# Patient Record
Sex: MALE | Race: WHITE | NOT HISPANIC OR LATINO | Employment: OTHER | URBAN - METROPOLITAN AREA
[De-identification: names, ages, dates, MRNs, and addresses within clinical notes are randomized per-mention and may not be internally consistent; named-entity substitution may affect disease eponyms.]

---

## 2017-01-20 ENCOUNTER — ALLSCRIPTS OFFICE VISIT (OUTPATIENT)
Dept: OTHER | Facility: OTHER | Age: 67
End: 2017-01-20

## 2017-01-30 ENCOUNTER — ALLSCRIPTS OFFICE VISIT (OUTPATIENT)
Dept: OTHER | Facility: OTHER | Age: 67
End: 2017-01-30

## 2017-03-06 ENCOUNTER — ALLSCRIPTS OFFICE VISIT (OUTPATIENT)
Dept: OTHER | Facility: OTHER | Age: 67
End: 2017-03-06

## 2017-03-06 DIAGNOSIS — Z00.00 ENCOUNTER FOR GENERAL ADULT MEDICAL EXAMINATION WITHOUT ABNORMAL FINDINGS: ICD-10-CM

## 2017-03-16 ENCOUNTER — ALLSCRIPTS OFFICE VISIT (OUTPATIENT)
Dept: OTHER | Facility: OTHER | Age: 67
End: 2017-03-16

## 2017-06-05 ENCOUNTER — ALLSCRIPTS OFFICE VISIT (OUTPATIENT)
Dept: OTHER | Facility: OTHER | Age: 67
End: 2017-06-05

## 2017-06-05 DIAGNOSIS — S39.012A STRAIN OF MUSCLE, FASCIA AND TENDON OF LOWER BACK, INITIAL ENCOUNTER: ICD-10-CM

## 2017-06-05 DIAGNOSIS — Z00.00 ENCOUNTER FOR GENERAL ADULT MEDICAL EXAMINATION WITHOUT ABNORMAL FINDINGS: ICD-10-CM

## 2017-06-07 ENCOUNTER — GENERIC CONVERSION - ENCOUNTER (OUTPATIENT)
Dept: OTHER | Facility: OTHER | Age: 67
End: 2017-06-07

## 2017-06-07 ENCOUNTER — APPOINTMENT (OUTPATIENT)
Dept: PHYSICAL THERAPY | Facility: CLINIC | Age: 67
End: 2017-06-07
Payer: MEDICARE

## 2017-06-07 PROCEDURE — G8991 OTHER PT/OT GOAL STATUS: HCPCS

## 2017-06-07 PROCEDURE — 97162 PT EVAL MOD COMPLEX 30 MIN: CPT

## 2017-06-07 PROCEDURE — G8990 OTHER PT/OT CURRENT STATUS: HCPCS

## 2017-06-07 PROCEDURE — 97140 MANUAL THERAPY 1/> REGIONS: CPT

## 2017-06-09 ENCOUNTER — APPOINTMENT (OUTPATIENT)
Dept: PHYSICAL THERAPY | Facility: CLINIC | Age: 67
End: 2017-06-09
Payer: MEDICARE

## 2017-06-09 PROCEDURE — 97014 ELECTRIC STIMULATION THERAPY: CPT

## 2017-06-09 PROCEDURE — 97140 MANUAL THERAPY 1/> REGIONS: CPT

## 2017-06-14 ENCOUNTER — APPOINTMENT (OUTPATIENT)
Dept: PHYSICAL THERAPY | Facility: CLINIC | Age: 67
End: 2017-06-14
Payer: MEDICARE

## 2017-06-14 PROCEDURE — 97014 ELECTRIC STIMULATION THERAPY: CPT

## 2017-06-14 PROCEDURE — 97140 MANUAL THERAPY 1/> REGIONS: CPT

## 2017-06-14 PROCEDURE — 97110 THERAPEUTIC EXERCISES: CPT

## 2017-06-16 ENCOUNTER — APPOINTMENT (OUTPATIENT)
Dept: PHYSICAL THERAPY | Facility: CLINIC | Age: 67
End: 2017-06-16
Payer: MEDICARE

## 2017-06-16 PROCEDURE — 97140 MANUAL THERAPY 1/> REGIONS: CPT

## 2017-06-16 PROCEDURE — 97014 ELECTRIC STIMULATION THERAPY: CPT

## 2017-06-16 PROCEDURE — 97110 THERAPEUTIC EXERCISES: CPT

## 2017-06-21 ENCOUNTER — APPOINTMENT (OUTPATIENT)
Dept: PHYSICAL THERAPY | Facility: CLINIC | Age: 67
End: 2017-06-21
Payer: MEDICARE

## 2017-06-21 PROCEDURE — 97140 MANUAL THERAPY 1/> REGIONS: CPT

## 2017-06-21 PROCEDURE — 97014 ELECTRIC STIMULATION THERAPY: CPT

## 2017-06-21 PROCEDURE — 97110 THERAPEUTIC EXERCISES: CPT

## 2017-06-23 ENCOUNTER — APPOINTMENT (OUTPATIENT)
Dept: PHYSICAL THERAPY | Facility: CLINIC | Age: 67
End: 2017-06-23
Payer: MEDICARE

## 2017-06-23 PROCEDURE — 97014 ELECTRIC STIMULATION THERAPY: CPT

## 2017-06-23 PROCEDURE — 97140 MANUAL THERAPY 1/> REGIONS: CPT

## 2017-06-23 PROCEDURE — 97110 THERAPEUTIC EXERCISES: CPT

## 2017-06-28 ENCOUNTER — APPOINTMENT (OUTPATIENT)
Dept: PHYSICAL THERAPY | Facility: CLINIC | Age: 67
End: 2017-06-28
Payer: MEDICARE

## 2017-06-28 PROCEDURE — 97110 THERAPEUTIC EXERCISES: CPT

## 2017-06-28 PROCEDURE — 97140 MANUAL THERAPY 1/> REGIONS: CPT

## 2017-06-28 PROCEDURE — 97014 ELECTRIC STIMULATION THERAPY: CPT

## 2017-06-30 ENCOUNTER — APPOINTMENT (OUTPATIENT)
Dept: PHYSICAL THERAPY | Facility: CLINIC | Age: 67
End: 2017-06-30
Payer: MEDICARE

## 2017-06-30 PROCEDURE — 97140 MANUAL THERAPY 1/> REGIONS: CPT

## 2017-06-30 PROCEDURE — 97014 ELECTRIC STIMULATION THERAPY: CPT

## 2017-06-30 PROCEDURE — 97110 THERAPEUTIC EXERCISES: CPT

## 2017-07-05 ENCOUNTER — APPOINTMENT (OUTPATIENT)
Dept: PHYSICAL THERAPY | Facility: CLINIC | Age: 67
End: 2017-07-05
Payer: MEDICARE

## 2017-07-05 PROCEDURE — 97014 ELECTRIC STIMULATION THERAPY: CPT

## 2017-07-05 PROCEDURE — 97140 MANUAL THERAPY 1/> REGIONS: CPT

## 2017-07-05 PROCEDURE — 97110 THERAPEUTIC EXERCISES: CPT

## 2017-07-07 ENCOUNTER — APPOINTMENT (OUTPATIENT)
Dept: PHYSICAL THERAPY | Facility: CLINIC | Age: 67
End: 2017-07-07
Payer: MEDICARE

## 2017-07-12 ENCOUNTER — APPOINTMENT (OUTPATIENT)
Dept: PHYSICAL THERAPY | Facility: CLINIC | Age: 67
End: 2017-07-12
Payer: MEDICARE

## 2017-07-12 PROCEDURE — 97110 THERAPEUTIC EXERCISES: CPT

## 2017-07-12 PROCEDURE — 97014 ELECTRIC STIMULATION THERAPY: CPT

## 2017-07-12 PROCEDURE — 97140 MANUAL THERAPY 1/> REGIONS: CPT

## 2017-07-14 ENCOUNTER — APPOINTMENT (OUTPATIENT)
Dept: PHYSICAL THERAPY | Facility: CLINIC | Age: 67
End: 2017-07-14
Payer: MEDICARE

## 2017-07-19 ENCOUNTER — APPOINTMENT (OUTPATIENT)
Dept: PHYSICAL THERAPY | Facility: CLINIC | Age: 67
End: 2017-07-19
Payer: MEDICARE

## 2017-07-21 ENCOUNTER — APPOINTMENT (OUTPATIENT)
Dept: PHYSICAL THERAPY | Facility: CLINIC | Age: 67
End: 2017-07-21
Payer: MEDICARE

## 2017-08-08 ENCOUNTER — ALLSCRIPTS OFFICE VISIT (OUTPATIENT)
Dept: OTHER | Facility: OTHER | Age: 67
End: 2017-08-08

## 2017-08-11 ENCOUNTER — GENERIC CONVERSION - ENCOUNTER (OUTPATIENT)
Dept: OTHER | Facility: OTHER | Age: 67
End: 2017-08-11

## 2017-11-06 ENCOUNTER — GENERIC CONVERSION - ENCOUNTER (OUTPATIENT)
Dept: OTHER | Facility: OTHER | Age: 67
End: 2017-11-06

## 2017-11-06 ENCOUNTER — ALLSCRIPTS OFFICE VISIT (OUTPATIENT)
Dept: OTHER | Facility: OTHER | Age: 67
End: 2017-11-06

## 2017-11-06 DIAGNOSIS — I10 ESSENTIAL (PRIMARY) HYPERTENSION: ICD-10-CM

## 2017-11-06 DIAGNOSIS — E78.5 HYPERLIPIDEMIA: ICD-10-CM

## 2017-11-06 DIAGNOSIS — R73.09 OTHER ABNORMAL GLUCOSE: ICD-10-CM

## 2017-11-06 DIAGNOSIS — Z12.5 ENCOUNTER FOR SCREENING FOR MALIGNANT NEOPLASM OF PROSTATE: ICD-10-CM

## 2017-11-06 DIAGNOSIS — K21.9 GASTRO-ESOPHAGEAL REFLUX DISEASE WITHOUT ESOPHAGITIS: ICD-10-CM

## 2018-01-12 VITALS
BODY MASS INDEX: 36.96 KG/M2 | DIASTOLIC BLOOD PRESSURE: 70 MMHG | OXYGEN SATURATION: 94 % | HEART RATE: 85 BPM | HEIGHT: 71 IN | SYSTOLIC BLOOD PRESSURE: 120 MMHG | WEIGHT: 264 LBS

## 2018-01-12 VITALS
SYSTOLIC BLOOD PRESSURE: 136 MMHG | BODY MASS INDEX: 36.4 KG/M2 | HEIGHT: 71 IN | DIASTOLIC BLOOD PRESSURE: 62 MMHG | OXYGEN SATURATION: 89 % | HEART RATE: 72 BPM | WEIGHT: 260 LBS

## 2018-01-12 VITALS
BODY MASS INDEX: 35.84 KG/M2 | HEART RATE: 59 BPM | OXYGEN SATURATION: 97 % | SYSTOLIC BLOOD PRESSURE: 120 MMHG | DIASTOLIC BLOOD PRESSURE: 72 MMHG | WEIGHT: 256 LBS | HEIGHT: 71 IN

## 2018-01-12 VITALS
WEIGHT: 258 LBS | OXYGEN SATURATION: 98 % | BODY MASS INDEX: 36.12 KG/M2 | HEART RATE: 62 BPM | HEIGHT: 71 IN | TEMPERATURE: 97.1 F | SYSTOLIC BLOOD PRESSURE: 136 MMHG | DIASTOLIC BLOOD PRESSURE: 82 MMHG

## 2018-01-14 VITALS
WEIGHT: 271 LBS | HEART RATE: 76 BPM | HEIGHT: 71 IN | OXYGEN SATURATION: 98 % | SYSTOLIC BLOOD PRESSURE: 142 MMHG | DIASTOLIC BLOOD PRESSURE: 80 MMHG | BODY MASS INDEX: 37.94 KG/M2

## 2018-01-14 VITALS
DIASTOLIC BLOOD PRESSURE: 84 MMHG | OXYGEN SATURATION: 95 % | WEIGHT: 275 LBS | HEIGHT: 71 IN | SYSTOLIC BLOOD PRESSURE: 128 MMHG | HEART RATE: 70 BPM | BODY MASS INDEX: 38.5 KG/M2

## 2018-01-22 VITALS
OXYGEN SATURATION: 96 % | HEART RATE: 80 BPM | SYSTOLIC BLOOD PRESSURE: 100 MMHG | DIASTOLIC BLOOD PRESSURE: 74 MMHG | WEIGHT: 233 LBS | BODY MASS INDEX: 32.62 KG/M2 | HEIGHT: 71 IN

## 2018-01-23 NOTE — PROGRESS NOTES
Assessment   1  Encounter for preventive health examination (V70 0) (Z00 00)  2  Hypertension (401 9) (I10)  3  Acute asthmatic bronchitis (493 90) (J40 902)    Plan  Acute asthmatic bronchitis    · Start: DrRx Zithromax Z-Mesfin 250 MG #6 pill pack; 2 TABLETS PO TODAY AND ONE  TABLET PO DAILY THEREAFTER FOR 4 DAYS   · Start: Ventolin  (90 Base) MCG/ACT Inhalation Aerosol Solution; INHALE 2  PUFFS EVERY 4 HOURS AS NEEDED  Elevated glucose    · (1) HEMOGLOBIN A1C; Status:Active; Requested EEN:[id number];   GERD without esophagitis    · (1) CBC/PLT/DIFF; Status:Active; Requested UUW:[id number];   Hyperlipidemia    · (1) LIPID PANEL FASTING W DIRECT LDL REFLEX; Status:Active; Requested  OXI:[id number];   Hypertension    · Stop: Amlodipine Besylate-Valsartan 5-160 MG Oral Tablet   · Start: Valsartan 160 MG Oral Tablet; TAKE 1 TABLET ONCE DAILY   · (1) COMPREHENSIVE METABOLIC PANEL; Status:Active; Requested UYD:[id number];    · (1) URINALYSIS (will reflex a microscopy if leukocytes, occult blood, protein or nitrites  are not within normal limits); Status:Active; Requested MUQ:[id number];   PMH: Encounter for prostate cancer screening, Hyperlipidemia    · (1) PSA (SCREEN) (Dx V76 44 Screen for Prostate Cancer); Status:Active; Requested  SKA:[id number];     Discussion/Summary    Prevention: Breonna Echavarria was reminded that he is due for a 5 year colonoscopy last performed on July 23, 2012 and I wrote him reminders for him to contact his Gastroenterology group  I also wrote him a reminder that he is due for an annual dermatology screening visit and he will make his own appointment  He had a flu shot today, reports previous Pneumovax and Zostavax, and I recommended Prevnar booster but he declines but may consider it next visit  He is up-to-date on eye care and dental care without active problems      He continues to lose substantial amounts of weight slowly and steadily on a reduced calorie diet and a vigorous exercise program with excellent exercise tolerance, no chest pain or shortness of breath and his goal is to achieve weight below 200 lb  He was encouraged to continue his significant lifestyle improvement  Hypertension with orthostatic hypotension: Home blood pressure readings are time are showing systolic blood pressures around 100 to 106 associated with orthostatic lightheadedness  Plan is to discontinue amlodipine 5 mg daily while continuing valsartan 160 mg once daily and to come in in 1 week for a blood pressure check performed by nursing and I will review results and further advise  Asthmatic bronchitis: Zithromax Z-Mesfin and Ventolin meter dose inhaler were prescribed and I asked him to start these medicines right away, expect prompt improvement, call not significantly better in 2 days or for other problems  Chief Complaint  pt is here for physical      History of Present Illness  HPI: Myke Danielson is here for his annual preventive examination and has lost an additional 17 lb since his last visit here on August 8th, through continuing his healthy exercise program, which is mainly aerobic in nature, and markedly improved diet  Muscular cramps after exercise, that occurred after increasing protein intake have resolved by increasing fluid intake after he call about this problem  His goal to achieve weight of 200 lb or below ultimately  He is also noticing orthostatic lightheadedness and home blood pressure readings are x1 363 to 861 systolic with blood pressures running low  He has not adjusted his blood pressure medicines on his own  Two days ago he developed cough and wheezing, brown sputum production without fever, chills, any does have mild dyspnea at rest and with exertion  He denies sore throat or sinus congestion or any ear congestion or pain  We reviewed preventive care and he is due for flu shot which was given and Prevnar which she declines but may consider next visit   His last colonoscopy was in  and he is on a 5 year recall list and is due and was referred back to his Gastroenterology group  He does follow-up with Urology and takes tamsulosin does have erectile dysfunction, and takes Viagra as needed  He is due for annual lab work which was ordered at this time  Review of systems: As above, all others negative  Welcome to [employer] and Wellness Visits: The patient is being seen for the subsequent annual wellness visit  Medicare Screening and Risk Factors   Hospitalizations: he has been previously hospitalizied and 1  Medicare Screening Tests Risk Questions   Abdominal aortic aneurysm risk assessment: over 72years of age  Osteoporosis risk assessment: none indicated  HIV risk assessment: none indicated  Drug and Alcohol Use: The patient is a former cigarette smoker  The patient reports frequent alcohol use  He has never used illicit drugs  Diet and Physical Activity: Current diet includes well balanced meals, low fat food choices, low carbohydrate food choices, low salt food choices, 1 servings of fruit per day, 1 servings of vegetables per day, 1 servings of whole grains per day, 1 servings of simple carbohydrates per day and 2 cups of coffee per day  He exercises daily  Exercise: walking, strength training 35 minutes per day  Mood Disorder and Cognitive Impairment Screenin  He denies feeling down, depressed, or hopeless over the past two weeks1   He denies feeling little interest or pleasure in doing things over the past two weeks1   Cognitive impairment screenin  denies difficulty learning/retaining new information1 , denies difficulty handling complex tasks1 , denies difficulty with reasoning1 , denies difficulty with spatial ability and orientation1 , denies difficulty with language1  and denies difficulty with behavior1   Functional Ability/Level of Safety:1  Hearing is1  normal bilaterally1   He denies hearing difficulties1  1  He does not use a hearing aid  1   The patient is currently1  able to do activities of daily living without limitations1 , able to do instrumental activities of daily living without limitations1 , able to participate in social activities without limitations1  and able to drive without limitations1   Activities of daily living details:1  does not need help using the phone1 , no transportation help needed1 , does not need help shopping1 , no meal preparation help needed1 , does not need help doing housework1 , does not need help doing laundry1 , does not need help managing medications1  and does not need help managing money1   Fall risk factors: 1  The patient fell 0 times in the past 12 months  1   Home safety risk factors: 1  no unfamiliar surroundings1 , no loose rugs1 , no poor household lighting1 , no uneven floors1 , no household clutter1 , grab bars in the bathroom1  and handrails on the stairs1   Advance Directives:1  Advance directives: 1  living will1  and durable power of  for health care directives1   Co-Managers and Medical Equipment/Suppliers: See Patient Care Team   Preventive Quality Program 65 and Older: Falls Risk:1  The patient fell 0 times in the past 12 months  1   The patient is currently asymptomatic1  Symptoms Include: 1    The patient currently has no urinary incontinence symptoms1   Date of last glaucoma screen was1  [address] [address] Amended By: Tabitha Espinoza; Nov 06 2017 4:31 PM EST    Active Problems   1  Anxiety (300 00) (F41 9)  2  BPH with obstruction/lower urinary tract symptoms (600 01,599 69) (N40 1,N13 8)  3  Elevated glucose (790 29) (R73 09)  4  Erectile dysfunction of non-organic origin (302 72) (F52 21)  5  GERD without esophagitis (530 81) (K21 9)  6  Hyperlipidemia (272 4) (E78 5)  7  Hypertension (401 9) (I10)  8  Mechanical low back pain (724 2) (M54 5)  9  Morbid obesity (278 01) (E66 01)  10  Obstructive sleep apnea (327 23) (G47 33)  11  Osteoarthritis (715 90) (M19 90)  12   Psoriasis (696  1) (L40 9)  13  Right bundle branch block (426 4) (I45 10)    Past Medical History    · History of Closed fracture of distal end of fibula with routine healing, unspecified fracture  morphology, unspecified laterality, subsequent encounter (V54 16) (E61 651Q)   · Encounter for screening colonoscopy (V76 51) (Z12 11)    The active problems and past medical history were reviewed and updated today  Surgical History    · History of Arthroscopy Shoulder Left    The surgical history was reviewed and updated today  Family History  Mother    · Family history of arthritis (V17 7) (Z82 61)   · Family history of hypertension (V17 49) (Z82 49)   · Family history of irritable bowel syndrome (V18 59) (Z83 79)   · Family history of osteoporosis (V17 81) (Z82 62)  Maternal Half Brother    · Family history of arthritis (V17 7) (Z82 61)    The family history was reviewed and updated today  Social History    · Being A Social Drinker   · Never A Smoker   · Denied: History of Tobacco Use  The social history was reviewed and updated today  Current Meds  1  ALPRAZolam 0 5 MG Oral Tablet; take 1 tablet by mouth every 8 hours if needed for   anxiety; Therapy: 07OUI9469 to (Evaluate:14Mar2017)  Requested for: 45Bny5436; Last   Rx:75Zda7439 Ordered  2  Amlodipine Besylate-Valsartan 5-160 MG Oral Tablet; take 1 tablet by mouth once daily; Therapy: 04CMS7867 to (Evaluate:11Nov2017)  Requested for: 65YZX1520; Last   Rx:06Dom7023 Ordered  3  Amoxicillin 500 MG Oral Capsule; TAKE 4 CAPSULES 1 HOUR PRIOR TO   PROCEDURE; Therapy: 57FXN4251 to (Evaluate:22Tab5796)  Requested for: 92WDJ8454; Last   Rx:05Jun2017 Ordered  4  Cialis 20 MG Oral Tablet; take 1 tablet by mouth daily; Therapy: 72LPO7752 to (Evaluate:13Nov2017)  Requested for: 85TGG7866; Last   Rx:38Fbn0470 Ordered  5  Cyclobenzaprine HCl - 10 MG Oral Tablet; TAKE 1 TABLET AT BEDTIME;    Therapy: 20Uhw0414 to (Evaluate:10Oct2017)  Requested for: 14QCM2694; Last   Rx:52Naa9081 Ordered  6  Flomax 0 4 MG Oral Capsule; TAKE 2 CAPSULES PO QHS; Therapy: 18Gem9202 to (Evaluate:04Mar2018)  Requested for: 28DOD5708; Last   Rx:09Mar2017 Ordered  7  Fluocinonide 0 05 % External Solution; APPLY SPARINGLY TO AFFECTED AREA(S)   TWICE DAILY; Therapy: 93BQI3790 to (Last Rx:63Luv3080)  Requested for: 14WKE2820 Ordered  8  Meloxicam 7 5 MG Oral Tablet; take 1 tablet twice a day as needed; Therapy: 81KRY1343 to (Evaluate:49Evd0134)  Requested for: 29CYG7914; Last   Rx:67Urb5782 Ordered  9  Omeprazole 20 MG Oral Capsule Delayed Release; take 1 capsule by mouth once daily; Therapy: 99LLK9684 to (Evaluate:75Gqu3564)  Requested for: 70Rbw0368; Last   Rx:11Aug2017 Ordered  10  RaNITidine HCl - 150 MG Oral Tablet; TAKE 1 TABLET Every twelve hours; Therapy: 52XDZ6274 to (Evaluate:17Djq7127)  Requested for: 72NZV5650; Last    Rx:66Agv9428 Ordered  11  Tamsulosin HCl - 0 4 MG Oral Capsule; take 2 capsules by mouth at bedtime; Therapy: 23ULM4418 to (Evaluate:11Nov2017)  Requested for: 14VEL5245; Last    Rx:05Ttt0214 Ordered  12  Viagra 100 MG Oral Tablet; TAKE 1 TABLET DAILY 1 HOUR BEFORE NEEDED; Therapy: 79APK1282 to (Evaluate:19Nov2017)  Requested for: 0499 52 06 34; Last    Rx:45Amq7725 Ordered    The medication list was reviewed and updated today  Allergies   1  Shellfish-derived Products    Immunizations   1 2 3    Influenza  09-Axel-2014 05-Nov-2015 24-Oct-2016    Tdap  14-May-2015      Zoster  24-Jan-2014       Vitals  Signs    Heart Rate: 80  Systolic: 331  Diastolic: 74  Height: 5 ft 11 in  Weight: 233 lb   BMI Calculated: 32 5  BSA Calculated: 2 25  O2 Saturation: 96    Physical Exam    Constitutional   General appearance: No acute distress, well appearing and well nourished  Head and Face   Head and face: Normal     Palpation of the face and sinuses: No sinus tenderness  Eyes   Conjunctiva and lids: No erythema, swelling or discharge      Pupils and irises: Equal, round, reactive to light  Ears, Nose, Mouth, and Throat   External inspection of ears and nose: Normal     Otoscopic examination: Tympanic membranes translucent with normal light reflex  Canals patent without erythema  Hearing: Normal     Nasal mucosa, septum, and turbinates: Normal without edema or erythema  Lips, teeth, and gums: Normal, good dentition  Oropharynx: Normal with no erythema, edema, exudate or lesions  Neck   Neck: Supple, symmetric, trachea midline, no masses  Thyroid: Normal, no thyromegaly  Pulmonary   Respiratory effort: No increased work of breathing or signs of respiratory distress  Percussion of chest: Normal   Diffuse inspiratory and expiratory wheezing without stridor, with slight prolongation of expiratory phase of respiration  There are no rales or focal decreased breath sounds  Cardiovascular   Auscultation of heart: Normal rate and rhythm, normal S1 and S2, no murmurs  Carotid pulses: 2+ bilaterally  Abdominal aorta: Normal     Pedal pulses: 2+ bilaterally  Peripheral vascular exam: Normal     Examination of extremities for edema and/or varicosities: Normal     Abdomen   Abdomen: Non-tender, no masses  Liver and spleen: No hepatomegaly or splenomegaly  Examination for hernias: No hernias appreciated  Lymphatic   Palpation of lymph nodes in neck: No lymphadenopathy  Palpation of lymph nodes in other areas: No lymphadenopathy  Musculoskeletal   Gait and station: Normal     Inspection/palpation of digits and nails: Normal without clubbing or cyanosis  Inspection/palpation of joints, bones, and muscles: Normal     Range of motion: Normal     Stability: Normal     Muscle strength/tone: Normal     Skin Some some related skin damage including hyper trick [place] and some premature aging of the skin, no rash or skin malignancy is noted  Neurologic   Cranial nerves: Cranial nerves 2-12 intact  Cortical function: Normal mental status  Sensation: No sensory loss  Coordination: Normal finger to nose and heel to shin  Psychiatric   Judgment and insight: Normal     Orientation to person, place and time: Normal     Recent and remote memory: Intact      Mood and affect: Normal        Signatures   Electronically signed by : LING Florence ; Nov 6 2017  4:47PM EST                       (Author)

## 2018-03-12 ENCOUNTER — OFFICE VISIT (OUTPATIENT)
Dept: INTERNAL MEDICINE CLINIC | Facility: CLINIC | Age: 68
End: 2018-03-12
Payer: MEDICARE

## 2018-03-12 VITALS
BODY MASS INDEX: 32.62 KG/M2 | HEART RATE: 73 BPM | TEMPERATURE: 98.1 F | HEIGHT: 71 IN | OXYGEN SATURATION: 95 % | SYSTOLIC BLOOD PRESSURE: 122 MMHG | WEIGHT: 233 LBS | DIASTOLIC BLOOD PRESSURE: 60 MMHG

## 2018-03-12 DIAGNOSIS — E66.9 OBESITY (BMI 30.0-34.9): ICD-10-CM

## 2018-03-12 DIAGNOSIS — I10 ESSENTIAL HYPERTENSION: Primary | ICD-10-CM

## 2018-03-12 DIAGNOSIS — M19.90 OSTEOARTHRITIS: ICD-10-CM

## 2018-03-12 DIAGNOSIS — R73.09 ELEVATED GLUCOSE: ICD-10-CM

## 2018-03-12 PROBLEM — E66.01 MORBID OBESITY (HCC): Status: ACTIVE | Noted: 2017-08-08

## 2018-03-12 PROBLEM — E66.811 OBESITY (BMI 30.0-34.9): Status: ACTIVE | Noted: 2017-08-08

## 2018-03-12 PROCEDURE — 99214 OFFICE O/P EST MOD 30 MIN: CPT | Performed by: INTERNAL MEDICINE

## 2018-03-12 RX ORDER — TAMSULOSIN HYDROCHLORIDE 0.4 MG/1
2 CAPSULE ORAL DAILY
COMMUNITY
Start: 2011-04-16 | End: 2018-08-02 | Stop reason: SDUPTHER

## 2018-03-12 RX ORDER — SILDENAFIL 100 MG/1
1 TABLET, FILM COATED ORAL DAILY PRN
COMMUNITY
Start: 2017-10-26 | End: 2018-05-21 | Stop reason: ALTCHOICE

## 2018-03-12 RX ORDER — FLUOCINONIDE TOPICAL SOLUTION USP, 0.05% 0.5 MG/ML
1 SOLUTION TOPICAL 2 TIMES DAILY
COMMUNITY
Start: 2015-09-28 | End: 2018-03-12 | Stop reason: ALTCHOICE

## 2018-03-12 RX ORDER — FEXOFENADINE HCL AND PSEUDOEPHEDRINE HCI 180; 240 MG/1; MG/1
1 TABLET, EXTENDED RELEASE ORAL
COMMUNITY
End: 2019-06-18

## 2018-03-12 RX ORDER — MELOXICAM 7.5 MG/1
1 TABLET ORAL 2 TIMES DAILY PRN
COMMUNITY
Start: 2017-06-05 | End: 2018-03-12 | Stop reason: ALTCHOICE

## 2018-03-12 RX ORDER — ALPRAZOLAM 0.5 MG/1
1 TABLET ORAL EVERY 8 HOURS PRN
COMMUNITY
Start: 2017-01-20 | End: 2018-04-04 | Stop reason: ALTCHOICE

## 2018-03-12 RX ORDER — CYCLOBENZAPRINE HCL 10 MG
1 TABLET ORAL DAILY
COMMUNITY
Start: 2011-08-25 | End: 2018-11-19 | Stop reason: ALTCHOICE

## 2018-03-12 RX ORDER — VALSARTAN 80 MG/1
40 TABLET ORAL DAILY
COMMUNITY
Start: 2017-11-06 | End: 2018-05-21 | Stop reason: SDUPTHER

## 2018-03-12 RX ORDER — VALSARTAN 80 MG/1
80 TABLET ORAL
COMMUNITY
End: 2018-03-12 | Stop reason: SDUPTHER

## 2018-03-12 RX ORDER — RANITIDINE 150 MG/1
1 TABLET ORAL EVERY 12 HOURS
COMMUNITY
Start: 2016-07-15 | End: 2018-03-12 | Stop reason: ALTCHOICE

## 2018-03-12 RX ORDER — OMEPRAZOLE 20 MG/1
1 CAPSULE, DELAYED RELEASE ORAL DAILY
COMMUNITY
Start: 2012-08-14 | End: 2018-12-25 | Stop reason: SDUPTHER

## 2018-03-12 RX ORDER — TAMSULOSIN HYDROCHLORIDE 0.4 MG/1
0.4 CAPSULE ORAL
COMMUNITY
End: 2018-03-12 | Stop reason: ALTCHOICE

## 2018-03-12 RX ORDER — ALBUTEROL SULFATE 90 UG/1
2 AEROSOL, METERED RESPIRATORY (INHALATION) EVERY 4 HOURS PRN
COMMUNITY
Start: 2017-11-06 | End: 2018-03-12 | Stop reason: ALTCHOICE

## 2018-03-12 RX ORDER — OMEPRAZOLE 10 MG/1
10 CAPSULE, DELAYED RELEASE ORAL
COMMUNITY
End: 2019-06-18

## 2018-03-12 NOTE — PROGRESS NOTES
HPI:  Carol Bahena returns today for a 4 month checkup  He continues to follow a very healthy diet and exercises 6 days a week, combination aerobic activity and weight each day  He continues to have excellent exercise tolerance  He has a history of morbid obesity and has lost more than 80 lb  His weight is the same as last visit and his weight was 9 lb less than today in early February, but his diet was not sustainable and not healthy on review  He has since did adopted a very healthy and sustainable diet  His goal is still to reduce his weight to 200 lb  He reports that he did have his lab work performed after his last visit as part of his annual exam, and we did not received results and they will be requested  He reports that he did receive a report from the lab and that labs were very good  Hypertension is well controlled on low-dose valsartan 80 mg daily with no orthostatic lightheadedness and we reviewed his home readings which are averaging in the mid 873Y systolic and 97U diastolic  He has no symptoms of elevated glucose with previous history of pre diabetes, much improved in the last year with weight reduction  He has occasional musculoskeletal complaints but has not had any problems that have caused him to stop exercising  Physical exam:  Vital signs stable with regular pulse  Weight is unchanged from last time and BMI is now 32  No JVD  Lungs clear cardiac exam is normal   There is no peripheral edema  Assessment and plan:    Essential hypertension:  Continue home monitoring of blood pressure on valsartan 80 mg daily with plan to call if systolic blood pressure start averaging consistently below 120  His goal was to further reduce his weight, through exercise and diet, to treat hypertension without medication  Reassess in 3 months      Obesity:  He is now Re classified out of the morbid obesity range, with 2 readings with BMI of 32, goal to continue healthy lifestyle for long run and continue to lose weight  Reassess in 3 months  Glucose intolerance:  Check lab work from November, and if next hemoglobin A1c is in the normal range, Re classify patient has no longer having glucose intolerance  Osteoarthritis:  Symptoms are slowly decreasing, especially in the low back region with weight reduction  He is avoiding analgesics

## 2018-03-12 NOTE — PATIENT INSTRUCTIONS
Continue to monitor blood pressure and if your systolic blood pressures start averaging systole below 120, please call the office and we will consider stopping valsartan at that time  Please continue your long-term lifestyle improvements, and your next visit will be in 3 months

## 2018-03-29 ENCOUNTER — TELEPHONE (OUTPATIENT)
Dept: INTERNAL MEDICINE CLINIC | Facility: CLINIC | Age: 68
End: 2018-03-29

## 2018-03-31 NOTE — TELEPHONE ENCOUNTER
Kristine Philippe will need to come in for a quick visit to discuss, then we'll send a note to [facility]

## 2018-04-02 NOTE — TELEPHONE ENCOUNTER
Patient scheduled to see you on Monday  He will try to get the results of his study from pulmonary associate

## 2018-04-04 ENCOUNTER — OFFICE VISIT (OUTPATIENT)
Dept: INTERNAL MEDICINE CLINIC | Facility: CLINIC | Age: 68
End: 2018-04-04
Payer: MEDICARE

## 2018-04-04 VITALS
TEMPERATURE: 97.5 F | WEIGHT: 228 LBS | HEIGHT: 71 IN | OXYGEN SATURATION: 97 % | SYSTOLIC BLOOD PRESSURE: 120 MMHG | RESPIRATION RATE: 18 BRPM | HEART RATE: 73 BPM | DIASTOLIC BLOOD PRESSURE: 78 MMHG | BODY MASS INDEX: 31.92 KG/M2

## 2018-04-04 DIAGNOSIS — J01.90 ACUTE BACTERIAL SINUSITIS: ICD-10-CM

## 2018-04-04 DIAGNOSIS — B96.89 ACUTE BACTERIAL SINUSITIS: ICD-10-CM

## 2018-04-04 DIAGNOSIS — G47.33 OBSTRUCTIVE SLEEP APNEA: Primary | ICD-10-CM

## 2018-04-04 PROCEDURE — 99213 OFFICE O/P EST LOW 20 MIN: CPT | Performed by: INTERNAL MEDICINE

## 2018-04-04 RX ORDER — AMOXICILLIN 875 MG/1
875 TABLET, COATED ORAL 2 TIMES DAILY
Qty: 14 TABLET | Refills: 0 | Status: SHIPPED | OUTPATIENT
Start: 2018-04-04 | End: 2018-04-11

## 2018-04-04 NOTE — PROGRESS NOTES
Assessment/Plan:       Diagnoses and all orders for this visit:    Obstructive sleep apnea    Acute bacterial sinusitis  -     amoxicillin (AMOXIL) 875 mg tablet; Take 1 tablet (875 mg total) by mouth 2 (two) times a day for 7 days        BARB: Diagnosed in 2007  He is in need of a new machine  Settings for the CPAP are   12 cm of pressure  He will continue therapy as directed  In terms of his current symptoms, his right ear does reveal a middle ear effusion which may be viral in nature  However, he has already started therapy with Amoxicillin and will complete this  We discussed that he should also use Flonase for symptom relief  Otherwise no other changes were made  Subjective:      Patient ID: Mallory Clifford is a 78 y o  male  [place] is here today for 2 things  First, he reports right sided ear pain with some associated sinus symptoms and increased congestion  He reports that it has been present for 3-4 days  Note ROS below  He does report having amoxicillin to use prior to dental visits and did start this  Denies recent swimming or travel  He also needs a new script for a CPAP machine  See discussion  Earache    There is pain in the right ear  This is a new problem  The current episode started in the past 7 days  The problem has been gradually worsening  There has been no fever  The pain is moderate  Associated symptoms include coughing  Pertinent negatives include no ear discharge, headaches, hearing loss, rhinorrhea or sore throat  He has tried nothing for the symptoms  The treatment provided no relief  The following portions of the patient's history were reviewed and updated as appropriate: current medications, past family history, past medical history, past social history, past surgical history and problem list     Review of Systems   HENT: Positive for ear pain  Negative for ear discharge, hearing loss, rhinorrhea and sore throat  Respiratory: Positive for cough      Neurological: Negative for headaches  Objective:      /78 (BP Location: Left arm, Patient Position: Sitting, Cuff Size: Large)   Pulse 73   Temp 97 5 °F (36 4 °C) (Tympanic)   Resp 18   Ht 5' 11" (1 803 m)   Wt 103 kg (228 lb)   SpO2 97%   BMI 31 80 kg/m²          Physical Exam   Constitutional: He is oriented to person, place, and time  He appears well-developed and well-nourished  HENT:   Head: Normocephalic and atraumatic  Right Ear: A middle ear effusion is present  Eyes: Conjunctivae are normal    Cardiovascular: Normal rate, regular rhythm and normal heart sounds  Pulmonary/Chest: Effort normal and breath sounds normal  No respiratory distress  Neurological: He is alert and oriented to person, place, and time  Skin: Skin is dry  Psychiatric: He has a normal mood and affect   His behavior is normal  Judgment and thought content normal

## 2018-05-15 ENCOUNTER — TELEPHONE (OUTPATIENT)
Dept: INTERNAL MEDICINE CLINIC | Facility: CLINIC | Age: 68
End: 2018-05-15

## 2018-05-16 NOTE — TELEPHONE ENCOUNTER
Patient will be in Monday evening, in the meantime I did encourage he see the urologist ( one of the reasons he gets up at night is nocturia) so he will be in to see Dr Milena Camacho this Friday

## 2018-05-17 ENCOUNTER — OFFICE VISIT (OUTPATIENT)
Dept: UROLOGY | Facility: MEDICAL CENTER | Age: 68
End: 2018-05-17
Payer: MEDICARE

## 2018-05-17 VITALS
HEIGHT: 71 IN | BODY MASS INDEX: 31.08 KG/M2 | WEIGHT: 222 LBS | SYSTOLIC BLOOD PRESSURE: 120 MMHG | DIASTOLIC BLOOD PRESSURE: 73 MMHG

## 2018-05-17 DIAGNOSIS — N40.1 BPH WITH URINARY OBSTRUCTION: ICD-10-CM

## 2018-05-17 DIAGNOSIS — N13.8 BPH WITH URINARY OBSTRUCTION: ICD-10-CM

## 2018-05-17 DIAGNOSIS — N20.0 CALCULUS OF KIDNEY: Primary | ICD-10-CM

## 2018-05-17 DIAGNOSIS — R35.1 NOCTURIA: ICD-10-CM

## 2018-05-17 LAB
POST-VOID RESIDUAL VOLUME, ML POC: 25 ML
SL AMB  POCT GLUCOSE, UA: ABNORMAL
SL AMB LEUKOCYTE ESTERASE,UA: ABNORMAL
SL AMB POCT BILIRUBIN,UA: ABNORMAL
SL AMB POCT BLOOD,UA: ABNORMAL
SL AMB POCT CLARITY,UA: CLEAR
SL AMB POCT COLOR,UA: YELLOW
SL AMB POCT KETONES,UA: ABNORMAL
SL AMB POCT NITRITE,UA: ABNORMAL
SL AMB POCT PH,UA: 7
SL AMB POCT SPECIFIC GRAVITY,UA: 1.01
SL AMB POCT URINE PROTEIN: ABNORMAL
SL AMB POCT UROBILINOGEN: 0.2

## 2018-05-17 PROCEDURE — 99204 OFFICE O/P NEW MOD 45 MIN: CPT | Performed by: UROLOGY

## 2018-05-17 PROCEDURE — 51798 US URINE CAPACITY MEASURE: CPT | Performed by: UROLOGY

## 2018-05-17 PROCEDURE — 81003 URINALYSIS AUTO W/O SCOPE: CPT | Performed by: UROLOGY

## 2018-05-17 RX ORDER — VALSARTAN 80 MG/1
40 TABLET ORAL
COMMUNITY
End: 2018-07-18 | Stop reason: SDUPTHER

## 2018-05-17 RX ORDER — TADALAFIL 20 MG
20 TABLET ORAL DAILY
Refills: 0 | COMMUNITY
Start: 2018-05-10 | End: 2018-05-21 | Stop reason: ALTCHOICE

## 2018-05-17 RX ORDER — BETAMETHASONE VALERATE 1.2 MG/G
AEROSOL, FOAM TOPICAL
Refills: 0 | COMMUNITY
Start: 2018-04-05 | End: 2018-11-19 | Stop reason: ALTCHOICE

## 2018-05-17 RX ORDER — OXYBUTYNIN CHLORIDE 5 MG/1
TABLET ORAL
Qty: 60 TABLET | Refills: 3 | Status: SHIPPED | OUTPATIENT
Start: 2018-05-17 | End: 2018-08-30 | Stop reason: SDUPTHER

## 2018-05-17 NOTE — PROGRESS NOTES
Assessment/Plan:  1  Unclear cause of much more nighttime symptoms then daytime  He thinks Flomax helps but asked about other medicines  2   He should have cystoscopy, but is very reluctant without sedation  3   Trial of oxybutynin 5-10 mg Q evening, warned about side effects  4   Follow-up in 6 or 8 weeks, he will bring a  with him in case we want to do cysto with light IV sedation  No problem-specific Assessment & Plan notes found for this encounter  Diagnoses and all orders for this visit:    Calculus of kidney  -     POCT urine dip auto non-scope  -     POCT Measure PVR    Nocturia  -     POCT Measure PVR  -     oxybutynin (DITROPAN) 5 mg tablet; 1 j- 2 tablets before bed    BPH with urinary obstruction    Other orders  -     Acetaminophen 325 MG CAPS; Take 650 mg by mouth every 6 (six) hours  -     betamethasone valerate (LUXIQ) 0 12 % foam; apply topically to scalp once daily for 2 weeks if needed for FLARES  -     CIALIS 20 MG tablet; Take 20 mg by mouth daily  -     valsartan (DIOVAN) 80 mg tablet; Take 40 mg by mouth          Subjective:      Patient ID: Cherie Cooper is a 78 y o  male  1   Many years of voiding dysfunction  We saw him in 2011, started Flomax for nocturia, urgency, somewhat slower flow  Since that time he has been on varying dosages, currently taking 2 Flomax daily  He thinks helps overall, but still has nocturia x6  Tired from getting not much sleep  He has been on CPAP machine for  many years, he does not think that  sleep apnea is any part of his nocturia problem  Minimal daytime symptoms, decent flow and emptying  Drink lots of fluids during the daytime, nothing after 8:00 p m   No burning, incontinence, blood or infections      2   Small renal calculus on CT scan 2016, no symptoms        The following portions of the patient's history were reviewed and updated as appropriate: allergies, current medications, past family history, past medical history, past social history, past surgical history and problem list     Review of Systems   All other systems reviewed and are negative  Objective:      /73 (BP Location: Left arm, Patient Position: Sitting)   Ht 5' 11" (1 803 m)   Wt 101 kg (222 lb)   BMI 30 96 kg/m²          Physical Exam   Constitutional: He is oriented to person, place, and time  He appears well-developed and well-nourished  No distress  HENT:   Head: Normocephalic and atraumatic  Eyes: Conjunctivae are normal    Cardiovascular: Normal rate and regular rhythm  Pulmonary/Chest: Effort normal and breath sounds normal  No respiratory distress  He has no wheezes  Abdominal: Soft  Bowel sounds are normal  He exhibits no distension and no mass  There is no tenderness  Genitourinary: Testes normal and penis normal    Genitourinary Comments: Prostate mildly enlarged no nodules    PSA 0 3 in November 2017   Neurological: He is alert and oriented to person, place, and time  Skin: Skin is warm and dry  He is not diaphoretic

## 2018-05-21 ENCOUNTER — OFFICE VISIT (OUTPATIENT)
Dept: INTERNAL MEDICINE CLINIC | Facility: CLINIC | Age: 68
End: 2018-05-21
Payer: MEDICARE

## 2018-05-21 VITALS
BODY MASS INDEX: 31.08 KG/M2 | OXYGEN SATURATION: 97 % | WEIGHT: 222 LBS | HEIGHT: 71 IN | HEART RATE: 64 BPM | SYSTOLIC BLOOD PRESSURE: 126 MMHG | DIASTOLIC BLOOD PRESSURE: 62 MMHG

## 2018-05-21 DIAGNOSIS — R73.09 ELEVATED GLUCOSE: ICD-10-CM

## 2018-05-21 DIAGNOSIS — F52.21 ERECTILE DYSFUNCTION OF NON-ORGANIC ORIGIN: ICD-10-CM

## 2018-05-21 DIAGNOSIS — F51.01 PRIMARY INSOMNIA: ICD-10-CM

## 2018-05-21 DIAGNOSIS — E78.01 FAMILIAL HYPERCHOLESTEROLEMIA: Primary | ICD-10-CM

## 2018-05-21 DIAGNOSIS — I10 ESSENTIAL HYPERTENSION: ICD-10-CM

## 2018-05-21 DIAGNOSIS — IMO0002 ANTIBIOTIC PROPHYLAXIS FOR DENTAL PROCEDURE INDICATED DUE TO PRIOR JOINT REPLACEMENT: Primary | ICD-10-CM

## 2018-05-21 PROBLEM — B96.89 ACUTE BACTERIAL SINUSITIS: Status: RESOLVED | Noted: 2018-04-04 | Resolved: 2018-05-21

## 2018-05-21 PROBLEM — R35.1 NOCTURIA: Status: RESOLVED | Noted: 2018-05-17 | Resolved: 2018-05-21

## 2018-05-21 PROBLEM — J01.90 ACUTE BACTERIAL SINUSITIS: Status: RESOLVED | Noted: 2018-04-04 | Resolved: 2018-05-21

## 2018-05-21 PROCEDURE — 99214 OFFICE O/P EST MOD 30 MIN: CPT | Performed by: INTERNAL MEDICINE

## 2018-05-21 RX ORDER — AMOXICILLIN 500 MG/1
500 CAPSULE ORAL EVERY 8 HOURS SCHEDULED
Qty: 4 CAPSULE | Refills: 0 | Status: SHIPPED | OUTPATIENT
Start: 2018-05-21 | End: 2018-07-11 | Stop reason: SDUPTHER

## 2018-05-21 RX ORDER — ZOLPIDEM TARTRATE 5 MG/1
5 TABLET ORAL
Qty: 30 TABLET | Refills: 2 | Status: SHIPPED | OUTPATIENT
Start: 2018-05-21 | End: 2018-05-29 | Stop reason: SDUPTHER

## 2018-05-21 RX ORDER — SILDENAFIL CITRATE 20 MG/1
TABLET ORAL
Qty: 20 TABLET | Refills: 5 | Status: SHIPPED | OUTPATIENT
Start: 2018-05-21 | End: 2018-11-19 | Stop reason: ALTCHOICE

## 2018-05-21 NOTE — PATIENT INSTRUCTIONS
Please continue your improve lifestyle  Please follow-up as planned with your urologist and bring her lab work with you to this visit, May 21st   Your next visit will be in 6 months and please call sooner for any problems

## 2018-05-21 NOTE — PROGRESS NOTES
Assessment/Plan   Problem List Items Addressed This Visit     Elevated glucose    Erectile dysfunction of non-organic origin    Relevant Medications    sildenafil (REVATIO) 20 mg tablet    zolpidem (AMBIEN) 5 mg tablet    Hyperlipidemia - Primary    Primary insomnia    Relevant Medications    zolpidem (AMBIEN) 5 mg tablet      Elevated glucose:  Sustained improvement in lifestyle has normalized his hemoglobin A1c, currently of 5 7 with fasting glucose of 95 on May 21st of this year  His goals to continue lose weight, and continue work with his nutritionist and   Follow-up will be in 6 months and hemoglobin A1c and fasting glucose be repeated  Erectile dysfunction:  Plan is trial of low-dose sildenafil 40 mg as needed  Cialis was discontinued because of high cost     Hyperlipidemia:  May 21st lipids are optimal as discussed with patient today, with total cholesterol 204, HDL 65, cholesterol HDL ratio 3 14 and  with triglycerides 55 with substantial improvement in lifestyle which he was encouraged to continue  Primary insomnia:  He is having significant difficulty sleeping, not withstanding problems with prostatic symptoms, and did respond well to Ambien in the past   He is unable sleep without medication as he has classic primary insomnia  This is despite optimizing sleep hygiene, and lifestyle  Currently there is no associated anxiety or depression as well  Ambien will be restarted as needed  Hypertension:  Continue valsartan 80 mg daily and blood pressure is optimal with optimal lifestyle  Reassess in 6 months  Prostate cancer screening:  PSA is optimal at 0 38 active follow-up continues with Urology, including for symptomatic prostate muscular dysfunction with trial of Myrbetriq, with poor response to oxybutynin in combination with tamsulosin with active follow-up with Dr Wandy Rhodes his urologist   Odilon Higuera has opted against any cystoscopy      Subjective   Patient ID: Emma Tipton Gabbie King is a 78 y o  male  Vitals:    05/21/18 1801   BP: 126/62   Pulse: 64   SpO2: 97%     HPI   Classyayo Rivera is here for regular checkup  He exercises vigorously, going to the gym twice a day as optimize his diet  He is seeing a   Weight is declining slowly and steadily and BMI is now 31  We discussed these just outside the range of overweight category set of obesity  His goals have a BMI below 26  He feels great when he exercises including no chest pain or shortness of breath or declining exercise tolerance  Hypertension is well controlled with lifestyle medication, no orthostatic lightheadedness or edema  Hyperlipidemia is also controlled with lifestyle improvement  Labs of May 21st reviewed including lipid panel  We also reviewed normal CBC, optimal CMP including glucose of 95, hemoglobin A1c of 5 6, below the cut off for glucose intolerance for the first time in years, and PSA of 0 38, and negative urinalysis  He is encouraged by his labs and overall health improvements  He still struggling with symptoms from his prostate enlargement, unresponsive to oxybutynin for overactive bladder or to tamsulosin for prostatic enlargement  He is now being tried on Myrbetriq by Urology with follow-up planned  He has primary insomnia and tried to avoid prescription sleep medicines, including optimal sleep hygiene, trial of melatonin, optimizing lifestyle, and also he has through his  Anxiety after his break-up with his girlfriend  There is no depression or anxiety  He will need to restart Ambien is he is not sleeping  The following portions of the patient's history were reviewed and updated as appropriate: allergies, current medications, past family history, past medical history, past social history, past surgical history and problem list     Review of Systems    Objective   Physical Exam   Vital signs stable, regular pulse in no distress    He appears much more physically fit and healthy  Mood is very good  No JVD  Lungs clear cardiac exam is normal   Skin without pallor or icterus  There is no peripheral edema and peripheral circulation is intact

## 2018-05-22 ENCOUNTER — TELEPHONE (OUTPATIENT)
Dept: INTERNAL MEDICINE CLINIC | Facility: CLINIC | Age: 68
End: 2018-05-22

## 2018-05-22 NOTE — TELEPHONE ENCOUNTER
Please explain to Maira Ferro that he will not qualify for prior authorization approval, so his only options are to pay out-of-pocket or go back to Cialis or Brand name Viagra and pay out of pocket

## 2018-05-22 NOTE — TELEPHONE ENCOUNTER
Neither: 2, 20 mg tablets at the same time for a total dose of 40 mg per day (this is for the Sildenafil 20 mg rx I sent in)  For Viagra, the dose would be 50 mg once a day

## 2018-05-25 ENCOUNTER — TELEPHONE (OUTPATIENT)
Dept: INTERNAL MEDICINE CLINIC | Facility: CLINIC | Age: 68
End: 2018-05-25

## 2018-05-29 DIAGNOSIS — F51.01 PRIMARY INSOMNIA: ICD-10-CM

## 2018-05-30 RX ORDER — ZOLPIDEM TARTRATE 10 MG/1
10 TABLET ORAL
Qty: 30 TABLET | Refills: 5 | Status: SHIPPED | OUTPATIENT
Start: 2018-05-30 | End: 2018-06-04 | Stop reason: SDUPTHER

## 2018-06-04 ENCOUNTER — OFFICE VISIT (OUTPATIENT)
Dept: INTERNAL MEDICINE CLINIC | Facility: CLINIC | Age: 68
End: 2018-06-04
Payer: MEDICARE

## 2018-06-04 VITALS
HEART RATE: 68 BPM | BODY MASS INDEX: 32.06 KG/M2 | SYSTOLIC BLOOD PRESSURE: 122 MMHG | OXYGEN SATURATION: 98 % | DIASTOLIC BLOOD PRESSURE: 70 MMHG | HEIGHT: 71 IN | WEIGHT: 229 LBS

## 2018-06-04 DIAGNOSIS — G47.33 OBSTRUCTIVE SLEEP APNEA: Primary | ICD-10-CM

## 2018-06-04 DIAGNOSIS — F51.01 PRIMARY INSOMNIA: ICD-10-CM

## 2018-06-04 PROCEDURE — 99213 OFFICE O/P EST LOW 20 MIN: CPT | Performed by: INTERNAL MEDICINE

## 2018-06-04 RX ORDER — ZOLPIDEM TARTRATE 10 MG/1
10 TABLET ORAL
Qty: 30 TABLET | Refills: 2 | Status: SHIPPED | OUTPATIENT
Start: 2018-06-04 | End: 2018-12-05 | Stop reason: SDUPTHER

## 2018-06-04 NOTE — PROGRESS NOTES
Assessment/Plan   Problem List Items Addressed This Visit     Obstructive sleep apnea - Primary    Primary insomnia    Relevant Medications    zolpidem (AMBIEN) 10 mg tablet      Albaro Alvares is doing very well on long-term CPAP therapy at 12 cm water, for obstructive sleep apnea  His compliance seems excellent  Plans continue current treatment indefinitely  Subjective   Patient ID: Becca Mo is a 78 y o  male  This is a visit regarding assessment of treatment for sleep apnea  Vitals:    06/04/18 1737   BP: 122/70   Pulse: 68   SpO2: 98%     HPI   Sp feels well  He is here for evaluation of long-term therapy for obstructive sleep apnea with CPAP  His CPAP setting is at 12 cm of water a reports feeling well rested when he wakes up, and uses CPAP faithfully  He reports no decline in efficacy over time  Primary insomnia is responding well to Ambien at the 10 mg dose but not so well at the 5 mg dose  There is no effect of Ambien therapy on how he feels on CPAP therapy in regards to treating sleep apnea  The following portions of the patient's history were reviewed and updated as appropriate: allergies, current medications, past family history, past medical history, past social history, past surgical history and problem list   Patient Active Problem List   Diagnosis    Anxiety    BPH with urinary obstruction    Elevated glucose    Erectile dysfunction of non-organic origin    GERD without esophagitis    Hyperlipidemia    Hypertension    Obesity (BMI 30 0-34  9)    Obstructive sleep apnea    Osteoarthritis    Psoriasis    RBBB    Calculus of kidney    Primary insomnia     Current Outpatient Prescriptions:     Acetaminophen 325 MG CAPS, Take 650 mg by mouth every 6 (six) hours, Disp: , Rfl:     betamethasone valerate (LUXIQ) 0 12 % foam, apply topically to scalp once daily for 2 weeks if needed for FLARES, Disp: , Rfl: 0    cyclobenzaprine (FLEXERIL) 10 mg tablet, Take 1 tablet by mouth daily, Disp: , Rfl:     fexofenadine-pseudoephedrine (ALLEGRA-D 24) 180-240 MG per 24 hr tablet, Take 1 tablet by mouth, Disp: , Rfl:     omeprazole (PriLOSEC) 10 mg delayed release capsule, Take 10 mg by mouth, Disp: , Rfl:     omeprazole (PriLOSEC) 20 mg delayed release capsule, Take 1 capsule by mouth daily, Disp: , Rfl:     oxybutynin (DITROPAN) 5 mg tablet, 1 j- 2 tablets before bed, Disp: 60 tablet, Rfl: 3    sildenafil (REVATIO) 20 mg tablet, Two tablets orally as needed once daily, Disp: 20 tablet, Rfl: 5    tamsulosin (FLOMAX) 0 4 mg, Take 2 capsules by mouth daily, Disp: , Rfl:     valsartan (DIOVAN) 80 mg tablet, Take 40 mg by mouth, Disp: , Rfl:     zolpidem (AMBIEN) 10 mg tablet, Take 1 tablet (10 mg total) by mouth daily at bedtime as needed for sleep, Disp: 30 tablet, Rfl: 2    Review of Systems no daytime somnolence, no fatigue  Objective   Physical Exam   Vital signs stable  He appears well and in no distress  Nasal passages, oral cavity and throat are normal   There is no JVD   Lungs are clear and the cardiac exam is normal

## 2018-06-27 RX ORDER — TRAMADOL HYDROCHLORIDE 50 MG/1
50 TABLET ORAL EVERY 12 HOURS
COMMUNITY
Start: 2018-06-25 | End: 2018-07-25

## 2018-06-28 ENCOUNTER — TELEPHONE (OUTPATIENT)
Dept: UROLOGY | Facility: AMBULATORY SURGERY CENTER | Age: 68
End: 2018-06-28

## 2018-06-28 RX ORDER — ALPRAZOLAM 0.5 MG/1
TABLET, EXTENDED RELEASE ORAL
Qty: 1 TABLET | Refills: 0 | Status: SHIPPED | OUTPATIENT
Start: 2018-06-28 | End: 2018-11-19 | Stop reason: ALTCHOICE

## 2018-06-28 NOTE — TELEPHONE ENCOUNTER
Patient called said since change in medication has not been having any issues  Patient does not want to have cysto done but not sure if the doctor would still like to see him   [phone]

## 2018-06-28 NOTE — TELEPHONE ENCOUNTER
Returned call and pt stated that he doesn't want or need cystoscopy but will keep appt to talk to Dr Ion More

## 2018-07-02 ENCOUNTER — PROCEDURE VISIT (OUTPATIENT)
Dept: UROLOGY | Facility: MEDICAL CENTER | Age: 68
End: 2018-07-02
Payer: MEDICARE

## 2018-07-02 VITALS
DIASTOLIC BLOOD PRESSURE: 82 MMHG | BODY MASS INDEX: 32.14 KG/M2 | SYSTOLIC BLOOD PRESSURE: 132 MMHG | WEIGHT: 217 LBS | HEIGHT: 69 IN

## 2018-07-02 DIAGNOSIS — N13.8 BPH WITH OBSTRUCTION/LOWER URINARY TRACT SYMPTOMS: Primary | ICD-10-CM

## 2018-07-02 DIAGNOSIS — N40.1 BPH WITH OBSTRUCTION/LOWER URINARY TRACT SYMPTOMS: Primary | ICD-10-CM

## 2018-07-02 LAB
SL AMB  POCT GLUCOSE, UA: ABNORMAL
SL AMB LEUKOCYTE ESTERASE,UA: ABNORMAL
SL AMB POCT BILIRUBIN,UA: ABNORMAL
SL AMB POCT BLOOD,UA: ABNORMAL
SL AMB POCT CLARITY,UA: CLEAR
SL AMB POCT COLOR,UA: YELLOW
SL AMB POCT KETONES,UA: ABNORMAL
SL AMB POCT NITRITE,UA: ABNORMAL
SL AMB POCT PH,UA: 5.5
SL AMB POCT SPECIFIC GRAVITY,UA: 1.03
SL AMB POCT URINE PROTEIN: ABNORMAL
SL AMB POCT UROBILINOGEN: 0.2

## 2018-07-02 PROCEDURE — 81003 URINALYSIS AUTO W/O SCOPE: CPT | Performed by: UROLOGY

## 2018-07-02 PROCEDURE — 99213 OFFICE O/P EST LOW 20 MIN: CPT | Performed by: UROLOGY

## 2018-07-02 NOTE — PROGRESS NOTES
Assessment/Plan:   Much improved, no need for cysto at present    PSA quite low last fall, 0 37    Follow-up one year     Diagnoses and all orders for this visit:    BPH with obstruction/lower urinary tract symptoms  -     POCT urine dip auto non-scope    Other orders  -     traMADol (ULTRAM) 50 mg tablet; Take 50 mg by mouth every 12 (twelve) hours  -     TIZANIDINE HCL PO; Take by mouth          Subjective:     Patient ID: Cherie Cooper is a 78 y o  male  Nocturia much improved on oxybutynin 10 mg each night  Dry mouth, but he can tolerate that  Only getting up twice at night now, compared to six I am before  Review of Systems   All other systems reviewed and are negative  Objective:     Physical Exam   Constitutional: He appears well-nourished

## 2018-07-11 DIAGNOSIS — IMO0002 ANTIBIOTIC PROPHYLAXIS FOR DENTAL PROCEDURE INDICATED DUE TO PRIOR JOINT REPLACEMENT: ICD-10-CM

## 2018-07-11 RX ORDER — AMOXICILLIN 500 MG/1
CAPSULE ORAL
Qty: 4 CAPSULE | Refills: 0 | Status: SHIPPED | OUTPATIENT
Start: 2018-07-11 | End: 2018-07-12

## 2018-07-18 DIAGNOSIS — I10 HYPERTENSION, UNSPECIFIED TYPE: Primary | ICD-10-CM

## 2018-07-18 RX ORDER — VALSARTAN 80 MG/1
40 TABLET ORAL DAILY
Qty: 30 TABLET | Refills: 2 | Status: SHIPPED | OUTPATIENT
Start: 2018-07-18 | End: 2018-07-27 | Stop reason: ALTCHOICE

## 2018-07-18 NOTE — TELEPHONE ENCOUNTER
Mary Clarke is calling with concerns about one of his medications  He states that he has seen an article about certain manufacturers of Valsartin having carcinogenics in their pills  His current pharmacy confirmed that the  they use is one of them that may contain such  He is asking to have a script printed that he can take to other pharmacies until he can find one that does not  He would like a call back to let him know when he can pick the scrip up    Thanks

## 2018-07-26 DIAGNOSIS — I10 HYPERTENSION, UNSPECIFIED TYPE: Primary | ICD-10-CM

## 2018-07-27 RX ORDER — LOSARTAN POTASSIUM 100 MG/1
50 TABLET ORAL DAILY
Qty: 30 TABLET | Refills: 1 | Status: SHIPPED | OUTPATIENT
Start: 2018-07-27 | End: 2018-08-30 | Stop reason: SDUPTHER

## 2018-07-27 NOTE — TELEPHONE ENCOUNTER
Pt aware, I will send in a thirty day supply, he was advised to come in sometime next week for a bp check

## 2018-08-02 DIAGNOSIS — N40.1 BENIGN PROSTATIC HYPERPLASIA WITH LOWER URINARY TRACT SYMPTOMS, SYMPTOM DETAILS UNSPECIFIED: Primary | ICD-10-CM

## 2018-08-02 RX ORDER — TAMSULOSIN HYDROCHLORIDE 0.4 MG/1
CAPSULE ORAL
Qty: 540 CAPSULE | Refills: 0 | Status: SHIPPED | OUTPATIENT
Start: 2018-08-02 | End: 2019-04-25 | Stop reason: SDUPTHER

## 2018-08-30 DIAGNOSIS — I10 HYPERTENSION, UNSPECIFIED TYPE: ICD-10-CM

## 2018-08-30 DIAGNOSIS — R35.1 NOCTURIA: ICD-10-CM

## 2018-08-30 RX ORDER — OXYBUTYNIN CHLORIDE 5 MG/1
TABLET ORAL
Qty: 60 TABLET | Refills: 3 | Status: SHIPPED | OUTPATIENT
Start: 2018-08-30 | End: 2018-11-19 | Stop reason: ALTCHOICE

## 2018-08-30 RX ORDER — LOSARTAN POTASSIUM 50 MG/1
50 TABLET ORAL DAILY
Qty: 30 TABLET | Refills: 5 | Status: SHIPPED | OUTPATIENT
Start: 2018-08-30 | End: 2018-11-19 | Stop reason: ALTCHOICE

## 2018-08-30 NOTE — TELEPHONE ENCOUNTER
Judy Contreras recently notice that he was taking his Losartan incorrectly  He never split the pill in half  So first, he is out of meds and second he said that pharmacist said that these can be ordered for the dose he needs and wont have to split in half  Can you please call him back to let him know the status    Thanks

## 2018-09-12 ENCOUNTER — TELEPHONE (OUTPATIENT)
Dept: INTERNAL MEDICINE CLINIC | Facility: CLINIC | Age: 68
End: 2018-09-12

## 2018-09-12 DIAGNOSIS — G89.29 CHRONIC BACK PAIN, UNSPECIFIED BACK LOCATION, UNSPECIFIED BACK PAIN LATERALITY: Primary | ICD-10-CM

## 2018-09-12 DIAGNOSIS — M54.9 CHRONIC BACK PAIN, UNSPECIFIED BACK LOCATION, UNSPECIFIED BACK PAIN LATERALITY: Primary | ICD-10-CM

## 2018-09-14 ENCOUNTER — EVALUATION (OUTPATIENT)
Dept: PHYSICAL THERAPY | Facility: MEDICAL CENTER | Age: 68
End: 2018-09-14
Payer: MEDICARE

## 2018-09-14 DIAGNOSIS — M54.9 CHRONIC BACK PAIN, UNSPECIFIED BACK LOCATION, UNSPECIFIED BACK PAIN LATERALITY: ICD-10-CM

## 2018-09-14 DIAGNOSIS — G89.29 CHRONIC BACK PAIN, UNSPECIFIED BACK LOCATION, UNSPECIFIED BACK PAIN LATERALITY: ICD-10-CM

## 2018-09-14 PROCEDURE — 97161 PT EVAL LOW COMPLEX 20 MIN: CPT | Performed by: PHYSICAL THERAPIST

## 2018-09-14 PROCEDURE — G8978 MOBILITY CURRENT STATUS: HCPCS | Performed by: PHYSICAL THERAPIST

## 2018-09-14 PROCEDURE — G8979 MOBILITY GOAL STATUS: HCPCS | Performed by: PHYSICAL THERAPIST

## 2018-09-14 NOTE — PROGRESS NOTES
PT Evaluation     Today's date: 2018  Patient name: Josephine Shah  : 1950  MRN: [id number]  Referring provider: Allison Guillermo MD  Dx:   Encounter Diagnosis     ICD-10-CM    1  Chronic back pain, unspecified back location, unspecified back pain laterality M54 9 Ambulatory referral to Physical Therapy    G89 29                   Assessment  Impairments: abnormal muscle firing, abnormal muscle tone, abnormal or restricted ROM, activity intolerance, impaired physical strength, lacks appropriate home exercise program, pain with function and poor body mechanics    Assessment details: Josephine Shah is a pleasant 78 y o  male presents with complaints of low back pain and L scapular pain   No further referral appears necessary at this time  Patient presents with paraspinal muscular spasm secondary to poor lumbopelvic mobility and mobility deficits in L hip  Transverse and multifidus activation is poor  L sided cervical mobility deficits present which likely contribute to recurrent levator scap spasm  Skilled PT services appropriate to facilitate return to prior level of function with transition to home exercise program for independent management when appropriate  Understanding of Dx/Px/POC: good   Prognosis: good    Goals  Patient will successfully transition to home exercise program   Patient will be able to manage symptoms independently      Patient will improve sleep quality to 6-8 hours nightly  Exercise routine will be altered to avoid over-exertion   Patient will report decrease need for injection frequency   FOTO score to improve above MDIC by time of DC     Plan  Patient would benefit from: skilled PT  Referral necessary: No  Planned modality interventions: thermotherapy: hydrocollator packs  Planned therapy interventions: home exercise program, manual therapy, neuromuscular re-education, patient education, functional ROM exercises, strengthening, stretching, joint mobilization, graded activity, graded exercise, therapeutic exercise, body mechanics training, motor coordination training and activity modification  Frequency: 2x week  Duration in weeks: 12  Treatment plan discussed with: patient        Subjective Evaluation    History of Present Illness  Mechanism of injury: Cara Iglesias is a 78 y o  male presenting to therapy with complaints of low back pain  He reports long history of low back pain which has been marginally responsive to various treatments including PT, Chiropractic, massage  He has been receiving injections into paraspinals for the last year and a half which has provided the most benefit although he needs to return every 3 months as the effect diminishes  He is very active in gym 3 hours per day X 6 days per week  Morning is the worst until he is able to move around and loosen up    Notes he is one week away from next injection and has high pain levels in low back as well as trigger point in L scapular region   Pain  Current pain ratin  At best pain rating: 3  At worst pain ratin  Quality: dull ache, sharp and tight    Treatments  Previous treatment: chiropractic, injection treatment, medication, physical therapy and massage  Patient Goals  Patient goals for therapy: decreased pain  Patient goal: Decrease reliance on injections, improved sleep quality, less pain in morning         Objective   Lumbar hinge at L5/S1 with hypomobility in surrounding areas  L glute min spasm with restricted hip mobility   Lumbar paraspinal muscular spasm  Poor L illum rotation   Inability to activate TA and multifidus     Cervical L sided facet hypomobility   Levator scap muscular spasm     No red flags present  Relfex/dermatomal/myotomal testing WNL       Flowsheet Rows      Most Recent Value   PT/OT G-Codes   FOTO information reviewed  Yes   Assessment Type  Evaluation   G code set  Mobility: Walking & Moving Around   Mobility: Walking and Moving Around Current Status () CK   Mobility: Walking and Moving Around Goal Status ()  CJ          Precautions: HTN (controlled)     Daily Treatment Diary     Manual  9/14            Prone thoracic/thoraco-lumbar PA's AF            L glute min release in prone AF            Uplgides C2-C5 AF            LS release AF                             Exercise Diary                                                                                                                                                                                                                                                                                      Modalities              MHP 10' pre

## 2018-09-17 ENCOUNTER — OFFICE VISIT (OUTPATIENT)
Dept: PHYSICAL THERAPY | Facility: MEDICAL CENTER | Age: 68
End: 2018-09-17
Payer: MEDICARE

## 2018-09-17 DIAGNOSIS — G89.29 CHRONIC BACK PAIN, UNSPECIFIED BACK LOCATION, UNSPECIFIED BACK PAIN LATERALITY: Primary | ICD-10-CM

## 2018-09-17 DIAGNOSIS — M54.9 CHRONIC BACK PAIN, UNSPECIFIED BACK LOCATION, UNSPECIFIED BACK PAIN LATERALITY: Primary | ICD-10-CM

## 2018-09-17 PROCEDURE — 97110 THERAPEUTIC EXERCISES: CPT | Performed by: PHYSICAL THERAPIST

## 2018-09-17 PROCEDURE — 97140 MANUAL THERAPY 1/> REGIONS: CPT | Performed by: PHYSICAL THERAPIST

## 2018-09-18 NOTE — PROGRESS NOTES
Daily Note     Today's date: 2018  Patient name: Elly Persaud  : 1950  MRN: [id number]  Referring provider: Vilma Ham MD  Dx:   Encounter Diagnosis     ICD-10-CM    1  Chronic back pain, unspecified back location, unspecified back pain laterality M54 9     G89 29                   Subjective: Jv An reports he felt great after evaluation and then decided to go to gym  Feels he may have overdone it with elliptical and heavy weights and is having low back pain and neck pain again today       Objective: See treatment diary below  Precautions: HTN (controlled)     Daily Treatment Diary     Manual   9 17           Prone thoracic/thoraco-lumbar PA's AF AF           L glute min release in prone AF AF           Uplgides C2-C5 AF AF           LS release AF AF           L postero-lateral hip glide   AF               Exercise Diary              Foam roll on wall 5'                                                                                                                                                                                                                                                                       Modalities              MHP 10' pre  10                                         Assessment: Tolerated treatment well  Patient with bilateral paraspinal muscle spasm which responded well to manual techniques  L hip mobility present and was addressed with mobilizations today  Plan: Continue per plan of care

## 2018-09-19 ENCOUNTER — OFFICE VISIT (OUTPATIENT)
Dept: PHYSICAL THERAPY | Facility: MEDICAL CENTER | Age: 68
End: 2018-09-19
Payer: MEDICARE

## 2018-09-19 DIAGNOSIS — G89.29 CHRONIC BACK PAIN, UNSPECIFIED BACK LOCATION, UNSPECIFIED BACK PAIN LATERALITY: Primary | ICD-10-CM

## 2018-09-19 DIAGNOSIS — M54.9 CHRONIC BACK PAIN, UNSPECIFIED BACK LOCATION, UNSPECIFIED BACK PAIN LATERALITY: Primary | ICD-10-CM

## 2018-09-19 PROCEDURE — 97110 THERAPEUTIC EXERCISES: CPT | Performed by: PHYSICAL THERAPIST

## 2018-09-20 NOTE — PROGRESS NOTES
Daily Note     Today's date: 2018  Patient name: Kiesha Souza  : 1950  MRN: [id number]  Referring provider: Kasey Miles MD  Dx:   Encounter Diagnosis     ICD-10-CM    1  Chronic back pain, unspecified back location, unspecified back pain laterality M54 9     G89 29                   Subjective: Cheryl Cooper reports that he is doing well overall and notes improvements  Continues to have flare ups of pain and is concerned that workout routine may be contributing  Objective: See treatment diary below  Precautions: HTN (controlled)     Daily Treatment Diary     Manual   9           Prone thoracic/thoraco-lumbar PA's AF AF AF          L glute min release in prone AF AF AF          Uplgides C2-C5 AF AF AF          LS release AF AF AF          L postero-lateral hip glide   AF AF              Exercise Diary              Foam roll on wall 5'  5'                                                                                                                                                                                                                                                                     Modalities              MHP 10' pre  10 10'                                        Assessment: Tolerated treatment well  Patient responds well to spinal mobility and soft tissue release  Progress as able       Plan: Continue per plan of care

## 2018-09-21 ENCOUNTER — OFFICE VISIT (OUTPATIENT)
Dept: PHYSICAL THERAPY | Facility: MEDICAL CENTER | Age: 68
End: 2018-09-21
Payer: MEDICARE

## 2018-09-21 DIAGNOSIS — M54.9 CHRONIC BACK PAIN, UNSPECIFIED BACK LOCATION, UNSPECIFIED BACK PAIN LATERALITY: Primary | ICD-10-CM

## 2018-09-21 DIAGNOSIS — G89.29 CHRONIC BACK PAIN, UNSPECIFIED BACK LOCATION, UNSPECIFIED BACK PAIN LATERALITY: Primary | ICD-10-CM

## 2018-09-21 PROCEDURE — 97140 MANUAL THERAPY 1/> REGIONS: CPT | Performed by: PHYSICAL THERAPIST

## 2018-09-24 ENCOUNTER — OFFICE VISIT (OUTPATIENT)
Dept: PHYSICAL THERAPY | Facility: MEDICAL CENTER | Age: 68
End: 2018-09-24
Payer: MEDICARE

## 2018-09-24 DIAGNOSIS — G89.29 CHRONIC BACK PAIN, UNSPECIFIED BACK LOCATION, UNSPECIFIED BACK PAIN LATERALITY: Primary | ICD-10-CM

## 2018-09-24 DIAGNOSIS — M54.9 CHRONIC BACK PAIN, UNSPECIFIED BACK LOCATION, UNSPECIFIED BACK PAIN LATERALITY: Primary | ICD-10-CM

## 2018-09-24 PROCEDURE — 97110 THERAPEUTIC EXERCISES: CPT | Performed by: PHYSICAL THERAPIST

## 2018-09-24 PROCEDURE — 97140 MANUAL THERAPY 1/> REGIONS: CPT | Performed by: PHYSICAL THERAPIST

## 2018-09-24 NOTE — PROGRESS NOTES
Daily Note     Today's date: 2018  Patient name: Cara Iglesias  : 1950  MRN: [id number]  Referring provider: Omero Oneal MD  Dx:   Encounter Diagnosis     ICD-10-CM    1  Chronic back pain, unspecified back location, unspecified back pain laterality M54 9     G89 29                   Subjective: [facility] reports he worked out this morning and is sore in lumbar spine       Objective: See treatment diary below  Precautions: HTN (controlled)     Daily Treatment Diary     Manual          Prone thoracic/thoraco-lumbar PA's AF AF AF AF AF        L glute min release in prone AF AF AF AF AF        Uplgides C2-C5 AF AF AF AF aF        LS release AF AF AF AF aF        L postero-lateral hip glide   AF AF AF AF            Exercise Diary              Foam roll on wall 5'  5'          Mod plank on table     10 sec  X5                                                                                                                                                                                                                                                       Modalities              MHP 10' pre  10 10' 10' 10'                                      Assessment: Tolerated treatment well  Patient remains  with significant muscle spasm of paraspinals  Began additional core strengthening with good tolerance       Plan: Continue per plan of care

## 2018-09-24 NOTE — PROGRESS NOTES
Daily Note     Today's date: 2018  Patient name: Cherie Cooper  : 1950  MRN: [id number]  Referring provider: Ambar Pichardo MD  Dx:   Encounter Diagnosis     ICD-10-CM    1  Chronic back pain, unspecified back location, unspecified back pain laterality M54 9     G89 29                   Subjective: Homero Summers reports that he is doing well  Just had injections in paraspinals yesterday and since notes less pain       Objective: See treatment diary below  Precautions: HTN (controlled)     Daily Treatment Diary     Manual           Prone thoracic/thoraco-lumbar PA's AF AF AF AF         L glute min release in prone AF AF AF AF         Uplgides C2-C5 AF AF AF AF         LS release AF AF AF AF         L postero-lateral hip glide   AF AF AF             Exercise Diary              Foam roll on wall 5'  5'                                                                                                                                                                                                                                                                     Modalities              MHP 10' pre  10 10' 10'                                       Assessment: Tolerated treatment well  Patient responds well to spinal mobility and soft tissue release  Progress as able       Plan: Continue per plan of care

## 2018-09-26 ENCOUNTER — APPOINTMENT (OUTPATIENT)
Dept: PHYSICAL THERAPY | Facility: MEDICAL CENTER | Age: 68
End: 2018-09-26
Payer: MEDICARE

## 2018-09-28 ENCOUNTER — OFFICE VISIT (OUTPATIENT)
Dept: PHYSICAL THERAPY | Facility: MEDICAL CENTER | Age: 68
End: 2018-09-28
Payer: MEDICARE

## 2018-09-28 DIAGNOSIS — G89.29 CHRONIC BACK PAIN, UNSPECIFIED BACK LOCATION, UNSPECIFIED BACK PAIN LATERALITY: Primary | ICD-10-CM

## 2018-09-28 DIAGNOSIS — M54.9 CHRONIC BACK PAIN, UNSPECIFIED BACK LOCATION, UNSPECIFIED BACK PAIN LATERALITY: Primary | ICD-10-CM

## 2018-09-28 PROCEDURE — 97140 MANUAL THERAPY 1/> REGIONS: CPT

## 2018-09-28 NOTE — PROGRESS NOTES
Daily Note     Today's date: 2018  Patient name: Araceli Pena  : 1950  MRN: [id number]  Referring provider: Faith Sterling MD  Dx:   Encounter Diagnosis     ICD-10-CM    1  Chronic back pain, unspecified back location, unspecified back pain laterality M54 9     G89 29                   Subjective: Gael Robb reports that his low back and hips are still sore and he is noticing that he has increased pain after doing the eliptical for 40 minutes  Objective: See treatment diary below  Precautions: HTN (controlled)     Daily Treatment Diary     Manual         Prone thoracic/thoraco-lumbar PA's AF AF AF AF AF        L glute min release in prone AF AF AF AF AF        Uplgides C2-C5 AF AF AF AF aF        LS release AF AF AF AF aF CC STM       L postero-lateral hip glide   AF AF AF AF            Exercise Diary              Foam roll on wall 5'  5'          Mod plank on table     10 sec  X5                                                                                                                                                                                                                                                       Modalities              MHP 10' pre  10 10' 10' 10' 10'                                     Assessment: Tolerated treatment well  Patient continues with muscle spasm and tightness in his L upper trap,lev scap, lumbar paraspinals and gluteus medius B  Pt responded well to soft tissue release and noted improved mobility post treatment  Discussed altering his eliptical program to 30-35 minutes before the onset of pain and competing his cardio workout with biking        Plan: Continue per plan of care

## 2018-10-01 ENCOUNTER — OFFICE VISIT (OUTPATIENT)
Dept: PHYSICAL THERAPY | Facility: MEDICAL CENTER | Age: 68
End: 2018-10-01
Payer: MEDICARE

## 2018-10-01 DIAGNOSIS — G89.29 CHRONIC BACK PAIN, UNSPECIFIED BACK LOCATION, UNSPECIFIED BACK PAIN LATERALITY: Primary | ICD-10-CM

## 2018-10-01 DIAGNOSIS — M54.9 CHRONIC BACK PAIN, UNSPECIFIED BACK LOCATION, UNSPECIFIED BACK PAIN LATERALITY: Primary | ICD-10-CM

## 2018-10-01 PROCEDURE — 97140 MANUAL THERAPY 1/> REGIONS: CPT

## 2018-10-01 NOTE — PROGRESS NOTES
Daily Note     Today's date: 10/1/2018  Patient name: Vashti Ortega  : 1950  MRN: [id number]  Referring provider: Adolph Romo MD  Dx:   Encounter Diagnosis     ICD-10-CM    1  Chronic back pain, unspecified back location, unspecified back pain laterality M54 9     G89 29                   Subjective: Debi Higuera reports that he felt great after his last visit but he did a lot of sitting to watch games over the weekend and he got really sore from the prolonged positioning  Objective: See treatment diary below  Precautions: HTN (controlled)     Daily Treatment Diary     Manual  9/14 9 17 9/19 9/23 9/24 9/28 10/1      Prone thoracic/thoraco-lumbar PA's AF AF AF AF AF        L glute min release in prone AF AF AF AF AF        Uplgides C2-C5 AF AF AF AF aF        LS release AF AF AF AF aF CC STM CC STM      L postero-lateral hip glide   AF AF AF AF            Exercise Diary              Foam roll on wall 5'  5'          Mod plank on table     10 sec  X5                                                                                                                                                                                                                                                       Modalities              MHP 10' pre  10 10' 10' 10' 10' 10'                                    Assessment: Tolerated treatment well  Patient continues with muscle spasm and tightness in his L upper trap,lev scap, lumbar paraspinals and gluteus medius B  Pt responded well to soft tissue release and noted improved mobility post treatment  Discussed posturing and use of a lumbar support when sitting in deep chairs to promote good posture        Plan: Continue per plan of care

## 2018-10-03 ENCOUNTER — OFFICE VISIT (OUTPATIENT)
Dept: PHYSICAL THERAPY | Facility: MEDICAL CENTER | Age: 68
End: 2018-10-03
Payer: MEDICARE

## 2018-10-03 DIAGNOSIS — G89.29 CHRONIC BACK PAIN, UNSPECIFIED BACK LOCATION, UNSPECIFIED BACK PAIN LATERALITY: Primary | ICD-10-CM

## 2018-10-03 DIAGNOSIS — M54.9 CHRONIC BACK PAIN, UNSPECIFIED BACK LOCATION, UNSPECIFIED BACK PAIN LATERALITY: Primary | ICD-10-CM

## 2018-10-03 PROCEDURE — 97140 MANUAL THERAPY 1/> REGIONS: CPT

## 2018-10-03 NOTE — PROGRESS NOTES
Daily Note     Today's date: 10/3/2018  Patient name: Kary Max  : 1950  MRN: [id number]  Referring provider: Andrea Martinez MD  Dx:   Encounter Diagnosis     ICD-10-CM    1  Chronic back pain, unspecified back location, unspecified back pain laterality M54 9     G89 29                   Subjective: Ganga Nam reports that his L upper trap has been feeling great  He reports that he continues to have low back and hip soreness L>R  Objective: See treatment diary below  Precautions: HTN (controlled)     Daily Treatment Diary     Manual  9/14 9 17 9/19 9/23 9/24 9/28 10/1 10/3     Prone thoracic/thoraco-lumbar PA's AF AF AF AF AF        L glute min release in prone AF AF AF AF AF        Uplgides C2-C5 AF AF AF AF aF        LS release AF AF AF AF aF CC STM CC STM CC STM     L postero-lateral hip glide   AF AF AF AF            Exercise Diary              Foam roll on wall 5'  5'          Mod plank on table     10 sec  X5                                                                                                                                                                                                                                                       Modalities              MHP 10' pre  10 10' 10' 10' 10' 10' 10'                                     Assessment: Tolerated treatment well  Patient continues with  lumbar paraspinals and gluteus medius B (L>R)  Pt presents with decreased subjective reports of L upper trap pain today  Improved upper trap/ev scap soft tissue mobility noted  Pt continues to respond positively to treatment and would benefit form continued PT  [facility] [place] Plan: Continue per plan of care

## 2018-10-05 ENCOUNTER — APPOINTMENT (OUTPATIENT)
Dept: PHYSICAL THERAPY | Facility: MEDICAL CENTER | Age: 68
End: 2018-10-05
Payer: MEDICARE

## 2018-10-08 ENCOUNTER — OFFICE VISIT (OUTPATIENT)
Dept: PHYSICAL THERAPY | Facility: MEDICAL CENTER | Age: 68
End: 2018-10-08
Payer: MEDICARE

## 2018-10-08 DIAGNOSIS — G89.29 CHRONIC BACK PAIN, UNSPECIFIED BACK LOCATION, UNSPECIFIED BACK PAIN LATERALITY: Primary | ICD-10-CM

## 2018-10-08 DIAGNOSIS — M54.9 CHRONIC BACK PAIN, UNSPECIFIED BACK LOCATION, UNSPECIFIED BACK PAIN LATERALITY: Primary | ICD-10-CM

## 2018-10-08 PROCEDURE — 97140 MANUAL THERAPY 1/> REGIONS: CPT | Performed by: PHYSICAL THERAPIST

## 2018-10-08 NOTE — PROGRESS NOTES
Daily Note     Today's date: 10/8/2018  Patient name: Paulina Keene  : 1950  MRN: [id number]  Referring provider: Tyler Garcia MD  Dx:   Encounter Diagnosis     ICD-10-CM    1  Chronic back pain, unspecified back location, unspecified back pain laterality M54 9     G89 29                   Subjective: Maliha Otero reports that he definitely notices an improvement in rotational ability  Most pain occurs when waking up       Objective: See treatment diary below  Precautions: HTN (controlled)     Daily Treatment Diary     Manual  9/14 9 17 9/19 9/23 9/24 9/28 10/1 10/3 10/8    Prone thoracic/thoraco-lumbar PA's AF AF AF AF AF    AF    L glute min release in prone AF AF AF AF AF    AF    Uplgides C2-C5 AF AF AF AF aF    AF    LS release AF AF AF AF aF CC STM CC STM CC STM AF    L postero-lateral hip glide   AF AF AF AF    AF        Exercise Diary              Foam roll on wall 5'  5'      rev    Mod plank on table     10 sec  X5     rev                                                                                                                                                                                                                                                  Modalities              MHP 10' pre  10 10' 10' 10' 10' 10' 10'   10'                                  Assessment: Tolerated treatment well  Patient continues to show improvements in lumbar and hip mobility resulting in reduced pain during exercise and daily tasks  Progress as able      Plan: Continue per plan of care

## 2018-10-10 ENCOUNTER — OFFICE VISIT (OUTPATIENT)
Dept: PHYSICAL THERAPY | Facility: MEDICAL CENTER | Age: 68
End: 2018-10-10
Payer: MEDICARE

## 2018-10-10 DIAGNOSIS — M54.9 CHRONIC BACK PAIN, UNSPECIFIED BACK LOCATION, UNSPECIFIED BACK PAIN LATERALITY: Primary | ICD-10-CM

## 2018-10-10 DIAGNOSIS — G89.29 CHRONIC BACK PAIN, UNSPECIFIED BACK LOCATION, UNSPECIFIED BACK PAIN LATERALITY: Primary | ICD-10-CM

## 2018-10-10 PROCEDURE — 97140 MANUAL THERAPY 1/> REGIONS: CPT | Performed by: PHYSICAL THERAPIST

## 2018-10-10 NOTE — PROGRESS NOTES
PT Re-evaluation    Today's date: 2018  Patient name: Alex Salinas  : 1950  MRN: [id number]  Referring provider: Zelalem Bridges MD  Dx:   Encounter Diagnosis     ICD-10-CM    1  Chronic back pain, unspecified back location, unspecified back pain laterality M54 9 Ambulatory referral to Physical Therapy    G89 29                   Assessment  Impairments: abnormal muscle firing, abnormal muscle tone, abnormal or restricted ROM, activity intolerance, impaired physical strength, lacks appropriate home exercise program, pain with function and poor body mechanics    Assessment details: Alex Salinas is a pleasant 78 y o  male presents with complaints of low back pain and L scapular pain   No further referral appears necessary at this time  Patient presents with paraspinal muscular spasm secondary to poor lumbopelvic mobility and mobility deficits in L hip  Transverse and multifidus activation is poor  L sided cervical mobility deficits present which likely contribute to recurrent levator scap spasm  Skilled PT services appropriate to facilitate return to prior level of function with transition to home exercise program for independent management when appropriate  Understanding of Dx/Px/POC: good   Prognosis: good    Goals  Patient will successfully transition to home exercise program  - progressing   Patient will be able to manage symptoms independently    - progressing  Patient will improve sleep quality to 6-8 hours nightly - met   Exercise routine will be altered to avoid over-exertion - in progress  Patient will report decrease need for injection frequency - in progress  FOTO score to improve above MDIC by time of DC - in progress    Plan  Patient would benefit from: skilled PT  Referral necessary: No  Planned modality interventions: thermotherapy: hydrocollator packs  Planned therapy interventions: home exercise program, manual therapy, neuromuscular re-education, patient education, functional ROM exercises, strengthening, stretching, joint mobilization, graded activity, graded exercise, therapeutic exercise, body mechanics training, motor coordination training and activity modification  Frequency: 2x week  Duration in weeks: 6  Treatment plan discussed with: patient        Subjective Evaluation    History of Present Illness  Mechanism of injury: Hollie Sanders is a 78 y o  male presenting to therapy with complaints of low back pain  He reports long history of low back pain which has been marginally responsive to various treatments including PT, Chiropractic, massage  He has been receiving injections into paraspinals for the last year and a half which has provided the most benefit although he needs to return every 3 months as the effect diminishes  He is very active in gym 3 hours per day X 6 days per week  Morning is the worst until he is able to move around and loosen up  Notes he is one week away from next injection and has high pain levels in low back as well as trigger point in L scapular region     Update 10/10-  Trisha Maikelar has been compliant with PT including soft tissue and joint mobilization to lumbar and thoracic spine as well as lumbopelvic re-education  He is able to better tolerate his normal fitness routine and has modified his routine to avoid recurrent symptom irritation  He remains with recurrent lumbar spine pain due to muscular spasm and would benefit from further intervention to provide additional pain relief      Pain  Current pain rating: 3  At best pain ratin  At worst pain ratin  Quality: dull ache, sharp and tight    Treatments  Previous treatment: chiropractic, injection treatment, medication, physical therapy and massage  Patient Goals  Patient goals for therapy: decreased pain  Patient goal: Decrease reliance on injections, improved sleep quality, less pain in morning         Objective   Lumbar hinge at L5/S1 with hypomobility in surrounding areas  L glute min spasm with restricted hip mobility  - addressing via soft tissue mobilization   Lumbar paraspinal muscular spasm - addressing via mob's and exercise   Poor L illum rotation   Inability to activate TA and multifidus     Cervical L sided facet hypomobility   Levator scap muscular spasm     No red flags present  Relfex/dermatomal/myotomal testing WNL       Flowsheet Rows      Most Recent Value   PT/OT G-Codes   FOTO information reviewed  Yes   Assessment Type  Evaluation   G code set  Mobility: Walking & Moving Around   Mobility: Walking and Moving Around Current Status ()  CK   Mobility: Walking and Moving Around Goal Status ()  CJ          Precautions: HTN (controlled)     Daily Treatment Diary     Manual  10/10            Supine lumbopelvic mobilization - b/l AF            Supine lateral flexion stretch- manual AF                                                       Exercise Diary                                                                                                                                                                                                                                                                                      Modalities              MHP 10' pre

## 2018-10-12 ENCOUNTER — APPOINTMENT (OUTPATIENT)
Dept: PHYSICAL THERAPY | Facility: MEDICAL CENTER | Age: 68
End: 2018-10-12
Payer: MEDICARE

## 2018-10-15 ENCOUNTER — OFFICE VISIT (OUTPATIENT)
Dept: PHYSICAL THERAPY | Facility: MEDICAL CENTER | Age: 68
End: 2018-10-15
Payer: MEDICARE

## 2018-10-15 DIAGNOSIS — M54.9 CHRONIC BACK PAIN, UNSPECIFIED BACK LOCATION, UNSPECIFIED BACK PAIN LATERALITY: Primary | ICD-10-CM

## 2018-10-15 DIAGNOSIS — G89.29 CHRONIC BACK PAIN, UNSPECIFIED BACK LOCATION, UNSPECIFIED BACK PAIN LATERALITY: Primary | ICD-10-CM

## 2018-10-15 PROCEDURE — 97140 MANUAL THERAPY 1/> REGIONS: CPT

## 2018-10-15 NOTE — PROGRESS NOTES
Daily Note     Today's date: 10/15/2018  Patient name: Abdifatah Bingham  : 1950  MRN: [id number]  Referring provider: Theodora Cordero MD  Dx:   Encounter Diagnosis     ICD-10-CM    1  Chronic back pain, unspecified back location, unspecified back pain laterality M54 9     G89 29                   Subjective: Thomas Villeda reports that he got a new mattress and his am pain is much less  He notes continued low back/hip soreness with activity  Objective: See treatment diary below  Precautions: HTN (controlled)     Daily Treatment Diary     Manual  9/14 9 17 9/19 9/23 9/24 9/28 10/1 10/3 10/8 10/15   Prone thoracic/thoraco-lumbar PA's AF AF AF AF AF    AF AF   L glute min release in prone AF AF AF AF AF    AF AF   Uplgides C2-C5 AF AF AF AF aF    AF    LS release AF AF AF AF aF CC STM CC STM CC STM AF AF/ STM CC   L postero-lateral hip glide   AF AF AF AF    AF AF       Exercise Diary              Foam roll on wall 5'  5'      rev    Mod plank on table     10 sec  X5     rev                                                                                                                                                                                                                                                  Modalities              MHP 10' pre  10 10' 10' 10' 10' 10' 10'   10' 10'                                 Assessment: Tolerated treatment well  Patient presents with decreased reports of am low back pain upon waking  Overall Sp is tolerating activity with decreased pain levels  Pt would benefit from continued PT to address mobility and postural strengthening  Plan: Continue per plan of care

## 2018-10-15 NOTE — PROGRESS NOTES
PT Re-evaluation    Today's date: 2018  Patient name: Ruben Bill  : 1950  MRN: [id number]  Referring provider: Romayne Sergeant, MD  Dx:   Encounter Diagnosis     ICD-10-CM    1  Chronic back pain, unspecified back location, unspecified back pain laterality M54 9 Ambulatory referral to Physical Therapy    G89 29                   Assessment  Impairments: abnormal muscle firing, abnormal muscle tone, abnormal or restricted ROM, activity intolerance, impaired physical strength, lacks appropriate home exercise program, pain with function and poor body mechanics    Assessment details: Ruben Bill is a pleasant 78 y o  male presents with complaints of low back pain and L scapular pain   No further referral appears necessary at this time  Patient presents with paraspinal muscular spasm secondary to poor lumbopelvic mobility and mobility deficits in L hip  Transverse and multifidus activation is poor  L sided cervical mobility deficits present which likely contribute to recurrent levator scap spasm  Skilled PT services appropriate to facilitate return to prior level of function with transition to home exercise program for independent management when appropriate  Understanding of Dx/Px/POC: good   Prognosis: good    Goals  Patient will successfully transition to home exercise program  - progressing   Patient will be able to manage symptoms independently    - progressing  Patient will improve sleep quality to 6-8 hours nightly - met   Exercise routine will be altered to avoid over-exertion - in progress  Patient will report decrease need for injection frequency - in progress  FOTO score to improve above MDIC by time of DC - in progress    Plan  Patient would benefit from: skilled PT  Referral necessary: No  Planned modality interventions: thermotherapy: hydrocollator packs  Planned therapy interventions: home exercise program, manual therapy, neuromuscular re-education, patient education, functional ROM exercises, strengthening, stretching, joint mobilization, graded activity, graded exercise, therapeutic exercise, body mechanics training, motor coordination training and activity modification  Frequency: 2x week  Duration in weeks: 6  Treatment plan discussed with: patient        Subjective Evaluation    History of Present Illness  Mechanism of injury: Marie Regan is a 78 y o  male presenting to therapy with complaints of low back pain  He reports long history of low back pain which has been marginally responsive to various treatments including PT, Chiropractic, massage  He has been receiving injections into paraspinals for the last year and a half which has provided the most benefit although he needs to return every 3 months as the effect diminishes  He is very active in gym 3 hours per day X 6 days per week  Morning is the worst until he is able to move around and loosen up  Notes he is one week away from next injection and has high pain levels in low back as well as trigger point in L scapular region     Update 10/10-  Elaine Yara has been compliant with PT including soft tissue and joint mobilization to lumbar and thoracic spine as well as lumbopelvic re-education  He is able to better tolerate his normal fitness routine and has modified his routine to avoid recurrent symptom irritation  He remains with recurrent lumbar spine pain due to muscular spasm and would benefit from further intervention to provide additional pain relief      Pain  Current pain rating: 3  At best pain ratin  At worst pain ratin  Quality: dull ache, sharp and tight    Treatments  Previous treatment: chiropractic, injection treatment, medication, physical therapy and massage  Patient Goals  Patient goals for therapy: decreased pain  Patient goal: Decrease reliance on injections, improved sleep quality, less pain in morning         Objective   Lumbar hinge at L5/S1 with hypomobility in surrounding areas  L glute min spasm with restricted hip mobility  - addressing via soft tissue mobilization   Lumbar paraspinal muscular spasm - addressing via mob's and exercise   Poor L illum rotation   Inability to activate TA and multifidus     Cervical L sided facet hypomobility   Levator scap muscular spasm     No red flags present  Relfex/dermatomal/myotomal testing WNL       Flowsheet Rows      Most Recent Value   PT/OT G-Codes   FOTO information reviewed  Yes   Assessment Type  Evaluation   G code set  Mobility: Walking & Moving Around   Mobility: Walking and Moving Around Current Status ()  CK   Mobility: Walking and Moving Around Goal Status ()  CJ          Precautions: HTN (controlled)     Daily Treatment Diary     Manual  10/10            Supine lumbopelvic mobilization - b/l AF            Supine lateral flexion stretch- manual AF                                                       Exercise Diary                                                                                                                                                                                                                                                                                      Modalities              MHP 10' pre

## 2018-10-17 ENCOUNTER — OFFICE VISIT (OUTPATIENT)
Dept: PHYSICAL THERAPY | Facility: MEDICAL CENTER | Age: 68
End: 2018-10-17
Payer: MEDICARE

## 2018-10-17 DIAGNOSIS — G89.29 CHRONIC BACK PAIN, UNSPECIFIED BACK LOCATION, UNSPECIFIED BACK PAIN LATERALITY: Primary | ICD-10-CM

## 2018-10-17 DIAGNOSIS — M54.9 CHRONIC BACK PAIN, UNSPECIFIED BACK LOCATION, UNSPECIFIED BACK PAIN LATERALITY: Primary | ICD-10-CM

## 2018-10-17 PROCEDURE — 97110 THERAPEUTIC EXERCISES: CPT | Performed by: PHYSICAL THERAPIST

## 2018-10-17 PROCEDURE — 97140 MANUAL THERAPY 1/> REGIONS: CPT | Performed by: PHYSICAL THERAPIST

## 2018-10-18 NOTE — PROGRESS NOTES
Daily Note     Today's date: 10/17/2018  Patient name: Marie Regan  : 1950  MRN: [id number]  Referring provider: Mignon Santamaria MD  Dx:   Encounter Diagnosis     ICD-10-CM    1  Chronic back pain, unspecified back location, unspecified back pain laterality M54 9     G89 29                   Subjective: Sp reports that overall he notes large improvements in pain and overall mobility of back   Notes that he has altered his workout routine as he feels this may be contributing to symptoms       Objective: See treatment diary below  Precautions: HTN (controlled)     Daily Treatment Diary     Manual  10/17 9 17 9/19 9/23 9/24 9/28 10/1 10/3 10/8 10/15   Prone thoracic/thoraco-lumbar PA's AF AF AF AF AF    AF AF   L glute min release in prone AF AF AF AF AF    AF AF   Uplgides C2-C5 AF AF AF AF aF    AF    LS release AF AF AF AF aF CC STM CC STM CC STM AF AF/ STM CC   L postero-lateral hip glide   AF AF AF AF    AF AF       Exercise Diary              Foam roll on wall 5'  5'      rev    Mod plank on table     10 sec  X5     rev    Prone over table hip extension 3X10            CLams[employer]s [employer]  3X10                                                                                                                                                                                                                                Modalities              MHP 10' pre  10 10' 10' 10' 10' 10' 10'   10' 10'                                 Assessment: Tolerated treatment well  Patient progressed with lumbopelvic stabilization and educated that this is likely contributing to ongoing symptoms  Suggested treadmill vs elliptical due to posturing  Will continue to progress and set up additional core stabilization in future sessions      Plan: Continue per plan of care

## 2018-10-19 ENCOUNTER — APPOINTMENT (OUTPATIENT)
Dept: PHYSICAL THERAPY | Facility: MEDICAL CENTER | Age: 68
End: 2018-10-19
Payer: MEDICARE

## 2018-10-24 ENCOUNTER — OFFICE VISIT (OUTPATIENT)
Dept: PHYSICAL THERAPY | Facility: MEDICAL CENTER | Age: 68
End: 2018-10-24
Payer: MEDICARE

## 2018-10-24 DIAGNOSIS — M54.9 CHRONIC BACK PAIN, UNSPECIFIED BACK LOCATION, UNSPECIFIED BACK PAIN LATERALITY: Primary | ICD-10-CM

## 2018-10-24 DIAGNOSIS — G89.29 CHRONIC BACK PAIN, UNSPECIFIED BACK LOCATION, UNSPECIFIED BACK PAIN LATERALITY: Primary | ICD-10-CM

## 2018-10-24 PROCEDURE — 97110 THERAPEUTIC EXERCISES: CPT | Performed by: PHYSICAL THERAPIST

## 2018-10-24 PROCEDURE — 97140 MANUAL THERAPY 1/> REGIONS: CPT | Performed by: PHYSICAL THERAPIST

## 2018-10-25 NOTE — PROGRESS NOTES
Daily Note     Today's date: 10/24/2018  Patient name: Vincenzo Zaidi  : 1950  MRN: [id number]  Referring provider: Carlita Gonzalez MD  Dx:   Encounter Diagnosis     ICD-10-CM    1  Chronic back pain, unspecified back location, unspecified back pain laterality M54 9     G89 29                   Subjective:   Ammon Will reports that he did 8 miles on elliptical and notes some increased pain near kidneys       Objective: See treatment diary below  Precautions: HTN (controlled)     Daily Treatment Diary     Manual  10/17 10/24 9/19 9/23 9/24 9/28 10/1 10/3 10/8 10/15   Prone thoracic/thoraco-lumbar PA's AF AF AF AF AF    AF AF   L glute min release in prone AF AF AF AF AF    AF AF   Uplgides C2-C5 AF AF AF AF aF    AF    LS release AF AF AF AF aF CC STM CC STM CC STM AF AF/ STM CC   L postero-lateral hip glide   AF AF AF AF    AF AF       Exercise Diary              Foam roll on wall 5'  5'      rev    Mod plank on table     10 sec  X5     rev    Prone over table hip extension 3X10            CLamshells [place]  3X10 3x10           Reverse clamshell  3X10                                                                                                                                                                                                                  Modalities              MHP 10' pre  10 10' 10' 10' 10' 10' 10'   10' 10'                                   Assessment: Tolerated treatment well  Patient tolerated hip rotational strengthening well  Continue to educate on moderation of fitness routine to avoid flare ups       Plan: Continue per plan of care

## 2018-10-26 ENCOUNTER — OFFICE VISIT (OUTPATIENT)
Dept: PHYSICAL THERAPY | Facility: MEDICAL CENTER | Age: 68
End: 2018-10-26
Payer: MEDICARE

## 2018-10-26 DIAGNOSIS — G89.29 CHRONIC BACK PAIN, UNSPECIFIED BACK LOCATION, UNSPECIFIED BACK PAIN LATERALITY: Primary | ICD-10-CM

## 2018-10-26 DIAGNOSIS — M54.9 CHRONIC BACK PAIN, UNSPECIFIED BACK LOCATION, UNSPECIFIED BACK PAIN LATERALITY: Primary | ICD-10-CM

## 2018-10-26 PROCEDURE — 97140 MANUAL THERAPY 1/> REGIONS: CPT | Performed by: PHYSICAL THERAPIST

## 2018-10-27 NOTE — PROGRESS NOTES
Daily Note     Today's date: 10/26/2018  Patient name: Paulina Keene  : 1950  MRN: [id number]  Referring provider: Tyler Garcia MD  Dx:   Encounter Diagnosis     ICD-10-CM    1  Chronic back pain, unspecified back location, unspecified back pain laterality M54 9     G89 29                   Subjective:  Maliha Otero reports that he made adjustment to use treadmill vs elliptical which he feels improved his back       Objective: See treatment diary below  Precautions: HTN (controlled)     Daily Treatment Diary     Manual  10/16            Lumbar PA's AF            QL release AF                                                       Exercise Diary                                                                                                                                                                                                                                                                                      Modalities                                                                Assessment: Tolerated treatment well  Patient with reduced pain post manual techniques directed at joint and muscular impairments  Plan: Continue per plan of care

## 2018-10-29 ENCOUNTER — OFFICE VISIT (OUTPATIENT)
Dept: PHYSICAL THERAPY | Facility: MEDICAL CENTER | Age: 68
End: 2018-10-29
Payer: MEDICARE

## 2018-10-29 DIAGNOSIS — G89.29 CHRONIC BACK PAIN, UNSPECIFIED BACK LOCATION, UNSPECIFIED BACK PAIN LATERALITY: Primary | ICD-10-CM

## 2018-10-29 DIAGNOSIS — M54.9 CHRONIC BACK PAIN, UNSPECIFIED BACK LOCATION, UNSPECIFIED BACK PAIN LATERALITY: Primary | ICD-10-CM

## 2018-10-29 PROCEDURE — 97140 MANUAL THERAPY 1/> REGIONS: CPT | Performed by: PHYSICAL THERAPIST

## 2018-10-29 NOTE — PROGRESS NOTES
Daily Note     Today's date: 10/29/2018  Patient name: Abdifatah Bingham  : 1950  MRN: [id number]  Referring provider: Theodora Cordero MD  Dx:   Encounter Diagnosis     ICD-10-CM    1  Chronic back pain, unspecified back location, unspecified back pain laterality M54 9     G89 29                   Subjective: Thomas Obduliolauren reports that he is more sore today given he just began a new fitness routine at gym and spent 3 hours there      Objective: See treatment diary below    Precautions: HTN (controlled)     Daily Treatment Diary     Manual  10/16 10/29           Lumbar PA's AF AF           QL release AF AF                                                      Exercise Diary                                                                                                                                                                                                                                                                                      Modalities              Laser to bilateral lumbar paraspinal  AF                                       Assessment: Tolerated treatment well  Patient with increased soreness today secondary to new routine  Focused on pain control for manual and trial of laser today  Plan: Continue per plan of care

## 2018-10-31 ENCOUNTER — OFFICE VISIT (OUTPATIENT)
Dept: PHYSICAL THERAPY | Facility: MEDICAL CENTER | Age: 68
End: 2018-10-31
Payer: MEDICARE

## 2018-10-31 DIAGNOSIS — G89.29 CHRONIC BACK PAIN, UNSPECIFIED BACK LOCATION, UNSPECIFIED BACK PAIN LATERALITY: Primary | ICD-10-CM

## 2018-10-31 DIAGNOSIS — M54.9 CHRONIC BACK PAIN, UNSPECIFIED BACK LOCATION, UNSPECIFIED BACK PAIN LATERALITY: Primary | ICD-10-CM

## 2018-10-31 PROCEDURE — G8978 MOBILITY CURRENT STATUS: HCPCS | Performed by: PHYSICAL THERAPIST

## 2018-10-31 PROCEDURE — 97140 MANUAL THERAPY 1/> REGIONS: CPT | Performed by: PHYSICAL THERAPIST

## 2018-10-31 PROCEDURE — G8979 MOBILITY GOAL STATUS: HCPCS | Performed by: PHYSICAL THERAPIST

## 2018-10-31 NOTE — PROGRESS NOTES
Daily Note     Today's date: 10/31/2018  Patient name: Kathryn Hudson  : 1950  MRN: [id number]  Referring provider: Alexa Kaur MD  Dx:   Encounter Diagnosis     ICD-10-CM    1  Chronic back pain, unspecified back location, unspecified back pain laterality M54 9     G89 29                   Subjective: Sammie Salmon reports that he felt great after last time but pain returns after working out      Objective: See treatment diary below  Precautions: HTN (controlled)     Daily Treatment Diary     Manual  10/16 10/29 10/31          Lumbar PA's AF AF AF          QL release AF AF AF                                                     Exercise Diary                                                                                                                                                                                                                                                                                      Modalities              Laser to bilateral lumbar paraspinal  AF AF                                        Assessment: Tolerated treatment well  Patient educated on modifications to exercise routine to reduce instance of flare ups  Will plan transition to HEP and addition to workout routine in next 2-4 weeks      Plan: Continue per plan of care

## 2018-11-02 ENCOUNTER — APPOINTMENT (OUTPATIENT)
Dept: PHYSICAL THERAPY | Facility: MEDICAL CENTER | Age: 68
End: 2018-11-02
Payer: MEDICARE

## 2018-11-05 ENCOUNTER — OFFICE VISIT (OUTPATIENT)
Dept: PHYSICAL THERAPY | Facility: MEDICAL CENTER | Age: 68
End: 2018-11-05
Payer: MEDICARE

## 2018-11-05 DIAGNOSIS — G89.29 CHRONIC BACK PAIN, UNSPECIFIED BACK LOCATION, UNSPECIFIED BACK PAIN LATERALITY: Primary | ICD-10-CM

## 2018-11-05 DIAGNOSIS — M54.9 CHRONIC BACK PAIN, UNSPECIFIED BACK LOCATION, UNSPECIFIED BACK PAIN LATERALITY: Primary | ICD-10-CM

## 2018-11-05 PROCEDURE — 97140 MANUAL THERAPY 1/> REGIONS: CPT | Performed by: PHYSICAL THERAPIST

## 2018-11-06 NOTE — PROGRESS NOTES
Daily Note     Today's date: 2018  Patient name: Kary Max  : 1950  MRN: [id number]  Referring provider: Andrea Martinez MD  Dx:   Encounter Diagnosis     ICD-10-CM    1  Chronic back pain, unspecified back location, unspecified back pain laterality M54 9     G89 29                   Subjective: Ganga Nam reports that he continues to have a mix of muscular soreness and back pain after change in workouts in gym         Objective: See treatment diary below  Precautions: HTN (controlled)     Daily Treatment Diary     Manual  10/16 10/29 10/31 11/5         Lumbar PA's AF AF AF AF         QL release AF AF AF AF                                                    Exercise Diary                                                                                                                                                                                                                                                                                      Modalities              Laser to bilateral lumbar paraspinal  AF AF AF                                       Assessment: Tolerated treatment well  Patient with reduced pain post session  Reviewed core exercises to add to his routine to avoid flare ups of lumbar spine       Plan: Continue per plan of care

## 2018-11-07 ENCOUNTER — OFFICE VISIT (OUTPATIENT)
Dept: PHYSICAL THERAPY | Facility: MEDICAL CENTER | Age: 68
End: 2018-11-07
Payer: MEDICARE

## 2018-11-07 DIAGNOSIS — G89.29 CHRONIC BACK PAIN, UNSPECIFIED BACK LOCATION, UNSPECIFIED BACK PAIN LATERALITY: Primary | ICD-10-CM

## 2018-11-07 DIAGNOSIS — M54.9 CHRONIC BACK PAIN, UNSPECIFIED BACK LOCATION, UNSPECIFIED BACK PAIN LATERALITY: Primary | ICD-10-CM

## 2018-11-07 PROCEDURE — 97140 MANUAL THERAPY 1/> REGIONS: CPT | Performed by: PHYSICAL THERAPIST

## 2018-11-09 ENCOUNTER — OFFICE VISIT (OUTPATIENT)
Dept: PHYSICAL THERAPY | Facility: MEDICAL CENTER | Age: 68
End: 2018-11-09
Payer: MEDICARE

## 2018-11-09 DIAGNOSIS — M54.9 CHRONIC BACK PAIN, UNSPECIFIED BACK LOCATION, UNSPECIFIED BACK PAIN LATERALITY: Primary | ICD-10-CM

## 2018-11-09 DIAGNOSIS — G89.29 CHRONIC BACK PAIN, UNSPECIFIED BACK LOCATION, UNSPECIFIED BACK PAIN LATERALITY: Primary | ICD-10-CM

## 2018-11-09 PROCEDURE — 97140 MANUAL THERAPY 1/> REGIONS: CPT | Performed by: PHYSICAL THERAPIST

## 2018-11-10 NOTE — PROGRESS NOTES
Progress Note     Today's date: 2018  Patient name: Tashi Esparza  : 1950  MRN: [id number]  Referring provider: Rosaura Hernandez MD  Dx:   Encounter Diagnosis     ICD-10-CM    1  Chronic back pain, unspecified back location, unspecified back pain laterality M54 9     G89 29                   Subjective: Myke Danielson reports he is sore today after working out this morning  Objective: See treatment diary below  Precautions: HTN (controlled)     Daily Treatment Diary     Manual  10/16 10/29 10/31 11/5 11/7 11/9       Lumbar PA's AF AF AF AF AF AF       QL release AF AF AF AF AF AF                                                  Exercise Diary              Bridging      3X10       Mod planks      20 sec  5                                                                                                                                                                                                                                                     Modalities              Laser to bilateral lumbar paraspinal  AF AF AF AF AF           Assessment: Tashi Esparza has been compliant with attending PT and home exercise program since initial eval   Myke Danielson has shown improvements in objective impairments identified at initial evaluation such as lumbar range of motion, reduction in paraspinal spasm, and improved transverse abdominus activation which have resulted in decreased functional deficits  Myke Danielson continues to respond favorably to treatment and will benefit from continued skilled PT services to address remaining impairments and functional limitations including bending, long duration standing, and tolerance to fitness routine    Patient will be transitioned to home exercise program as appropriate with goal of next 2-4 weeks             Plan: Continue with POC at 3X per week for 2-4 weeks and transition to HEP at that time

## 2018-11-12 ENCOUNTER — OFFICE VISIT (OUTPATIENT)
Dept: PHYSICAL THERAPY | Facility: MEDICAL CENTER | Age: 68
End: 2018-11-12
Payer: MEDICARE

## 2018-11-12 DIAGNOSIS — G89.29 CHRONIC BACK PAIN, UNSPECIFIED BACK LOCATION, UNSPECIFIED BACK PAIN LATERALITY: Primary | ICD-10-CM

## 2018-11-12 DIAGNOSIS — M54.9 CHRONIC BACK PAIN, UNSPECIFIED BACK LOCATION, UNSPECIFIED BACK PAIN LATERALITY: Primary | ICD-10-CM

## 2018-11-12 PROCEDURE — G8979 MOBILITY GOAL STATUS: HCPCS | Performed by: PHYSICAL THERAPIST

## 2018-11-12 PROCEDURE — G8978 MOBILITY CURRENT STATUS: HCPCS | Performed by: PHYSICAL THERAPIST

## 2018-11-12 PROCEDURE — 97140 MANUAL THERAPY 1/> REGIONS: CPT | Performed by: PHYSICAL THERAPIST

## 2018-11-12 NOTE — PROGRESS NOTES
Daily Note     Today's date: 2018  Patient name: Will Bernal  : 1950  MRN: [id number]  Referring provider: Marielle Webster MD  Dx:   Encounter Diagnosis     ICD-10-CM    1  Chronic back pain, unspecified back location, unspecified back pain laterality M54 9     G89 29                   Subjective: Jennifer Mena reports he is feeling pretty sore and is looking forward to his injections this week  Objective: See treatment diary below  Precautions: HTN (controlled)     Daily Treatment Diary     Manual  10/16 10/29 10/31 11/5 11/7 11/9 11/12      Lumbar PA's AF AF AF AF AF AF AF      QL release AF AF AF AF AF AF AF                                                 Exercise Diary              Bridging      3X10       Mod planks      20 sec  5                                                                                                                                                                                                                                                     Modalities              Laser to bilateral lumbar paraspinal  AF AF AF AF AF AF          Assessment: Tolerated treatment well  Patient with continued temporary pain relief post sessions  Talked with patient regarding possible transition to HEP given diminishing returns unless progress increases in recent visits  Plan: Continue per plan of care

## 2018-11-14 ENCOUNTER — OFFICE VISIT (OUTPATIENT)
Dept: PHYSICAL THERAPY | Facility: MEDICAL CENTER | Age: 68
End: 2018-11-14
Payer: MEDICARE

## 2018-11-14 DIAGNOSIS — G89.29 CHRONIC BACK PAIN, UNSPECIFIED BACK LOCATION, UNSPECIFIED BACK PAIN LATERALITY: Primary | ICD-10-CM

## 2018-11-14 DIAGNOSIS — M54.9 CHRONIC BACK PAIN, UNSPECIFIED BACK LOCATION, UNSPECIFIED BACK PAIN LATERALITY: Primary | ICD-10-CM

## 2018-11-14 PROCEDURE — 97140 MANUAL THERAPY 1/> REGIONS: CPT

## 2018-11-14 NOTE — PROGRESS NOTES
Daily Note     Today's date: 2018  Patient name: Vincenzo Zaidi  : 1950  MRN: [id number]  Referring provider: Carlita Gonzalez MD  Dx:   Encounter Diagnosis     ICD-10-CM    1  Chronic back pain, unspecified back location, unspecified back pain laterality M54 9     G89 29                   Subjective: Ammon Will reports he is feeling sore again today nothing that his pain is a 5/10 in his low back area  Objective: See treatment diary below  Precautions: HTN (controlled)     Daily Treatment Diary     Manual  10/16 10/29 10/31 11/5 11/7 11/9 11/12 11/14     Lumbar PA's AF AF AF AF AF AF AF      QL release AF AF AF AF AF AF AF MM                                                Exercise Diary              Bridging      3X10       Mod planks      20 sec  5                                                                                                                                                                                                                                                     Modalities              Laser to bilateral lumbar paraspinal  AF AF AF AF AF AF Heat 10' pre, Laser MM         Assessment: Tolerated treatment well  Relief felt with LASER to end today  Tenderness noted in b/l QL  Patient demonstrated fatigue post treatment and exhibited good technique with therapeutic exercises  Plan: Continue per plan of care  Pt to get injections 11/15/18

## 2018-11-19 ENCOUNTER — OFFICE VISIT (OUTPATIENT)
Dept: INTERNAL MEDICINE CLINIC | Facility: CLINIC | Age: 68
End: 2018-11-19
Payer: MEDICARE

## 2018-11-19 VITALS
OXYGEN SATURATION: 93 % | TEMPERATURE: 98.5 F | HEIGHT: 71 IN | WEIGHT: 231.2 LBS | HEART RATE: 71 BPM | DIASTOLIC BLOOD PRESSURE: 74 MMHG | SYSTOLIC BLOOD PRESSURE: 126 MMHG | BODY MASS INDEX: 32.37 KG/M2

## 2018-11-19 DIAGNOSIS — G47.33 OBSTRUCTIVE SLEEP APNEA: ICD-10-CM

## 2018-11-19 DIAGNOSIS — N13.8 BPH WITH OBSTRUCTION/LOWER URINARY TRACT SYMPTOMS: ICD-10-CM

## 2018-11-19 DIAGNOSIS — Z23 FLU VACCINE NEED: ICD-10-CM

## 2018-11-19 DIAGNOSIS — I10 HYPERTENSION, UNSPECIFIED TYPE: ICD-10-CM

## 2018-11-19 DIAGNOSIS — N40.1 BPH WITH OBSTRUCTION/LOWER URINARY TRACT SYMPTOMS: ICD-10-CM

## 2018-11-19 DIAGNOSIS — E78.01 FAMILIAL HYPERCHOLESTEROLEMIA: Primary | ICD-10-CM

## 2018-11-19 DIAGNOSIS — Z12.5 ENCOUNTER FOR SCREENING FOR MALIGNANT NEOPLASM OF PROSTATE: ICD-10-CM

## 2018-11-19 DIAGNOSIS — R73.09 ELEVATED GLUCOSE: ICD-10-CM

## 2018-11-19 DIAGNOSIS — F51.01 PRIMARY INSOMNIA: ICD-10-CM

## 2018-11-19 PROCEDURE — G0008 ADMIN INFLUENZA VIRUS VAC: HCPCS

## 2018-11-19 PROCEDURE — 90662 IIV NO PRSV INCREASED AG IM: CPT

## 2018-11-19 PROCEDURE — 99214 OFFICE O/P EST MOD 30 MIN: CPT | Performed by: INTERNAL MEDICINE

## 2018-11-19 RX ORDER — BETAMETHASONE VALERATE 0.1 %
LOTION (ML) TOPICAL 2 TIMES DAILY
COMMUNITY
End: 2019-06-18

## 2018-11-19 RX ORDER — TRAMADOL HYDROCHLORIDE 50 MG/1
100 TABLET ORAL EVERY 12 HOURS PRN
COMMUNITY
Start: 2018-11-16 | End: 2018-12-16

## 2018-11-19 RX ORDER — AMOXICILLIN 500 MG/1
CAPSULE ORAL
Refills: 0 | COMMUNITY
Start: 2018-09-22 | End: 2019-05-17 | Stop reason: ALTCHOICE

## 2018-11-19 NOTE — PROGRESS NOTES
Assessment/Plan   Problem List Items Addressed This Visit     BPH with obstruction/lower urinary tract symptoms    Relevant Orders    PSA, Total Screen    Elevated glucose    Hyperlipidemia - Primary    Relevant Orders    Lipid panel    UA w Reflex to Microscopic w Reflex to Culture    Hypertension    Relevant Orders    Comprehensive metabolic panel    CBC and differential    Obstructive sleep apnea    Primary insomnia      Other Visit Diagnoses     Encounter for screening for malignant neoplasm of prostate         Relevant Orders    PSA, Total Screen    Flu vaccine need        Relevant Orders    influenza vaccine, [id number], high-dose, PF 0 5 mL, for patients 65 yr+ (FLUZONE HIGH-DOSE)      Hypertension:  Blood pressure is well controlled with regular exercise, low-sodium diet and valsartan 80 mg daily which will be continued  Check CMP and urinalysis  Primary insomnia:  Continue zolpidem as needed as well as sleep hygiene with limited effect, with little else that can be done  Obstructive sleep apnea:  Compliance to CPAP is excellent and this has eliminated symptoms  Check CBC  Prostate screening:  He continues follow-up with Urology and has declined cystoscopy, after BPH symptoms have not responded to tamsulosin, which she stopped, and Flomax which he is continuing  Check PSA and CMP  Hyperlipidemia elevated glucose: These problems have responded very well to optimal diet and dedicated exercise  Weight is increased recently and we discussed eliminating the 1 drink he has each evening and he will continue efforts to lose weight  Check fasting glucose in do not order hemoglobin A1c and less sugars trend upward again  Sugars trended downward with dedication regular exercise 2 years ago, as did lipids  Flu shot was given today  We discussed follow-up which can be in 6 months will be annual exam     Subjective   Patient ID: Kristan Marinelli is a 78 y o  male    He is here for a checkup at 6 months between annual visits and also he is due for annual lab work which was 6 months off cycle with annual exams  Vitals:    11/19/18 1717   BP: 126/74   Pulse:    Temp:    SpO2:      HPI   Layo Aguilera continues to see physical therapy and pain management for chronic spasmodic low back pain without significant change in symptoms, and he does not have surgical disease despite underlying spondylosis  He is exercising 7 days a week, 3 are the gym including an hour of aerobic activity and weight lifting  He has gained weight and states he is doing his best with his diet  His goals to achieve weight of 200 lb or less  With physical activity has no chest pain or shortness of breath  Blood pressure is well controlled on blood pressures repeated today and a resting state  No orthostatic lightheadedness or edema is noted  We reviewed his follow-up with Urology with BPH not responsive to medical therapy, and he declines cystoscopy  Primary insomnia partially responses zolpidem and over-the-counter sleep aids with limited response  There is no further intervention that is available and we discussed this  CPAP continues to work well for sleep apnea and he wakes up feeling rested when he gets enough sleep referral to his primary insomnia  The following portions of the patient's history were reviewed and updated as appropriate: allergies, current medications, past family history, past medical history, past social history, past surgical history and problem list     Review of Systems chronic spasmodic low back pain is unchanged, no radiculopathy symptoms  Objective   Physical Exam   Vital signs stable with regular pulse, awake alert no distress  Cardiac exam is normal lungs are clear  There is no peripheral edema  Peripheral circulation is intact        Patient Active Problem List   Diagnosis    Anxiety    BPH with obstruction/lower urinary tract symptoms    Elevated glucose    Erectile dysfunction of non-organic origin    GERD without esophagitis    Hyperlipidemia    Hypertension    Obesity (BMI 30 0-34  9)    Obstructive sleep apnea    Osteoarthritis    Psoriasis    RBBB    Calculus of kidney    Primary insomnia     Current Outpatient Prescriptions:     Acetaminophen 325 MG CAPS, Take 650 mg by mouth every 6 (six) hours, Disp: , Rfl:     betamethasone valerate (VALISONE) 0 1 % lotion, Apply topically 2 (two) times a day, Disp: , Rfl:     fexofenadine-pseudoephedrine (ALLEGRA-D 24) 180-240 MG per 24 hr tablet, Take 1 tablet by mouth, Disp: , Rfl:     omeprazole (PriLOSEC) 10 mg delayed release capsule, Take 10 mg by mouth, Disp: , Rfl:     omeprazole (PriLOSEC) 20 mg delayed release capsule, Take 1 capsule by mouth daily, Disp: , Rfl:     tamsulosin (FLOMAX) 0 4 mg, take 2 capsules by mouth at bedtime, Disp: 540 capsule, Rfl: 0    TIZANIDINE HCL PO, Take by mouth, Disp: , Rfl:     traMADol (ULTRAM) 50 mg tablet, Take 100 mg by mouth every 12 (twelve) hours as needed, Disp: , Rfl:     zolpidem (AMBIEN) 10 mg tablet, Take 1 tablet (10 mg total) by mouth daily at bedtime as needed for sleep, Disp: 30 tablet, Rfl: 2    amoxicillin (AMOXIL) 500 mg capsule, TAKE ONE CAPSULE 4 TIMES DAILY  , Disp: , Rfl: 0

## 2018-12-05 DIAGNOSIS — F51.01 PRIMARY INSOMNIA: ICD-10-CM

## 2018-12-05 RX ORDER — ZOLPIDEM TARTRATE 10 MG/1
10 TABLET ORAL
Qty: 30 TABLET | Refills: 0 | Status: SHIPPED | OUTPATIENT
Start: 2018-12-05 | End: 2019-01-24 | Stop reason: SDUPTHER

## 2018-12-25 DIAGNOSIS — K21.9 GASTROESOPHAGEAL REFLUX DISEASE, ESOPHAGITIS PRESENCE NOT SPECIFIED: Primary | ICD-10-CM

## 2018-12-25 RX ORDER — OMEPRAZOLE 20 MG/1
CAPSULE, DELAYED RELEASE ORAL
Qty: 90 CAPSULE | Refills: 3 | Status: SHIPPED | OUTPATIENT
Start: 2018-12-25 | End: 2019-12-01 | Stop reason: SDUPTHER

## 2019-01-14 ENCOUNTER — TELEPHONE (OUTPATIENT)
Dept: INTERNAL MEDICINE CLINIC | Facility: CLINIC | Age: 69
End: 2019-01-14

## 2019-01-14 NOTE — TELEPHONE ENCOUNTER
Pt called stated needs an order to continue taking physical therapy for his upper and lower back order that he has  is this possible to do thanks

## 2019-01-15 DIAGNOSIS — M54.9 UPPER BACK PAIN: Primary | ICD-10-CM

## 2019-01-15 DIAGNOSIS — G89.29 CHRONIC LOW BACK PAIN, UNSPECIFIED BACK PAIN LATERALITY, WITH SCIATICA PRESENCE UNSPECIFIED: ICD-10-CM

## 2019-01-15 DIAGNOSIS — M54.5 CHRONIC LOW BACK PAIN, UNSPECIFIED BACK PAIN LATERALITY, WITH SCIATICA PRESENCE UNSPECIFIED: ICD-10-CM

## 2019-01-16 DIAGNOSIS — I10 ESSENTIAL HYPERTENSION: Primary | ICD-10-CM

## 2019-01-16 RX ORDER — LOSARTAN POTASSIUM 50 MG/1
50 TABLET ORAL DAILY
Qty: 90 TABLET | Refills: 3 | Status: SHIPPED | OUTPATIENT
Start: 2019-01-16 | End: 2019-06-18 | Stop reason: DRUGHIGH

## 2019-01-16 RX ORDER — LOSARTAN POTASSIUM 50 MG/1
TABLET ORAL
Refills: 0 | COMMUNITY
Start: 2018-12-26 | End: 2019-01-16 | Stop reason: SDUPTHER

## 2019-01-23 ENCOUNTER — EVALUATION (OUTPATIENT)
Dept: PHYSICAL THERAPY | Facility: MEDICAL CENTER | Age: 69
End: 2019-01-23
Payer: MEDICARE

## 2019-01-23 DIAGNOSIS — G89.29 CHRONIC BILATERAL LOW BACK PAIN WITHOUT SCIATICA: Primary | ICD-10-CM

## 2019-01-23 DIAGNOSIS — M54.50 CHRONIC BILATERAL LOW BACK PAIN WITHOUT SCIATICA: Primary | ICD-10-CM

## 2019-01-23 PROCEDURE — 97161 PT EVAL LOW COMPLEX 20 MIN: CPT | Performed by: PHYSICAL THERAPIST

## 2019-01-24 DIAGNOSIS — F51.01 PRIMARY INSOMNIA: ICD-10-CM

## 2019-01-24 RX ORDER — ZOLPIDEM TARTRATE 10 MG/1
10 TABLET ORAL
Qty: 30 TABLET | Refills: 0 | Status: SHIPPED | OUTPATIENT
Start: 2019-01-24 | End: 2019-02-26 | Stop reason: SDUPTHER

## 2019-01-25 ENCOUNTER — OFFICE VISIT (OUTPATIENT)
Dept: PHYSICAL THERAPY | Facility: MEDICAL CENTER | Age: 69
End: 2019-01-25
Payer: MEDICARE

## 2019-01-25 DIAGNOSIS — M54.50 CHRONIC BILATERAL LOW BACK PAIN WITHOUT SCIATICA: Primary | ICD-10-CM

## 2019-01-25 DIAGNOSIS — G89.29 CHRONIC BILATERAL LOW BACK PAIN WITHOUT SCIATICA: Primary | ICD-10-CM

## 2019-01-25 PROCEDURE — 97140 MANUAL THERAPY 1/> REGIONS: CPT | Performed by: PHYSICAL THERAPIST

## 2019-01-26 NOTE — PROGRESS NOTES
Daily Note     Today's date: 2019  Patient name: Will Bernal  : 1950  MRN: [id number]  Referring provider: Marielle Webster MD  Dx:   Encounter Diagnosis     ICD-10-CM    1  Chronic bilateral low back pain without sciatica M54 5     G89 29                   Subjective: Jennifer Mena reports that he had 50% reduction in pain since last session  Had consult with MD and will be getting facet injections       Objective: See treatment diary below  Precautions: None    Daily Treatment Diary     Manual              Paraspinal MFR AF            Thoracolumbar rotation in prone  AF            Supine lumbopelvic grade IV BL  AF                                          Exercise Diary                                                                                                                                                                                                                                                                                      Modalities               10'                                          Assessment: Tolerated treatment well  Patient with reduced muscular spasm post session  Encouraged to avoid extension at this piont       Plan: Continue per plan of care

## 2019-01-28 ENCOUNTER — OFFICE VISIT (OUTPATIENT)
Dept: PHYSICAL THERAPY | Facility: MEDICAL CENTER | Age: 69
End: 2019-01-28
Payer: MEDICARE

## 2019-01-28 DIAGNOSIS — G89.29 CHRONIC BILATERAL LOW BACK PAIN WITHOUT SCIATICA: Primary | ICD-10-CM

## 2019-01-28 DIAGNOSIS — M54.50 CHRONIC BILATERAL LOW BACK PAIN WITHOUT SCIATICA: Primary | ICD-10-CM

## 2019-01-28 PROCEDURE — 97140 MANUAL THERAPY 1/> REGIONS: CPT | Performed by: PHYSICAL THERAPIST

## 2019-01-29 NOTE — PROGRESS NOTES
Daily Note     Today's date: 2019  Patient name: Joanna Gonzalez  : 1950  MRN: [id number]  Referring provider: Deb Beard MD  Dx:   Encounter Diagnosis     ICD-10-CM    1  Chronic bilateral low back pain without sciatica M54 5     G89 29                   Subjective:   Ned Trujillo reports that he is feeling okay today, just got back from working out and he has some more soreness       Objective: See treatment diary below  Precautions: None    Daily Treatment Diary     Manual             Paraspinal MFR AF AF           Thoracolumbar rotation in prone  AF AF           Supine lumbopelvic grade IV BL  AF AF                                         Exercise Diary                                                                                                                                                                                                                                                                                      Modalities               10'                                          Assessment: Tolerated treatment well  Patient with improved muscular tone and less hypertonicity post treatment  Progress as able       Plan: Continue per plan of care

## 2019-01-30 ENCOUNTER — APPOINTMENT (OUTPATIENT)
Dept: PHYSICAL THERAPY | Facility: MEDICAL CENTER | Age: 69
End: 2019-01-30
Payer: MEDICARE

## 2019-02-01 ENCOUNTER — OFFICE VISIT (OUTPATIENT)
Dept: PHYSICAL THERAPY | Facility: MEDICAL CENTER | Age: 69
End: 2019-02-01
Payer: MEDICARE

## 2019-02-01 DIAGNOSIS — M54.50 CHRONIC BILATERAL LOW BACK PAIN WITHOUT SCIATICA: Primary | ICD-10-CM

## 2019-02-01 DIAGNOSIS — G89.29 CHRONIC BILATERAL LOW BACK PAIN WITHOUT SCIATICA: Primary | ICD-10-CM

## 2019-02-01 PROCEDURE — 97140 MANUAL THERAPY 1/> REGIONS: CPT | Performed by: PHYSICAL THERAPIST

## 2019-02-04 ENCOUNTER — OFFICE VISIT (OUTPATIENT)
Dept: PHYSICAL THERAPY | Facility: MEDICAL CENTER | Age: 69
End: 2019-02-04
Payer: MEDICARE

## 2019-02-04 DIAGNOSIS — G89.29 CHRONIC BILATERAL LOW BACK PAIN WITHOUT SCIATICA: Primary | ICD-10-CM

## 2019-02-04 DIAGNOSIS — M54.50 CHRONIC BILATERAL LOW BACK PAIN WITHOUT SCIATICA: Primary | ICD-10-CM

## 2019-02-04 PROCEDURE — 97140 MANUAL THERAPY 1/> REGIONS: CPT | Performed by: PHYSICAL THERAPIST

## 2019-02-04 NOTE — PROGRESS NOTES
Daily Note     Today's date: 2019  Patient name: Vincenzo Zaidi  : 1950  MRN: [id number]  Referring provider: Carlita Gonzalez MD  Dx:   Encounter Diagnosis     ICD-10-CM    1  Chronic bilateral low back pain without sciatica M54 5     G89 29                   Subjective: Sp reports being more sore than normal today  Just finished working out prior to coming to PT  Objective: See treatment diary below  Precautions: None    Daily Treatment Diary     Manual           Paraspinal MFR AF AF AF AF         Thoracolumbar rotation in prone  AF AF AF aF         Supine lumbopelvic grade IV BL  AF AF AF AF                                       Exercise Diary                                                                                                                                                                                                                                                                                      Modalities               10'                                        Assessment: Tolerated treatment well  Patient with reduced pain post treatment,will be getting facet injections this week and will return to therapy afterwards        Plan: Continue per plan of care

## 2019-02-04 NOTE — PROGRESS NOTES
Daily Note     Today's date: 2/3/2019  Patient name: Vashti Ortega  : 1950  MRN: [id number]  Referring provider: Adolph Romo MD  Dx:   Encounter Diagnosis     ICD-10-CM    1  Chronic bilateral low back pain without sciatica M54 5     G89 29                   Subjective: Debi Higuera continues to report temporary relief of pain post sessions  Has injections next week to facets       Objective: See treatment diary below  Precautions: None    Daily Treatment Diary     Manual  1/25 1/28 2/3          Paraspinal MFR AF AF AF          Thoracolumbar rotation in prone  AF AF AF          Supine lumbopelvic grade IV BL  AF AF AF                                        Exercise Diary                                                                                                                                                                                                                                                                                      Modalities               10'                                          Assessment: Tolerated treatment well  Patient with reduced pain from 8/10 to 3/10 post session  Will continue to avoid aggravating activity at gym to reduce flare ups       Plan: Continue per plan of care

## 2019-02-06 ENCOUNTER — OFFICE VISIT (OUTPATIENT)
Dept: PHYSICAL THERAPY | Facility: MEDICAL CENTER | Age: 69
End: 2019-02-06
Payer: MEDICARE

## 2019-02-06 DIAGNOSIS — G89.29 CHRONIC BILATERAL LOW BACK PAIN WITHOUT SCIATICA: Primary | ICD-10-CM

## 2019-02-06 DIAGNOSIS — M54.50 CHRONIC BILATERAL LOW BACK PAIN WITHOUT SCIATICA: Primary | ICD-10-CM

## 2019-02-06 PROCEDURE — 97140 MANUAL THERAPY 1/> REGIONS: CPT

## 2019-02-06 NOTE — PROGRESS NOTES
Daily Note     Today's date: 2019  Patient name: Yadi Chavarria  : 1950  MRN: [id number]  Referring provider: Nereida Aparicio MD  Dx:   Encounter Diagnosis     ICD-10-CM    1  Chronic bilateral low back pain without sciatica M54 5     G89 29        Start Time: 1510  Stop Time: 1557  Total time in clinic (min): 47 minutes    Subjective: Pt reports pain in LB today "same as usual" prior to start of session  Notes his appointment for his facet injections has been postponed until next week  Objective: See treatment diary below  Precautions: None    Daily Treatment Diary     Manual          Paraspinal MFR AF AF AF AF STM,  AFB        Thoracolumbar rotation in prone  AF AF AF aF AF        Supine lumbopelvic grade IV BL  AF AF AF AF AF                                      Exercise Diary                                                                                                                                                                                                                                                                                      Modalities               10'    10' pre                                    15 minutes 1:1 with PT    Assessment: Tolerated treatment well  Significant tension found along paraspinals during STM  Tension began to slightly resolve after this skilled therapy performed  Pt noted reduced pain at end of session  Patient would benefit from continued PT  Plan: Continue per plan of care

## 2019-02-11 ENCOUNTER — APPOINTMENT (OUTPATIENT)
Dept: PHYSICAL THERAPY | Facility: MEDICAL CENTER | Age: 69
End: 2019-02-11
Payer: MEDICARE

## 2019-02-13 ENCOUNTER — OFFICE VISIT (OUTPATIENT)
Dept: PHYSICAL THERAPY | Facility: MEDICAL CENTER | Age: 69
End: 2019-02-13
Payer: MEDICARE

## 2019-02-13 DIAGNOSIS — G89.29 CHRONIC BILATERAL LOW BACK PAIN WITHOUT SCIATICA: Primary | ICD-10-CM

## 2019-02-13 DIAGNOSIS — M54.50 CHRONIC BILATERAL LOW BACK PAIN WITHOUT SCIATICA: Primary | ICD-10-CM

## 2019-02-13 PROCEDURE — 97140 MANUAL THERAPY 1/> REGIONS: CPT

## 2019-02-13 NOTE — PROGRESS NOTES
Daily Note     Today's date: 2019  Patient name: Shanae Mares  : 1950  MRN: [id number]  Referring provider: Lina Santana MD  Dx:   Encounter Diagnosis     ICD-10-CM    1  Chronic bilateral low back pain without sciatica M54 5     G89 29        Start Time: 1505  Stop Time: 1540  Total time in clinic (min): 35 minutes    Subjective: Pt notes "usual" amount of pain in LB today prior to start of session  "I'm all twisted up "      Objective: See treatment diary below  Precautions: None    Daily Treatment Diary     Manual         Paraspinal MFR AF AF AF AF STM,  AFB STM,  AFB       Thoracolumbar rotation in prone  AF AF AF aF AF        Supine lumbopelvic grade IV BL  AF AF AF AF AF                                      Exercise Diary                                                                                                                                                                                                                                                                                      Modalities              10'    10' pre 10'  pre                                     Assessment: Tolerated treatment well  Increased tone/tension along bilateral paraspinals noted during STM  Pt noted feeling "a little better" at end of session  Patient would benefit from continued PT      Plan: Continue per plan of care

## 2019-02-15 ENCOUNTER — APPOINTMENT (OUTPATIENT)
Dept: PHYSICAL THERAPY | Facility: MEDICAL CENTER | Age: 69
End: 2019-02-15
Payer: MEDICARE

## 2019-02-18 ENCOUNTER — OFFICE VISIT (OUTPATIENT)
Dept: PHYSICAL THERAPY | Facility: MEDICAL CENTER | Age: 69
End: 2019-02-18
Payer: MEDICARE

## 2019-02-18 DIAGNOSIS — G89.29 CHRONIC BILATERAL LOW BACK PAIN WITHOUT SCIATICA: Primary | ICD-10-CM

## 2019-02-18 DIAGNOSIS — M54.50 CHRONIC BILATERAL LOW BACK PAIN WITHOUT SCIATICA: Primary | ICD-10-CM

## 2019-02-18 PROCEDURE — 97140 MANUAL THERAPY 1/> REGIONS: CPT

## 2019-02-18 NOTE — PROGRESS NOTES
Daily Note     Today's date: 2019  Patient name: Adrienne Mitchell  : 1950  MRN: [id number]  Referring provider: Jim Merritt MD  Dx:   Encounter Diagnosis     ICD-10-CM    1  Chronic bilateral low back pain without sciatica M54 5     G89 29                   Subjective: pt reports       Objective: See treatment diary below      Assessment: Tolerated treatment well  Patient would benefit from continued PT      Plan: Continue per plan of care  Progress treatment as tolerated        Precautions: None    Daily Treatment Diary     Manual  19      Paraspinal MFR AF AF AF AF STM,  AFB STM,  AFB STM VK      Thoracolumbar rotation in prone  AF AF AF aF AF  AF      Supine lumbopelvic grade IV BL  AF AF AF AF AF  AF                                    Exercise Diary                                                                                                                                                                                                                                                                                      Modalities      19       10'    10' pre 10'  pre HP 10 min to start                                    1:1 treatment [phone]

## 2019-02-20 ENCOUNTER — APPOINTMENT (OUTPATIENT)
Dept: PHYSICAL THERAPY | Facility: MEDICAL CENTER | Age: 69
End: 2019-02-20
Payer: MEDICARE

## 2019-02-22 ENCOUNTER — APPOINTMENT (OUTPATIENT)
Dept: PHYSICAL THERAPY | Facility: MEDICAL CENTER | Age: 69
End: 2019-02-22
Payer: MEDICARE

## 2019-02-25 ENCOUNTER — OFFICE VISIT (OUTPATIENT)
Dept: PHYSICAL THERAPY | Facility: MEDICAL CENTER | Age: 69
End: 2019-02-25
Payer: MEDICARE

## 2019-02-25 DIAGNOSIS — G89.29 CHRONIC BILATERAL LOW BACK PAIN WITHOUT SCIATICA: Primary | ICD-10-CM

## 2019-02-25 DIAGNOSIS — M54.50 CHRONIC BILATERAL LOW BACK PAIN WITHOUT SCIATICA: Primary | ICD-10-CM

## 2019-02-25 PROCEDURE — 97140 MANUAL THERAPY 1/> REGIONS: CPT | Performed by: PHYSICAL THERAPIST

## 2019-02-26 DIAGNOSIS — F51.01 PRIMARY INSOMNIA: ICD-10-CM

## 2019-02-26 NOTE — PROGRESS NOTES
Daily Note     Today's date: 2019  Patient name: Lonny Akers  : 1950  MRN: [id number]  Referring provider: Arely Boyd MD  Dx:   Encounter Diagnosis     ICD-10-CM    1  Chronic bilateral low back pain without sciatica M54 5     G89 29                   Subjective: Balyee Mackey reports that he felt good after trial injections last week, pain level 5/10 today       Objective: See treatment diary below    Precautions: None    Daily Treatment Diary     Manual       Paraspinal MFR AF AF AF AF STM,  AFB STM,  AFB STM VK AF     Thoracolumbar rotation in prone  AF AF AF aF AF  AF AF     Supine lumbopelvic grade IV BL  AF AF AF AF AF  AF AF                                   Exercise Diary                                                                                                                                                                                                                                                                                      Modalities      19       10'    10' pre 10'  pre HP 10 min to start 10'2                                 Assessment: Tolerated treatment well  Patient with reduced pain by 50% post mobilizations  Will be getting another round of injections next week      Plan: Continue per plan of care

## 2019-02-27 ENCOUNTER — OFFICE VISIT (OUTPATIENT)
Dept: PHYSICAL THERAPY | Facility: MEDICAL CENTER | Age: 69
End: 2019-02-27
Payer: MEDICARE

## 2019-02-27 DIAGNOSIS — M54.50 CHRONIC BILATERAL LOW BACK PAIN WITHOUT SCIATICA: Primary | ICD-10-CM

## 2019-02-27 DIAGNOSIS — G89.29 CHRONIC BILATERAL LOW BACK PAIN WITHOUT SCIATICA: Primary | ICD-10-CM

## 2019-02-27 PROCEDURE — 97140 MANUAL THERAPY 1/> REGIONS: CPT | Performed by: PHYSICAL THERAPIST

## 2019-02-27 RX ORDER — ZOLPIDEM TARTRATE 10 MG/1
TABLET ORAL
Qty: 30 TABLET | Refills: 0 | Status: SHIPPED | OUTPATIENT
Start: 2019-02-27 | End: 2019-04-02 | Stop reason: SDUPTHER

## 2019-02-27 NOTE — PROGRESS NOTES
Daily Note     Today's date: 2019  Patient name: Shaila Galvan  : 1950  MRN: [id number]  Referring provider: Etta Olsen MD  Dx:   Encounter Diagnosis     ICD-10-CM    1  Chronic bilateral low back pain without sciatica M54 5     G89 29                   Subjective: Sp reports he had some soreness after last session, only lasted one day  Objective: See treatment diary below  Precautions: None    Daily Treatment Diary     Manual      Paraspinal MFR AF AF AF AF STM,  AFB STM,  AFB STM VK AF     Thoracolumbar rotation in prone  AF AF AF aF AF  AF AF     Supine lumbopelvic grade IV BL  AF AF AF AF AF  AF AF     Supine positional distraction         AF    Hip flexor stretch         AF        Exercise Diary                                                                                                                                                                                                                                                                                      Modalities      19       10'    10' pre 10'  pre HP 10 min to start 10'2                                   Assessment: Tolerated treatment well  Patient responded well to altered manuals given soreness after last visit  Progress as able      Plan: Continue per plan of care

## 2019-03-04 ENCOUNTER — OFFICE VISIT (OUTPATIENT)
Dept: PHYSICAL THERAPY | Facility: MEDICAL CENTER | Age: 69
End: 2019-03-04
Payer: MEDICARE

## 2019-03-04 DIAGNOSIS — G89.29 CHRONIC BILATERAL LOW BACK PAIN WITHOUT SCIATICA: Primary | ICD-10-CM

## 2019-03-04 DIAGNOSIS — M54.50 CHRONIC BILATERAL LOW BACK PAIN WITHOUT SCIATICA: Primary | ICD-10-CM

## 2019-03-04 PROCEDURE — 97140 MANUAL THERAPY 1/> REGIONS: CPT

## 2019-03-04 NOTE — PROGRESS NOTES
Daily Note     Today's date: 3/4/2019  Patient name: Vashti Ortega  : 1950  MRN: [id number]  Referring provider: Adolph Romo MD  Dx:   Encounter Diagnosis     ICD-10-CM    1  Chronic bilateral low back pain without sciatica M54 5     G89 29                   Subjective: Pt reports his LB is "a little tight today," but that overall hit has been great since receiving lidocaine "trial" injection  States will be receiving actual steroid injection soon  Objective: See treatment diary below  Precautions: None    Daily Treatment Diary     Manual  1/25 1/28 2/1 2/4 2/6 2/13 2/18/ 2/25 2/27 3/4   Paraspinal MFR AF AF AF AF STM,  AFB STM,  AFB STM VK AF  STM,  AFB   Thoracolumbar rotation in prone  AF AF AF aF AF  AF AF     Supine lumbopelvic grade IV BL  AF AF AF AF AF  AF AF     Supine positional distraction         AF    Hip flexor stretch         AF        Exercise Diary                                                                                                                                                                                                                                                                                      Modalities      2/6 2/13 2/18/19   3/4    10'    10' pre 10'  pre HP 10 min to start 10'2  MHP  10'  pre                                 Assessment: Tolerated treatment well  Increased tone/tightness noted around paraspinals in LB today that released slightly during STM  Pt states feeling better at end of session, noting his treatments always seem to help, but symptoms quickly return soon afterward  Patient would benefit from continued PT  Plan: Continue per plan of care

## 2019-03-06 ENCOUNTER — OFFICE VISIT (OUTPATIENT)
Dept: PHYSICAL THERAPY | Facility: MEDICAL CENTER | Age: 69
End: 2019-03-06
Payer: MEDICARE

## 2019-03-06 DIAGNOSIS — M54.50 CHRONIC BILATERAL LOW BACK PAIN WITHOUT SCIATICA: Primary | ICD-10-CM

## 2019-03-06 DIAGNOSIS — G89.29 CHRONIC BILATERAL LOW BACK PAIN WITHOUT SCIATICA: Primary | ICD-10-CM

## 2019-03-06 PROCEDURE — 97140 MANUAL THERAPY 1/> REGIONS: CPT

## 2019-03-06 NOTE — PROGRESS NOTES
Daily Note     Today's date: 3/6/2019  Patient name: Lainey Leonard  : 1950  MRN: [id number]  Referring provider: Geoff Manning MD  Dx:   Encounter Diagnosis     ICD-10-CM    1  Chronic bilateral low back pain without sciatica M54 5     G89 29        Start Time: 1503  Stop Time: 1535  Total time in clinic (min): 32 minutes    Subjective: Pt reports pain in low back is "a good /10 today "  Notes he goes to receive his steroid injections tomorrow  Objective: See treatment diary below  Precautions: None    Daily Treatment Diary     Manual  3/6 1/28 2/1 2/4 2/6 2/13 2/18/ 2/25 2/27 3/4   Paraspinal MFR STM,  AFB AF AF AF STM,  AFB STM,  AFB STM VK AF  STM,  AFB   Thoracolumbar rotation in prone   AF AF aF AF  AF AF     Supine lumbopelvic grade IV BL   AF AF AF AF  AF AF     Supine positional distraction         AF    Hip flexor stretch         AF        Exercise Diary                                                                                                                                                                                                                                                                                      Modalities  3/6    2/6 2/13 2/18/19   3    MHP  10'  pre    10' pre 10'  pre HP 10 min to start 10'2  MHP  10'  pre                                 Assessment: Tolerated treatment well  Increased tone noted along paraspinals that began to release during STM  Pt noted feeling better at end of session and could feel the tension relaxing  Patient would benefit from continued PT       Plan: Continue per plan of care

## 2019-03-08 ENCOUNTER — OFFICE VISIT (OUTPATIENT)
Dept: PHYSICAL THERAPY | Facility: MEDICAL CENTER | Age: 69
End: 2019-03-08
Payer: MEDICARE

## 2019-03-08 DIAGNOSIS — M54.50 CHRONIC BILATERAL LOW BACK PAIN WITHOUT SCIATICA: Primary | ICD-10-CM

## 2019-03-08 DIAGNOSIS — G89.29 CHRONIC BILATERAL LOW BACK PAIN WITHOUT SCIATICA: Primary | ICD-10-CM

## 2019-03-08 PROCEDURE — 97140 MANUAL THERAPY 1/> REGIONS: CPT | Performed by: PHYSICAL THERAPIST

## 2019-03-09 NOTE — PROGRESS NOTES
Daily Note     Today's date: 3/8/2019  Patient name: Carolyn Deleon  : 1950  MRN: [id number]  Referring provider: Krunal Mo MD  Dx:   Encounter Diagnosis     ICD-10-CM    1  Chronic bilateral low back pain without sciatica M54 5     G89 29                   Subjective: Helenegayatri Chavez reports that he is sore today, had facet injections yesterday         Objective: See treatment diary below  Precautions: None    Daily Treatment Diary     Manual  3/6 3/8 2/1 2/4 2/6 2/13 2/18/ 2/25 2/27 3/4   Paraspinal MFR STM,  AFB AF AF AF STM,  AFB STM,  AFB STM VK AF  STM,  AFB   Thoracolumbar rotation in prone   AF AF aF AF  AF AF     Supine lumbopelvic grade IV BL   AF AF AF AF  AF AF     Supine positional distraction         AF    Hip flexor stretch         AF        Exercise Diary                                                                                                                                                                                                                                                                                      Modalities  3/6    2/6 2/13 2/18/19   3    MHP  10'  pre    10' pre 10'  pre HP 10 min to start 10'2  MHP  10'  pre                                 Assessment: Tolerated treatment well  Patient tolerated mobilizations with avoidance of end range secondary to soreness and recent injections  Plan: Continue per plan of care

## 2019-03-11 ENCOUNTER — TRANSCRIBE ORDERS (OUTPATIENT)
Dept: ADMINISTRATIVE | Facility: HOSPITAL | Age: 69
End: 2019-03-11

## 2019-03-11 ENCOUNTER — OFFICE VISIT (OUTPATIENT)
Dept: PHYSICAL THERAPY | Facility: MEDICAL CENTER | Age: 69
End: 2019-03-11
Payer: MEDICARE

## 2019-03-11 DIAGNOSIS — M54.50 CHRONIC BILATERAL LOW BACK PAIN WITHOUT SCIATICA: Primary | ICD-10-CM

## 2019-03-11 DIAGNOSIS — R10.33 UMBILICAL PAIN: Primary | ICD-10-CM

## 2019-03-11 DIAGNOSIS — G89.29 CHRONIC BILATERAL LOW BACK PAIN WITHOUT SCIATICA: Primary | ICD-10-CM

## 2019-03-11 PROCEDURE — 97140 MANUAL THERAPY 1/> REGIONS: CPT | Performed by: PHYSICAL THERAPIST

## 2019-03-11 NOTE — PROGRESS NOTES
Daily Note     Today's date: 3/11/2019  Patient name: Kristan Marinelli  : 1950  MRN: [id number]  Referring provider: Sejal Zhao MD  Dx:   Encounter Diagnosis     ICD-10-CM    1  Chronic bilateral low back pain without sciatica M54 5     G89 29        Start Time: 1730  Stop Time: 1810  Total time in clinic (min): 40 minutes    Subjective: Patient reports being sore pre-treatment, but mentions if he was not coming to therapy     Objective: See treatment diary below    Precautions: None    Daily Treatment Diary     Manual  3/6 3/8 3/11 2/4 2/6 2/13 2/18/ 2/25 2/27 3/4   Paraspinal MFR STM,  AFB AF SK AF STM,  AFB STM,  AFB STM VK AF  STM,  AFB   Thoracolumbar rotation in prone   AF SK aF AF  AF AF     Supine lumbopelvic grade IV BL   AF AF AF AF  AF AF     Supine positional distraction         AF    Hip flexor stretch         AF        Exercise Diary                                                                                                                                                                                                                                                                                      Modalities  3/6  3/11  2/6 2/13 2/18/19   34    MHP  10'  pre  MHP 10' pre  10' pre 10'  pre HP 10 min to start 10'2  MHP  10'  pre                                 Assessment: Tolerated treatment well  Patient would benefit from continued PT to decrease low back pain       Plan: Continue per plan of care

## 2019-03-13 ENCOUNTER — OFFICE VISIT (OUTPATIENT)
Dept: PHYSICAL THERAPY | Facility: MEDICAL CENTER | Age: 69
End: 2019-03-13
Payer: MEDICARE

## 2019-03-13 DIAGNOSIS — G89.29 CHRONIC BILATERAL LOW BACK PAIN WITHOUT SCIATICA: Primary | ICD-10-CM

## 2019-03-13 DIAGNOSIS — M54.50 CHRONIC BILATERAL LOW BACK PAIN WITHOUT SCIATICA: Primary | ICD-10-CM

## 2019-03-13 PROCEDURE — 97140 MANUAL THERAPY 1/> REGIONS: CPT

## 2019-03-13 NOTE — PROGRESS NOTES
Daily Note     Today's date: 3/13/2019  Patient name: Abdifatah Bingham  : 1950  MRN: [id number]  Referring provider: Theodora Cordero MD  Dx:   Encounter Diagnosis     ICD-10-CM    1  Chronic bilateral low back pain without sciatica M54 5     G89 29        Start Time: 1700  Stop Time: 1730  Total time in clinic (min): 30 minutes    Subjective: Patient reports bilateral paraspinal/QL soreness  Relief post PT session today  Objective: See treatment diary below    Precautions: None    Daily Treatment Diary     Manual  3/6 3/8 3/11 2/4 2/6 2/13 2/18/ 2/25 2/27 3   Paraspinal MFR STM,  AFB AF SK AF STM,  AFB STM,  AFB STM VK AF  STM,  AFB   Thoracolumbar rotation in prone   AF SK aF AF  AF AF     Supine lumbopelvic grade IV BL   AF AF AF AF  AF AF     Supine positional distraction         AF    Hip flexor stretch         AF      Exercise Diary                                                                                                                                                                                                                                                                                    Modalities  3/6  3/11  2/6 2/13 2/18/19   3/4    MHP  10'  pre  MHP 10' pre  10' pre 10'  pre HP 10 min to start 10'2  MHP  10'  pre                               Assessment: Tolerated treatment well  Patient would benefit from continued PT to decrease low back pain  Plan: Continue per plan of care

## 2019-03-15 ENCOUNTER — OFFICE VISIT (OUTPATIENT)
Dept: PHYSICAL THERAPY | Facility: MEDICAL CENTER | Age: 69
End: 2019-03-15
Payer: MEDICARE

## 2019-03-15 DIAGNOSIS — M54.50 CHRONIC BILATERAL LOW BACK PAIN WITHOUT SCIATICA: Primary | ICD-10-CM

## 2019-03-15 DIAGNOSIS — G89.29 CHRONIC BILATERAL LOW BACK PAIN WITHOUT SCIATICA: Primary | ICD-10-CM

## 2019-03-15 PROCEDURE — 97140 MANUAL THERAPY 1/> REGIONS: CPT

## 2019-03-15 NOTE — PROGRESS NOTES
Daily Note     Today's date: 3/15/2019  Patient name: Nimesh Allen  : 1950  MRN: [id number]  Referring provider: Eric Martinez MD  Dx:   Encounter Diagnosis     ICD-10-CM    1  Chronic bilateral low back pain without sciatica M54 5     G89 29                   Subjective: Patient states that he has increased acute LBP today  Objective: See treatment diary below    Precautions: None    Daily Treatment Diary     Manual  3/6 3/8 3/11 3/15 2/6 2/13 2/18/ 2/25 2/27 3   Paraspinal MFR STM,  AFB AF SK TH STM,  AFB STM,  AFB STM VK AF  STM,  AFB   Thoracolumbar rotation in prone   AF SK AF AF  AF AF     Supine lumbopelvic grade IV BL   AF AF AF AF  AF AF     Supine positional distraction         AF    Hip flexor stretch         AF      Exercise Diary                                                                                                                                                                                                                                                                                    Modalities  3/6  3/11 3/15 2/6 2/13 2/18/19   3/4    MHP  10'  pre  MHP 10' pre MHP  10'  pre 10' pre 10'  pre HP 10 min to start 10'2  MHP  10'  pre                               Assessment: Tolerated treatment well  TP noted in L piriformis  Release noted with manual therapy  Pt reports decreased pain after session  Patient would benefit from continued PT to decrease low back pain  Plan: Continue per plan of care

## 2019-03-18 ENCOUNTER — OFFICE VISIT (OUTPATIENT)
Dept: PHYSICAL THERAPY | Facility: MEDICAL CENTER | Age: 69
End: 2019-03-18
Payer: MEDICARE

## 2019-03-18 DIAGNOSIS — G89.29 CHRONIC BILATERAL LOW BACK PAIN WITHOUT SCIATICA: Primary | ICD-10-CM

## 2019-03-18 DIAGNOSIS — M54.50 CHRONIC BILATERAL LOW BACK PAIN WITHOUT SCIATICA: Primary | ICD-10-CM

## 2019-03-18 PROCEDURE — 97140 MANUAL THERAPY 1/> REGIONS: CPT | Performed by: PHYSICAL THERAPIST

## 2019-03-19 ENCOUNTER — HOSPITAL ENCOUNTER (OUTPATIENT)
Dept: RADIOLOGY | Facility: HOSPITAL | Age: 69
Discharge: HOME | End: 2019-03-19

## 2019-03-19 DIAGNOSIS — R10.33 UMBILICAL PAIN: ICD-10-CM

## 2019-03-19 NOTE — PROGRESS NOTES
Daily Note     Today's date: 3/18/2019  Patient name: Carolyn Deleon  : 1950  MRN: [id number]  Referring provider: Krunal Mo MD  Dx:   Encounter Diagnosis     ICD-10-CM    1  Chronic bilateral low back pain without sciatica M54 5     G89 29                   Subjective: Helene Cornea reports that he woke up feeling good but pain increased throughout day       Objective: See treatment diary below  Precautions: None    Daily Treatment Diary     Manual  3/6 3/8 3/11 3/15 3/18 2/13 2/18/ 2/25 2/27 3   Paraspinal MFR STM,  AFB AF SK TH AF STM,  AFB STM VK AF  STM,  AFB   Thoracolumbar rotation in prone   AF SK AF AF  AF AF     Supine lumbopelvic grade IV BL   AF AF AF AF  AF AF     Supine positional distraction         AF    Hip flexor stretch         AF      Exercise Diary                                                                                                                                                                                                                                                                                    Modalities  3/6  3/11 3/15 2/6 2/13 2/18/19   3/4    MHP  10'  pre  MHP 10' pre MHP  10'  pre 10' pre 10'  pre HP 10 min to start 10'2  MHP  10'  pre                                 Assessment: Tolerated treatment well  Patient continues to receive temporary relief of pain which is allowing for more activity between medical intervention  Plan: Continue per plan of care

## 2019-03-20 ENCOUNTER — OFFICE VISIT (OUTPATIENT)
Dept: PHYSICAL THERAPY | Facility: MEDICAL CENTER | Age: 69
End: 2019-03-20
Payer: MEDICARE

## 2019-03-20 DIAGNOSIS — G89.29 CHRONIC BILATERAL LOW BACK PAIN WITHOUT SCIATICA: Primary | ICD-10-CM

## 2019-03-20 DIAGNOSIS — M54.50 CHRONIC BILATERAL LOW BACK PAIN WITHOUT SCIATICA: Primary | ICD-10-CM

## 2019-03-20 PROCEDURE — 97140 MANUAL THERAPY 1/> REGIONS: CPT | Performed by: PHYSICAL THERAPIST

## 2019-03-20 NOTE — PROGRESS NOTES
Daily Note     Today's date: 3/20/2019  Patient name: Bekah Shin  : 1950  MRN: [id number]  Referring provider: Kathrene Lennox, MD  Dx:   Encounter Diagnosis     ICD-10-CM    1  Chronic bilateral low back pain without sciatica M54 5     G89 29                   Subjective: Ileana Crowley reports that he is feeling pretty good today, went to gym and did well  Objective: See treatment diary below  Precautions: None    Daily Treatment Diary     Manual  3/6 3/8 3/11 3/15 3/18 2/20 2/18/ 2/25 2/27 3/4   Paraspinal MFR STM,  AFB AF SK TH AF AF STM VK AF  STM,  AFB   Thoracolumbar rotation in prone   AF SK AF AF AF AF AF     Supine lumbopelvic grade IV BL   AF AF AF AF AF AF AF     Supine positional distraction         AF    Hip flexor stretch         AF      Exercise Diary                                                                                                                                                                                                                                                                                    Modalities  3/6  3/11 3/15 2/6 2/13 2/18/19   3    MHP  10'  pre  MHP 10' pre MHP  10'  pre 10' pre 10'  pre HP 10 min to start 10'2  MHP  10'  pre                                 Assessment: Tolerated treatment well  Patient will be receiving injections next week, continues to receive relief of pain for temporary period post PT  Plan: Continue per plan of care

## 2019-03-22 ENCOUNTER — OFFICE VISIT (OUTPATIENT)
Dept: PHYSICAL THERAPY | Facility: MEDICAL CENTER | Age: 69
End: 2019-03-22
Payer: MEDICARE

## 2019-03-22 DIAGNOSIS — M54.50 CHRONIC BILATERAL LOW BACK PAIN WITHOUT SCIATICA: Primary | ICD-10-CM

## 2019-03-22 DIAGNOSIS — G89.29 CHRONIC BILATERAL LOW BACK PAIN WITHOUT SCIATICA: Primary | ICD-10-CM

## 2019-03-22 PROCEDURE — 97140 MANUAL THERAPY 1/> REGIONS: CPT | Performed by: PHYSICAL THERAPIST

## 2019-03-23 NOTE — PROGRESS NOTES
Daily Note     Today's date: 3/22/2019  Patient name: Scarlet Byers  : 1950  MRN: [id number]  Referring provider: Kati Head MD  Dx:   Encounter Diagnosis     ICD-10-CM    1  Chronic bilateral low back pain without sciatica M54 5     G89 29                   Subjective: Sp reports he had injections yesterday  Felt good during workout today      Objective: See treatment diary below  Precautions: None    Daily Treatment Diary     Manual  3/6 3/8 3/11 3/15 3/18 3/22 2/18/ 2/25 2/27 3/4   Paraspinal MFR STM,  AFB AF SK TH AF AF STM VK AF  STM,  AFB   Thoracolumbar rotation in prone   AF SK AF AF AF AF AF     Supine lumbopelvic grade IV BL   AF AF AF AF AF AF AF     Supine positional distraction         AF    Hip flexor stretch         AF      Exercise Diary                                                                                                                                                                                                                                                                                    Modalities  3/6  3/11 3/15 2/6 2/13 2/18/19   3    MHP  10'  pre  MHP 10' pre MHP  10'  pre 10' pre 10'  pre HP 10 min to start 10'2  MHP  10'  pre                                 Assessment: Tolerated treatment well  Patient would benefit from continued PT to allow for return to full fitness activity       Plan: Continue per plan of care

## 2019-03-25 ENCOUNTER — HOSPITAL ENCOUNTER (OUTPATIENT)
Dept: RADIOLOGY | Facility: HOSPITAL | Age: 69
Discharge: HOME | End: 2019-03-25
Payer: MEDICARE

## 2019-03-25 ENCOUNTER — APPOINTMENT (OUTPATIENT)
Dept: PHYSICAL THERAPY | Facility: MEDICAL CENTER | Age: 69
End: 2019-03-25
Payer: MEDICARE

## 2019-03-25 PROCEDURE — 74250 X-RAY XM SM INT 1CNTRST STD: CPT

## 2019-03-27 ENCOUNTER — APPOINTMENT (OUTPATIENT)
Dept: PHYSICAL THERAPY | Facility: MEDICAL CENTER | Age: 69
End: 2019-03-27
Payer: MEDICARE

## 2019-03-29 ENCOUNTER — OFFICE VISIT (OUTPATIENT)
Dept: PHYSICAL THERAPY | Facility: MEDICAL CENTER | Age: 69
End: 2019-03-29
Payer: MEDICARE

## 2019-03-29 DIAGNOSIS — G89.29 CHRONIC BILATERAL LOW BACK PAIN WITHOUT SCIATICA: Primary | ICD-10-CM

## 2019-03-29 DIAGNOSIS — M54.50 CHRONIC BILATERAL LOW BACK PAIN WITHOUT SCIATICA: Primary | ICD-10-CM

## 2019-03-29 PROCEDURE — 97140 MANUAL THERAPY 1/> REGIONS: CPT | Performed by: PHYSICAL THERAPIST

## 2019-03-30 NOTE — PROGRESS NOTES
Daily Note     Today's date: 3/29/2019  Patient name: Ruben Bill  : 1950  MRN: [id number]  Referring provider: Romayne Sergeant, MD  Dx:   Encounter Diagnosis     ICD-10-CM    1  Chronic bilateral low back pain without sciatica M54 5     G89 29                   Subjective: Tina Parada reports that he is feeling overall good since rhizotomy but right side has spasm lately       Objective: See treatment diary below  Precautions: None    Daily Treatment Diary     Manual  3/6 3/8 3/11 3/15 3/18 3/22 3/29 2/25 2/27 3   Paraspinal MFR STM,  AFB AF SK TH AF AF AF AF  STM,  AFB   Thoracolumbar rotation in prone   AF SK AF AF AF AF AF     Supine lumbopelvic grade IV BL   AF AF AF AF AF AF AF     Supine positional distraction         AF    Hip flexor stretch         AF      Exercise Diary                                                                                                                                                                                                                                                                                    Modalities  3/6  3/11 3/15 2/6 2/13 2/18/19   3    MHP  10'  pre  MHP 10' pre MHP  10'  pre 10' pre 10'  pre HP 10 min to start 10'2  MHP  10'  pre                             ss    Assessment: Tolerated treatment well  Patient with reduced spasm on right paraspinals post mobilizations and MFR  Plan: Continue per plan of care

## 2019-04-01 ENCOUNTER — OFFICE VISIT (OUTPATIENT)
Dept: PHYSICAL THERAPY | Facility: MEDICAL CENTER | Age: 69
End: 2019-04-01
Payer: MEDICARE

## 2019-04-01 DIAGNOSIS — G89.29 CHRONIC BILATERAL LOW BACK PAIN WITHOUT SCIATICA: Primary | ICD-10-CM

## 2019-04-01 DIAGNOSIS — M54.50 CHRONIC BILATERAL LOW BACK PAIN WITHOUT SCIATICA: Primary | ICD-10-CM

## 2019-04-01 PROCEDURE — 97140 MANUAL THERAPY 1/> REGIONS: CPT | Performed by: PHYSICAL THERAPIST

## 2019-04-02 DIAGNOSIS — F51.01 PRIMARY INSOMNIA: ICD-10-CM

## 2019-04-02 RX ORDER — ZOLPIDEM TARTRATE 10 MG/1
10 TABLET ORAL
Qty: 30 TABLET | Refills: 2 | Status: SHIPPED | OUTPATIENT
Start: 2019-04-02 | End: 2019-05-06 | Stop reason: SDUPTHER

## 2019-04-03 ENCOUNTER — OFFICE VISIT (OUTPATIENT)
Dept: PHYSICAL THERAPY | Facility: MEDICAL CENTER | Age: 69
End: 2019-04-03
Payer: MEDICARE

## 2019-04-03 DIAGNOSIS — M54.50 CHRONIC BILATERAL LOW BACK PAIN WITHOUT SCIATICA: Primary | ICD-10-CM

## 2019-04-03 DIAGNOSIS — G89.29 CHRONIC BILATERAL LOW BACK PAIN WITHOUT SCIATICA: Primary | ICD-10-CM

## 2019-04-03 PROCEDURE — 97140 MANUAL THERAPY 1/> REGIONS: CPT | Performed by: PHYSICAL THERAPIST

## 2019-04-05 ENCOUNTER — OFFICE VISIT (OUTPATIENT)
Dept: PHYSICAL THERAPY | Facility: MEDICAL CENTER | Age: 69
End: 2019-04-05
Payer: MEDICARE

## 2019-04-05 DIAGNOSIS — M54.50 CHRONIC BILATERAL LOW BACK PAIN WITHOUT SCIATICA: Primary | ICD-10-CM

## 2019-04-05 DIAGNOSIS — G89.29 CHRONIC BILATERAL LOW BACK PAIN WITHOUT SCIATICA: Primary | ICD-10-CM

## 2019-04-05 PROCEDURE — 97112 NEUROMUSCULAR REEDUCATION: CPT | Performed by: PHYSICAL MEDICINE & REHABILITATION

## 2019-04-05 PROCEDURE — 97140 MANUAL THERAPY 1/> REGIONS: CPT | Performed by: PHYSICAL MEDICINE & REHABILITATION

## 2019-04-08 ENCOUNTER — OFFICE VISIT (OUTPATIENT)
Dept: PHYSICAL THERAPY | Facility: MEDICAL CENTER | Age: 69
End: 2019-04-08
Payer: MEDICARE

## 2019-04-08 DIAGNOSIS — G89.29 CHRONIC BILATERAL LOW BACK PAIN WITHOUT SCIATICA: Primary | ICD-10-CM

## 2019-04-08 DIAGNOSIS — M54.50 CHRONIC BILATERAL LOW BACK PAIN WITHOUT SCIATICA: Primary | ICD-10-CM

## 2019-04-08 PROCEDURE — 97140 MANUAL THERAPY 1/> REGIONS: CPT | Performed by: PHYSICAL THERAPIST

## 2019-04-10 ENCOUNTER — OFFICE VISIT (OUTPATIENT)
Dept: PHYSICAL THERAPY | Facility: MEDICAL CENTER | Age: 69
End: 2019-04-10
Payer: MEDICARE

## 2019-04-10 DIAGNOSIS — M54.50 CHRONIC BILATERAL LOW BACK PAIN WITHOUT SCIATICA: Primary | ICD-10-CM

## 2019-04-10 DIAGNOSIS — G89.29 CHRONIC BILATERAL LOW BACK PAIN WITHOUT SCIATICA: Primary | ICD-10-CM

## 2019-04-10 PROCEDURE — 97140 MANUAL THERAPY 1/> REGIONS: CPT | Performed by: PHYSICAL THERAPIST

## 2019-04-12 ENCOUNTER — OFFICE VISIT (OUTPATIENT)
Dept: PHYSICAL THERAPY | Facility: MEDICAL CENTER | Age: 69
End: 2019-04-12
Payer: MEDICARE

## 2019-04-12 DIAGNOSIS — G89.29 CHRONIC BILATERAL LOW BACK PAIN WITHOUT SCIATICA: Primary | ICD-10-CM

## 2019-04-12 DIAGNOSIS — M54.50 CHRONIC BILATERAL LOW BACK PAIN WITHOUT SCIATICA: Primary | ICD-10-CM

## 2019-04-12 PROCEDURE — 97140 MANUAL THERAPY 1/> REGIONS: CPT | Performed by: PHYSICAL THERAPIST

## 2019-04-15 ENCOUNTER — OFFICE VISIT (OUTPATIENT)
Dept: PHYSICAL THERAPY | Facility: MEDICAL CENTER | Age: 69
End: 2019-04-15
Payer: MEDICARE

## 2019-04-15 DIAGNOSIS — M54.50 CHRONIC BILATERAL LOW BACK PAIN WITHOUT SCIATICA: Primary | ICD-10-CM

## 2019-04-15 DIAGNOSIS — G89.29 CHRONIC BILATERAL LOW BACK PAIN WITHOUT SCIATICA: Primary | ICD-10-CM

## 2019-04-15 PROCEDURE — 97140 MANUAL THERAPY 1/> REGIONS: CPT | Performed by: PHYSICAL THERAPIST

## 2019-04-17 ENCOUNTER — OFFICE VISIT (OUTPATIENT)
Dept: PHYSICAL THERAPY | Facility: MEDICAL CENTER | Age: 69
End: 2019-04-17
Payer: MEDICARE

## 2019-04-17 DIAGNOSIS — G89.29 CHRONIC BILATERAL LOW BACK PAIN WITHOUT SCIATICA: Primary | ICD-10-CM

## 2019-04-17 DIAGNOSIS — M54.50 CHRONIC BILATERAL LOW BACK PAIN WITHOUT SCIATICA: Primary | ICD-10-CM

## 2019-04-17 PROCEDURE — 97140 MANUAL THERAPY 1/> REGIONS: CPT | Performed by: PHYSICAL THERAPIST

## 2019-04-19 ENCOUNTER — OFFICE VISIT (OUTPATIENT)
Dept: PHYSICAL THERAPY | Facility: MEDICAL CENTER | Age: 69
End: 2019-04-19
Payer: MEDICARE

## 2019-04-19 DIAGNOSIS — G89.29 CHRONIC BILATERAL LOW BACK PAIN WITHOUT SCIATICA: Primary | ICD-10-CM

## 2019-04-19 DIAGNOSIS — M54.50 CHRONIC BILATERAL LOW BACK PAIN WITHOUT SCIATICA: Primary | ICD-10-CM

## 2019-04-19 PROCEDURE — 97140 MANUAL THERAPY 1/> REGIONS: CPT | Performed by: PHYSICAL MEDICINE & REHABILITATION

## 2019-04-22 ENCOUNTER — OFFICE VISIT (OUTPATIENT)
Dept: PHYSICAL THERAPY | Facility: MEDICAL CENTER | Age: 69
End: 2019-04-22
Payer: MEDICARE

## 2019-04-22 DIAGNOSIS — G89.29 CHRONIC BILATERAL LOW BACK PAIN WITHOUT SCIATICA: Primary | ICD-10-CM

## 2019-04-22 DIAGNOSIS — M54.50 CHRONIC BILATERAL LOW BACK PAIN WITHOUT SCIATICA: Primary | ICD-10-CM

## 2019-04-22 PROCEDURE — 97140 MANUAL THERAPY 1/> REGIONS: CPT | Performed by: PHYSICAL THERAPIST

## 2019-04-24 ENCOUNTER — OFFICE VISIT (OUTPATIENT)
Dept: PHYSICAL THERAPY | Facility: MEDICAL CENTER | Age: 69
End: 2019-04-24
Payer: MEDICARE

## 2019-04-24 DIAGNOSIS — G89.29 CHRONIC BILATERAL LOW BACK PAIN WITHOUT SCIATICA: Primary | ICD-10-CM

## 2019-04-24 DIAGNOSIS — M54.50 CHRONIC BILATERAL LOW BACK PAIN WITHOUT SCIATICA: Primary | ICD-10-CM

## 2019-04-24 PROCEDURE — 97140 MANUAL THERAPY 1/> REGIONS: CPT | Performed by: PHYSICAL THERAPIST

## 2019-04-25 DIAGNOSIS — N40.1 BENIGN PROSTATIC HYPERPLASIA WITH LOWER URINARY TRACT SYMPTOMS, SYMPTOM DETAILS UNSPECIFIED: ICD-10-CM

## 2019-04-25 RX ORDER — TAMSULOSIN HYDROCHLORIDE 0.4 MG/1
CAPSULE ORAL
Qty: 540 CAPSULE | Refills: 0 | Status: SHIPPED | OUTPATIENT
Start: 2019-04-25 | End: 2020-01-30

## 2019-04-26 ENCOUNTER — APPOINTMENT (OUTPATIENT)
Dept: PHYSICAL THERAPY | Facility: MEDICAL CENTER | Age: 69
End: 2019-04-26
Payer: MEDICARE

## 2019-05-01 ENCOUNTER — OFFICE VISIT (OUTPATIENT)
Dept: PHYSICAL THERAPY | Facility: MEDICAL CENTER | Age: 69
End: 2019-05-01
Payer: MEDICARE

## 2019-05-01 DIAGNOSIS — M54.50 CHRONIC BILATERAL LOW BACK PAIN WITHOUT SCIATICA: Primary | ICD-10-CM

## 2019-05-01 DIAGNOSIS — G89.29 CHRONIC BILATERAL LOW BACK PAIN WITHOUT SCIATICA: Primary | ICD-10-CM

## 2019-05-01 PROCEDURE — 97140 MANUAL THERAPY 1/> REGIONS: CPT | Performed by: PHYSICAL THERAPIST

## 2019-05-03 ENCOUNTER — OFFICE VISIT (OUTPATIENT)
Dept: PHYSICAL THERAPY | Facility: MEDICAL CENTER | Age: 69
End: 2019-05-03
Payer: MEDICARE

## 2019-05-03 DIAGNOSIS — G89.29 CHRONIC BILATERAL LOW BACK PAIN WITHOUT SCIATICA: Primary | ICD-10-CM

## 2019-05-03 DIAGNOSIS — M54.50 CHRONIC BILATERAL LOW BACK PAIN WITHOUT SCIATICA: Primary | ICD-10-CM

## 2019-05-03 PROCEDURE — 97140 MANUAL THERAPY 1/> REGIONS: CPT

## 2019-05-06 ENCOUNTER — OFFICE VISIT (OUTPATIENT)
Dept: PHYSICAL THERAPY | Facility: MEDICAL CENTER | Age: 69
End: 2019-05-06
Payer: MEDICARE

## 2019-05-06 DIAGNOSIS — M54.50 CHRONIC BILATERAL LOW BACK PAIN WITHOUT SCIATICA: Primary | ICD-10-CM

## 2019-05-06 DIAGNOSIS — G89.29 CHRONIC BILATERAL LOW BACK PAIN WITHOUT SCIATICA: Primary | ICD-10-CM

## 2019-05-06 DIAGNOSIS — F51.01 PRIMARY INSOMNIA: ICD-10-CM

## 2019-05-06 PROCEDURE — 97140 MANUAL THERAPY 1/> REGIONS: CPT | Performed by: PHYSICAL THERAPIST

## 2019-05-06 RX ORDER — ZOLPIDEM TARTRATE 10 MG/1
10 TABLET ORAL
Qty: 90 TABLET | Refills: 0 | Status: SHIPPED | OUTPATIENT
Start: 2019-05-06 | End: 2019-08-06 | Stop reason: SDUPTHER

## 2019-05-08 ENCOUNTER — APPOINTMENT (OUTPATIENT)
Dept: PHYSICAL THERAPY | Facility: MEDICAL CENTER | Age: 69
End: 2019-05-08
Payer: MEDICARE

## 2019-05-10 ENCOUNTER — OFFICE VISIT (OUTPATIENT)
Dept: PHYSICAL THERAPY | Facility: MEDICAL CENTER | Age: 69
End: 2019-05-10
Payer: MEDICARE

## 2019-05-10 DIAGNOSIS — M54.50 CHRONIC BILATERAL LOW BACK PAIN WITHOUT SCIATICA: Primary | ICD-10-CM

## 2019-05-10 DIAGNOSIS — G89.29 CHRONIC BILATERAL LOW BACK PAIN WITHOUT SCIATICA: Primary | ICD-10-CM

## 2019-05-10 PROCEDURE — 97140 MANUAL THERAPY 1/> REGIONS: CPT | Performed by: PHYSICAL THERAPIST

## 2019-05-13 ENCOUNTER — OFFICE VISIT (OUTPATIENT)
Dept: PHYSICAL THERAPY | Facility: MEDICAL CENTER | Age: 69
End: 2019-05-13
Payer: MEDICARE

## 2019-05-13 DIAGNOSIS — M54.50 CHRONIC BILATERAL LOW BACK PAIN WITHOUT SCIATICA: Primary | ICD-10-CM

## 2019-05-13 DIAGNOSIS — G89.29 CHRONIC BILATERAL LOW BACK PAIN WITHOUT SCIATICA: Primary | ICD-10-CM

## 2019-05-13 PROCEDURE — 97140 MANUAL THERAPY 1/> REGIONS: CPT | Performed by: PHYSICAL THERAPIST

## 2019-05-15 ENCOUNTER — APPOINTMENT (OUTPATIENT)
Dept: PHYSICAL THERAPY | Facility: MEDICAL CENTER | Age: 69
End: 2019-05-15
Payer: MEDICARE

## 2019-05-17 ENCOUNTER — OFFICE VISIT (OUTPATIENT)
Dept: INTERNAL MEDICINE CLINIC | Facility: CLINIC | Age: 69
End: 2019-05-17
Payer: MEDICARE

## 2019-05-17 ENCOUNTER — OFFICE VISIT (OUTPATIENT)
Dept: PHYSICAL THERAPY | Facility: MEDICAL CENTER | Age: 69
End: 2019-05-17
Payer: MEDICARE

## 2019-05-17 VITALS
SYSTOLIC BLOOD PRESSURE: 120 MMHG | BODY MASS INDEX: 32.34 KG/M2 | DIASTOLIC BLOOD PRESSURE: 78 MMHG | WEIGHT: 231 LBS | OXYGEN SATURATION: 98 % | HEIGHT: 71 IN | HEART RATE: 98 BPM

## 2019-05-17 DIAGNOSIS — N13.8 BPH WITH OBSTRUCTION/LOWER URINARY TRACT SYMPTOMS: ICD-10-CM

## 2019-05-17 DIAGNOSIS — E66.9 OBESITY (BMI 30.0-34.9): ICD-10-CM

## 2019-05-17 DIAGNOSIS — F51.01 PRIMARY INSOMNIA: ICD-10-CM

## 2019-05-17 DIAGNOSIS — G47.33 OBSTRUCTIVE SLEEP APNEA: ICD-10-CM

## 2019-05-17 DIAGNOSIS — G89.29 CHRONIC BILATERAL LOW BACK PAIN WITHOUT SCIATICA: Primary | ICD-10-CM

## 2019-05-17 DIAGNOSIS — N40.1 BPH WITH OBSTRUCTION/LOWER URINARY TRACT SYMPTOMS: ICD-10-CM

## 2019-05-17 DIAGNOSIS — R73.09 ELEVATED GLUCOSE: ICD-10-CM

## 2019-05-17 DIAGNOSIS — M54.50 CHRONIC BILATERAL LOW BACK PAIN WITHOUT SCIATICA: Primary | ICD-10-CM

## 2019-05-17 DIAGNOSIS — M25.352 INSTABILITY OF LEFT HIP JOINT: Primary | ICD-10-CM

## 2019-05-17 PROCEDURE — 97140 MANUAL THERAPY 1/> REGIONS: CPT | Performed by: PHYSICAL THERAPIST

## 2019-05-17 PROCEDURE — 99214 OFFICE O/P EST MOD 30 MIN: CPT | Performed by: INTERNAL MEDICINE

## 2019-05-20 ENCOUNTER — TELEPHONE (OUTPATIENT)
Dept: OBGYN CLINIC | Facility: HOSPITAL | Age: 69
End: 2019-05-20

## 2019-05-20 ENCOUNTER — TELEPHONE (OUTPATIENT)
Dept: INTERNAL MEDICINE CLINIC | Facility: CLINIC | Age: 69
End: 2019-05-20

## 2019-05-20 DIAGNOSIS — M25.352 INSTABILITY OF LEFT HIP JOINT: Primary | ICD-10-CM

## 2019-05-22 ENCOUNTER — OFFICE VISIT (OUTPATIENT)
Dept: PHYSICAL THERAPY | Facility: MEDICAL CENTER | Age: 69
End: 2019-05-22
Payer: MEDICARE

## 2019-05-22 ENCOUNTER — APPOINTMENT (OUTPATIENT)
Dept: PHYSICAL THERAPY | Facility: MEDICAL CENTER | Age: 69
End: 2019-05-22
Payer: MEDICARE

## 2019-05-22 DIAGNOSIS — M54.50 CHRONIC BILATERAL LOW BACK PAIN WITHOUT SCIATICA: Primary | ICD-10-CM

## 2019-05-22 DIAGNOSIS — G89.29 CHRONIC BILATERAL LOW BACK PAIN WITHOUT SCIATICA: Primary | ICD-10-CM

## 2019-05-22 PROCEDURE — 97140 MANUAL THERAPY 1/> REGIONS: CPT | Performed by: PHYSICAL THERAPIST

## 2019-05-24 ENCOUNTER — OFFICE VISIT (OUTPATIENT)
Dept: PHYSICAL THERAPY | Facility: MEDICAL CENTER | Age: 69
End: 2019-05-24
Payer: MEDICARE

## 2019-05-24 DIAGNOSIS — M54.50 CHRONIC BILATERAL LOW BACK PAIN WITHOUT SCIATICA: Primary | ICD-10-CM

## 2019-05-24 DIAGNOSIS — G89.29 CHRONIC BILATERAL LOW BACK PAIN WITHOUT SCIATICA: Primary | ICD-10-CM

## 2019-05-24 PROCEDURE — 97140 MANUAL THERAPY 1/> REGIONS: CPT | Performed by: PHYSICAL THERAPIST

## 2019-05-29 ENCOUNTER — OFFICE VISIT (OUTPATIENT)
Dept: PHYSICAL THERAPY | Facility: MEDICAL CENTER | Age: 69
End: 2019-05-29
Payer: MEDICARE

## 2019-05-29 DIAGNOSIS — M54.50 CHRONIC BILATERAL LOW BACK PAIN WITHOUT SCIATICA: Primary | ICD-10-CM

## 2019-05-29 DIAGNOSIS — G89.29 CHRONIC BILATERAL LOW BACK PAIN WITHOUT SCIATICA: Primary | ICD-10-CM

## 2019-05-29 PROCEDURE — 97140 MANUAL THERAPY 1/> REGIONS: CPT | Performed by: PHYSICAL THERAPIST

## 2019-05-31 ENCOUNTER — OFFICE VISIT (OUTPATIENT)
Dept: PHYSICAL THERAPY | Facility: MEDICAL CENTER | Age: 69
End: 2019-05-31
Payer: MEDICARE

## 2019-05-31 DIAGNOSIS — G89.29 CHRONIC BILATERAL LOW BACK PAIN WITHOUT SCIATICA: Primary | ICD-10-CM

## 2019-05-31 DIAGNOSIS — M54.50 CHRONIC BILATERAL LOW BACK PAIN WITHOUT SCIATICA: Primary | ICD-10-CM

## 2019-05-31 PROCEDURE — 97140 MANUAL THERAPY 1/> REGIONS: CPT | Performed by: PHYSICAL THERAPIST

## 2019-06-03 ENCOUNTER — OFFICE VISIT (OUTPATIENT)
Dept: PHYSICAL THERAPY | Facility: MEDICAL CENTER | Age: 69
End: 2019-06-03
Payer: MEDICARE

## 2019-06-03 DIAGNOSIS — G89.29 CHRONIC BILATERAL LOW BACK PAIN WITHOUT SCIATICA: Primary | ICD-10-CM

## 2019-06-03 DIAGNOSIS — M54.50 CHRONIC BILATERAL LOW BACK PAIN WITHOUT SCIATICA: Primary | ICD-10-CM

## 2019-06-03 PROCEDURE — 97140 MANUAL THERAPY 1/> REGIONS: CPT | Performed by: PHYSICAL THERAPIST

## 2019-06-05 ENCOUNTER — APPOINTMENT (OUTPATIENT)
Dept: PHYSICAL THERAPY | Facility: MEDICAL CENTER | Age: 69
End: 2019-06-05
Payer: MEDICARE

## 2019-06-07 ENCOUNTER — OFFICE VISIT (OUTPATIENT)
Dept: PHYSICAL THERAPY | Facility: MEDICAL CENTER | Age: 69
End: 2019-06-07
Payer: MEDICARE

## 2019-06-07 DIAGNOSIS — M54.50 CHRONIC BILATERAL LOW BACK PAIN WITHOUT SCIATICA: Primary | ICD-10-CM

## 2019-06-07 DIAGNOSIS — G89.29 CHRONIC BILATERAL LOW BACK PAIN WITHOUT SCIATICA: Primary | ICD-10-CM

## 2019-06-07 PROCEDURE — 97140 MANUAL THERAPY 1/> REGIONS: CPT | Performed by: PHYSICAL THERAPIST

## 2019-06-10 ENCOUNTER — APPOINTMENT (OUTPATIENT)
Dept: PHYSICAL THERAPY | Facility: MEDICAL CENTER | Age: 69
End: 2019-06-10
Payer: MEDICARE

## 2019-06-12 ENCOUNTER — APPOINTMENT (OUTPATIENT)
Dept: PHYSICAL THERAPY | Facility: MEDICAL CENTER | Age: 69
End: 2019-06-12
Payer: MEDICARE

## 2019-06-14 ENCOUNTER — APPOINTMENT (OUTPATIENT)
Dept: PHYSICAL THERAPY | Facility: MEDICAL CENTER | Age: 69
End: 2019-06-14
Payer: MEDICARE

## 2019-06-17 ENCOUNTER — APPOINTMENT (OUTPATIENT)
Dept: PHYSICAL THERAPY | Facility: MEDICAL CENTER | Age: 69
End: 2019-06-17
Payer: MEDICARE

## 2019-06-18 ENCOUNTER — HOSPITAL ENCOUNTER (INPATIENT)
Facility: HOSPITAL | Age: 69
LOS: 1 days | Discharge: HOME | DRG: 247 | End: 2019-06-20
Attending: EMERGENCY MEDICINE | Admitting: INTERNAL MEDICINE
Payer: MEDICARE

## 2019-06-18 ENCOUNTER — APPOINTMENT (EMERGENCY)
Dept: CT IMAGING | Facility: HOSPITAL | Age: 69
DRG: 247 | End: 2019-06-18
Payer: MEDICARE

## 2019-06-18 ENCOUNTER — TELEPHONE (OUTPATIENT)
Dept: INTERNAL MEDICINE CLINIC | Facility: CLINIC | Age: 69
End: 2019-06-18

## 2019-06-18 DIAGNOSIS — I10 ACCELERATED HYPERTENSION: Primary | ICD-10-CM

## 2019-06-18 DIAGNOSIS — E78.5 HYPERLIPIDEMIA: ICD-10-CM

## 2019-06-18 DIAGNOSIS — R77.8 ELEVATED TROPONIN I LEVEL: ICD-10-CM

## 2019-06-18 DIAGNOSIS — I25.10 CAD (CORONARY ARTERY DISEASE): ICD-10-CM

## 2019-06-18 DIAGNOSIS — I10 ESSENTIAL HYPERTENSION: ICD-10-CM

## 2019-06-18 DIAGNOSIS — I10 ESSENTIAL HYPERTENSION: Primary | ICD-10-CM

## 2019-06-18 DIAGNOSIS — I16.0 HYPERTENSIVE URGENCY: ICD-10-CM

## 2019-06-18 PROBLEM — Z78.9 ALCOHOL USE: Status: ACTIVE | Noted: 2019-06-18

## 2019-06-18 PROBLEM — R79.89 ELEVATED TROPONIN: Status: ACTIVE | Noted: 2019-06-18

## 2019-06-18 PROBLEM — M54.50 LOW BACK PAIN: Status: ACTIVE | Noted: 2019-06-18

## 2019-06-18 PROBLEM — F10.90 ALCOHOL USE: Status: ACTIVE | Noted: 2019-06-18

## 2019-06-18 PROBLEM — Z72.89 ALCOHOL USE: Status: ACTIVE | Noted: 2019-06-18

## 2019-06-18 LAB
ALBUMIN SERPL BCP-MCNC: 4 G/DL (ref 3.5–5)
ALP SERPL-CCNC: 57 U/L (ref 46–116)
ALT SERPL W P-5'-P-CCNC: 24 U/L (ref 12–78)
ANION GAP BLD CALC-SCNC: 16 MMOL/L (ref 4–13)
ANION GAP SERPL CALCULATED.3IONS-SCNC: 10 MMOL/L (ref 4–13)
APTT PPP: 22 SECONDS (ref 26–38)
APTT PPP: 26 SECONDS (ref 26–38)
AST SERPL W P-5'-P-CCNC: 24 U/L (ref 5–45)
ATRIAL RATE: 56 BPM
ATRIAL RATE: 57 BPM
BACTERIA UR QL AUTO: ABNORMAL /HPF
BASOPHILS # BLD AUTO: 0.02 THOUSANDS/ΜL (ref 0–0.1)
BASOPHILS NFR BLD AUTO: 0 % (ref 0–1)
BILIRUB DIRECT SERPL-MCNC: 0.16 MG/DL (ref 0–0.2)
BILIRUB SERPL-MCNC: 0.77 MG/DL (ref 0.2–1)
BILIRUB UR QL STRIP: NEGATIVE
BUN BLD-MCNC: 15 MG/DL (ref 5–25)
BUN SERPL-MCNC: 15 MG/DL (ref 5–25)
CA-I BLD-SCNC: 1.19 MMOL/L (ref 1.12–1.32)
CALCIUM SERPL-MCNC: 9.9 MG/DL (ref 8.3–10.1)
CHLORIDE BLD-SCNC: 103 MMOL/L (ref 100–108)
CHLORIDE SERPL-SCNC: 104 MMOL/L (ref 100–108)
CLARITY UR: CLEAR
CO2 SERPL-SCNC: 28 MMOL/L (ref 21–32)
COLOR UR: YELLOW
CREAT BLD-MCNC: 0.9 MG/DL (ref 0.6–1.3)
CREAT SERPL-MCNC: 0.82 MG/DL (ref 0.6–1.3)
EOSINOPHIL # BLD AUTO: 0.09 THOUSAND/ΜL (ref 0–0.61)
EOSINOPHIL NFR BLD AUTO: 1 % (ref 0–6)
ERYTHROCYTE [DISTWIDTH] IN BLOOD BY AUTOMATED COUNT: 12.6 % (ref 11.6–15.1)
ERYTHROCYTE [DISTWIDTH] IN BLOOD BY AUTOMATED COUNT: 12.7 % (ref 11.6–15.1)
GFR SERPL CREATININE-BSD FRML MDRD: 88 ML/MIN/1.73SQ M
GFR SERPL CREATININE-BSD FRML MDRD: 92 ML/MIN/1.73SQ M
GLUCOSE SERPL-MCNC: 103 MG/DL (ref 65–140)
GLUCOSE SERPL-MCNC: 111 MG/DL (ref 65–140)
GLUCOSE UR STRIP-MCNC: NEGATIVE MG/DL
HCT VFR BLD AUTO: 42.7 % (ref 36.5–49.3)
HCT VFR BLD AUTO: 47.4 % (ref 36.5–49.3)
HCT VFR BLD CALC: 47 % (ref 36.5–49.3)
HGB BLD-MCNC: 14.6 G/DL (ref 12–17)
HGB BLD-MCNC: 16.2 G/DL (ref 12–17)
HGB BLDA-MCNC: 16 G/DL (ref 12–17)
HGB UR QL STRIP.AUTO: ABNORMAL
IMM GRANULOCYTES # BLD AUTO: 0.02 THOUSAND/UL (ref 0–0.2)
IMM GRANULOCYTES NFR BLD AUTO: 0 % (ref 0–2)
INR PPP: 0.93 (ref 0.86–1.17)
INR PPP: 0.98 (ref 0.86–1.17)
KETONES UR STRIP-MCNC: NEGATIVE MG/DL
LEUKOCYTE ESTERASE UR QL STRIP: NEGATIVE
LYMPHOCYTES # BLD AUTO: 1.68 THOUSANDS/ΜL (ref 0.6–4.47)
LYMPHOCYTES NFR BLD AUTO: 26 % (ref 14–44)
MAGNESIUM SERPL-MCNC: 2 MG/DL (ref 1.6–2.6)
MCH RBC QN AUTO: 33.1 PG (ref 26.8–34.3)
MCH RBC QN AUTO: 33.3 PG (ref 26.8–34.3)
MCHC RBC AUTO-ENTMCNC: 34.2 G/DL (ref 31.4–37.4)
MCHC RBC AUTO-ENTMCNC: 34.2 G/DL (ref 31.4–37.4)
MCV RBC AUTO: 97 FL (ref 82–98)
MCV RBC AUTO: 97 FL (ref 82–98)
MONOCYTES # BLD AUTO: 0.82 THOUSAND/ΜL (ref 0.17–1.22)
MONOCYTES NFR BLD AUTO: 12 % (ref 4–12)
NEUTROPHILS # BLD AUTO: 3.97 THOUSANDS/ΜL (ref 1.85–7.62)
NEUTS SEG NFR BLD AUTO: 61 % (ref 43–75)
NITRITE UR QL STRIP: NEGATIVE
NON-SQ EPI CELLS URNS QL MICRO: ABNORMAL /HPF
NRBC BLD AUTO-RTO: 0 /100 WBCS
NT-PROBNP SERPL-MCNC: 189 PG/ML
P AXIS: 42 DEGREES
P AXIS: 45 DEGREES
PCO2 BLD: 27 MMOL/L (ref 21–32)
PH UR STRIP.AUTO: 7 [PH] (ref 4.5–8)
PLATELET # BLD AUTO: 220 THOUSANDS/UL (ref 149–390)
PLATELET # BLD AUTO: 232 THOUSANDS/UL (ref 149–390)
PMV BLD AUTO: 9.2 FL (ref 8.9–12.7)
PMV BLD AUTO: 9.9 FL (ref 8.9–12.7)
POTASSIUM BLD-SCNC: 4.2 MMOL/L (ref 3.5–5.3)
POTASSIUM SERPL-SCNC: 4.4 MMOL/L (ref 3.5–5.3)
PR INTERVAL: 134 MS
PR INTERVAL: 146 MS
PROT SERPL-MCNC: 7.7 G/DL (ref 6.4–8.2)
PROT UR STRIP-MCNC: NEGATIVE MG/DL
PROTHROMBIN TIME: 12.6 SECONDS (ref 11.8–14.2)
PROTHROMBIN TIME: 13.1 SECONDS (ref 11.8–14.2)
QRS AXIS: -64 DEGREES
QRS AXIS: -72 DEGREES
QRSD INTERVAL: 154 MS
QRSD INTERVAL: 156 MS
QT INTERVAL: 456 MS
QT INTERVAL: 470 MS
QTC INTERVAL: 443 MS
QTC INTERVAL: 453 MS
RBC # BLD AUTO: 4.41 MILLION/UL (ref 3.88–5.62)
RBC # BLD AUTO: 4.87 MILLION/UL (ref 3.88–5.62)
RBC #/AREA URNS AUTO: ABNORMAL /HPF
SODIUM BLD-SCNC: 140 MMOL/L (ref 136–145)
SODIUM SERPL-SCNC: 142 MMOL/L (ref 136–145)
SP GR UR STRIP.AUTO: 1.02 (ref 1–1.03)
SPECIMEN SOURCE: ABNORMAL
T WAVE AXIS: 3 DEGREES
T WAVE AXIS: 50 DEGREES
TROPONIN I SERPL-MCNC: 0.42 NG/ML
TROPONIN I SERPL-MCNC: 1.65 NG/ML
TROPONIN I SERPL-MCNC: 2.52 NG/ML
TROPONIN I SERPL-MCNC: 4.18 NG/ML
TSH SERPL DL<=0.05 MIU/L-ACNC: 1.98 UIU/ML (ref 0.36–3.74)
UROBILINOGEN UR QL STRIP.AUTO: 0.2 E.U./DL
VENTRICULAR RATE: 56 BPM
VENTRICULAR RATE: 57 BPM
WBC # BLD AUTO: 6.6 THOUSAND/UL (ref 4.31–10.16)
WBC # BLD AUTO: 7.44 THOUSAND/UL (ref 4.31–10.16)
WBC #/AREA URNS AUTO: ABNORMAL /HPF

## 2019-06-18 PROCEDURE — 85014 HEMATOCRIT: CPT

## 2019-06-18 PROCEDURE — 85730 THROMBOPLASTIN TIME PARTIAL: CPT | Performed by: EMERGENCY MEDICINE

## 2019-06-18 PROCEDURE — 84443 ASSAY THYROID STIM HORMONE: CPT | Performed by: EMERGENCY MEDICINE

## 2019-06-18 PROCEDURE — 80047 BASIC METABLC PNL IONIZED CA: CPT

## 2019-06-18 PROCEDURE — 93005 ELECTROCARDIOGRAM TRACING: CPT

## 2019-06-18 PROCEDURE — 85730 THROMBOPLASTIN TIME PARTIAL: CPT | Performed by: PHYSICIAN ASSISTANT

## 2019-06-18 PROCEDURE — 85610 PROTHROMBIN TIME: CPT | Performed by: EMERGENCY MEDICINE

## 2019-06-18 PROCEDURE — 99284 EMERGENCY DEPT VISIT MOD MDM: CPT

## 2019-06-18 PROCEDURE — 70450 CT HEAD/BRAIN W/O DYE: CPT

## 2019-06-18 PROCEDURE — 96375 TX/PRO/DX INJ NEW DRUG ADDON: CPT

## 2019-06-18 PROCEDURE — 84484 ASSAY OF TROPONIN QUANT: CPT | Performed by: PHYSICIAN ASSISTANT

## 2019-06-18 PROCEDURE — 93010 ELECTROCARDIOGRAM REPORT: CPT | Performed by: INTERNAL MEDICINE

## 2019-06-18 PROCEDURE — 71275 CT ANGIOGRAPHY CHEST: CPT

## 2019-06-18 PROCEDURE — 85025 COMPLETE CBC W/AUTO DIFF WBC: CPT | Performed by: EMERGENCY MEDICINE

## 2019-06-18 PROCEDURE — 80076 HEPATIC FUNCTION PANEL: CPT | Performed by: EMERGENCY MEDICINE

## 2019-06-18 PROCEDURE — 96374 THER/PROPH/DIAG INJ IV PUSH: CPT

## 2019-06-18 PROCEDURE — 81001 URINALYSIS AUTO W/SCOPE: CPT

## 2019-06-18 PROCEDURE — 74174 CTA ABD&PLVS W/CONTRAST: CPT

## 2019-06-18 PROCEDURE — 96361 HYDRATE IV INFUSION ADD-ON: CPT

## 2019-06-18 PROCEDURE — 99204 OFFICE O/P NEW MOD 45 MIN: CPT | Performed by: PHYSICIAN ASSISTANT

## 2019-06-18 PROCEDURE — 85027 COMPLETE CBC AUTOMATED: CPT | Performed by: PHYSICIAN ASSISTANT

## 2019-06-18 PROCEDURE — 83735 ASSAY OF MAGNESIUM: CPT | Performed by: EMERGENCY MEDICINE

## 2019-06-18 PROCEDURE — 80048 BASIC METABOLIC PNL TOTAL CA: CPT | Performed by: EMERGENCY MEDICINE

## 2019-06-18 PROCEDURE — 84484 ASSAY OF TROPONIN QUANT: CPT | Performed by: EMERGENCY MEDICINE

## 2019-06-18 PROCEDURE — 99284 EMERGENCY DEPT VISIT MOD MDM: CPT | Performed by: EMERGENCY MEDICINE

## 2019-06-18 PROCEDURE — 99220 PR INITIAL OBSERVATION CARE/DAY 70 MINUTES: CPT | Performed by: INTERNAL MEDICINE

## 2019-06-18 PROCEDURE — 83880 ASSAY OF NATRIURETIC PEPTIDE: CPT | Performed by: EMERGENCY MEDICINE

## 2019-06-18 PROCEDURE — 85610 PROTHROMBIN TIME: CPT | Performed by: PHYSICIAN ASSISTANT

## 2019-06-18 PROCEDURE — 36415 COLL VENOUS BLD VENIPUNCTURE: CPT | Performed by: EMERGENCY MEDICINE

## 2019-06-18 RX ORDER — HYDROMORPHONE HCL/PF 1 MG/ML
1 SYRINGE (ML) INJECTION ONCE
Status: COMPLETED | OUTPATIENT
Start: 2019-06-18 | End: 2019-06-18

## 2019-06-18 RX ORDER — TRAMADOL HYDROCHLORIDE 50 MG/1
100 TABLET ORAL 2 TIMES DAILY PRN
Status: DISCONTINUED | OUTPATIENT
Start: 2019-06-18 | End: 2019-06-20 | Stop reason: HOSPADM

## 2019-06-18 RX ORDER — METOCLOPRAMIDE HYDROCHLORIDE 5 MG/ML
10 INJECTION INTRAMUSCULAR; INTRAVENOUS ONCE
Status: COMPLETED | OUTPATIENT
Start: 2019-06-18 | End: 2019-06-18

## 2019-06-18 RX ORDER — ACETAMINOPHEN 325 MG/1
650 TABLET ORAL ONCE
Status: COMPLETED | OUTPATIENT
Start: 2019-06-18 | End: 2019-06-18

## 2019-06-18 RX ORDER — LOSARTAN POTASSIUM 50 MG/1
100 TABLET ORAL DAILY
Status: DISCONTINUED | OUTPATIENT
Start: 2019-06-19 | End: 2019-06-20 | Stop reason: HOSPADM

## 2019-06-18 RX ORDER — ASPIRIN 81 MG/1
81 TABLET, CHEWABLE ORAL DAILY
Status: DISCONTINUED | OUTPATIENT
Start: 2019-06-19 | End: 2019-06-19

## 2019-06-18 RX ORDER — ONDANSETRON 2 MG/ML
4 INJECTION INTRAMUSCULAR; INTRAVENOUS EVERY 6 HOURS PRN
Status: DISCONTINUED | OUTPATIENT
Start: 2019-06-18 | End: 2019-06-20 | Stop reason: HOSPADM

## 2019-06-18 RX ORDER — TIZANIDINE 4 MG/1
4 TABLET ORAL 2 TIMES DAILY PRN
Status: DISCONTINUED | OUTPATIENT
Start: 2019-06-18 | End: 2019-06-20 | Stop reason: HOSPADM

## 2019-06-18 RX ORDER — LORAZEPAM 2 MG/ML
1 INJECTION INTRAMUSCULAR 4 TIMES DAILY PRN
Status: DISCONTINUED | OUTPATIENT
Start: 2019-06-18 | End: 2019-06-20 | Stop reason: HOSPADM

## 2019-06-18 RX ORDER — TAMSULOSIN HYDROCHLORIDE 0.4 MG/1
0.8 CAPSULE ORAL
Status: DISCONTINUED | OUTPATIENT
Start: 2019-06-18 | End: 2019-06-20 | Stop reason: HOSPADM

## 2019-06-18 RX ORDER — OXYBUTYNIN CHLORIDE 5 MG/1
5 TABLET ORAL 2 TIMES DAILY
Refills: 0 | COMMUNITY
Start: 2019-06-12 | End: 2019-12-31

## 2019-06-18 RX ORDER — OXYBUTYNIN CHLORIDE 5 MG/1
5 TABLET ORAL 2 TIMES DAILY
Status: DISCONTINUED | OUTPATIENT
Start: 2019-06-18 | End: 2019-06-20 | Stop reason: HOSPADM

## 2019-06-18 RX ORDER — AMLODIPINE BESYLATE 5 MG/1
5 TABLET ORAL DAILY
Status: DISCONTINUED | OUTPATIENT
Start: 2019-06-19 | End: 2019-06-19

## 2019-06-18 RX ORDER — TRAMADOL HYDROCHLORIDE 50 MG/1
100 TABLET ORAL AS NEEDED
Refills: 0 | COMMUNITY
Start: 2019-06-03

## 2019-06-18 RX ORDER — PANTOPRAZOLE SODIUM 40 MG/1
40 TABLET, DELAYED RELEASE ORAL
Status: DISCONTINUED | OUTPATIENT
Start: 2019-06-19 | End: 2019-06-20 | Stop reason: HOSPADM

## 2019-06-18 RX ORDER — HEPARIN SODIUM 1000 [USP'U]/ML
2000 INJECTION, SOLUTION INTRAVENOUS; SUBCUTANEOUS AS NEEDED
Status: DISCONTINUED | OUTPATIENT
Start: 2019-06-18 | End: 2019-06-20

## 2019-06-18 RX ORDER — HEPARIN SODIUM 1000 [USP'U]/ML
4000 INJECTION, SOLUTION INTRAVENOUS; SUBCUTANEOUS ONCE
Status: COMPLETED | OUTPATIENT
Start: 2019-06-18 | End: 2019-06-18

## 2019-06-18 RX ORDER — ASPIRIN 81 MG/1
162 TABLET, CHEWABLE ORAL ONCE
Status: COMPLETED | OUTPATIENT
Start: 2019-06-18 | End: 2019-06-18

## 2019-06-18 RX ORDER — OXYBUTYNIN CHLORIDE 5 MG/1
5 TABLET ORAL ONCE
Status: COMPLETED | OUTPATIENT
Start: 2019-06-18 | End: 2019-06-18

## 2019-06-18 RX ORDER — HEPARIN SODIUM 10000 [USP'U]/100ML
3-20 INJECTION, SOLUTION INTRAVENOUS
Status: DISCONTINUED | OUTPATIENT
Start: 2019-06-18 | End: 2019-06-20

## 2019-06-18 RX ORDER — ZOLPIDEM TARTRATE 5 MG/1
10 TABLET ORAL
Status: DISCONTINUED | OUTPATIENT
Start: 2019-06-18 | End: 2019-06-20 | Stop reason: HOSPADM

## 2019-06-18 RX ORDER — KETOROLAC TROMETHAMINE 30 MG/ML
15 INJECTION, SOLUTION INTRAMUSCULAR; INTRAVENOUS ONCE
Status: COMPLETED | OUTPATIENT
Start: 2019-06-18 | End: 2019-06-18

## 2019-06-18 RX ORDER — LOSARTAN POTASSIUM 100 MG/1
TABLET ORAL
Qty: 30 TABLET | Refills: 11 | Status: SHIPPED | OUTPATIENT
Start: 2019-06-18 | End: 2019-07-10 | Stop reason: SDUPTHER

## 2019-06-18 RX ORDER — LORAZEPAM 2 MG/ML
1 INJECTION INTRAMUSCULAR ONCE
Status: COMPLETED | OUTPATIENT
Start: 2019-06-18 | End: 2019-06-18

## 2019-06-18 RX ORDER — METOPROLOL TARTRATE 5 MG/5ML
2.5 INJECTION INTRAVENOUS EVERY 6 HOURS PRN
Status: DISCONTINUED | OUTPATIENT
Start: 2019-06-18 | End: 2019-06-20 | Stop reason: HOSPADM

## 2019-06-18 RX ORDER — LOSARTAN POTASSIUM 100 MG/1
100 TABLET ORAL DAILY
Qty: 30 TABLET | Refills: 11 | Status: SHIPPED | OUTPATIENT
Start: 2019-06-18 | End: 2019-06-18 | Stop reason: SDUPTHER

## 2019-06-18 RX ORDER — HEPARIN SODIUM 1000 [USP'U]/ML
4000 INJECTION, SOLUTION INTRAVENOUS; SUBCUTANEOUS AS NEEDED
Status: DISCONTINUED | OUTPATIENT
Start: 2019-06-18 | End: 2019-06-20

## 2019-06-18 RX ORDER — ACETAMINOPHEN 325 MG/1
650 TABLET ORAL EVERY 4 HOURS PRN
Status: DISCONTINUED | OUTPATIENT
Start: 2019-06-18 | End: 2019-06-20 | Stop reason: HOSPADM

## 2019-06-18 RX ORDER — DIPHENHYDRAMINE HYDROCHLORIDE 50 MG/ML
25 INJECTION INTRAMUSCULAR; INTRAVENOUS ONCE
Status: COMPLETED | OUTPATIENT
Start: 2019-06-18 | End: 2019-06-18

## 2019-06-18 RX ADMIN — DIPHENHYDRAMINE HYDROCHLORIDE 25 MG: 50 INJECTION, SOLUTION INTRAMUSCULAR; INTRAVENOUS at 12:32

## 2019-06-18 RX ADMIN — IOHEXOL 100 ML: 350 INJECTION, SOLUTION INTRAVENOUS at 12:03

## 2019-06-18 RX ADMIN — OXYBUTYNIN CHLORIDE 5 MG: 5 TABLET ORAL at 21:33

## 2019-06-18 RX ADMIN — HEPARIN SODIUM 4000 UNITS: 1000 INJECTION INTRAVENOUS; SUBCUTANEOUS at 23:25

## 2019-06-18 RX ADMIN — HYDROMORPHONE HYDROCHLORIDE 1 MG: 1 INJECTION, SOLUTION INTRAMUSCULAR; INTRAVENOUS; SUBCUTANEOUS at 11:11

## 2019-06-18 RX ADMIN — LORAZEPAM 1 MG: 2 INJECTION INTRAMUSCULAR; INTRAVENOUS at 17:04

## 2019-06-18 RX ADMIN — ACETAMINOPHEN 650 MG: 325 TABLET ORAL at 23:32

## 2019-06-18 RX ADMIN — ASPIRIN 81 MG 162 MG: 81 TABLET ORAL at 14:39

## 2019-06-18 RX ADMIN — METOCLOPRAMIDE 10 MG: 5 INJECTION, SOLUTION INTRAMUSCULAR; INTRAVENOUS at 12:31

## 2019-06-18 RX ADMIN — SODIUM CHLORIDE 1000 ML: 0.9 INJECTION, SOLUTION INTRAVENOUS at 12:37

## 2019-06-18 RX ADMIN — TAMSULOSIN HYDROCHLORIDE 0.8 MG: 0.4 CAPSULE ORAL at 21:06

## 2019-06-18 RX ADMIN — ACETAMINOPHEN 650 MG: 325 TABLET ORAL at 18:38

## 2019-06-18 RX ADMIN — ZOLPIDEM TARTRATE 10 MG: 5 TABLET, COATED ORAL at 23:33

## 2019-06-18 RX ADMIN — ACETAMINOPHEN 650 MG: 325 TABLET ORAL at 12:31

## 2019-06-18 RX ADMIN — METOPROLOL TARTRATE 2.5 MG: 1 INJECTION, SOLUTION INTRAVENOUS at 18:39

## 2019-06-18 RX ADMIN — HEPARIN SODIUM AND DEXTROSE 11.1 UNITS/KG/HR: 10000; 5 INJECTION INTRAVENOUS at 23:27

## 2019-06-18 RX ADMIN — KETOROLAC TROMETHAMINE 15 MG: 30 INJECTION, SOLUTION INTRAMUSCULAR; INTRAVENOUS at 11:09

## 2019-06-18 NOTE — ASSESSMENT & PLAN NOTE
Presenting with symptoms including bilateral temporal headache, pressure sensation between shoulder blades radiating to chest, fatigue, and reported high blood pressures at home in the 200s last night  CT of head reveals no acute intracranial process  CT of chest abdomen pelvis reveals no evidence of acute dissection  Normally patient takes losartan 50 mg daily  He took an extra losartan last night  This morning symptoms persisted and called PCP who instructed taking 100 mg of losartan daily  Presented to ER with persistence of symptoms  Found to have elevated blood pressures 193/87, 183/96    Possibly secondary to anxiety/stress vs pain, consider secondary causes/workup if does not respond to current treatment    -continue losartan 100 mg daily   -treat anxiety/pain to see if this improves pressures   -can add additional antihypertensive if necessary

## 2019-06-18 NOTE — ED NOTES
Called CT at this time regarding patient to be next for CT at this time      Shaune Pallas, RN  06/18/19 0897

## 2019-06-18 NOTE — ED PROVIDER NOTES
History  Chief Complaint   Patient presents with    Blood Pressure Check     Patient reports has been feeling 'crappy' for the past few days since Thursday with headaches and pressure between his shoulders blades pushing forward, states he decided to check his blood pressure last night and it was close to 200 so he took his Losartan  States it was high again this morning, took his Losartan at 830 and called his doctor, was told to take another dose which was around 9 but patient remained concerned and wanted to be checked  History provided by:  Patient   used: No    Medical Problem - Major   Location:  Complains of feeling crappy including headache a generalized weakness just overall not feeling well, achiness between his shoulder blades that radiates to the chest has been going on since last Thursday with labile and very high blood pressures  Severity:  Severe  Onset quality:  Sudden  Duration:  6 days  Timing:  Constant  Progression:  Worsening  Chronicity:  New  Context:  Patient has treatment for hypertension and has noticed that his blood pressure is been very high has been taking extra medications  He complains of a diffuse headache which is getting worse  Also complains of interscapular pain which radiates to the front of his chest since Thursday as well which is there most of the time but waxes and wanes  He is not short of breath  Relieved by:  Nothing  Worsened by:  Nothing  Ineffective treatments:  Took extra antihypertensive medications  Associated symptoms: chest pain and headaches    Associated symptoms: no abdominal pain, no congestion, no cough, no diarrhea, no fever, no nausea, no rash, no shortness of breath, no sore throat and no vomiting        Prior to Admission Medications   Prescriptions Last Dose Informant Patient Reported? Taking?    Acetaminophen 325 MG CAPS  Self Yes Yes   Sig: Take 650 mg by mouth every 6 (six) hours   TIZANIDINE HCL PO  Self Yes Yes Sig: Take by mouth   losartan (COZAAR) 100 MG tablet   No Yes   Sig: take 1 tablet by mouth once daily   omeprazole (PriLOSEC) 20 mg delayed release capsule   No Yes   Sig: take 1 capsule by mouth once daily   tamsulosin (FLOMAX) 0 4 mg   No Yes   Sig: take 2 capsules by mouth at bedtime   zolpidem (AMBIEN) 10 mg tablet   No Yes   Sig: Take 1 tablet (10 mg total) by mouth daily at bedtime as needed for sleep      Facility-Administered Medications: None       Past Medical History:   Diagnosis Date    Closed fracture of distal end of fibula with routine healing     unspecified fracture morphology, unspecified laterality, subsequent encounter; Last Assessed:12/27/16    GERD (gastroesophageal reflux disease)     Hypertension     Kidney stones     Nocturia     Psoriasis     Sleep apnea        Past Surgical History:   Procedure Laterality Date    APPENDECTOMY      CARPAL TUNNEL RELEASE      ROTATOR CUFF REPAIR      SHOULDER ARTHROSCOPY Left     Last ASsessed:10/24/16    TOTAL HIP ARTHROPLASTY      TRIGGER FINGER RELEASE         Family History   Problem Relation Age of Onset    Arthritis Mother     Hypertension Mother     Irritable bowel syndrome Mother     Osteoporosis Mother     Arthritis Other      I have reviewed and agree with the history as documented  Social History     Tobacco Use    Smoking status: Never Smoker    Smokeless tobacco: Never Used   Substance Use Topics    Alcohol use: Yes     Comment: 1 drink daily    Drug use: No        Review of Systems   Constitutional: Negative for chills and fever  HENT: Negative for congestion and sore throat  Eyes: Negative for visual disturbance  Respiratory: Positive for chest tightness  Negative for cough and shortness of breath  Cardiovascular: Positive for chest pain  Negative for leg swelling  Gastrointestinal: Negative for abdominal pain, diarrhea, nausea and vomiting  Genitourinary: Negative for dysuria and flank pain  Musculoskeletal: Positive for back pain  Negative for arthralgias, gait problem and neck pain  Skin: Negative for rash  Neurological: Positive for headaches  Negative for dizziness, facial asymmetry, speech difficulty, weakness and numbness  All other systems reviewed and are negative  Physical Exam  Physical Exam   Constitutional: He appears well-developed and well-nourished  He is cooperative  Non-toxic appearance  He does not have a sickly appearance  He does not appear ill  No distress  HENT:   Head: Normocephalic and atraumatic  Right Ear: Hearing normal  No drainage or swelling  Left Ear: Hearing normal  No drainage or swelling  Mouth/Throat: Mucous membranes are normal    Eyes: Pupils are equal, round, and reactive to light  Conjunctivae, EOM and lids are normal  Right eye exhibits no discharge  Left eye exhibits no discharge  Neck: Trachea normal and normal range of motion  Neck supple  No JVD present  Cardiovascular: Normal rate, regular rhythm, normal heart sounds, intact distal pulses and normal pulses  Exam reveals no gallop and no friction rub  No murmur heard  Pulmonary/Chest: Effort normal and breath sounds normal  No stridor  No respiratory distress  He has no wheezes  He has no rales  He exhibits no tenderness  Abdominal: Soft  Normal appearance  He exhibits no ascites and no mass  There is no hepatosplenomegaly  There is no tenderness  There is no rebound, no guarding and no CVA tenderness  Musculoskeletal: Normal range of motion  He exhibits no edema or tenderness  Cervical back: Normal         Thoracic back: Normal         Lumbar back: Normal    Lymphadenopathy:     He has no cervical adenopathy  Right: No inguinal adenopathy present  Left: No inguinal adenopathy present  Neurological: He is alert  He has normal strength  No cranial nerve deficit or sensory deficit  He exhibits normal muscle tone   Coordination and gait normal  GCS eye subscore is 4  GCS verbal subscore is 5  GCS motor subscore is 6  Skin: Skin is warm, dry and intact  No rash noted  He is not diaphoretic  No pallor  Psychiatric: His speech is normal  His mood appears anxious  Cognition and memory are normal    Nursing note and vitals reviewed        Vital Signs  ED Triage Vitals [06/18/19 1006]   Temperature Pulse Respirations Blood Pressure SpO2   97 7 °F (36 5 °C) 71 18 166/90 96 %      Temp Source Heart Rate Source Patient Position - Orthostatic VS BP Location FiO2 (%)   Temporal Monitor Sitting Right arm --      Pain Score       7           Vitals:    06/18/19 1400 06/18/19 1415 06/18/19 1430 06/18/19 1445   BP: 158/85 141/85 148/83 (!) 171/97   Pulse: 56 60 58 60   Patient Position - Orthostatic VS: Lying Lying Lying Lying         Visual Acuity      ED Medications  Medications   HYDROmorphone (DILAUDID) injection 1 mg (1 mg Intravenous Given 6/18/19 1111)   ketorolac (TORADOL) injection 15 mg (15 mg Intravenous Given 6/18/19 1109)   iohexol (OMNIPAQUE) 350 MG/ML injection (MULTI-DOSE) 100 mL (100 mL Intravenous Given 6/18/19 1203)   metoclopramide (REGLAN) injection 10 mg (10 mg Intravenous Given 6/18/19 1231)   diphenhydrAMINE (BENADRYL) injection 25 mg (25 mg Intravenous Given 6/18/19 1232)   sodium chloride 0 9 % bolus 1,000 mL (1,000 mL Intravenous New Bag 6/18/19 1237)   acetaminophen (TYLENOL) tablet 650 mg (650 mg Oral Given 6/18/19 1231)   aspirin chewable tablet 162 mg (162 mg Oral Given 6/18/19 1439)       Diagnostic Studies  Results Reviewed     Procedure Component Value Units Date/Time    B-type natriuretic peptide [[id number]]  (Abnormal) Collected:  06/18/19 1107    Lab Status:  Final result Specimen:  Blood from Arm, Right Updated:  06/18/19 1341     NT-proBNP 189 pg/mL     Hepatic function panel [[id number]]  (Normal) Collected:  06/18/19 1107    Lab Status:  Final result Specimen:  Blood from Arm, Right Updated:  06/18/19 1329     Total Bilirubin 0 77 mg/dL Bilirubin, Direct 0 16 mg/dL      Alkaline Phosphatase 57 U/L      AST 24 U/L      ALT 24 U/L      Total Protein 7 7 g/dL      Albumin 4 0 g/dL     TSH [[id number]]  (Normal) Collected:  06/18/19 1107    Lab Status:  Final result Specimen:  Blood from Arm, Right Updated:  06/18/19 1329     TSH 3RD GENERATON 1 979 uIU/mL     Narrative:       Patients undergoing fluorescein dye angiography may retain small amounts of fluorescein in the body for 48-72 hours post procedure  Samples containing fluorescein can produce falsely depressed TSH values  If the patient had this procedure,a specimen should be resubmitted post fluorescein clearance        Magnesium [[id number]]  (Normal) Collected:  06/18/19 1107    Lab Status:  Final result Specimen:  Blood from Arm, Right Updated:  06/18/19 1329     Magnesium 2 0 mg/dL     Troponin I [[id number]]  (Abnormal) Collected:  06/18/19 1107    Lab Status:  Final result Specimen:  Blood from Arm, Right Updated:  06/18/19 1310     Troponin I 0 42 ng/mL     Basic metabolic panel [[id number]] Collected:  06/18/19 1107    Lab Status:  Final result Specimen:  Blood from Arm, Right Updated:  06/18/19 1304     Sodium 142 mmol/L      Potassium 4 4 mmol/L      Chloride 104 mmol/L      CO2 28 mmol/L      ANION GAP 10 mmol/L      BUN 15 mg/dL      Creatinine 0 82 mg/dL      Glucose 103 mg/dL      Calcium 9 9 mg/dL      eGFR 92 ml/min/1 73sq m     Narrative:       Br[employer]aney guidelines for Chronic Kidney Disease (CKD):     Stage 1 with normal or high GFR (GFR > 90 mL/min/1 73 square meters)    Stage 2 Mild CKD (GFR = 60-89 mL/min/1 73 square meters)    Stage 3A Moderate CKD (GFR = 45-59 mL/min/1 73 square meters)    Stage 3B Moderate CKD (GFR = 30-44 mL/min/1 73 square meters)    Stage 4 Severe CKD (GFR = 15-29 mL/min/1 73 square meters)    Stage 5 End Stage CKD (GFR <15 mL/min/1 73 square meters)  Note: GFR calculation is accurate only with a steady state creatinine Protime-INR [[id number]]  (Normal) Collected:  06/18/19 1107    Lab Status:  Final result Specimen:  Blood from Arm, Right Updated:  06/18/19 1300     Protime 12 6 seconds      INR 0 93    APTT [[id number]]  (Normal) Collected:  06/18/19 1107    Lab Status:  Final result Specimen:  Blood from Arm, Right Updated:  06/18/19 1300     PTT 26 seconds     CBC and differential [[id number]] Collected:  06/18/19 1107    Lab Status:  Final result Specimen:  Blood from Arm, Right Updated:  06/18/19 1237     WBC 6 60 Thousand/uL      RBC 4 87 Million/uL      Hemoglobin 16 2 g/dL      Hematocrit 47 4 %      MCV 97 fL      MCH 33 3 pg      MCHC 34 2 g/dL      RDW 12 7 %      MPV 9 9 fL      Platelets 889 Thousands/uL      nRBC 0 /100 WBCs      Neutrophils Relative 61 %      Immat GRANS % 0 %      Lymphocytes Relative 26 %      Monocytes Relative 12 %      Eosinophils Relative 1 %      Basophils Relative 0 %      Neutrophils Absolute 3 97 Thousands/µL      Immature Grans Absolute 0 02 Thousand/uL      Lymphocytes Absolute 1 68 Thousands/µL      Monocytes Absolute 0 82 Thousand/µL      Eosinophils Absolute 0 09 Thousand/µL      Basophils Absolute 0 02 Thousands/µL     Urine Microscopic [[id number]]  (Abnormal) Collected:  06/18/19 1130    Lab Status:  Final result Specimen:  Urine, Clean Catch Updated:  06/18/19 1156     RBC, UA 4-10 /hpf      WBC, UA None Seen /hpf      Epithelial Cells Occasional /hpf      Bacteria, UA Occasional /hpf     POCT urinalysis dipstick [[id number]]  (Abnormal) Resulted:  06/18/19 1126    Lab Status:  Final result Specimen:  Urine Updated:  06/18/19 1126    ED Urine Macroscopic [[id number]]  (Abnormal) Collected:  06/18/19 1130    Lab Status:  Final result Specimen:  Urine Updated:  06/18/19 1121     Color, UA Yellow     Clarity, UA Clear     pH, UA 7 0     Leukocytes, UA Negative     Nitrite, UA Negative     Protein, UA Negative mg/dl      Glucose, UA Negative mg/dl      Ketones, UA Negative mg/dl Urobilinogen, UA 0 2 E U /dl      Bilirubin, UA Negative     Blood, UA Small     Specific [place], UA 1 020    Narrative:       CLINITEK RESULT    POCT Chem 8+ [[id number]]  (Abnormal) Collected:  06/18/19 1111    Lab Status:  Final result Specimen:  Venous Updated:  06/18/19 1115     SODIUM, I-STAT 140 mmol/l      Potassium, i-STAT 4 2 mmol/L      Chloride, istat 103 mmol/L      CO2, i-STAT 27 mmol/L      Anion Gap, i-STAT 16 mmol/L      Calcium, Ionized i-STAT 1 19 mmol/L      BUN, I-STAT 15 mg/dl      Creatinine, i-STAT 0 9 mg/dl      eGFR 88 ml/min/1 73sq m      Glucose, i-STAT 111 mg/dl      Hct, i-STAT 47 %      Hgb, i-STAT 16 0 g/dl      Specimen Type VENOUS    Narrative:       National Kid[employer] guidelines for Chronic Kidney Disease (CKD):     Stage 1 with normal or high GFR (GFR > 90 mL/min/1 73 square meters)    Stage 2 Mild CKD (GFR = 60-89 mL/min/1 73 square meters)    Stage 3A Moderate CKD (GFR = 45-59 mL/min/1 73 square meters)    Stage 3B Moderate CKD (GFR = 30-44 mL/min/1 73 square meters)    Stage 4 Severe CKD (GFR = 15-29 mL/min/1 73 square meters)    Stage 5 End Stage CKD (GFR <15 mL/min/1 73 square meters)  Note: GFR calculation is accurate only with a steady state creatinine                 CT head without contrast   Final Result by Tiffany Mireles MD (06/18 1230)      No CT evidence of acute intracranial process  Chronic microangiopathy  Workstation performed: [id number]         CTA dissection protocol chest abdomen pelvis w wo contrast   Final Result by Tiffany Mireles MD (06/18 1252)      CTA chest:      Minimal aortic and branch vessel calcification  No aortic dissection or aneurysm  No acute intrathoracic process  CTA abdomen and pelvis:      Minimal aortic and branch vessel calcification  No aortic dissection or aneurysm  Mild bilateral common iliac ectasia  No acute intra-abdominal or intrapelvic process  Colonic diverticulosis  Cholelithiasis  2 mm right nephrolithiasis  Workstation performed: [id number]                    Procedures  ECG 12 Lead Documentation  Date/Time: 6/18/2019 11:05 AM  Performed by: Nicole Crain MD  Authorized by: Nicole Crain MD     Indications / Diagnosis:  Htm, back pain and cp  ECG reviewed by me, the ED Provider: yes    Patient location:  ED  Previous ECG:     Previous ECG:  Compared to current    Comparison ECG info:  6/2015    Similarity:  No change  Interpretation:     Interpretation: non-specific    Rate:     ECG rate:  57    ECG rate assessment: bradycardic    Rhythm:     Rhythm: sinus bradycardia    Ectopy:     Ectopy: none    QRS:     QRS axis:  Left    QRS intervals: Wide  Conduction:     Conduction: abnormal      Abnormal conduction: complete RBBB    ST segments:     ST segments:  Normal  T waves:     T waves: non-specific             ED Course                               MDM  Number of Diagnoses or Management Options  Accelerated hypertension:   Elevated troponin I level:   Diagnosis management comments: Patient feeling better with some medication treatment although still has a slight headache and still has slight back pain and the chest pain has improved  He has no EKG changes to indicate an acute MI  His troponin was elevated he has no old measurements in the system  No renal failure  Could be due to accelerated hypertension  His blood pressure has improved somewhat since he has been here  I have ordered the patient have some aspirin  Spoke with Cardiology who recommends admission to Internal Medicine  Spoke with Internal Medicine regarding admission  Has no evidence of aortic dissection  He does have some aortic calcifications         Amount and/or Complexity of Data Reviewed  Clinical lab tests: ordered and reviewed  Tests in the radiology section of CPT®: ordered and reviewed  Tests in the medicine section of CPT®: ordered and reviewed  Discuss the patient with other providers: yes    Patient Progress  Patient progress: stable      Disposition  Final diagnoses:   Accelerated hypertension   Elevated troponin I level     Time reflects when diagnosis was documented in both MDM as applicable and the Disposition within this note     Time User Action Codes Description Comment    6/18/2019  2:56 PM Demario OLIVERA Add [I10] Accelerated hypertension     6/18/2019  2:56 PM Demario Thomas [R74 8] Elevated troponin I level       ED Disposition     ED Disposition Condition Date/Time Comment    Admit Stable Tue Jun 18, 2019  2:56 PM Case was discussed with Jackie and the patient's admission status was agreed to be Admission Status: observation status to the service of Dr Marcella Gambino   Follow-up Information    None         Patient's Medications   Discharge Prescriptions    No medications on file     No discharge procedures on file      ED Provider  Electronically Signed by           Marco Antonio Augustin MD  06/18/19 6047

## 2019-06-18 NOTE — ASSESSMENT & PLAN NOTE
Presenting with elevated troponin 0 42  Will trend x3  Initial EKG without evidence of acute ischemia  Patient also complaining of a pressure sensation located between his shoulder blades radiating to the front of his chest   Does report some intermittent nausea and clamminess however denies any sayra chest pain, shortness of breath, or exertional symptoms  Denies history of high cholesterol or diabetes  Recent lipid panel from February 2018 with cholesterol 170, triglycerides 79, HDL 57,   Denies history of cardiac disease or prior stents    History of stress test 2015

## 2019-06-18 NOTE — H&P
H&P- Sp Fam 1950, 80 y o  male MRN: [id number]    Unit/Bed#: E4 -01 Encounter: [id number]    Primary Care Provider: Yonathan Greenfield MD   Date and time admitted to hospital: 6/18/2019 10:09 AM      * Hypertensive urgency  Assessment & Plan  Presenting with symptoms including bilateral temporal headache, pressure sensation between shoulder blades radiating to chest, fatigue, and reported high blood pressures at home in the 200s last night  CT of head reveals no acute intracranial process  CT of chest abdomen pelvis reveals no evidence of acute dissection  Normally patient takes losartan 50 mg daily  He took an extra losartan last night  This morning symptoms persisted and called PCP who instructed taking 100 mg of losartan daily  Presented to ER with persistence of symptoms  Found to have elevated blood pressures 193/87, 183/96  Possibly secondary to anxiety/stress vs pain, consider secondary causes/workup if does not respond to current treatment    -continue losartan 100 mg daily   -treat anxiety/pain to see if this improves pressures   -can add additional antihypertensive if necessary    Elevated troponin  Assessment & Plan  Presenting with elevated troponin 0 42  Will trend x3  Initial EKG without evidence of acute ischemia  Patient also complaining of a pressure sensation located between his shoulder blades radiating to the front of his chest   Does report some intermittent nausea and clamminess however denies any sayra chest pain, shortness of breath, or exertional symptoms  Denies history of high cholesterol or diabetes  Recent lipid panel from February 2018 with cholesterol 170, triglycerides 79, HDL 57,   Denies history of cardiac disease or prior stents  History of stress test 2015    Low back pain  Assessment & Plan  Being managed as an outpt with pain/spine    Stable at baseline  Prescribed tizanadine and tramadol as needed    Alcohol use  Assessment & Plan  Admits to drinking 1-3 shots of liquor a day  Has gone days without drinking before  Denies history of alcohol withdrawal   Monitor for the symptoms  1 mg Ativan as needed for acute withdrawal symptoms    GERD (gastroesophageal reflux disease)  Assessment & Plan  Continue Protonix    BPH with obstruction/lower urinary tract symptoms  Assessment & Plan  Continue Flomax and Proscar        VTE Prophylaxis: Enoxaparin (Lovenox)  / sequential compression device   Code Status: full code  Anticipated Length of Stay:  Patient will be admitted on an Observation basis with an anticipated length of stay of  Less than 2 midnights  Justification for Hospital Stay:  ACS rule out and hypertensive urgency management     Chief Complaint:   High blood pressures at home / fatigue     History of Present Illness:     Sharifa Alvarado is a 80 y o  male with past medical history of essential hypertension, low back pain, BARB on CPAP, BPH      He presents with a complaint of not feeling himself since last Thursday 6/13/19  Patient notes he normally goes to the gym every day and runs for 1 hour followed by weakness thing  During his routine he was unable to complete his run and felt extremely tired, fatigued  He is also complaining of a bilateral temporal headache  No visual changes, lightheadedness or dizziness  His headache has waxed and waned over the past several days  Additionally has a pressure sensation located between his shoulder blades like someone is pressing on his back which radiates to his chest   At times he will experience some nausea and intermittent clamminess however denies any sayra symptoms of chest pain or shortness of breath  Patient reports taking his blood pressure last night and noting it to be in the 511 systolically and diastolically in the 450I  On his own patient took an additional losartan 50 mg 2 total 100 mg that day    This morning patient woke up complaining of the same symptoms along with elevated blood pressures and decided to present to the ER  Denies history of coronary artery disease or previous stent placement  Last stress test was in 2015       Patient lives at home he and is a fairly active 41-year-old who works out daily  He declines smoking history  Drinks 1-3 shots of liquor daily  Denies any other drug use  Denies any recent illnesses, abdominal pain, ripping or tearing sensation in his chest, fevers or chills      Review of Systems:     General:   No Fever or chills;    EENT:   No ear pain, facial swelling;   Skin:   No rashes, color changes  Respiratory:     No shortness of breath, cough, wheezing, stridor  Cardiovascular:     + chest/back pressure, palpitations  Gastrointestinal:    No nausea, vomiting, diarrhea; No abdominal pain  Musculoskeletal:     No arthralgias, myalgias, swelling  Neurologic:   No dizziness, numbness, weakness  No speech difficulties  Psych:   No agitation,     Otherwise, All other twelve-point review of systems normal       Past Medical and Surgical History:      Medical History        Past Medical History:   Diagnosis Date    Closed fracture of distal end of fibula with routine healing       unspecified fracture morphology, unspecified laterality, subsequent encounter;  Last Assessed:12/27/16    GERD (gastroesophageal reflux disease)      Hypertension      Kidney stones      Nocturia      Psoriasis      Sleep apnea              Surgical History         Past Surgical History:   Procedure Laterality Date    APPENDECTOMY        CARPAL TUNNEL RELEASE        ROTATOR CUFF REPAIR        SHOULDER ARTHROSCOPY Left       Last ASsessed:10/24/16    TOTAL HIP ARTHROPLASTY        TRIGGER FINGER RELEASE                Meds/Allergies:     Current Medications             Current Facility-Administered Medications   Medication Dose Route Frequency Provider Last Rate Last Dose    acetaminophen (TYLENOL) tablet 650 mg  650 mg Oral Q4H PRN Jeffery Greenfield PA-C        [START ON 6/19/2019] amLODIPine (NORVASC) tablet 5 mg  5 mg Oral Daily Lazara Mejia DO        [START ON 6/19/2019] aspirin chewable tablet 81 mg  81 mg Oral Daily Mira Zuniga PA-C        [START ON 6/19/2019] enoxaparin (LOVENOX) subcutaneous injection 40 mg  40 mg Subcutaneous Daily Mira Zuniga PA-C        [START ON 6/19/2019] losartan (COZAAR) tablet 100 mg  100 mg Oral Daily Mira Zuniga PA-C        metoprolol (LOPRESSOR) injection 2 5 mg  2 5 mg Intravenous Q6H PRN Lazara Mejia DO        ondansetron (ZOFRAN) injection 4 mg  4 mg Intravenous Q6H PRN Alan Desai PA-C        oxybutynin (DITROPAN) tablet 5 mg  5 mg Oral BID Alan Desai PA-C        [START ON 6/19/2019] pantoprazole (PROTONIX) EC tablet 40 mg  40 mg Oral Early Morning Mira Zuniga PA-C        tamsulosin (FLOMAX) capsule 0 8 mg  0 8 mg Oral HS Mira Zuniga PA-C        tiZANidine (ZANAFLEX) tablet 4 mg  4 mg Oral BID PRN Alan Desai PA-C        traMADol (ULTRAM) tablet 100 mg  100 mg Oral BID PRN Alan Desai PA-C        zolpidem (AMBIEN) tablet 10 mg  10 mg Oral HS PRN Alan Desai PA-C                      Allergies   Allergen Reactions    Latex Hives    Other      Pollen Extract      Shellfish-Derived Products         [facility]         Allergies:          Allergies   Allergen Reactions    Latex Hives    Other      Pollen Extract      Shellfish-Derived Products         [place]         Social History:     Marital Status:       Substance Use History:   Social History           Substance and Sexual Activity   Alcohol Use Yes     Comment: 1 drink daily      Social History      Tobacco Use   Smoking Status Never Smoker   Smokeless Tobacco Never Used      Social History          Substance and Sexual Activity   Drug Use No         Family History:     80year-old mother with history of longstanding hypertension     Physical Exam:      Vitals:   Blood Pressure: (!) 172/90 (06/18/19 1626)  Pulse: 64 (06/18/19 1626)  Temperature: (!) 97 °F (36 1 °C) (06/18/19 1626)  Temp Source: Tympanic (06/18/19 1626)  Respirations: 18 (06/18/19 1626)  Height: 5' 9" (175 3 cm) (06/18/19 1626)  Weight - Scale: 105 kg (231 lb 4 2 oz) (06/18/19 1626)  SpO2: 94 % (06/18/19 1626)     Physical Exam   Constitutional: He is oriented to person, place, and time  He appears well-developed and well-nourished  HENT:   Head: Normocephalic and atraumatic  Cardiovascular: Normal rate, regular rhythm and normal heart sounds  Unable to reproduce any chest tenderness or back tenderness   Pulmonary/Chest: Effort normal and breath sounds normal  No stridor  No respiratory distress  He has no wheezes  He has no rales  He exhibits no tenderness  Abdominal: Soft  Bowel sounds are normal  He exhibits no distension and no mass  There is no tenderness  There is no rebound and no guarding  No hernia  Neurological: He is alert and oriented to person, place, and time  Skin: Skin is warm and dry  Psychiatric: He has a normal mood and affect   His behavior is normal    Nursing note and vitals reviewed         Additional Data:      Lab Results: I have personally reviewed pertinent reports              Results from last 7 days   Lab Units 06/18/19  1111 06/18/19  1107   WBC Thousand/uL  --  6 60   HEMOGLOBIN g/dL  --  16 2   I STAT HEMOGLOBIN g/dl 16 0  --    HEMATOCRIT %  --  47 4   HEMATOCRIT, ISTAT % 47  --    PLATELETS Thousands/uL  --  232   NEUTROS PCT %  --  61   LYMPHS PCT %  --  26   MONOS PCT %  --  12   EOS PCT %  --  1            Results from last 7 days   Lab Units 06/18/19  1111 06/18/19  1107   POTASSIUM mmol/L  --  4 4   CHLORIDE mmol/L  --  104   CO2 mmol/L  --  28   CO2, I-STAT mmol/L 27  --    BUN mg/dL  --  15   CREATININE mg/dL  --  0 82   CALCIUM mg/dL  --  9 9   ALK PHOS U/L  --  57   ALT U/L  --  24   AST U/L  --  24   GLUCOSE, ISTAT mg/dl 111  --            Results from last 7 days   Lab Units 06/18/19  1107   INR   0 93         Imaging: I have personally reviewed pertinent reports        Ct Head Without Contrast     Result Date: 6/18/2019  Narrative: CT BRAIN - WITHOUT CONTRAST INDICATION:   HA, HTN  COMPARISON:  None  TECHNIQUE:  CT examination of the brain was performed  In addition to axial images, coronal 2D reformatted images were created and submitted for interpretation  Radiation dose length product (DLP) for this visit:  865 mGy-cm   This examination, like all CT scans performed in the [facility], was performed utilizing techniques to minimize radiation dose exposure, including the use of iterative reconstruction and automated exposure control  IMAGE QUALITY:  Diagnostic  FINDINGS: PARENCHYMA:  No intracranial mass, mass effect or midline shift  No CT signs of acute infarction  No acute parenchymal hemorrhage  Periventricular white matter hypodensity is a nonspecific finding likely representing chronic microangiopathy  Calcification bilateral cavernous internal carotid and distal vertebral arteries  VENTRICLES AND EXTRA-AXIAL SPACES:  Mild bifrontal CSF prominence attributed to bifrontal predominant volume loss  No hydrocephalus  VISUALIZED ORBITS AND PARANASAL SINUSES:  Minimal mucosal thickening bilateral ethmoid and right sphenoid sinuses  Partially visualized orbits are unremarkable  CALVARIUM AND EXTRACRANIAL SOFT TISSUES:  Left parietal vertex scalp soft tissue thickening attributed to chronic scar in the absence of recent injury  Otherwise unremarkable       Impression: No CT evidence of acute intracranial process  Chronic microangiopathy  Workstation performed: [id number]      Cta Dissection Protocol Chest Abdomen Pelvis W Wo Contrast     Result Date: 6/18/2019  Narrative: CT ANGIOGRAM OF THE CHEST, ABDOMEN AND PELVIS WITH AND WITHOUT IV CONTRAST INDICATION:  HTN, chest and back pain   COMPARISON: CT abdomen and pelvis 2/17/2016 TECHNIQUE:  CT angiogram examination of the chest, abdomen and pelvis was performed according to standard protocol  This examination, like all CT scans performed in the [facility], was performed utilizing techniques to minimize radiation dose exposure, including the use of iterative reconstruction and automated exposure control  Contrast as well as noncontrast images were obtained  3D reconstructions were performed an independent workstation, and are supplied for review  Rad dose 2981 mGy-cm IV Contrast:  100 mL of iohexol (OMNIPAQUE) 350 Enteric Contrast:  None  FINDINGS: VASCULAR STRUCTURES:  No aortic dissection  Minimal aortic and branch vessel calcification  No aortic aneurysm  Diffuse left and distal right common iliac artery ectasia measuring up to 1 4 cm  Tortuous abdominal aorta  Brachiocephalic, left common carotid, and left subclavian arteries are patent  Celiac, superior mesenteric, single bilateral renal, and inferior mesenteric arteries are patent  Within limits of this examination, there is no evidence of pulmonary embolus  OTHER FINDINGS: CHEST: HEART: Heart is not enlarged  No pericardial effusion  Aortic and coronary artery calcification    LUNGS:  No infiltrate, pneumothorax, or suspicious lung nodule  Punctate subpleural calcified granuloma left lower lobe central airways are clear  Jerone Hunter PLEURA: No pleural effusion  MEDIASTINUM AND ANIKET:  No mass or significant lymphadenopathy  CHEST WALL AND LOWER NECK: Normal  ABDOMEN LIVER/BILIARY TREE:  Unremarkable  GALLBLADDER:  There are gallstones within the gallbladder, without pericholecystic inflammatory changes  SPLEEN:  2 punctate low density nodules too small to characterize further accurately by CT statistically represent simple cyst  PANCREAS:  Unremarkable  ADRENAL GLANDS: There is a 7 mm right adrenal nodule   Although its imaging features on this study is indeterminate, because this lesion has been stable for > 1 year, it is considered benign and no further followup or workup is needed  Unremarkable left adrenal gland  Adrenal recommendation based on institutional consensus and Journal of [employer] of Radiology 2017;14:[id number] KIDNEYS/URETERS:  One or more sharply circumscribed subcentimeter renal hypodensities are noted  These lesions are too small to accurately characterize, but are statistically most likely to represent benign cortical renal cyst(s)  According to the guidelines published in the [facility] Paper of the ACR Incidental Findings Committee (Radiology 2010), no further workup of these lesions is recommended  2 mm right nephrolithiasis  Kidneys otherwise unremarkable  No perinephric collection  PELVIS REPRODUCTIVE ORGANS:  Unremarkable for patient's age  URINARY BLADDER:  Unremarkable  ADDITIONAL ABDOMINAL AND PELVIC STRUCTURES: STOMACH AND BOWEL:  Descending and sigmoid colonic diverticulosis without immediate adjacent stranding  No bowel obstruction  ABDOMINOPELVIC CAVITY:  No free fluid, free gas, or enlarged lymph nodes  ABDOMINAL WALL/INGUINAL REGIONS:  Subcentimeter ventral umbilical abdominal wall diastases containing fat  No bowel herniation  No inguinal hernia or mass  OSSEOUS STRUCTURES:  No acute fracture or osseous destructive lesion identified  Degenerative changes of the spine, pubic symphysis, and multiple joints  Partially visualized left hip prosthesis  Evidence of bilateral shoulder surgery  Stable minimal  grade 1 retrolisthesis L3 on L4       Impression: CTA chest: Minimal aortic and branch vessel calcification  No aortic dissection or aneurysm  No acute intrathoracic process  CTA abdomen and pelvis: Minimal aortic and branch vessel calcification  No aortic dissection or aneurysm  Mild bilateral common iliac ectasia  No acute intra-abdominal or intrapelvic process  Colonic diverticulosis  Cholelithiasis  2 mm right nephrolithiasis   Workstation performed: [id number]         EKG, Pathology, and Other Studies Reviewed on Admission:   · EK bradycardia, right bundle-branch block, inverted T-waves lead V1 and V2     [employer] Records Reviewed: Yes      Total Time for Visit, including Counseling / Coordination of Care: 45 minutes  Greater than 50% of this total time spent on direct patient counseling and coordination of care         ** Please Note: This note has been constructed using a voice recognition system   **

## 2019-06-18 NOTE — ED NOTES
Patient ambulatory to the bathroom with steady gait at this time      Silvino Head RN  06/18/19 [id number]

## 2019-06-18 NOTE — ED NOTES
ED provider at bedside updating patient and family at this time      Kassie Jose, [address] [facility]  06/18/19 [id number]

## 2019-06-18 NOTE — PROGRESS NOTES
Progress Note - Nicolas Figueroa 1950, 80 y o  male MRN: [id number]    Unit/Bed#: E4 -01 Encounter: [id number]    Primary Care Provider: Joey Braga MD   Date and time admitted to hospital: 6/18/2019 10:09 AM        * Hypertensive urgency  Assessment & Plan  Presenting with symptoms including bilateral temporal headache, pressure sensation between shoulder blades radiating to chest, fatigue, and reported high blood pressures at home in the 200s last night  CT of head reveals no acute intracranial process  CT of chest abdomen pelvis reveals no evidence of acute dissection  Normally patient takes losartan 50 mg daily  He took an extra losartan last night  This morning symptoms persisted and called PCP who instructed taking 100 mg of losartan daily  Presented to ER with persistence of symptoms  Found to have elevated blood pressures 193/87, 183/96  Possibly secondary to anxiety/stress vs pain, consider secondary causes/workup if does not respond to current treatment    -continue losartan 100 mg daily   -treat anxiety/pain to see if this improves pressures   -can add additional antihypertensive if necessary    Elevated troponin  Assessment & Plan  Presenting with elevated troponin 0 42  Will trend x3  Initial EKG without evidence of acute ischemia  Patient also complaining of a pressure sensation located between his shoulder blades radiating to the front of his chest   Does report some intermittent nausea and clamminess however denies any sayra chest pain, shortness of breath, or exertional symptoms  Denies history of high cholesterol or diabetes  Recent lipid panel from February 2018 with cholesterol 170, triglycerides 79, HDL 57,   Denies history of cardiac disease or prior stents  History of stress test 2015    Low back pain  Assessment & Plan  Being managed as an outpt with pain/spine    Stable at baseline  Prescribed tizanadine and tramadol as needed    Alcohol use  Assessment & Plan  Admits to drinking 1-3 shots of liquor a day  Has gone days without drinking before  Denies history of alcohol withdrawal   Monitor for the symptoms  1 mg Ativan as needed for acute withdrawal symptoms    GERD (gastroesophageal reflux disease)  Assessment & Plan  Continue Protonix    BPH with obstruction/lower urinary tract symptoms  Assessment & Plan  Continue Flomax and Proscar    Obstructive sleep apnea  Assessment and plan      Continue home CPAP     VTE Prophylaxis: Enoxaparin (Lovenox)  / sequential compression device   Code Status: full code  Anticipated Length of Stay:  Patient will be admitted on an Observation basis with an anticipated length of stay of  Less than 2 midnights  Justification for Hospital Stay:  ACS rule out and hypertensive urgency management    Chief Complaint:   High blood pressures at home / fatigue    History of Present Illness:    Tejas Lofton is a 80 y o  male with past medical history of essential hypertension, low back pain, BARB on CPAP, BPH  He presents with a complaint of not feeling himself since last Thursday 6/13/19  Patient notes he normally goes to the gym every day and runs for 1 hour followed by weakness thing  During his routine he was unable to complete his run and felt extremely tired, fatigued  He is also complaining of a bilateral temporal headache  No visual changes, lightheadedness or dizziness  His headache has waxed and waned over the past several days  Additionally has a pressure sensation located between his shoulder blades like someone is pressing on his back which radiates to his chest   At times he will experience some nausea and intermittent clamminess however denies any sayra symptoms of chest pain or shortness of breath  Patient reports taking his blood pressure last night and noting it to be in the 185 systolically and diastolically in the 372C  On his own patient took an additional losartan 50 mg 2 total 100 mg that day    This morning patient woke up complaining of the same symptoms along with elevated blood pressures and decided to present to the ER  Denies history of coronary artery disease or previous stent placement  Last stress test was in 2015  Patient lives at home he and is a fairly active 58-year-old who works out daily  He declines smoking history  Drinks 1-3 shots of liquor daily  Denies any other drug use  Denies any recent illnesses, abdominal pain, ripping or tearing sensation in his chest, fevers or chills  Review of Systems:    General:   No Fever or chills;    EENT:   No ear pain, facial swelling;   Skin:   No rashes, color changes  Respiratory:     No shortness of breath, cough, wheezing, stridor  Cardiovascular:     + chest/back pressure, palpitations  Gastrointestinal:    No nausea, vomiting, diarrhea; No abdominal pain  Musculoskeletal:     No arthralgias, myalgias, swelling  Neurologic:   No dizziness, numbness, weakness  No speech difficulties  Psych:   No agitation,    Otherwise, All other twelve-point review of systems normal      Past Medical and Surgical History:     Past Medical History:   Diagnosis Date    Closed fracture of distal end of fibula with routine healing     unspecified fracture morphology, unspecified laterality, subsequent encounter;  Last Assessed:12/27/16    GERD (gastroesophageal reflux disease)     Hypertension     Kidney stones     Nocturia     Psoriasis     Sleep apnea        Past Surgical History:   Procedure Laterality Date    APPENDECTOMY      CARPAL TUNNEL RELEASE      ROTATOR CUFF REPAIR      SHOULDER ARTHROSCOPY Left     Last ASsessed:10/24/16    TOTAL HIP ARTHROPLASTY      TRIGGER FINGER RELEASE         Meds/Allergies:    Current Facility-Administered Medications   Medication Dose Route Frequency Provider Last Rate Last Dose    acetaminophen (TYLENOL) tablet 650 mg  650 mg Oral Q4H PRN [employer], PA-C        [START ON 6/19/2019] amLODIPine (NORVASC) tablet 5 mg  5 mg Oral Daily Lazara Ambron, DO        [START ON 6/19/2019] aspirin chewable tablet 81 mg  81 mg Oral Daily Mira Zuniga PA-C        [START ON 6/19/2019] enoxaparin (LOVENOX) subcutaneous injection 40 mg  40 mg Subcutaneous Daily Mira Zuniga, PA-C        [START ON 6/19/2019] losartan (COZAAR) tablet 100 mg  100 mg Oral Daily Mira Zuniga, PA-C        metoprolol (LOPRESSOR) injection 2 5 mg  2 5 mg Intravenous Q6H PRN Lazara Radharon, DO        ondansetron (ZOFRAN) injection 4 mg  4 mg Intravenous Q6H PRN [place] Antis, PA-C        oxybutynin (DITROPAN) tablet 5 mg  5 mg Oral BID Jeffery Antis, PA-C        [START ON 6/19/2019] pantoprazole (PROTONIX) EC tablet 40 mg  40 mg Oral Early Morning Mira Zuniga PA-C        tamsulosin (FLOMAX) capsule 0 8 mg  0 8 mg Oral HS Mira Zuniga PA-C        tiZANidine (ZANAFLEX) tablet 4 mg  4 mg Oral BID PRN Jeffery Antis, PA-C        traMADol (ULTRAM) tablet 100 mg  100 mg Oral BID PRN Jeffery Antis, PA-C        zolpidem (AMBIEN) tablet 10 mg  10 mg Oral HS PRN Jeffery Antis, PA-C           Allergies   Allergen Reactions    Latex Hives    Other     Pollen Extract    [facility] Shellfish-Derived Products      [employer]       Allergies:    Allergies   Allergen Reactions    Latex Hives    Other     Pollen Extract     Shellfish-Derived Products      [employer]       Social History:     Marital Status:      Substance Use History:   Social History     Substance and Sexual Activity   Alcohol Use Yes    Comment: 1 drink daily     Social History     Tobacco Use   Smoking Status Never Smoker   Smokeless Tobacco Never Used     Social History     Substance and Sexual Activity   Drug Use No       Family History:    60-year-old mother with history of longstanding hypertension    Physical Exam:     Vitals:   Blood Pressure: (!) 172/90 (06/18/19 1626)  Pulse: 64 (06/18/19 1626)  Temperature: (!) 97 °F (36 1 °C) (06/18/19 1626)  Temp Source: Tympanic (06/18/19 1626)  Respirations: 18 (06/18/19 1626)  Height: 5' 9" (175 3 cm) (06/18/19 1626)  Weight - Scale: 105 kg (231 lb 4 2 oz) (06/18/19 1626)  SpO2: 94 % (06/18/19 1626)    Physical Exam   Constitutional: He is oriented to person, place, and time  He appears well-developed and well-nourished  HENT:   Head: Normocephalic and atraumatic  Cardiovascular: Normal rate, regular rhythm and normal heart sounds  Unable to reproduce any chest tenderness or back tenderness   Pulmonary/Chest: Effort normal and breath sounds normal  No stridor  No respiratory distress  He has no wheezes  He has no rales  He exhibits no tenderness  Abdominal: Soft  Bowel sounds are normal  He exhibits no distension and no mass  There is no tenderness  There is no rebound and no guarding  No hernia  Neurological: He is alert and oriented to person, place, and time  Skin: Skin is warm and dry  Psychiatric: He has a normal mood and affect  His behavior is normal    Nursing note and vitals reviewed  Additional Data:     Lab Results: I have personally reviewed pertinent reports  Results from last 7 days   Lab Units 06/18/19  1111 06/18/19  1107   WBC Thousand/uL  --  6 60   HEMOGLOBIN g/dL  --  16 2   I STAT HEMOGLOBIN g/dl 16 0  --    HEMATOCRIT %  --  47 4   HEMATOCRIT, ISTAT % 47  --    PLATELETS Thousands/uL  --  232   NEUTROS PCT %  --  61   LYMPHS PCT %  --  26   MONOS PCT %  --  12   EOS PCT %  --  1     Results from last 7 days   Lab Units 06/18/19  1111 06/18/19  1107   POTASSIUM mmol/L  --  4 4   CHLORIDE mmol/L  --  104   CO2 mmol/L  --  28   CO2, I-STAT mmol/L 27  --    BUN mg/dL  --  15   CREATININE mg/dL  --  0 82   CALCIUM mg/dL  --  9 9   ALK PHOS U/L  --  57   ALT U/L  --  24   AST U/L  --  24   GLUCOSE, ISTAT mg/dl 111  --      Results from last 7 days   Lab Units 06/18/19  1107   INR  0 93       Imaging: I have personally reviewed pertinent reports        Ct Head Without Contrast    Result Date: 6/18/2019  Narrative: CT BRAIN - WITHOUT CONTRAST INDICATION:   HA, HTN  COMPARISON:  None  TECHNIQUE:  CT examination of the brain was performed  In addition to axial images, coronal 2D reformatted images were created and submitted for interpretation  Radiation dose length product (DLP) for this visit:  865 mGy-cm   This examination, like all CT scans performed in the [facility], was performed utilizing techniques to minimize radiation dose exposure, including the use of iterative reconstruction and automated exposure control  IMAGE QUALITY:  Diagnostic  FINDINGS: PARENCHYMA:  No intracranial mass, mass effect or midline shift  No CT signs of acute infarction  No acute parenchymal hemorrhage  Periventricular white matter hypodensity is a nonspecific finding likely representing chronic microangiopathy  Calcification bilateral cavernous internal carotid and distal vertebral arteries  VENTRICLES AND EXTRA-AXIAL SPACES:  Mild bifrontal CSF prominence attributed to bifrontal predominant volume loss  No hydrocephalus  VISUALIZED ORBITS AND PARANASAL SINUSES:  Minimal mucosal thickening bilateral ethmoid and right sphenoid sinuses  Partially visualized orbits are unremarkable  CALVARIUM AND EXTRACRANIAL SOFT TISSUES:  Left parietal vertex scalp soft tissue thickening attributed to chronic scar in the absence of recent injury  Otherwise unremarkable  Impression: No CT evidence of acute intracranial process  Chronic microangiopathy  Workstation performed: [id number]     Cta Dissection Protocol Chest Abdomen Pelvis W Wo Contrast    Result Date: 6/18/2019  Narrative: CT ANGIOGRAM OF THE CHEST, ABDOMEN AND PELVIS WITH AND WITHOUT IV CONTRAST INDICATION:  HTN, chest and back pain  COMPARISON: CT abdomen and pelvis 2/17/2016 TECHNIQUE:  CT angiogram examination of the chest, abdomen and pelvis was performed according to standard protocol    This examination, like all CT scans performed in the [facility], was performed utilizing techniques to minimize radiation dose exposure, including the use of iterative reconstruction and automated exposure control  Contrast as well as noncontrast images were obtained  3D reconstructions were performed an independent workstation, and are supplied for review  Rad dose 2981 mGy-cm IV Contrast:  100 mL of iohexol (OMNIPAQUE) 350 Enteric Contrast:  None  FINDINGS: VASCULAR STRUCTURES:  No aortic dissection  Minimal aortic and branch vessel calcification  No aortic aneurysm  Diffuse left and distal right common iliac artery ectasia measuring up to 1 4 cm  Tortuous abdominal aorta  Brachiocephalic, left common carotid, and left subclavian arteries are patent  Celiac, superior mesenteric, single bilateral renal, and inferior mesenteric arteries are patent  Within limits of this examination, there is no evidence of pulmonary embolus  OTHER FINDINGS: CHEST: HEART: Heart is not enlarged  No pericardial effusion  Aortic and coronary artery calcification    LUNGS:  No infiltrate, pneumothorax, or suspicious lung nodule  Punctate subpleural calcified granuloma left lower lobe central airways are clear  Janey Sinks PLEURA: No pleural effusion  MEDIASTINUM AND ANIKET:  No mass or significant lymphadenopathy  CHEST WALL AND LOWER NECK: Normal  ABDOMEN LIVER/BILIARY TREE:  Unremarkable  GALLBLADDER:  There are gallstones within the gallbladder, without pericholecystic inflammatory changes  SPLEEN:  2 punctate low density nodules too small to characterize further accurately by CT statistically represent simple cyst  PANCREAS:  Unremarkable  ADRENAL GLANDS: There is a 7 mm right adrenal nodule  Although its imaging features on this study is indeterminate, because this lesion has been stable for > 1 year, it is considered benign and no further followup or workup is needed  Unremarkable left adrenal gland   Adrenal recommendation based on institutional consensus and Journal of [employer] of Radiology 2017;14:[id number] KIDNEYS/URETERS:  One or more sharply circumscribed subcentimeter renal hypodensities are noted  These lesions are too small to accurately characterize, but are statistically most likely to represent benign cortical renal cyst(s)  According to the guidelines published in the [facility] Paper of the ACR Incidental Findings Committee (Radiology 2010), no further workup of these lesions is recommended  2 mm right nephrolithiasis  Kidneys otherwise unremarkable  No perinephric collection  PELVIS REPRODUCTIVE ORGANS:  Unremarkable for patient's age  URINARY BLADDER:  Unremarkable  ADDITIONAL ABDOMINAL AND PELVIC STRUCTURES: STOMACH AND BOWEL:  Descending and sigmoid colonic diverticulosis without immediate adjacent stranding  No bowel obstruction  ABDOMINOPELVIC CAVITY:  No free fluid, free gas, or enlarged lymph nodes  ABDOMINAL WALL/INGUINAL REGIONS:  Subcentimeter ventral umbilical abdominal wall diastases containing fat  No bowel herniation  No inguinal hernia or mass  OSSEOUS STRUCTURES:  No acute fracture or osseous destructive lesion identified  Degenerative changes of the spine, pubic symphysis, and multiple joints  Partially visualized left hip prosthesis  Evidence of bilateral shoulder surgery  Stable minimal  grade 1 retrolisthesis L3 on L4  Impression: CTA chest: Minimal aortic and branch vessel calcification  No aortic dissection or aneurysm  No acute intrathoracic process  CTA abdomen and pelvis: Minimal aortic and branch vessel calcification  No aortic dissection or aneurysm  Mild bilateral common iliac ectasia  No acute intra-abdominal or intrapelvic process  Colonic diverticulosis  Cholelithiasis  2 mm right nephrolithiasis   Workstation performed: [id number]       EKG, Pathology, and Other Studies Reviewed on Admission:   · EK bradycardia, right bundle-branch block, inverted T-waves lead V1 and V2    [employer] Records Reviewed: Yes     Total Time for Visit, including Counseling / Coordination of Care: 45 minutes  Greater than 50% of this total time spent on direct patient counseling and coordination of care  ** Please Note: This note has been constructed using a voice recognition system   **

## 2019-06-18 NOTE — ASSESSMENT & PLAN NOTE
Being managed as an outpt with pain/spine    Stable at baseline  Prescribed tizanadine and tramadol as needed

## 2019-06-18 NOTE — PLAN OF CARE
Problem: Potential for Falls  Goal: Patient will remain free of falls  Description  INTERVENTIONS:  - Assess patient frequently for physical needs  -  Identify cognitive and physical deficits and behaviors that affect risk of falls  -  [place] fall precautions as indicated by assessment   - Educate patient/family on patient safety including physical limitations  - Instruct patient to call for assistance with activity based on assessment  - Modify environment to reduce risk of injury  - Consider OT/PT consult to assist with strengthening/mobility  Outcome: Progressing     Problem: CARDIOVASCULAR - ADULT  Goal: Maintains optimal cardiac output and hemodynamic stability  Description  INTERVENTIONS:  - Monitor I/O, vital signs and rhythm  - Monitor for S/S and trends of decreased cardiac output i e  bleeding, hypotension  - Administer and titrate ordered vasoactive medications to optimize hemodynamic stability  - Assess quality of pulses, skin color and temperature  - Assess for signs of decreased coronary artery perfusion - ex   Angina  - Instruct patient to report change in severity of symptoms  Outcome: Progressing  Goal: Absence of cardiac dysrhythmias or at baseline rhythm  Description  INTERVENTIONS:  - Continuous cardiac monitoring, monitor vital signs, obtain 12 lead EKG if indicated  - Administer antiarrhythmic and heart rate control medications as ordered  - Monitor electrolytes and administer replacement therapy as ordered  Outcome: Progressing     Problem: PAIN - ADULT  Goal: Verbalizes/displays adequate comfort level or baseline comfort level  Description  Interventions:  - Encourage patient to monitor pain and request assistance  - Assess pain using appropriate pain scale  - Administer analgesics based on type and severity of pain and evaluate response  - Implement non-pharmacological measures as appropriate and evaluate response  - Consider cultural and social influences on pain and pain management  - Notify physician/advanced practitioner if interventions unsuccessful or patient reports new pain  Outcome: Progressing     Problem: SAFETY ADULT  Goal: Maintain or return to baseline ADL function  Description  INTERVENTIONS:  -  Assess patient's ability to carry out ADLs; assess patient's baseline for ADL function and identify physical deficits which impact ability to perform ADLs (bathing, care of mouth/teeth, toileting, grooming, dressing, etc )  - Assess/evaluate cause of self-care deficits   - Assess range of motion  - Assess patient's mobility; develop plan if impaired  - Assess patient's need for assistive devices and provide as appropriate  - Encourage maximum independence but intervene and supervise when necessary  ¯ Involve family in performance of ADLs  ¯ Assess for home care needs following discharge   ¯ Request OT consult to assist with ADL evaluation and planning for discharge  ¯ Provide patient education as appropriate  Outcome: Progressing  Goal: Maintain or return mobility status to optimal level  Description  INTERVENTIONS:  - Assess patient's baseline mobility status (ambulation, transfers, stairs, etc )    - Identify cognitive and physical deficits and behaviors that affect mobility  - Identify mobility aids required to assist with transfers and/or ambulation (gait belt, sit-to-stand, lift, walker, cane, etc )  - [place] fall precautions as indicated by assessment  - Record patient progress and toleration of activity level on Mobility SBAR; progress patient to next Phase/Stage  - Instruct patient to call for assistance with activity based on assessment  - Request Rehabilitation consult to assist with strengthening/weightbearing, etc   Outcome: Progressing     Problem: DISCHARGE PLANNING  Goal: Discharge to home or other facility with appropriate resources  Description  INTERVENTIONS:  - Identify barriers to discharge w/patient and caregiver  - Arrange for needed discharge resources and transportation as appropriate  - Identify discharge learning needs (meds, wound care, etc )  - Arrange for interpretive services to assist at discharge as needed  - Refer to Case Management Department for coordinating discharge planning if the patient needs post-hospital services based on physician/advanced practitioner order or complex needs related to functional status, cognitive ability, or social support system  Outcome: Progressing     Problem: Knowledge Deficit  Goal: Patient/family/caregiver demonstrates understanding of disease process, treatment plan, medications, and discharge instructions  Description  Complete learning assessment and assess knowledge base    Interventions:  - Provide teaching at level of understanding  - Provide teaching via preferred learning methods  Outcome: Progressing

## 2019-06-18 NOTE — ASSESSMENT & PLAN NOTE
Admits to drinking 1-3 shots of liquor a day  Has gone days without drinking before  Denies history of alcohol withdrawal   Monitor for the symptoms    1 mg Ativan as needed for acute withdrawal symptoms

## 2019-06-18 NOTE — CONSULTS
Consult - Cardiology   Ana Estrella 80 y o  male MRN: [id number]  Unit/Bed#: E4 -01 Encounter: [id number]        Reason For Consult:  Elevated troponin               ASSESSMENT:  1  Elevated troponin  Primary reason for consultation  Initial troponin 0 4 --> Trend pending  ECG with bifascicular block (noted on previous), new T-wave inversion in lead 2    2  Accelerated hypertension  BP trending 150-180/ 85-90 since admission    3  Obstructive sleep apnea, wears home CPAP  4  Obesity  5  BPH    PLAN:     1  Elevated troponin:   -suspect secondary to high BP, trop may continue to rise slightly   -continue to trend X 3 with serial ECG's   -no indication for heparin drip yet   await repeat troponin levels   -probably ischemic testing to follow once BP under control   -goal is for chest pain free, advise beta blockade to avoid precipitous drop in BP with nitroglycerin    -check echocardiogram     2  Accelerated HTN:   -Gradually coming down, peak 193/87   165/89 most recent   -Decrease gradually by 25%    -Can use low dose PRN beta blocker       History Of Present Illness: This is a 71yo gentleman with a rather benign PMH which includes HTN, obesity (recently lost around 100 lbs), BARB on CPAP, and BPH  He has no significant cardiac history and has never been seen by a cardiologist in the past  He did have a Lexiscan stress test in 2015 ordered by his PCP for chest pain which was did not show any perfusion abnormalities  LF systolic function was normal at this time  He has not had any subsequent cardiac testing  His PCP is Dr Armando Montgomery and he is compliant with his appointments  He is normally very active in his daily life and exercises up to 1 hour per day  He does not get any chest pain or shortness of breath in his daily life  He does not smoke, drink excessively, or use street drugs  His mother had HTN but other than that there is no strong family hx of cardiac disease       Mr Pedro Horn relates to me a 4-5 day history or generalized fatigue  Last Thursday he began to have severe headaches  He attributes this to high BP as in the past when his BP spiked he had similar symptoms  His headaches are associated with pressure in his back which radiates to the front of his chest  This pressure has been constant since Thursday  At no point has it gone away completely  It is made worse when his headaches seem to get worse  When his pain is at its worse it is associated with nausea and diaphoresis  He called his PCP yesterday about this and was instructed to take an extra Losartan pill  The patient took his BP again today and after noting it was still high decided to come to the ED for evaluation  Past Medical History:        Past Medical History:   Diagnosis Date    Closed fracture of distal end of fibula with routine healing     unspecified fracture morphology, unspecified laterality, subsequent encounter; Last Assessed:12/27/16    GERD (gastroesophageal reflux disease)     Hypertension     Kidney stones     Nocturia     Psoriasis     Sleep apnea       Past Surgical History:   Procedure Laterality Date    APPENDECTOMY      CARPAL TUNNEL RELEASE      ROTATOR CUFF REPAIR      SHOULDER ARTHROSCOPY Left     Last ASsessed:10/24/16    TOTAL HIP ARTHROPLASTY      TRIGGER FINGER RELEASE          Allergy:        Allergies   Allergen Reactions    Latex Hives    Other     Pollen Extract     Shellfish-Derived Products      [employer]       Medications:       Prior to Admission medications    Medication Sig Start Date End Date Taking?  Authorizing Provider   Acetaminophen 325 MG CAPS Take 650 mg by mouth every 6 (six) hours   Yes Historical Provider, MD   losartan (COZAAR) 100 MG tablet take 1 tablet by mouth once daily 6/18/19  Yes Isis Wesley MD   omeprazole (PriLOSEC) 20 mg delayed release capsule take 1 capsule by mouth once daily 12/25/18  Yes Isis Wesley MD   tamsulosin [facility]) 0 4 mg take 2 capsules by mouth at bedtime 4/25/19  Yes Yonathan Greenfield MD   TIZANIDINE HCL PO Take 4 mg by mouth 2 (two) times a day    Yes Historical Provider, MD   zolpidem (AMBIEN) 10 mg tablet Take 1 tablet (10 mg total) by mouth daily at bedtime as needed for sleep 5/6/19  Yes Yonathan Greenfield MD   omeprazole (PriLOSEC) 10 mg delayed release capsule Take 10 mg by mouth  6/18/19 Yes Historical Provider, MD   oxybutynin (DITROPAN) 5 mg tablet Take 5 mg by mouth 2 (two) times a day  6/12/19   Historical Provider, MD   traMADol (ULTRAM) 50 mg tablet Take 100 mg by mouth 2 (two) times a day 6/3/19   Historical Provider, MD   betamethasone valerate (VALISONE) 0 1 % lotion Apply topically 2 (two) times a day  6/18/19  Historical Provider, MD   fexofenadine-pseudoephedrine (ALLEGRA-D 24) 180-240 MG per 24 hr tablet Take 1 tablet by mouth  6/18/19  Historical Provider, MD   losartan (COZAAR) 100 MG tablet Take 1 tablet (100 mg total) by mouth daily 6/18/19 6/18/19  Yonathan Greenfield MD   losartan (COZAAR) 50 mg tablet Take 1 tablet (50 mg total) by mouth daily 1/16/19 6/18/19  Yonathan Greenfield MD       Family History:     Family History   Problem Relation Age of Onset    Arthritis Mother     Hypertension Mother     Irritable bowel syndrome Mother     Osteoporosis Mother     Arthritis Other         Social History:       Social History     Socioeconomic History    Marital status:      Spouse name: None    Number of children: None    Years of education: None    Highest education level: None   Occupational History    None   Social Needs    Financial resource strain: None    Food insecurity:     Worry: None     Inability: None    Transportation needs:     Medical: None     Non-medical: None   Tobacco Use    Smoking status: Never Smoker    Smokeless tobacco: Never Used   Substance and Sexual Activity    Alcohol use: Yes     Comment: 1 drink daily    Drug use: No    Sexual activity: None   Lifestyle    Physical activity: Days per week: None     Minutes per session: None    Stress: None   Relationships    Social connections:     Talks on phone: None     Gets together: None     Attends Protestant service: None     Active member of club or organization: None     Attends meetings of clubs or organizations: None     Relationship status: None    Intimate partner violence:     Fear of current or ex partner: None     Emotionally abused: None     Physically abused: None     Forced sexual activity: None   Other Topics Concern    None   Social History Narrative    None       ROS:  Symptoms per HPI  Remainder review of systems is negative    Exam:  General:  alert, oriented and in no distress, cooperative  Somewhat anxious appearing   Head: Normocephalic, atraumatic  Eyes:  EOMI  Pupils - equal, round, reactive to accomodation  No icterus  Normal Conjunctiva  Oropharynx: moist and normal-appearing mucosa  Neck: supple, symmetrical, trachea midline and no JVD  Heart:  RRR, No: murmer, rub or gallop, S1 & S2 normal   Respiratory effort / Chest Inspection: unlabored  Lungs:  normal air entry, lungs clear to auscultation and no rales, rhonchi or wheezing   Abdomen: flat, normal findings: bowel sounds normal and soft, non-tender  Lower Limbs:  no pitting edema  Pulses[de-identified]  RLE - DP: present 2+                 LLE - DP: present 2+  Musculoskeletal: ROM grossly normal        DATA:      ECG:  Bifascicular block  No acute ST changes                      Telemetry: Normal sinus rhythm,   HR 60's                 Weights: Wt Readings from Last 3 Encounters:   06/18/19 104 kg (230 lb 2 6 oz)   05/17/19 105 kg (231 lb)   11/19/18 105 kg (231 lb 3 2 oz)   , Body mass index is 32 1 kg/m²           Lab Studies:    Results from last 7 days   Lab Units 06/18/19  1107   TROPONIN I ng/mL 0 42*          Results from last 7 days   Lab Units 06/18/19  1111 06/18/19  1107   WBC Thousand/uL  --  6 60   HEMOGLOBIN g/dL  --  16 2   I STAT HEMOGLOBIN g/dl 16 0  -- HEMATOCRIT %  --  47 4   HEMATOCRIT, ISTAT % 47  --    PLATELETS Thousands/uL  --  232   ,   Results from last 7 days   Lab Units 06/18/19  1111 06/18/19  1107   POTASSIUM mmol/L  --  4 4   CHLORIDE mmol/L  --  104   CO2 mmol/L  --  28   CO2, I-STAT mmol/L 27  --    BUN mg/dL  --  15   CREATININE mg/dL  --  0 82   CALCIUM mg/dL  --  9 9   ALK PHOS U/L  --  57   ALT U/L  --  24   AST U/L  --  24   GLUCOSE, ISTAT mg/dl 111  --

## 2019-06-19 ENCOUNTER — APPOINTMENT (OUTPATIENT)
Dept: PHYSICAL THERAPY | Facility: MEDICAL CENTER | Age: 69
End: 2019-06-19
Payer: MEDICARE

## 2019-06-19 ENCOUNTER — APPOINTMENT (OUTPATIENT)
Dept: NON INVASIVE DIAGNOSTICS | Facility: HOSPITAL | Age: 69
DRG: 247 | End: 2019-06-19
Attending: INTERNAL MEDICINE
Payer: MEDICARE

## 2019-06-19 LAB
ANION GAP SERPL CALCULATED.3IONS-SCNC: 12 MMOL/L (ref 4–13)
APTT PPP: 34 SECONDS (ref 26–38)
ATRIAL RATE: 60 BPM
ATRIAL RATE: 65 BPM
BUN SERPL-MCNC: 13 MG/DL (ref 5–25)
CALCIUM SERPL-MCNC: 9.4 MG/DL (ref 8.3–10.1)
CHLORIDE SERPL-SCNC: 106 MMOL/L (ref 100–108)
CO2 SERPL-SCNC: 24 MMOL/L (ref 21–32)
CREAT SERPL-MCNC: 0.76 MG/DL (ref 0.6–1.3)
ERYTHROCYTE [DISTWIDTH] IN BLOOD BY AUTOMATED COUNT: 12.7 % (ref 11.6–15.1)
GFR SERPL CREATININE-BSD FRML MDRD: 94 ML/MIN/1.73SQ M
GLUCOSE P FAST SERPL-MCNC: 108 MG/DL (ref 65–99)
GLUCOSE SERPL-MCNC: 108 MG/DL (ref 65–140)
HCT VFR BLD AUTO: 44.5 % (ref 36.5–49.3)
HGB BLD-MCNC: 15.4 G/DL (ref 12–17)
KCT BLD-ACNC: 291 SEC (ref 89–137)
MCH RBC QN AUTO: 33.7 PG (ref 26.8–34.3)
MCHC RBC AUTO-ENTMCNC: 34.6 G/DL (ref 31.4–37.4)
MCV RBC AUTO: 97 FL (ref 82–98)
P AXIS: 27 DEGREES
P AXIS: 45 DEGREES
PLATELET # BLD AUTO: 208 THOUSANDS/UL (ref 149–390)
PMV BLD AUTO: 9 FL (ref 8.9–12.7)
POTASSIUM SERPL-SCNC: 4.2 MMOL/L (ref 3.5–5.3)
PR INTERVAL: 136 MS
PR INTERVAL: 142 MS
QRS AXIS: -65 DEGREES
QRS AXIS: -70 DEGREES
QRSD INTERVAL: 156 MS
QRSD INTERVAL: 160 MS
QT INTERVAL: 458 MS
QT INTERVAL: 476 MS
QTC INTERVAL: 476 MS
QTC INTERVAL: 476 MS
RBC # BLD AUTO: 4.57 MILLION/UL (ref 3.88–5.62)
SODIUM SERPL-SCNC: 142 MMOL/L (ref 136–145)
SPECIMEN SOURCE: ABNORMAL
T WAVE AXIS: -12 DEGREES
T WAVE AXIS: -28 DEGREES
TROPONIN I SERPL-MCNC: 5.31 NG/ML
TROPONIN I SERPL-MCNC: 5.6 NG/ML
VENTRICULAR RATE: 60 BPM
VENTRICULAR RATE: 65 BPM
WBC # BLD AUTO: 8.52 THOUSAND/UL (ref 4.31–10.16)

## 2019-06-19 PROCEDURE — C1887 CATHETER, GUIDING: HCPCS | Performed by: INTERNAL MEDICINE

## 2019-06-19 PROCEDURE — 92928 PRQ TCAT PLMT NTRAC ST 1 LES: CPT | Performed by: INTERNAL MEDICINE

## 2019-06-19 PROCEDURE — 93454 CORONARY ARTERY ANGIO S&I: CPT | Performed by: INTERNAL MEDICINE

## 2019-06-19 PROCEDURE — 99153 MOD SED SAME PHYS/QHP EA: CPT | Performed by: INTERNAL MEDICINE

## 2019-06-19 PROCEDURE — C1769 GUIDE WIRE: HCPCS | Performed by: INTERNAL MEDICINE

## 2019-06-19 PROCEDURE — 93005 ELECTROCARDIOGRAM TRACING: CPT

## 2019-06-19 PROCEDURE — 84484 ASSAY OF TROPONIN QUANT: CPT | Performed by: INTERNAL MEDICINE

## 2019-06-19 PROCEDURE — 85730 THROMBOPLASTIN TIME PARTIAL: CPT | Performed by: INTERNAL MEDICINE

## 2019-06-19 PROCEDURE — 99152 MOD SED SAME PHYS/QHP 5/>YRS: CPT | Performed by: INTERNAL MEDICINE

## 2019-06-19 PROCEDURE — B2111ZZ FLUOROSCOPY OF MULTIPLE CORONARY ARTERIES USING LOW OSMOLAR CONTRAST: ICD-10-PCS | Performed by: INTERNAL MEDICINE

## 2019-06-19 PROCEDURE — 93010 ELECTROCARDIOGRAM REPORT: CPT | Performed by: INTERNAL MEDICINE

## 2019-06-19 PROCEDURE — 80061 LIPID PANEL: CPT | Performed by: PHYSICIAN ASSISTANT

## 2019-06-19 PROCEDURE — 027034Z DILATION OF CORONARY ARTERY, ONE ARTERY WITH DRUG-ELUTING INTRALUMINAL DEVICE, PERCUTANEOUS APPROACH: ICD-10-PCS | Performed by: INTERNAL MEDICINE

## 2019-06-19 PROCEDURE — 99232 SBSQ HOSP IP/OBS MODERATE 35: CPT | Performed by: INTERNAL MEDICINE

## 2019-06-19 PROCEDURE — 85027 COMPLETE CBC AUTOMATED: CPT | Performed by: PHYSICIAN ASSISTANT

## 2019-06-19 PROCEDURE — 83036 HEMOGLOBIN GLYCOSYLATED A1C: CPT | Performed by: PHYSICIAN ASSISTANT

## 2019-06-19 PROCEDURE — 99225 PR SBSQ OBSERVATION CARE/DAY 25 MINUTES: CPT | Performed by: INTERNAL MEDICINE

## 2019-06-19 PROCEDURE — 80048 BASIC METABOLIC PNL TOTAL CA: CPT | Performed by: PHYSICIAN ASSISTANT

## 2019-06-19 PROCEDURE — 84484 ASSAY OF TROPONIN QUANT: CPT | Performed by: PHYSICIAN ASSISTANT

## 2019-06-19 PROCEDURE — 85347 COAGULATION TIME ACTIVATED: CPT

## 2019-06-19 PROCEDURE — C1894 INTRO/SHEATH, NON-LASER: HCPCS | Performed by: INTERNAL MEDICINE

## 2019-06-19 PROCEDURE — C1725 CATH, TRANSLUMIN NON-LASER: HCPCS | Performed by: INTERNAL MEDICINE

## 2019-06-19 PROCEDURE — C9600 PERC DRUG-EL COR STENT SING: HCPCS | Performed by: INTERNAL MEDICINE

## 2019-06-19 PROCEDURE — C1874 STENT, COATED/COV W/DEL SYS: HCPCS

## 2019-06-19 RX ORDER — ATORVASTATIN CALCIUM 80 MG/1
80 TABLET, FILM COATED ORAL
Status: DISCONTINUED | OUTPATIENT
Start: 2019-06-19 | End: 2019-06-20 | Stop reason: HOSPADM

## 2019-06-19 RX ORDER — HEPARIN SODIUM 1000 [USP'U]/ML
INJECTION, SOLUTION INTRAVENOUS; SUBCUTANEOUS CODE/TRAUMA/SEDATION MEDICATION
Status: COMPLETED | OUTPATIENT
Start: 2019-06-19 | End: 2019-06-19

## 2019-06-19 RX ORDER — FENTANYL CITRATE 50 UG/ML
INJECTION, SOLUTION INTRAMUSCULAR; INTRAVENOUS CODE/TRAUMA/SEDATION MEDICATION
Status: COMPLETED | OUTPATIENT
Start: 2019-06-19 | End: 2019-06-19

## 2019-06-19 RX ORDER — NITROGLYCERIN 20 MG/100ML
INJECTION INTRAVENOUS CODE/TRAUMA/SEDATION MEDICATION
Status: COMPLETED | OUTPATIENT
Start: 2019-06-19 | End: 2019-06-19

## 2019-06-19 RX ORDER — THIAMINE MONONITRATE (VIT B1) 100 MG
100 TABLET ORAL DAILY
Status: DISCONTINUED | OUTPATIENT
Start: 2019-06-19 | End: 2019-06-20 | Stop reason: HOSPADM

## 2019-06-19 RX ORDER — SODIUM CHLORIDE 9 MG/ML
125 INJECTION, SOLUTION INTRAVENOUS CONTINUOUS
Status: DISPENSED | OUTPATIENT
Start: 2019-06-19 | End: 2019-06-19

## 2019-06-19 RX ORDER — CLOPIDOGREL BISULFATE 75 MG/1
75 TABLET ORAL DAILY
Status: DISCONTINUED | OUTPATIENT
Start: 2019-06-19 | End: 2019-06-20 | Stop reason: HOSPADM

## 2019-06-19 RX ORDER — CLOPIDOGREL BISULFATE 75 MG/1
300 TABLET ORAL ONCE
Status: COMPLETED | OUTPATIENT
Start: 2019-06-19 | End: 2019-06-19

## 2019-06-19 RX ORDER — VERAPAMIL HYDROCHLORIDE 2.5 MG/ML
INJECTION, SOLUTION INTRAVENOUS CODE/TRAUMA/SEDATION MEDICATION
Status: COMPLETED | OUTPATIENT
Start: 2019-06-19 | End: 2019-06-19

## 2019-06-19 RX ORDER — LANOLIN ALCOHOL/MO/W.PET/CERES
400 CREAM (GRAM) TOPICAL DAILY
Status: DISCONTINUED | OUTPATIENT
Start: 2019-06-19 | End: 2019-06-20 | Stop reason: HOSPADM

## 2019-06-19 RX ORDER — SODIUM CHLORIDE 9 MG/ML
INJECTION, SOLUTION INTRAVENOUS
Status: COMPLETED | OUTPATIENT
Start: 2019-06-19 | End: 2019-06-19

## 2019-06-19 RX ORDER — KETOROLAC TROMETHAMINE 30 MG/ML
15 INJECTION, SOLUTION INTRAMUSCULAR; INTRAVENOUS ONCE
Status: COMPLETED | OUTPATIENT
Start: 2019-06-19 | End: 2019-06-19

## 2019-06-19 RX ORDER — LIDOCAINE HYDROCHLORIDE 10 MG/ML
INJECTION, SOLUTION INFILTRATION; PERINEURAL CODE/TRAUMA/SEDATION MEDICATION
Status: COMPLETED | OUTPATIENT
Start: 2019-06-19 | End: 2019-06-19

## 2019-06-19 RX ORDER — ASPIRIN 81 MG/1
324 TABLET, CHEWABLE ORAL DAILY
Status: DISCONTINUED | OUTPATIENT
Start: 2019-06-19 | End: 2019-06-20

## 2019-06-19 RX ORDER — MIDAZOLAM HYDROCHLORIDE 1 MG/ML
INJECTION INTRAMUSCULAR; INTRAVENOUS CODE/TRAUMA/SEDATION MEDICATION
Status: COMPLETED | OUTPATIENT
Start: 2019-06-19 | End: 2019-06-19

## 2019-06-19 RX ORDER — DIPHENHYDRAMINE HYDROCHLORIDE 50 MG/ML
INJECTION INTRAMUSCULAR; INTRAVENOUS CODE/TRAUMA/SEDATION MEDICATION
Status: COMPLETED | OUTPATIENT
Start: 2019-06-19 | End: 2019-06-19

## 2019-06-19 RX ORDER — NITROGLYCERIN 0.4 MG/1
0.4 TABLET SUBLINGUAL
Status: DISCONTINUED | OUTPATIENT
Start: 2019-06-19 | End: 2019-06-20 | Stop reason: HOSPADM

## 2019-06-19 RX ADMIN — ZOLPIDEM TARTRATE 10 MG: 5 TABLET, COATED ORAL at 23:23

## 2019-06-19 RX ADMIN — HEPARIN SODIUM 4000 UNITS: 1000 INJECTION INTRAVENOUS; SUBCUTANEOUS at 12:01

## 2019-06-19 RX ADMIN — HEPARIN SODIUM 4000 UNITS: 1000 INJECTION INTRAVENOUS; SUBCUTANEOUS at 06:28

## 2019-06-19 RX ADMIN — HEPARIN SODIUM 1000 UNITS: 1000 INJECTION INTRAVENOUS; SUBCUTANEOUS at 12:17

## 2019-06-19 RX ADMIN — MIDAZOLAM 1 MG: 1 INJECTION INTRAMUSCULAR; INTRAVENOUS at 11:59

## 2019-06-19 RX ADMIN — KETOROLAC TROMETHAMINE 15 MG: 30 INJECTION, SOLUTION INTRAMUSCULAR; INTRAVENOUS at 09:04

## 2019-06-19 RX ADMIN — THIAMINE HCL TAB 100 MG 100 MG: 100 TAB at 09:01

## 2019-06-19 RX ADMIN — NITROGLYCERIN 1 INCH: 20 OINTMENT TOPICAL at 17:45

## 2019-06-19 RX ADMIN — IOHEXOL 150 ML: 350 INJECTION, SOLUTION INTRAVENOUS at 12:34

## 2019-06-19 RX ADMIN — NITROGLYCERIN 1 INCH: 20 OINTMENT TOPICAL at 13:01

## 2019-06-19 RX ADMIN — FENTANYL CITRATE 50 MCG: 50 INJECTION, SOLUTION INTRAMUSCULAR; INTRAVENOUS at 12:08

## 2019-06-19 RX ADMIN — NITROGLYCERIN 200 MCG: 20 INJECTION INTRAVENOUS at 12:01

## 2019-06-19 RX ADMIN — ATORVASTATIN CALCIUM 80 MG: 80 TABLET, FILM COATED ORAL at 16:07

## 2019-06-19 RX ADMIN — FENTANYL CITRATE 50 MCG: 50 INJECTION, SOLUTION INTRAMUSCULAR; INTRAVENOUS at 12:20

## 2019-06-19 RX ADMIN — OXYBUTYNIN CHLORIDE 5 MG: 5 TABLET ORAL at 09:03

## 2019-06-19 RX ADMIN — NITROGLYCERIN 200 MCG: 20 INJECTION INTRAVENOUS at 12:20

## 2019-06-19 RX ADMIN — CLOPIDOGREL BISULFATE 300 MG: 75 TABLET ORAL at 09:02

## 2019-06-19 RX ADMIN — TRAMADOL HYDROCHLORIDE 100 MG: 50 TABLET, COATED ORAL at 21:35

## 2019-06-19 RX ADMIN — NITROGLYCERIN 200 MCG: 20 INJECTION INTRAVENOUS at 12:29

## 2019-06-19 RX ADMIN — METOPROLOL TARTRATE 12.5 MG: 25 TABLET ORAL at 21:35

## 2019-06-19 RX ADMIN — HEPARIN SODIUM 8000 UNITS: 1000 INJECTION INTRAVENOUS; SUBCUTANEOUS at 12:05

## 2019-06-19 RX ADMIN — ACETAMINOPHEN 650 MG: 325 TABLET ORAL at 16:08

## 2019-06-19 RX ADMIN — FENTANYL CITRATE 50 MCG: 50 INJECTION, SOLUTION INTRAMUSCULAR; INTRAVENOUS at 11:59

## 2019-06-19 RX ADMIN — HYDROCORTISONE SODIUM SUCCINATE 100 MG: 100 INJECTION, POWDER, FOR SOLUTION INTRAMUSCULAR; INTRAVENOUS at 11:56

## 2019-06-19 RX ADMIN — OXYBUTYNIN CHLORIDE 5 MG: 5 TABLET ORAL at 21:35

## 2019-06-19 RX ADMIN — VERAPAMIL HYDROCHLORIDE 2.5 MG: 2.5 INJECTION INTRAVENOUS at 12:01

## 2019-06-19 RX ADMIN — LIDOCAINE HYDROCHLORIDE 2 ML: 10 INJECTION, SOLUTION INFILTRATION; PERINEURAL at 11:58

## 2019-06-19 RX ADMIN — MIDAZOLAM 1 MG: 1 INJECTION INTRAMUSCULAR; INTRAVENOUS at 12:08

## 2019-06-19 RX ADMIN — MIDAZOLAM 2 MG: 1 INJECTION INTRAMUSCULAR; INTRAVENOUS at 11:55

## 2019-06-19 RX ADMIN — SODIUM CHLORIDE 100 ML/HR: 0.9 INJECTION, SOLUTION INTRAVENOUS at 11:54

## 2019-06-19 RX ADMIN — TRAMADOL HYDROCHLORIDE 100 MG: 50 TABLET, COATED ORAL at 09:02

## 2019-06-19 RX ADMIN — DIPHENHYDRAMINE HYDROCHLORIDE 25 MG: 50 INJECTION INTRAMUSCULAR; INTRAVENOUS at 12:17

## 2019-06-19 RX ADMIN — METOPROLOL TARTRATE 12.5 MG: 25 TABLET ORAL at 09:03

## 2019-06-19 RX ADMIN — Medication 400 MCG: at 09:02

## 2019-06-19 RX ADMIN — ASPIRIN 81 MG 324 MG: 81 TABLET ORAL at 09:03

## 2019-06-19 RX ADMIN — MIDAZOLAM 1 MG: 1 INJECTION INTRAMUSCULAR; INTRAVENOUS at 12:20

## 2019-06-19 RX ADMIN — NITROGLYCERIN 1 INCH: 20 OINTMENT TOPICAL at 12:07

## 2019-06-19 RX ADMIN — ACETAMINOPHEN 650 MG: 325 TABLET ORAL at 05:34

## 2019-06-19 RX ADMIN — TAMSULOSIN HYDROCHLORIDE 0.8 MG: 0.4 CAPSULE ORAL at 21:35

## 2019-06-19 RX ADMIN — LOSARTAN POTASSIUM 100 MG: 50 TABLET, FILM COATED ORAL at 09:02

## 2019-06-19 RX ADMIN — PANTOPRAZOLE SODIUM 40 MG: 40 TABLET, DELAYED RELEASE ORAL at 05:34

## 2019-06-19 RX ADMIN — LORAZEPAM 1 MG: 2 INJECTION INTRAMUSCULAR; INTRAVENOUS at 09:04

## 2019-06-19 RX ADMIN — MAGNESIUM HYDROXIDE 30 ML: 400 SUSPENSION ORAL at 23:23

## 2019-06-19 RX ADMIN — FENTANYL CITRATE 50 MCG: 50 INJECTION, SOLUTION INTRAMUSCULAR; INTRAVENOUS at 11:55

## 2019-06-19 NOTE — PHYSICIAN ADVISOR
Current patient class: Observation  The patient is currently on Hospital Day: 2 at [facility]      This patient was originally admitted to the hospital under observation status  After admission the patient was reevaluated and determined to require further hospitalization  The patient is now documented to require at least a 2nd midnight in the hospital  As such the patient is now expected to satisfy the 2 midnight benchmark and is therefore eligible for inpatient admission  After review of the relevant documentation, labs, vital signs and test results, the patient is appropriate for INPATIENT ADMISSION  Admission to the hospital as an inpatient is a complex decision making process which requires the practitioner to consider the patients presenting complaint, history and physical examination and all relevant testing  With this in mind, in this case, the patient was deemed appropriate for INPATIENT ADMISSION  After review of the documentation and testing available at the time of the admission I concur with this clinical determination of medical necessity  Rationale is as follows: The patient is a 67-year-old male who presented to the emergency room on 06/18/2019 with a complaint of headache, pressure between shoulder blades and not feeling well  His blood pressure also elevated  In the ER he was found to have elevated troponin levels which trended up from 1 65 up to 5 60  Cardiology is consulted  Patient was diagnosed with non STEMI type 1 versus type 2  Today he has undergone a cardiac catheterization with balloon angioplasty and stenting of the 2nd obtuse marginal vessel  Patient is expected to remain hospitalized over 2 midnights  Inpatient level of care is warranted for this patient      The patients vitals on arrival were ED Triage Vitals [06/18/19 1006]   Temperature Pulse Respirations Blood Pressure SpO2   97 7 °F (36 5 °C) 71 18 166/90 96 %      Temp Source Heart Rate Source Patient Position - Orthostatic VS BP Location FiO2 (%)   Temporal Monitor Sitting Right arm --      Pain Score       7           Past Medical History:   Diagnosis Date    Closed fracture of distal end of fibula with routine healing     unspecified fracture morphology, unspecified laterality, subsequent encounter; Last Assessed:12/27/16    GERD (gastroesophageal reflux disease)     Hypertension     Kidney stones     Nocturia     Psoriasis     Sleep apnea      Past Surgical History:   Procedure Laterality Date    APPENDECTOMY      CARPAL TUNNEL RELEASE      ROTATOR CUFF REPAIR      SHOULDER ARTHROSCOPY Left     Last ASsessed:10/24/16    TOTAL HIP ARTHROPLASTY      TRIGGER FINGER RELEASE         The patient was admitted to the hospital at N/A on N/A for the following diagnosis:  Accelerated hypertension [I10]  Blood pressure check [Z01 30]  Hypertensive urgency [I16 0]  Elevated troponin I level [R74 8]    Consults have been placed to:   IP CONSULT TO CARDIOLOGY    Vitals:    06/19/19 1415 06/19/19 1445 06/19/19 1500 06/19/19 1545   BP: 117/71 106/58  123/82   BP Location: Left arm Left arm Left arm    Pulse: 55 56  56   Resp:   18    Temp:   (!) 97 4 °F (36 3 °C)    TempSrc:   Tympanic    SpO2:   96%    Weight:       Height:           Most recent labs:    Recent Labs     06/18/19  1107  06/18/19  2306  06/19/19  0544   WBC 6 60  --  7 44  --  8 52   HGB 16 2   < > 14 6  --  15 4   HCT 47 4   < > 42 7  --  44 5     --  220  --  208   K 4 4  --   --   --  4 2   CALCIUM 9 9  --   --   --  9 4   BUN 15  --   --   --  13   CREATININE 0 82  --   --   --  0 76   INR 0 93  --  0 98  --   --    TROPONINI 0 42*   < > 4 18*   < > 5 31*   AST 24  --   --   --   --    ALT 24  --   --   --   --    ALKPHOS 57  --   --   --   --     < > = values in this interval not displayed         Scheduled Meds:  Current Facility-Administered Medications:  acetaminophen 650 mg Oral Q4H PRN Lily Scott PA-C aspirin 324 mg Oral Daily Lazara Ambron, DO    atorvastatin 80 mg Oral Daily With Thao Lima MD    clopidogrel 75 mg Oral Daily Helene Alcazar MD    folic acid 219 mcg Oral Daily Lazara Ambron, DO    heparin (porcine) 3-20 Units/kg/hr (Order-Specific) Intravenous Titrated Linn Yoder PA-C Last Rate: Stopped (06/19/19 1154)   heparin (porcine) 2,000 Units Intravenous PRN Roselia Mallitzy Gyarmabean, PA-C    heparin (porcine) 4,000 Units Intravenous PRN Roselia Malady Gyarmaty, PA-C    LORazepam 1 mg Intravenous 4x Daily PRN Mira Piger, PA-IGNACIO    losartan 100 mg Oral Daily Mira Piger, SHELBY    metoprolol 2 5 mg Intravenous Q6H PRN Lazara Ambron, DO    metoprolol tartrate 12 5 mg Oral Q12H [address] Lazara Ambron, DO    nitroglycerin 1 inch Topical Q6H Helene Alcazar MD    nitroglycerin 0 4 mg Sublingual Q5 Min PRN Helene Alcazar MD    ondansetron 4 mg Intravenous Q6H PRN Alan Desai PA-C    oxybutynin 5 mg Oral BID Mira Piger, SHELBY    pantoprazole 40 mg Oral Early Morning Mira Zuniga PA-C    sodium chloride 125 mL/hr Intravenous Continuous Helene Alcazar MD Last Rate: 125 mL/hr (06/19/19 1259)   tamsulosin 0 8 mg Oral HS Mira Zuniga PA-C    thiamine 100 mg Oral Daily Lazara Ambron,     tiZANidine 4 mg Oral BID PRN Beckey MartSHELBY rosales    traMADol 100 mg Oral BID PRN Alan Desai PA-C    zolpidem 10 mg Oral HS PRN Mira Pigekirk, PA-IGNACIO      Continuous Infusions:  heparin (porcine) 3-20 Units/kg/hr (Order-Specific) Last Rate: Stopped (06/19/19 1154)   sodium chloride 125 mL/hr Last Rate: 125 mL/hr (06/19/19 1259)     PRN Meds:   acetaminophen    heparin (porcine)    heparin (porcine)    LORazepam    metoprolol    nitroglycerin    ondansetron    tiZANidine    traMADol    zolpidem    Surgical procedures (if appropriate):

## 2019-06-19 NOTE — UTILIZATION REVIEW
Initial Clinical Review    PLEASE NOTE: APtient upgraded to INPT 6/19 @ 1705 from OBS 6/18 @ [id number] due to NSTEMI type 1 vs 2   Cardiac cath with balloon angioplasty and stenting today    Admission: Date/Time/Statement:  @ 1457  Start   Ordered   06/19/19 1705  Inpatient Admission Once     Transfer Service: General Medicine       Question Answer Comment   Admitting Physician GI Massey    Level of Care Med Surg    Estimated length of stay More than 2 Midnights    Certification I certify that inpatient services are medically necessary for this patient for a duration of greater than two midnights  See H&P and MD Progress Notes for additional information about the patient's course of treatment  06/19/19 1705       ED Arrival Information     Expected Arrival Acuity Means of Arrival Escorted By Service Admission Type    - 6/18/2019 09:59 Urgent Walk-In Family Member General Medicine Urgent    Arrival Complaint    -        Chief Complaint   Patient presents with    Blood Pressure Check     Patient reports has been feeling 'crappy' for the past few days since Thursday with headaches and pressure between his shoulders blades pushing forward, states he decided to check his blood pressure last night and it was close to 200 so he took his Losartan  States it was high again this morning, took his Losartan at 830 and called his doctor, was told to take another dose which was around 9 but patient remained concerned and wanted to be checked  Assessment/Plan:  79 yo male presents to ED with headache, generalized weakness, achiness between shoulder blades that radiates to chest since last Thursday with labile elevated BP's    Hypertensive urgency  Assessment & Plan  Presenting with symptoms including bilateral temporal headache, pressure sensation between shoulder blades radiating to chest, fatigue, and reported high blood pressures at home in the 200s last night    CT of head reveals no acute intracranial process  CT of chest abdomen pelvis reveals no evidence of acute dissection  Normally patient takes losartan 50 mg daily  He took an extra losartan last night  This morning symptoms persisted and called PCP who instructed taking 100 mg of losartan daily  Presented to ER with persistence of symptoms  Found to have elevated blood pressures 193/87, 183/96  Possibly secondary to anxiety/stress vs pain, consider secondary causes/workup if does not respond to current treatment            -continue losartan 100 mg daily           -treat anxiety/pain to see if this improves pressures           -can add additional antihypertensive if necessary     Elevated troponin  Assessment & Plan  Presenting with elevated troponin 0 42  Will trend x3  Initial EKG without evidence of acute ischemia  Patient also complaining of a pressure sensation located between his shoulder blades radiating to the front of his chest   Does report some intermittent nausea and clamminess however denies any sayra chest pain, shortness of breath, or exertional symptoms  Denies history of high cholesterol or diabetes  Recent lipid panel from February 2018 with cholesterol 170, triglycerides 79, HDL 57,   Denies history of cardiac disease or prior stents  History of stress test 2015  Low back pain  Assessment & Plan  Being managed as an outpt with pain/spine  Stable at baseline  Prescribed tizanadine and tramadol as needed     Alcohol use  Assessment & Plan  Admits to drinking 1-3 shots of liquor a day  Has gone days without drinking before  Denies history of alcohol withdrawal   Monitor for the symptoms  1 mg Ativan as needed for acute withdrawal symptoms    Anticipated Length of Stay:  Patient will be admitted on an Observation basis with an anticipated length of stay of  Less than 2 midnights  Justification for Hospital Stay:  ACS rule out and hypertensive urgency management    Per Cardio: 6/19: 1   Non ST-elevation myocardial infarction type 1 versus type 2  2  Significant hypertension on presentation  3  Coronary and aortic atherosclerosis noted on CT scan which ruled out dissection  4  BPH  5  Right bundle-branch block and left anterior fascicular block  6  Status post left hip replacement with possible abnormality    Discussed with patient in detail    Would favor coronary angiography to define coronary anatomy        ED Triage Vitals [06/18/19 1006]   Temperature Pulse Respirations Blood Pressure SpO2   97 7 °F (36 5 °C) 71 18 166/90 96 %      Temp Source Heart Rate Source Patient Position - Orthostatic VS BP Location FiO2 (%)   Temporal Monitor Sitting Right arm --      Pain Score       7        Wt Readings from Last 1 Encounters:   06/18/19 105 kg (231 lb 4 2 oz)     Additional Vital Signs:   06/19/19 0700  97 3 °F (36 3 °C)Abnormal   67  20  154/96  96 %    Lying   06/19/19 0534        159/86      Sitting   06/18/19 2332  97 4 °F (36 3 °C)Abnormal   62  19  146/86  99 %    Lying   06/18/19 1908    69  20  119/71  99 %    Lying   06/18/19 1821        170/96      Sitting   06/18/19 1626  97 °F (36 1 °C)Abnormal   64  18  172/90Abnormal   94 %    Sitting   06/18/19 1515    62  16  165/89  97 %    Lying   06/18/19 1445    60  16  171/97Abnormal   95 %    Lying   06/18/19 1430    58  18  148/83  95 %    Lying   06/18/19 1315    60  18  169/91  96 %    Lying   06/18/19 1256    57  18  183/96Abnormal   95 %    Sitting   06/18/19 1245    56  20  193/87Abnormal   97 %    Lying   06/18/19 1130    60  18  141/86  96 %    Lying         Pertinent Labs/Diagnostic Test Results:   Results from last 7 days   Lab Units 06/19/19  0544 06/18/19  2306 06/18/19  1111 06/18/19  1107   WBC Thousand/uL 8 52 7 44  --  6 60   HEMOGLOBIN g/dL 15 4 14 6  --  16 2   I STAT HEMOGLOBIN g/dl  --   --  16 0  --    HEMATOCRIT % 44 5 42 7  --  47 4   HEMATOCRIT, ISTAT %  --   --  47  --    PLATELETS Thousands/uL 208 220  --  232   NEUTROS ABS Thousands/µL  --   --   --  3 97     Results from last 7 days   Lab Units 06/19/19  0544 06/18/19  1111 06/18/19  1107   SODIUM mmol/L 142  --  142   POTASSIUM mmol/L 4 2  --  4 4   CHLORIDE mmol/L 106  --  104   CO2 mmol/L 24  --  28   CO2, I-STAT mmol/L  --  27  --    AGAP mmol/L  --  16*  --    ANION GAP mmol/L 12  --  10   BUN mg/dL 13  --  15   CREATININE mg/dL 0 76  --  0 82   EGFR ml/min/1 73sq m 94 88 92   CALCIUM mg/dL 9 4  --  9 9   CALCIUM, IONIZED, ISTAT mmol/L  --  1 19  --    MAGNESIUM mg/dL  --   --  2 0     Results from last 7 days   Lab Units 06/18/19  1107   AST U/L 24   ALT U/L 24   ALK PHOS U/L 57   TOTAL PROTEIN g/dL 7 7   ALBUMIN g/dL 4 0   TOTAL BILIRUBIN mg/dL 0 77   BILIRUBIN DIRECT mg/dL 0 16     Results from last 7 days   Lab Units 06/19/19  0544 06/18/19  1107   GLUCOSE RANDOM mg/dL 108 103     Results from last 7 days   Lab Units 06/19/19  0544 06/19/19  0208 06/18/19  2306 06/18/19  2023 06/18/19  1735   TROPONIN I ng/mL 5 31* 5 60* 4 18* 2 52* 1 65*     Results from last 7 days   Lab Units 06/19/19  0544 06/18/19  2306 06/18/19  1107   PROTIME seconds  --  13 1 12 6   INR   --  0 98 0 93   PTT seconds 34 22* 26     Results from last 7 days   Lab Units 06/18/19  1107   NT-PRO BNP pg/mL 189*     Results from last 7 days   Lab Units 06/18/19  1130   CLARITY UA  Clear   COLOR UA  Yellow   SPEC GRAV UA  1 020   PH UA  7 0   GLUCOSE UA mg/dl Negative   KETONES UA mg/dl Negative   BLOOD UA  Small*   PROTEIN UA mg/dl Negative   NITRITE UA  Negative   BILIRUBIN UA  Negative   UROBILINOGEN UA E U /dl 0 2   LEUKOCYTES UA  Negative   WBC UA /hpf None Seen   RBC UA /hpf 4-10*   BACTERIA UA /hpf Occasional   EPITHELIAL CELLS WET PREP /hpf Occasional     CT Head: No CT evidence of acute intracranial process  Chronic microangiopathy  CTA C/A/P:  Minimal aortic and branch vessel calcification   No aortic dissection or aneurysm  No acute intrathoracic process    Minimal aortic and branch vessel calcification   No aortic dissection or aneurysm  Mild bilateral common iliac ectasia  No acute intra-abdominal or intrapelvic process  Colonic diverticulosis  Cholelithiasis  2 mm right nephrolithiasis  EKG: Sinus Parth    ED Treatment:   Medication Administration from 06/18/2019 0959 to 06/18/2019 1624       Date/Time Order Dose Route Action Action by Comments     06/18/2019 1111 HYDROmorphone (DILAUDID) injection 1 mg 1 mg Intravenous Given       06/18/2019 1109 ketorolac (TORADOL) injection 15 mg 15 mg Intravenous Given       06/18/2019 1231 metoclopramide (REGLAN) injection 10 mg 10 mg Intravenous Given       06/18/2019 1232 diphenhydrAMINE (BENADRYL) injection 25 mg 25 mg Intravenous Given       06/18/2019 1536 sodium chloride 0 9 % bolus 1,000 mL 0 mL Intravenous Stopped       06/18/2019 1237 sodium chloride 0 9 % bolus 1,000 mL 1,000 mL Intravenous New Bag       06/18/2019 1231 acetaminophen (TYLENOL) tablet 650 mg 650 mg Oral Given       06/18/2019 1439 aspirin chewable tablet 162 mg 162 mg Oral Given          Past Medical History:   Diagnosis Date    Closed fracture of distal end of fibula with routine healing     unspecified fracture morphology, unspecified laterality, subsequent encounter;  Last Assessed:12/27/16    GERD (gastroesophageal reflux disease)     Hypertension     Kidney stones     Nocturia     Psoriasis     Sleep apnea      Present on Admission:   GERD (gastroesophageal reflux disease)   BPH with obstruction/lower urinary tract symptoms   Hypertensive urgency   Elevated troponin   Low back pain   Alcohol use      Admitting Diagnosis: Accelerated hypertension [I10]  Blood pressure check [Z01 30]  Hypertensive urgency [I16 0]  Elevated troponin I level [R74 8]  Age/Sex: 80 y o  male     Admission Orders:  TELE  Current Facility-Administered Medications:  acetaminophen 650 mg Oral Q4H PRN X 2 6/18 & x 1 6/19    aspirin 324 mg Oral Daily     clopidogrel 300 mg Oral Once folic acid 091 mcg Oral Daily     heparin (porcine) 3-20 Units/kg/hr (Order-Specific) Intravenous Titrated     heparin (porcine) 2,000 Units Intravenous PRN     heparin (porcine) 4,000 Units Intravenous PRN     ketorolac 15 mg Intravenous Once     LORazepam 1 mg Intravenous 4x Daily PRN     losartan 100 mg Oral Daily     metoprolol 2 5 mg Intravenous Q6H PRN IV x 1 6/18    metoprolol tartrate 12 5 mg Oral Q12H VINNIE     ondansetron 4 mg Intravenous Q6H PRN     oxybutynin 5 mg Oral BID     pantoprazole 40 mg Oral Early Morning     tamsulosin 0 8 mg Oral HS     thiamine 100 mg Oral Daily     tiZANidine 4 mg Oral BID PRN     traMADol 100 mg Oral BID PRN     zolpidem 10 mg Oral HS PRN         IP CONSULT TO CARDIOLOGY  NPO  Serial trops  EKG with 3rd trop or CP or ST changes  CPAP @   ECHO  SCD's      Network Utilization Review Department  Phone: [phone]; Fax [phone]  [email or web address]  ATTENTION: Please call with any questions or concerns to [phone]  and carefully listen to the prompts so that you are directed to the right person  Send all requests for admission clinical reviews, approved or denied determinations and any other requests to fax [phone]   All voicemails are confidential

## 2019-06-19 NOTE — RESPIRATORY THERAPY NOTE
Spoke with patient about wearing CPAP tonight  Pt states he only can tolerate his nasal pillows which he does not have with him  He is not willing to try a full face mask for overnight  Pt states if he is here another night he will bring in his home unit for use overnight

## 2019-06-19 NOTE — PROGRESS NOTES
[facility] Progress Note  Patient: Ana Estrella 80 y o  male   MRN: [id number]  PCP: Armando Montgomery MD  Unit/Bed#: E4 -01 Encounter: [id number]  Date Of Visit: 06/19/19      Assessment/plan  1  Elevated troponin- possible nstemi type 1  Will make pt npo  Will discuss with cardiology  He may need a heart cath  Pt is already on heparin gtt  Will write for metoprolol 12 5mg bid  Will load with plavix  2  Accelerated htn- improved  sbp is 150  Will continue with losartan of 100mg  Will add metoprolol 12 5mg bid  Will continue to monitor    3  Headache- originally due to accelerated htn  Could be due to caffeine withdrawal as pt drinks approx 40 ounces of coffee a day  Will not add Fioricet due to number 1  Will give a one time dose of toradol and monitor  4  etoh use- monitor for signs of withdrawal  Will add folate and thiamine  5  gerd- continue protonix    6  akin- continue cpap qhs if pt can tolerate this    7  bph- continue flomax    Subjective:   Pt seen and examined  Pt denies chest pain  No sob  His back pain has resolved  He still has a pounding headache  He states that the tylenol didn't really help  He usually drinks about 40 ounces a day of coffee  No f/c no n/v/d no abd pain  Did discuss the possibility of a heart cath and pt is worried about this  He has not ate breakfast yet and have asked pt not to eat breakfast until we hear from cardiology  Objective:     Vitals: Blood pressure 159/86, pulse 62, temperature (!) 97 4 °F (36 3 °C), temperature source Tympanic, resp  rate 19, height 5' 9" (1 753 m), weight 105 kg (231 lb 4 2 oz), SpO2 99 %  ,Body mass index is 34 15 kg/m²      Lab, Imaging and other studies:  Results from last 7 days   Lab Units 06/19/19  0544 06/18/19  2306   WBC Thousand/uL 8 52 7 44   HEMOGLOBIN g/dL 15 4 14 6   HEMATOCRIT % 44 5 42 7   PLATELETS Thousands/uL 208 220   INR   --  0 98     Results from last 7 days   Lab Units 06/19/19  0544 06/18/19  1111 06/18/19  1107   POTASSIUM mmol/L 4 2  --  4 4   CHLORIDE mmol/L 106  --  104   CO2 mmol/L 24  --  28   CO2, I-STAT mmol/L  --  27  --    BUN mg/dL 13  --  15   CREATININE mg/dL 0 76  --  0 82   CALCIUM mg/dL 9 4  --  9 9   ALK PHOS U/L  --   --  57   ALT U/L  --   --  24   AST U/L  --   --  24   GLUCOSE, ISTAT mg/dl  --  111  --      Results from last 7 days   Lab Units 06/19/19  0544 06/19/19  0208 06/18/19  2306   TROPONIN I ng/mL 5 31* 5 60* 4 18*     Lab Results   Component Value Date    URINECX No Growth <1000 cfu/mL 02/17/2016     Scheduled Meds:   Current Facility-Administered Medications:  acetaminophen 650 mg Oral Q4H PRN Mira Zuniga PA-C    aspirin 324 mg Oral Daily Lazara Ambron, DO    heparin (porcine) 3-20 Units/kg/hr (Order-Specific) Intravenous Titrated Gabbie Sullivan PA-C Last Rate: 15 1 Units/kg/hr (06/19/19 0626)   heparin (porcine) 2,000 Units Intravenous PRN Jerson Desai PA-C    heparin (porcine) 4,000 Units Intravenous PRN Jerson Desai PA-C    ketorolac 15 mg Intravenous Once Lazara Ambron, DO    LORazepam 1 mg Intravenous 4x Daily PRN Kaila Rehman PA-C    losartan 100 mg Oral Daily Mira Zuniga PA-C    metoprolol 2 5 mg Intravenous Q6H PRN Lazara Ambron, DO    metoprolol tartrate 12 5 mg Oral Q12H [address] Lazara Ambron, DO    ondansetron 4 mg Intravenous Q6H PRN Mira Zuniga PA-C    oxybutynin 5 mg Oral BID Mira Zuniga PA-C    pantoprazole 40 mg Oral Early Morning CANDE [facility]-IGNAC[facility]    tamsulosin 0 8 mg Oral HS Mira Zuniga PA-C    tiZANidine 4 mg Oral BID PRN Kaila Rehman PA-C    traMADol 100 mg Oral BID PRN Kaila Rehman PA-C    zolpidem 10 mg Oral HS PRN Mira Zuniga PA-C      Continuous Infusions:   heparin (porcine) 3-20 Units/kg/hr (Order-Specific) Last Rate: 15 1 Units/kg/hr (06/19/19 0626)     PRN Meds:   acetaminophen    heparin (porcine)    heparin (porcine)    LORazepam    metoprolol    ondansetron    tiZANidine   traMADol    zolpidem      Physical exam:  Physical Exam  General appearance: alert and oriented, in no acute distress  Head: Normocephalic, without obvious abnormality, atraumatic  Eyes: conjunctivae/corneas clear  PERRL, EOM's intact  Fundi benign    Neck: no adenopathy, no carotid bruit, no JVD, supple, symmetrical, trachea midline and thyroid not enlarged, symmetric, no tenderness/mass/nodules  Lungs: clear to auscultation bilaterally  Heart: regular rate and rhythm, S1, S2 normal, no murmur, click, rub or gallop  Abdomen: soft, non-tender; bowel sounds normal; no masses,  no organomegaly  Extremities: extremities normal, warm and well-perfused; no cyanosis, clubbing, or edema  Pulses: 2+ and symmetric  Skin: Skin color, texture, turgor normal  No rashes or lesions  Neurologic: Mental status: Alert, oriented, thought content appropriate      VTE Pharmacologic Prophylaxis: Heparin  VTE Mechanical Prophylaxis: sequential compression device    Counseling / Coordination of Care  Total floor / unit time spent today 20 minutes      Current Length of Stay: 0 day(s)    Current Patient Status: Observation       Code Status: Level 1 - Full Code

## 2019-06-19 NOTE — PLAN OF CARE
Problem: Potential for Falls  Goal: Patient will remain free of falls  Description  INTERVENTIONS:  - Assess patient frequently for physical needs  -  Identify cognitive and physical deficits and behaviors that affect risk of falls  -  [place] fall precautions as indicated by assessment   - Educate patient/family on patient safety including physical limitations  - Instruct patient to call for assistance with activity based on assessment  - Modify environment to reduce risk of injury  - Consider OT/PT consult to assist with strengthening/mobility  Outcome: Progressing     Problem: CARDIOVASCULAR - ADULT  Goal: Maintains optimal cardiac output and hemodynamic stability  Description  INTERVENTIONS:  - Monitor I/O, vital signs and rhythm  - Monitor for S/S and trends of decreased cardiac output i e  bleeding, hypotension  - Administer and titrate ordered vasoactive medications to optimize hemodynamic stability  - Assess quality of pulses, skin color and temperature  - Assess for signs of decreased coronary artery perfusion - ex   Angina  - Instruct patient to report change in severity of symptoms  Outcome: Progressing  Goal: Absence of cardiac dysrhythmias or at baseline rhythm  Description  INTERVENTIONS:  - Continuous cardiac monitoring, monitor vital signs, obtain 12 lead EKG if indicated  - Administer antiarrhythmic and heart rate control medications as ordered  - Monitor electrolytes and administer replacement therapy as ordered  Outcome: Progressing     Problem: PAIN - ADULT  Goal: Verbalizes/displays adequate comfort level or baseline comfort level  Description  Interventions:  - Encourage patient to monitor pain and request assistance  - Assess pain using appropriate pain scale  - Administer analgesics based on type and severity of pain and evaluate response  - Implement non-pharmacological measures as appropriate and evaluate response  - Consider cultural and social influences on pain and pain management  - Notify physician/advanced practitioner if interventions unsuccessful or patient reports new pain  Outcome: Progressing     Problem: SAFETY ADULT  Goal: Maintain or return to baseline ADL function  Description  INTERVENTIONS:  -  Assess patient's ability to carry out ADLs; assess patient's baseline for ADL function and identify physical deficits which impact ability to perform ADLs (bathing, care of mouth/teeth, toileting, grooming, dressing, etc )  - Assess/evaluate cause of self-care deficits   - Assess range of motion  - Assess patient's mobility; develop plan if impaired  - Assess patient's need for assistive devices and provide as appropriate  - Encourage maximum independence but intervene and supervise when necessary  ¯ Involve family in performance of ADLs  ¯ Assess for home care needs following discharge   ¯ Request OT consult to assist with ADL evaluation and planning for discharge  ¯ Provide patient education as appropriate  Outcome: Progressing  Goal: Maintain or return mobility status to optimal level  Description  INTERVENTIONS:  - Assess patient's baseline mobility status (ambulation, transfers, stairs, etc )    - Identify cognitive and physical deficits and behaviors that affect mobility  - Identify mobility aids required to assist with transfers and/or ambulation (gait belt, sit-to-stand, lift, walker, cane, etc )  - [place] fall precautions as indicated by assessment  - Record patient progress and toleration of activity level on Mobility SBAR; progress patient to next Phase/Stage  - Instruct patient to call for assistance with activity based on assessment  - Request Rehabilitation consult to assist with strengthening/weightbearing, etc   Outcome: Progressing     Problem: DISCHARGE PLANNING  Goal: Discharge to home or other facility with appropriate resources  Description  INTERVENTIONS:  - Identify barriers to discharge w/patient and caregiver  - Arrange for needed discharge resources and transportation as appropriate  - Identify discharge learning needs (meds, wound care, etc )  - Arrange for interpretive services to assist at discharge as needed  - Refer to Case Management Department for coordinating discharge planning if the patient needs post-hospital services based on physician/advanced practitioner order or complex needs related to functional status, cognitive ability, or social support system  Outcome: Progressing     Problem: Knowledge Deficit  Goal: Patient/family/caregiver demonstrates understanding of disease process, treatment plan, medications, and discharge instructions  Description  Complete learning assessment and assess knowledge base    Interventions:  - Provide teaching at level of understanding  - Provide teaching via preferred learning methods  Outcome: Progressing

## 2019-06-19 NOTE — PROGRESS NOTES
Progress Note - Cardiology   Naveen Daniel 80 y o  male MRN: [id number]  Unit/Bed#: E4 -01 Encounter: [id number]      Assessment:     1  Non ST-elevation myocardial infarction type 1 versus type 2  2  Significant hypertension on presentation  3  Coronary and aortic atherosclerosis noted on CT scan which ruled out dissection  4  BPH  5  Right bundle-branch block and left anterior fascicular block  6  Status post left hip replacement with possible abnormality    Discussion/Recommendations:    Discussed with patient in detail  Would favor coronary angiography to define coronary anatomy  He has an appointment in September for possible problem with left hip replacement  I did mention to him that any surgery would need to be postponed if he was stented  Continue beta-blocker/aspirin/angiotensin receptor blocker  I have added high-dose statin      Subjective:  No significant chest or back pain      Physical Exam:  GEN:  NAD  HEENT:  MMM, NCAT, pink conjunctiva, EOMI, nonicteric sclera  CV:  NO JVD/HJR, RR, NO M/R/G, +S1/S2, NO PARASTERNAL HEAVE/THRILL, NO LE EDEMA, NO HEPATIC SYSTOLIC PULSATION, WARM EXTREMITIES  RESP:  CTAB/L  ABD:  SOFT, NT, NO GROSS ORGANOMEGALY        Vitals:   /96 (BP Location: Left arm)   Pulse 67   Temp (!) 97 3 °F (36 3 °C) (Tympanic)   Resp 20   Ht 5' 9" (1 753 m)   Wt 105 kg (231 lb 4 2 oz)   SpO2 96%   BMI 34 15 kg/m²   Vitals:    06/18/19 1006 06/18/19 1626   Weight: 104 kg (230 lb 2 6 oz) 105 kg (231 lb 4 2 oz)       Intake/Output Summary (Last 24 hours) at 6/19/2019 0945  Last data filed at 6/18/2019 1536  Gross per 24 hour   Intake 1000 ml   Output    Net 1000 ml         TELEMETRY:  No events  Lab Results:  Results from last 7 days   Lab Units 06/19/19  0544   WBC Thousand/uL 8 52   HEMOGLOBIN g/dL 15 4   HEMATOCRIT % 44 5   PLATELETS Thousands/uL 208     Results from last 7 days   Lab Units 06/19/19  0544 06/18/19  1111 06/18/19  1107   POTASSIUM mmol/L 4 2  --  4 4   CHLORIDE mmol/L 106  --  104   CO2 mmol/L 24  --  28   CO2, I-STAT mmol/L  --  27  --    BUN mg/dL 13  --  15   CREATININE mg/dL 0 76  --  0 82   CALCIUM mg/dL 9 4  --  9 9   ALK PHOS U/L  --   --  57   ALT U/L  --   --  24   AST U/L  --   --  24   GLUCOSE, ISTAT mg/dl  --  111  --      Results from last 7 days   Lab Units 06/19/19  0544 06/18/19  1111   POTASSIUM mmol/L 4 2  --    CHLORIDE mmol/L 106  --    CO2 mmol/L 24  --    CO2, I-STAT mmol/L  --  27   BUN mg/dL 13  --    CREATININE mg/dL 0 76  --    GLUCOSE, ISTAT mg/dl  --  111   CALCIUM mg/dL 9 4  --              Medications:    Current Facility-Administered Medications:     acetaminophen (TYLENOL) tablet 650 mg, 650 mg, Oral, Q4H PRN, Mira Zuniga PA-C, 650 mg at 06/19/19 0534    aspirin chewable tablet 324 mg, 324 mg, Oral, Daily, Lazara Mejia DO, 324 mg at 15/36/45 8146    folic acid (FOLVITE) tablet 400 mcg, 400 mcg, Oral, Daily, Lazara Radharon DO, 400 mcg at 06/19/19 0902    heparin (porcine) 25,000 units in 250 mL infusion (premix), 3-20 Units/kg/hr (Order-Specific), Intravenous, Titrated, Tammi Ochoa PA-C, Last Rate: 13 6 mL/hr at 06/19/19 0626, 15 1 Units/kg/hr at 06/19/19 0626    heparin (porcine) injection 2,000 Units, 2,000 Units, Intravenous, PRN, Tammi Ochoa PA-C    heparin (porcine) injection 4,000 Units, 4,000 Units, Intravenous, PRN, Tammi Ochoa PA-C, 4,000 Units at 06/19/19 0735    LORazepam (ATIVAN) 2 mg/mL injection 1 mg, 1 mg, Intravenous, 4x Daily PRN, Lewis Zepeda PA-C, 1 mg at 06/19/19 0904    losartan (COZAAR) tablet 100 mg, 100 mg, Oral, Daily, Mira Zuniga PA-C, 100 mg at 06/19/19 0902    metoprolol (LOPRESSOR) injection 2 5 mg, 2 5 mg, Intravenous, Q6H PRN, Lazara Mejia DO, 2 5 mg at 06/18/19 1839    metoprolol tartrate (LOPRESSOR) partial tablet 12 5 mg, 12 5 mg, Oral, Q12H VINNIE, Lazara Mejia DO, 12 5 mg at 06/19/19 0903    ondansetron (ZOFRAN) injection 4 mg, 4 mg, Intravenous, Q6H PRN, Chay Coffer, PA-C    oxybutynin (DITROPAN) tablet 5 mg, 5 mg, Oral, BID, Mira Piger, PA-C, 5 mg at 06/19/19 0903    pantoprazole (PROTONIX) EC tablet 40 mg, 40 mg, Oral, Early Morning, Mira Piger, PA-C, 40 mg at 06/19/19 0534    tamsulosin (FLOMAX) capsule 0 8 mg, 0 8 mg, Oral, HS, Mira Piger, PA-C, 0 8 mg at 06/18/19 2106    thiamine (VITAMIN B1) tablet 100 mg, 100 mg, Oral, Daily, Lazara Ambron, DO, 100 mg at 06/19/19 0901    tiZANidine (ZANAFLEX) tablet 4 mg, 4 mg, Oral, BID PRN, Chay Coffer, PA-C    traMADol (ULTRAM) tablet 100 mg, 100 mg, Oral, BID PRN, Chay Coffer, PA-C, 100 mg at 06/19/19 0902    zolpidem (AMBIEN) tablet 10 mg, 10 mg, Oral, HS PRN, Chay Coffer, PA-C, 10 mg at 06/18/19 7790    Portions of the record may have been created with voice recognition software  Occasional wrong word or "sound a like" substitutions may have occurred due to the inherent limitations of voice recognition software  Read the chart carefully and recognize, using context, where substitutions have occurred

## 2019-06-20 ENCOUNTER — APPOINTMENT (INPATIENT)
Dept: NON INVASIVE DIAGNOSTICS | Facility: HOSPITAL | Age: 69
DRG: 247 | End: 2019-06-20
Payer: MEDICARE

## 2019-06-20 VITALS
RESPIRATION RATE: 18 BRPM | HEART RATE: 51 BPM | BODY MASS INDEX: 34.25 KG/M2 | OXYGEN SATURATION: 96 % | WEIGHT: 231.26 LBS | SYSTOLIC BLOOD PRESSURE: 149 MMHG | HEIGHT: 69 IN | DIASTOLIC BLOOD PRESSURE: 87 MMHG | TEMPERATURE: 96.3 F

## 2019-06-20 PROBLEM — I21.4 NSTEMI (NON-ST ELEVATED MYOCARDIAL INFARCTION) (HCC): Status: ACTIVE | Noted: 2019-06-18

## 2019-06-20 PROBLEM — I21.4 NSTEMI (NON-ST ELEVATED MYOCARDIAL INFARCTION) (HCC): Status: RESOLVED | Noted: 2019-06-18 | Resolved: 2019-06-20

## 2019-06-20 PROBLEM — I16.0 HYPERTENSIVE URGENCY: Status: RESOLVED | Noted: 2019-06-18 | Resolved: 2019-06-20

## 2019-06-20 LAB
CHOLEST SERPL-MCNC: 211 MG/DL (ref 50–200)
EST. AVERAGE GLUCOSE BLD GHB EST-MCNC: 103 MG/DL
HBA1C MFR BLD: 5.2 % (ref 4.2–6.3)
HDLC SERPL-MCNC: 50 MG/DL (ref 40–60)
LDLC SERPL CALC-MCNC: 142 MG/DL (ref 0–100)
TRIGL SERPL-MCNC: 95 MG/DL

## 2019-06-20 PROCEDURE — 93308 TTE F-UP OR LMTD: CPT

## 2019-06-20 PROCEDURE — 99232 SBSQ HOSP IP/OBS MODERATE 35: CPT | Performed by: INTERNAL MEDICINE

## 2019-06-20 PROCEDURE — 99239 HOSP IP/OBS DSCHRG MGMT >30: CPT | Performed by: PHYSICIAN ASSISTANT

## 2019-06-20 RX ORDER — HEPARIN SODIUM 5000 [USP'U]/ML
5000 INJECTION, SOLUTION INTRAVENOUS; SUBCUTANEOUS EVERY 8 HOURS SCHEDULED
Status: DISCONTINUED | OUTPATIENT
Start: 2019-06-20 | End: 2019-06-20 | Stop reason: HOSPADM

## 2019-06-20 RX ORDER — ASPIRIN 81 MG/1
81 TABLET, CHEWABLE ORAL DAILY
Qty: 30 TABLET | Refills: 0 | Status: CANCELLED | OUTPATIENT
Start: 2019-06-21

## 2019-06-20 RX ORDER — ASPIRIN 81 MG/1
81 TABLET ORAL DAILY
Qty: 30 TABLET | Refills: 0 | Status: SHIPPED | OUTPATIENT
Start: 2019-06-21

## 2019-06-20 RX ORDER — ATORVASTATIN CALCIUM 80 MG/1
80 TABLET, FILM COATED ORAL
Qty: 30 TABLET | Refills: 0 | Status: SHIPPED | OUTPATIENT
Start: 2019-06-20 | End: 2019-07-10 | Stop reason: SDUPTHER

## 2019-06-20 RX ORDER — CLOPIDOGREL BISULFATE 75 MG/1
75 TABLET ORAL DAILY
Qty: 30 TABLET | Refills: 0 | Status: SHIPPED | OUTPATIENT
Start: 2019-06-21 | End: 2019-07-10 | Stop reason: SDUPTHER

## 2019-06-20 RX ORDER — ASPIRIN 81 MG/1
81 TABLET ORAL DAILY
Status: DISCONTINUED | OUTPATIENT
Start: 2019-06-21 | End: 2019-06-20 | Stop reason: HOSPADM

## 2019-06-20 RX ADMIN — OXYBUTYNIN CHLORIDE 5 MG: 5 TABLET ORAL at 09:00

## 2019-06-20 RX ADMIN — CLOPIDOGREL BISULFATE 75 MG: 75 TABLET ORAL at 09:39

## 2019-06-20 RX ADMIN — ATORVASTATIN CALCIUM 80 MG: 80 TABLET, FILM COATED ORAL at 17:33

## 2019-06-20 RX ADMIN — Medication 400 MCG: at 09:38

## 2019-06-20 RX ADMIN — PANTOPRAZOLE SODIUM 40 MG: 40 TABLET, DELAYED RELEASE ORAL at 05:45

## 2019-06-20 RX ADMIN — LOSARTAN POTASSIUM 100 MG: 50 TABLET, FILM COATED ORAL at 09:39

## 2019-06-20 RX ADMIN — METOPROLOL TARTRATE 12.5 MG: 25 TABLET ORAL at 09:39

## 2019-06-20 RX ADMIN — ASPIRIN 81 MG 324 MG: 81 TABLET ORAL at 09:38

## 2019-06-20 RX ADMIN — ACETAMINOPHEN 650 MG: 325 TABLET ORAL at 16:00

## 2019-06-20 RX ADMIN — ACETAMINOPHEN 650 MG: 325 TABLET ORAL at 09:38

## 2019-06-20 RX ADMIN — THIAMINE HCL TAB 100 MG 100 MG: 100 TAB at 09:42

## 2019-06-20 NOTE — DISCHARGE SUMMARY
Discharge- Naveen Daniel 1950, 80 y o  male MRN: [id number]    Unit/Bed#: E4 -01 Encounter: [id number]    Primary Care Provider: Barnie Simmonds, MD   Date and time admitted to hospital: 6/18/2019 10:09 AM        Discharge Summary - [facility]    Patient Information: Naveen Daniel 80 y o  male MRN: [id number]  Unit/Bed#: E4 -01 Encounter: [id number]    Discharging Physician / Practitioner: Kalani Vargas PA-C  PCP: Barnie Simmonds, MD  Admission Date: 6/18/2019  Discharge Date: 06/20/19    Disposition:     Home    Reason for Admission: htn urgency    Discharge Diagnoses:     Principal Problem:    Hypertensive urgency  Active Problems:    BPH with obstruction/lower urinary tract symptoms    GERD (gastroesophageal reflux disease)    NSTEMI (non-ST elevated myocardial infarction) ([facility] [address] )    Low back pain    Alcohol use  Resolved Problems:    * No resolved hospital problems  *      Consultations During Hospital Stay:  · cardiology    Procedures Performed:     · Cardiac cath and PCI/west x 1    Significant Findings / Test Results:     · Cardiac cath  CORONARY CIRCULATION:  1st diagonal: There was a tubular 60 % stenosis  2nd obtuse marginal: There was a 99 % stenosis  There was ROSIE grade 2 flow through the vessel (partial perfusion)  This lesion is a likely culprit for the patient's recent myocardial infarction  An intervention was performed  Left AV groove artery: There was a discrete 60 % stenosis  Mid RCA: There was a 50 % stenosis      Incidental Findings:   · none     Test Results Pending at Discharge (will require follow up):   · TTE     Outpatient Tests Requested:  ·     Complications:      Hospital Course:     Naveen Daniel is a 80 y o  male patient who originally presented to the hospital on 6/18/2019 male with past medical history of essential hypertension, low back pain, BARB on CPAP, BPH      He presents with a complaint of not feeling himself since last Thursday 6/13/19   Patient notes he normally goes to the gym every day and runs for 1 hour followed by weakness thing   During his routine he was unable to complete his run and felt extremely tired, fatigued   He is also complaining of a bilateral temporal headache  No visual changes, lightheadedness or dizziness  His headache has waxed and waned over the past several days   Additionally has a pressure sensation located between his shoulder blades like someone is pressing on his back which radiates to his chest   At times he will experience some nausea and intermittent clamminess however denies any sayra symptoms of chest pain or shortness of breath   Patient reports taking his blood pressure last night and noting it to be in the 431 systolically and diastolically in the 199R   On his own patient took an additional losartan 50 mg 2 total 100 mg that day   This morning patient woke up complaining of the same symptoms along with elevated blood pressures and decided to present to the ER   Denies history of coronary artery disease or previous stent placement   Last stress test was in 2015       Patient lives at home he and is a fairly active 60-year-old who works out daily  Richie Ahumada declines smoking history   Drinks 1-3 shots of liquor daily   Denies any other drug use   Denies any recent illnesses, abdominal pain, ripping or tearing sensation in his chest, fevers or chills  Hospital stay by problem list below  * Hypertensive urgency  Assessment & Plan  Resolved  C/b by htn nstemi type 1  Now miproved, continue cozaar 100mg and lopressor 12 5mg bid  Encouraged low sodium nsa diet  D/w pt and son at length for dietary modifications    Alcohol use  Assessment & Plan  No hx of withdrawal    NSTEMI (non-ST elevated myocardial infarction) [facility])  Assessment & Plan  S/p heparin gtt  Cath w/1st diagonal: There was a tubular 60 % stenosis  2nd obtuse marginal: There was a 99 % stenosis   There was ROSIE grade 2 flow through the vessel (partial perfusion)  This lesion is a likely culprit for the patient's recent myocardial infarction  An intervention was performed  Left AV groove artery: There was a discrete 60 % stenosis  Mid RCA: There was a 50 % stenosis  S/p PCI w/FARHAN x 1 to 2nd obtuse marginal   D/w cardiology will transition to plavix/asa 81mg  Prelim TTE read per cardiology w/normal EF w/mild AI  Formal read in AM, safe for d/c per cardiology w/ op cardiology f/u    BPH with obstruction/lower urinary tract symptoms  Assessment & Plan  Continue oxybutynin/flomax        Condition at Discharge: good     Discharge Day Visit / Exam:     Subjective:   Pt seen and examined  No events overnight  No further cp/sob  No bleeding at right radial access  No n/v/f/c  No lightheadedness  Vitals: Blood Pressure: 140/79 (06/20/19 0713)  Pulse: 56 (06/20/19 0713)  Temperature: (!) 97 2 °F (36 2 °C) (06/20/19 0713)  Temp Source: Tympanic (06/20/19 0713)  Respirations: 18 (06/20/19 0713)  Height: 5' 9" (175 3 cm) (06/18/19 1626)  Weight - Scale: 105 kg (231 lb 4 2 oz) (06/18/19 1626)  SpO2: 95 % (06/20/19 0713)  Exam:   Physical Exam   Constitutional: He appears well-developed  No distress  HENT:   Head: Normocephalic and atraumatic  Right Ear: External ear normal    Left Ear: External ear normal    Mouth/Throat: Oropharynx is clear and moist    Eyes: Pupils are equal, round, and reactive to light  Conjunctivae and EOM are normal    Neck: Normal range of motion  Cardiovascular: Normal rate, regular rhythm and normal heart sounds  Exam reveals no gallop and no friction rub  No murmur heard  Pulmonary/Chest: Effort normal and breath sounds normal  No stridor  No respiratory distress  He has no wheezes  He has no rales  Abdominal: Soft  He exhibits no distension and no mass  There is no tenderness  There is no rebound  Musculoskeletal: He exhibits no edema  Neurological: He is alert  Skin: Skin is warm and dry   He is not diaphoretic  Vitals reviewed  (  Be Sure to Include Physical Exam: Delete this entire line when you have entered your exam)  Discussion with Family: none    Discharge instructions/Information to patient and family:   See after visit summary for information provided to patient and family  Provisions for Follow-Up Care:  See after visit summary for information related to follow-up care and any pertinent home health orders  Planned Readmission: none     Discharge Statement:  I spent 45 minutes discharging the patient  This time was spent on the day of discharge  I had direct contact with the patient on the day of discharge  Greater than 50% of the total time was spent examining patient, answering all patient questions, arranging and discussing plan of care with patient as well as directly providing post-discharge instructions  Additional time then spent on discharge activities  Discharge Medications:  See after visit summary for reconciled discharge medications provided to patient and family        ** Please Note: This note has been constructed using a voice recognition system **

## 2019-06-20 NOTE — PROGRESS NOTES
Progress Note - Elly Persaud 1950, 80 y o  male MRN: [id number]    Unit/Bed#: E4 -01 Encounter: [id number]    Primary Care Provider: Jan Gaspar MD   Date and time admitted to hospital: 6/18/2019 10:09 AM        * Hypertensive urgency  Assessment & Plan  Resolved  C/b by htn nstemi type 1  Now improved, continue cozaar 100mg and lopressor 12 5mg bid  Encouraged low sodium nsa diet  D/w pt and son at length for dietary modifications    Alcohol use  Assessment & Plan  No hx of withdrawal    NSTEMI (non-ST elevated myocardial infarction) [facility])  Assessment & Plan  S/p heparin gtt  Cath w/1st diagonal: There was a tubular 60 % stenosis  2nd obtuse marginal: There was a 99 % stenosis  There was ROSIE grade 2 flow through the vessel (partial perfusion)  This lesion is a likely culprit for the patient's recent myocardial infarction  An intervention was performed  Left AV groove artery: There was a discrete 60 % stenosis  Mid RCA: There was a 50 % stenosis  S/p PCI w/FARHAN x 1 to 2nd obtuse marginal   D/w cardiology will transition to plavix/asa 81mg  D/c pending TTE to assess for any underlying CM    GERD (gastroesophageal reflux disease)  Assessment & Plan  Continue ppi    BPH with obstruction/lower urinary tract symptoms  Assessment & Plan  Continue oxybutynin/flomax      VTE Pharmacologic Prophylaxis:   Pharmacologic: Heparin  Mechanical VTE Prophylaxis in Place: Yes    Patient Centered Rounds: I have performed bedside rounds with nursing staff today  Discussions with Specialists or Other Care Team Provider: jarvis Calhoun-cardiology    Education and Discussions with Family / Patient: dispo workup, dietary modifications d/w pt and son at length    Time Spent for Care: 30 minutes  More than 50% of total time spent on counseling and coordination of care as described above      Current Length of Stay: 1 day(s)    Current Patient Status: Inpatient   Certification Statement: The patient will continue to require additional inpatient hospital stay due to nstemi    Discharge Plan: pending TTE, likely on 19    Code Status: Level 1 - Full Code      Subjective:   Patient seen examined  No events overnight per nursing  Patient reports she has no further chest pain or shortness of breath  Has no abdominal pain nausea vomiting or lightheadedness  He has no bleeding right radial artery  Objective:     Vitals:   Temp (24hrs), Av 4 °F (36 3 °C), Min:96 3 °F (35 7 °C), Max:98 8 °F (37 1 °C)    Temp:  [96 3 °F (35 7 °C)-98 8 °F (37 1 °C)] 96 3 °F (35 7 °C)  HR:  [51-62] 51  Resp:  [18] 18  BP: (120-149)/(58-98) 149/87  SpO2:  [93 %-96 %] 96 %  Body mass index is 34 15 kg/m²  Input and Output Summary (last 24 hours):     No intake or output data in the 24 hours ending 19 1652    Physical Exam:     Physical Exam   Constitutional: He appears well-developed  No distress  HENT:   Head: Normocephalic and atraumatic  Right Ear: External ear normal    Left Ear: External ear normal    Eyes: Conjunctivae are normal    Neck: Normal range of motion  Cardiovascular: Normal rate, regular rhythm and normal heart sounds  Exam reveals no gallop and no friction rub  No murmur heard  Pulmonary/Chest: Effort normal and breath sounds normal  No stridor  No respiratory distress  He has no wheezes  He has no rales  Abdominal: Soft  He exhibits no mass  There is no tenderness  There is no guarding  Musculoskeletal: He exhibits no tenderness  Neurological: He is alert  Skin: Skin is warm and dry  He is not diaphoretic  Psychiatric: He has a normal mood and affect  Vitals reviewed      (  Be Sure to Include Physical Exam: Delete this entire line when you have entered your exam)    Additional Data:     Labs:    Results from last 7 days   Lab Units 19  0544  19  1107   WBC Thousand/uL 8 52   < > 6 60   HEMOGLOBIN g/dL 15 4   < > 16 2   I STAT HEMOGLOBIN   --    < >  --    HEMATOCRIT % 44 5   < > 47 4   HEMATOCRIT, ISTAT   --    < >  --    PLATELETS Thousands/uL 208   < > 232   NEUTROS PCT %  --   --  61   LYMPHS PCT %  --   --  26   MONOS PCT %  --   --  12   EOS PCT %  --   --  1    < > = values in this interval not displayed  Results from last 7 days   Lab Units 06/19/19  0544  06/18/19  1107   SODIUM mmol/L 142  --  142   POTASSIUM mmol/L 4 2  --  4 4   CHLORIDE mmol/L 106  --  104   CO2 mmol/L 24  --  28   CO2, I-STAT   --    < >  --    BUN mg/dL 13  --  15   CREATININE mg/dL 0 76  --  0 82   AGAP   --    < >  --    ANION GAP mmol/L 12  --  10   CALCIUM mg/dL 9 4  --  9 9   ALBUMIN g/dL  --   --  4 0   TOTAL BILIRUBIN mg/dL  --   --  0 77   ALK PHOS U/L  --   --  57   ALT U/L  --   --  24   AST U/L  --   --  24   GLUCOSE RANDOM mg/dL 108  --  103    < > = values in this interval not displayed  Results from last 7 days   Lab Units 06/18/19  2306   INR  0 98         Results from last 7 days   Lab Units 06/19/19  0544   HEMOGLOBIN A1C % 5 2               * I Have Reviewed All Lab Data Listed Above  * Additional Pertinent Lab Tests Reviewed:  All Labs Within Last 24 Hours Reviewed    Imaging:    Imaging Reports Reviewed Today Include:   Imaging Personally Reviewed by Myself Includes:      Recent Cultures (last 7 days):           Last 24 Hours Medication List:     Current Facility-Administered Medications:  acetaminophen 650 mg Oral Q4H PRN Jeffery Greenfield PA-C    aspirin 324 mg Oral Daily Lazara Radharon, DO    atorvastatin 80 mg Oral Daily With Artem Ferraro MD    clopidogrel 75 mg Oral Daily Leroy Martínez MD    folic acid 846 mcg Oral Daily Lazara Ambron, DO    heparin (porcine) 3-20 Units/kg/hr (Order-Specific) Intravenous Titrated Kimberly Scotty PA-IGNACIO Last Rate: Stopped (06/19/19 1154)   heparin (porcine) 2,000 Units Intravenous PRN Bettywinsomee Opoka Gyarmabean PA-C    heparin (porcine) 4,000 Units Intravenous PRN Bettyjane Opoka Gyarmabean PA-C    LORazepam 1 mg Intravenous 4x Daily PRN Mira Zuniga, SHELBY    losartan 100 mg Oral Daily Mira Zuniga PA-C    magnesium hydroxide 30 mL Oral Daily PRN Kody Aguilera PA-C    metoprolol 2 5 mg Intravenous Q6H PRN Lazara Ambvincent, DO    metoprolol tartrate 12 5 mg Oral Q12H [address] Lazara Ambron, DO    nitroglycerin 0 4 mg Sublingual Q5 Min PRN Magaly Villarreal MD    ondansetron 4 mg Intravenous Q6H PRN Willam Obregon PA-C    oxybutynin 5 mg Oral BID Mira Zuniga PA-C    pantoprazole 40 mg Oral Early Morning Mira Zuniga PA-C    tamsulosin 0 8 mg Oral HS Mira Zuniga PA-C    thiamine 100 mg Oral Daily Lazara Mejia,     tiZANidine 4 mg Oral BID PRN Mira Zuniga PA-C    traMADol 100 mg Oral BID PRN Mira Zuniga PA-C    zolpidem 10 mg Oral HS PRN Willam Obregon PA-C         Today, Patient Was Seen By: Timmy Bedoya PA-C    ** Please Note: Dictation voice to text software may have been used in the creation of this document   **

## 2019-06-20 NOTE — PROGRESS NOTES
Progress Note - Cherie Cooper 80 y o  male MRN: [id number]    Unit/Bed#: E4 -01 Encounter: [id number]      Assessment/ Plan:  NSTEMI type 1  Pt presented to ED with c/o not feeling well overall  ECG showed ST segment depression in leads V1 and V2 with right bundle branch block, left anterior fascicular block  Serial troponin elevation, highest 5 6 trending down to 5 3  S/P PCI/FARHAN 6/19/19 and showed significant blockage of the coronary arteries  No complications  Echo scheduled for today  Continue on telemetry  Continue metoprolol tartrate, ASA, statin, plavix and losartan  Appreciate cardiology consult, cleared for discharge today after echo  Hypertensive urgency  Pt presented to ED with elevated BP  Taken at home and was in the 200's  Called PCP who instructed him to take 100mg losartan  Pt had temporal headache on presentation  Highest SBP was 193  Well controlled yesterday and today  Continue losartan and metoprolol tartrate    Elevated troponin  Troponin trending down after peak at 5 6  Echo today  No c/o CP or SOB today  Alcohol Use  Pt reports increased stress lately and has increased his drinking to three shots of liquor per day  No history of alcohol abuse  LFT's WNL  Encourage cessation    GERD  Currently controlled  Continue Protonix    Low back pain  Continue to f/u as outpatient as needed  Tizanadine and tramadol PRN    BPH with obstruction/ lower urinary tract symptoms  F/u with urology outpatient as needed  Continue Flomax and Proscar        Subjective:   Pt seen and examined  No issues overnight  Pt states that his headache is better today, not throbbing like it has been  He is generally feeling better today  No issues after PCI  He denies N/V/D, CP, SOB, or leg swelling  He has no issues with ambulating around the room  He states his appetite is improving    Pt is hopeful to be discharged today after the echocardiogram     Objective:     Vitals: Blood pressure 140/79, pulse 56, temperature (!) 97 2 °F (36 2 °C), temperature source Tympanic, resp  rate 18, height 5' 9" (1 753 m), weight 105 kg (231 lb 4 2 oz), SpO2 95 %  ,Body mass index is 34 15 kg/m²  Intake/Output Summary (Last 24 hours) at 6/20/2019 1308  Last data filed at 6/19/2019 1500  Gross per 24 hour   Intake 240 ml   Output 375 ml   Net -135 ml       Physical Exam: /79 (BP Location: Left arm)   Pulse 56   Temp (!) 97 2 °F (36 2 °C) (Tympanic)   Resp 18   Ht 5' 9" (1 753 m)   Wt 105 kg (231 lb 4 2 oz)   SpO2 95%   BMI 34 15 kg/m²   General appearance: alert and oriented, in no acute distress and resting comfortably in bed with son by his side  Head: Normocephalic, without obvious abnormality, atraumatic  Eyes: PERRL, EOMI, no discharge or scleral icterus  Ears: external ears normal  Lungs: clear to auscultation bilaterally  Heart: regular rate and rhythm, S1, S2 normal, no murmur, click, rub or gallop  Abdomen: soft, non-tender; bowel sounds normal; no masses,  no organomegaly  Extremities: extremities normal, warm and well-perfused; no cyanosis, clubbing, or edema     Invasive Devices     Peripheral Intravenous Line            Peripheral IV 06/18/19 Right Antecubital 2 days                Lab, Imaging and other studies: I have personally reviewed pertinent reports      VTE Pharmacologic Prophylaxis: Reason for no pharmacologic prophylaxis currently on plavix and ASA  VTE Mechanical Prophylaxis: sequential compression device

## 2019-06-20 NOTE — DISCHARGE INSTRUCTIONS
After Coronary Angioplasty and Intravascular Stent Placement   AMBULATORY CARE:   After coronary angioplasty and intravascular stent placement,  you will need to move carefully for 48 hours  Your healthcare provider will give you specific activity instructions to follow while you heal  You will also need to care for your procedure wound to prevent infection and help it heal   Call 911 for any of the following:   · You have any of the following signs of a heart attack:      ¨ Squeezing, pressure, or pain in your chest that lasts longer than 5 minutes or returns    ¨ Discomfort or pain in your back, neck, jaw, stomach, or arm     ¨ Trouble breathing    ¨ Nausea or vomiting    ¨ Lightheadedness or a sudden cold sweat, especially with chest pain or trouble breathing    · You have any of the following signs of a stroke:      ¨ Numbness or drooping on one side of your face     ¨ Weakness in an arm or leg    ¨ Confusion or difficulty speaking    ¨ Dizziness, a severe headache, or vision loss    · You feel lightheaded, short of breath, and have chest pain  · You cough up blood  Seek care immediately if:   · Your arm or leg feels warm, tender, and painful  It may look swollen and red  · Your leg or arm becomes numb, feels cold, or turns white or blue  · Your wound is bleeding and does not stop after you hold pressure on it for 10 minutes  · Your wound is swollen, red, or has pus or foul-smelling fluid coming from it  Contact your cardiologist if:   · You have a fever or chills  · You have questions or concerns about your condition or care  Medicines: You may need any of the following:  · Blood thinners    help prevent blood clots  Examples of blood thinners include heparin and warfarin  Clots can cause strokes, heart attacks, and death  The following are general safety guidelines to follow while you are taking a blood thinner:    ¨ Watch for bleeding and bruising while you take blood thinners   Watch for bleeding from your gums or nose  Watch for blood in your urine and bowel movements  Use a soft washcloth on your skin, and a soft toothbrush to brush your teeth  This can keep your skin and gums from bleeding  If you shave, use an electric shaver  Do not play contact sports  ¨ Tell your dentist and other healthcare providers that you take anticoagulants  Wear a bracelet or necklace that says you take this medicine  ¨ Do not start or stop any medicines unless your healthcare provider tells you to  Many medicines cannot be used with blood thinners  ¨ Tell your healthcare provider right away if you forget to take the medicine, or if you take too much  ¨ Warfarin  is a blood thinner that you may need to take  The following are things you should be aware of if you take warfarin  § Foods and medicines can affect the amount of warfarin in your blood  Do not make major changes to your diet while you take warfarin  Warfarin works best when you eat about the same amount of vitamin K every day  Vitamin K is found in green leafy vegetables and certain other foods  Ask for more information about what to eat when you are taking warfarin  § You will need to see your healthcare provider for follow-up visits when you are on warfarin  You will need regular blood tests  These tests are used to decide how much medicine you need  · Antiplatelets , such as aspirin, help prevent blood clots  Take your antiplatelet medicine exactly as directed  These medicines make it more likely for you to bleed or bruise  If you are told to take aspirin, do not take acetaminophen or ibuprofen instead  · Nitrates , such as nitroglycerin, relax the arteries of your heart so it gets more oxygen  This medicine helps to relieve chest pain  · Cholesterol medicine  helps decrease the amount of cholesterol in your blood  Cholesterol can cause plaque buildup that blocks your arteries       · Blood pressure medicine  lowers your blood pressure  · Take your medicine as directed  Contact your healthcare provider if you think your medicine is not helping or if you have side effects  Tell him or her if you are allergic to any medicine  Keep a list of the medicines, vitamins, and herbs you take  Include the amounts, and when and why you take them  Bring the list or the pill bottles to follow-up visits  Carry your medicine list with you in case of an emergency  Activity:   · You may feel like resting more after your procedure  Slowly start to do more each day  Rest when you feel it is needed  Ask when you can return to your daily activities  · Keep the arm or leg used for the procedure straight as much as possible  Try not to bend at the site of the incision for 24 to 48 hours  · Do not place pressure on your arm, hand, or wrist, if the catheter was placed in your wrist      · Do not lift anything heavy for at least 48 hours after your procedure  · Do not climb stairs for the first 48 hours unless it is necessary  Wound care:   · You may have pain, swelling, or bruising at the wound area  This is normal and should go away in a few days  Ice can help relieve pain, swelling, and bruising  Use an ice pack, or put crushed ice in a plastic bag  Cover the ice pack or plastic bag with a towel  Apply it to the area for 20 minutes every hour, or as directed  · If the wound bleeds, lie down with your arm or leg straight, and apply pressure on the wound for 10 minutes  · Do not get your wound wet until your healthcare provider says it is okay  Carefully wash around the wound with soap and water  · Check the wound for signs of infection, such as redness, swelling, or pus  · Dry around the wound and put on new, clean bandages as directed  Change your bandages when they get wet or dirty  · If you need to cough, apply gentle pressure to the wound area with your hand    Drink liquids as directed:  Drink extra liquids if contrast liquid was used during your procedure  Liquid will help flush the contrast out of your body  Ask your healthcare provider how much liquid to drink each day and which liquids are best for you  Do not smoke:  Nicotine and other chemicals in cigarettes and cigars can cause heart, lung, and blood vessel damage  Ask your healthcare provider for information if you currently smoke and need help to quit  E-cigarettes or smokeless tobacco still contain nicotine  Talk to your healthcare provider before you use these products  Go to cardiac rehabilitation (rehab) as directed:  Cardiac rehab is a program run by specialists who help you safely strengthen your heart and prevent more heart disease  The plan includes exercise, relaxation, stress management, and heart-healthy nutrition  Healthcare providers will also check to make sure any medicines you take are working  The plan may also include instructions for when you can drive, return to work, and do other normal daily activities  Follow up with your cardiologist as directed: If you need an MRI, wait at least 6 to 8 weeks after stent placement, or as directed  Write down your questions so you remember to ask them during your visits  © 2017 [facility] [employer] Information is for End User's use only and may not be sold, redistributed or otherwise used for commercial purposes  All illustrations and images included in CareNotes® are the copyrighted property of A D A M [employer] Inc  or Michael Schneider  The above information is an  only  It is not intended as medical advice for individual conditions or treatments  Talk to your doctor, nurse or pharmacist before following any medical regimen to see if it is safe and effective for you

## 2019-06-20 NOTE — NURSING NOTE
The patient had his tele removed, his IV and had all instructions reviewed  The PCA escorted the patient to the lobby via w/c  He was given his information re: his stent and had the opportunity to speak with Lucy at length

## 2019-06-20 NOTE — ASSESSMENT & PLAN NOTE
S/p heparin gtt  Cath w/1st diagonal: There was a tubular 60 % stenosis  2nd obtuse marginal: There was a 99 % stenosis  There was ROSIE grade 2 flow through the vessel (partial perfusion)  This lesion is a likely culprit for the patient's recent myocardial infarction  An intervention was performed  Left AV groove artery: There was a discrete 60 % stenosis  Mid RCA: There was a 50 % stenosis    S/p PCI w/FARHAN x 1 to 2nd obtuse marginal   D/w cardiology will transition to plavix/asa 81mg  D/c pending TTE to assess for any underlying CM

## 2019-06-20 NOTE — PROGRESS NOTES
Progress Note - Cardiology   Trung Engel 80 y o  male MRN: [id number]  Unit/Bed#: E4 -01 Encounter: [id number]      Assessment/Recommendations/Discussion:   1  Type 1 NSTEMI secondary to 99% om 2 stenosis, status post PCI/FARHAN, 2 75 x 18 mm placed 06/19/2019  2  Hypertension  3  Dyslipidemia  4  Obesity  5  Right bundle branch block, left anterior fascicular block    · Continue dual anti-platelet treatment  · Continue losartan 100 mg daily  · Continue metoprolol tartrate 12 5 mg twice daily  · Continue atorvastatin 80 mg daily  · Discontinue nitroglycerin ointment  · Echo pending to assess LVfx  · Follow-up with Dr King Herbert as scheduled 7/23/2019 9:20AM  · OK to d/c later today after echo        Subjective: Pt seen/examined    Feels well, no further back pain          Physical Exam:  GEN:  NAD  HEENT:  MMM, NCAT, pink conjunctiva, EOMI, nonicteric sclera  CV:  NO JVD/HJR, RR, NO M/R/G, +S1/S2, NO PARASTERNAL HEAVE/THRILL, NO LE EDEMA, NO HEPATIC SYSTOLIC PULSATION, WARM EXTREMITIES  RESP:  CTAB/L  ABD:  SOFT, NT, NO GROSS ORGANOMEGALY        Vitals:   /79 (BP Location: Left arm)   Pulse 56   Temp (!) 97 2 °F (36 2 °C) (Tympanic)   Resp 18   Ht 5' 9" (1 753 m)   Wt 105 kg (231 lb 4 2 oz)   SpO2 95%   BMI 34 15 kg/m²   Vitals:    06/18/19 1006 06/18/19 1626   Weight: 104 kg (230 lb 2 6 oz) 105 kg (231 lb 4 2 oz)       Intake/Output Summary (Last 24 hours) at 6/20/2019 1052  Last data filed at 6/19/2019 1500  Gross per 24 hour   Intake 240 ml   Output 375 ml   Net -135 ml       TELEMETRY: SR  Lab Results:  Results from last 7 days   Lab Units 06/19/19  0544   WBC Thousand/uL 8 52   HEMOGLOBIN g/dL 15 4   HEMATOCRIT % 44 5   PLATELETS Thousands/uL 208     Results from last 7 days   Lab Units 06/19/19  0544 06/18/19  1111 06/18/19  1107   POTASSIUM mmol/L 4 2  --  4 4   CHLORIDE mmol/L 106  --  104   CO2 mmol/L 24  --  28   CO2, I-STAT mmol/L  --  27  --    BUN mg/dL 13  --  15   CREATININE mg/dL 0 76  --  0 82   CALCIUM mg/dL 9 4  --  9 9   ALK PHOS U/L  --   --  57   ALT U/L  --   --  24   AST U/L  --   --  24   GLUCOSE, ISTAT mg/dl  --  111  --      Results from last 7 days   Lab Units 06/19/19  0544 06/18/19  1111   POTASSIUM mmol/L 4 2  --    CHLORIDE mmol/L 106  --    CO2 mmol/L 24  --    CO2, I-STAT mmol/L  --  27   BUN mg/dL 13  --    CREATININE mg/dL 0 76  --    GLUCOSE, ISTAT mg/dl  --  111   CALCIUM mg/dL 9 4  --            Medications:    Current Facility-Administered Medications:     acetaminophen (TYLENOL) tablet 650 mg, 650 mg, Oral, Q4H PRN, Mira Zuniga PA-C, 650 mg at 06/20/19 2476    aspirin chewable tablet 324 mg, 324 mg, Oral, Daily, Lazara Mejia DO, 324 mg at 06/20/19 0817    atorvastatin (LIPITOR) tablet 80 mg, 80 mg, Oral, Daily With Marcos Hernandez MD, 80 mg at 06/19/19 1607    clopidogrel (PLAVIX) tablet 75 mg, 75 mg, Oral, Daily, Magaly Villarreal MD, 75 mg at 47/65/69 3684    folic acid (FOLVITE) tablet 400 mcg, 400 mcg, Oral, Daily, Lazara Mejia DO, 400 mcg at 06/20/19 5756    heparin (porcine) 25,000 units in 250 mL infusion (premix), 3-20 Units/kg/hr (Order-Specific), Intravenous, Titrated, El Ortega PA-C, Stopped at 06/19/19 1154    heparin (porcine) injection 2,000 Units, 2,000 Units, Intravenous, PRN, El Ortega PA-C    heparin (porcine) injection 4,000 Units, 4,000 Units, Intravenous, PRN, El Ortega PA-C, 4,000 Units at 06/19/19 5189    LORazepam (ATIVAN) 2 mg/mL injection 1 mg, 1 mg, Intravenous, 4x Daily PRN, Willam Obreogn PA-C, 1 mg at 06/19/19 0904    losartan (COZAAR) tablet 100 mg, 100 mg, Oral, Daily, Mira Zuniga PA-C, 100 mg at 06/20/19 3360    magnesium hydroxide (MILK OF MAGNESIA) 400 mg/5 mL oral suspension 30 mL, 30 mL, Oral, Daily PRN, Jennifer Aguilera PA-C, 30 mL at 06/19/19 7821    metoprolol (LOPRESSOR) injection 2 5 mg, 2 5 mg, Intravenous, Q6H PRN, Lazara Mejia DO, 2 5 mg at 06/18/19 0054   metoprolol tartrate (LOPRESSOR) partial tablet 12 5 mg, 12 5 mg, Oral, Q12H VINNIE, Lazara Ambron, DO, 12 5 mg at 06/20/19 0939    nitroglycerin (NITRO-BID) 2 % TD ointment 1 inch, 1 inch, Topical, Q6H, Castro Holbrook MD, 1 inch at 06/19/19 1745    nitroglycerin (NITROSTAT) SL tablet 0 4 mg, 0 4 mg, Sublingual, Q5 Min PRN, Castro Holbrook MD    [facility]ron [facility]) injection 4 mg, 4 mg, Intravenous, Q6H PRN, Lewis Zepeda PA-C    oxybutynin (DITROPAN) tablet 5 mg, 5 mg, Oral, BID, Mira Zuniga PA-C, 5 mg at 06/20/19 0900    pantoprazole (PROTONIX) EC tablet 40 mg, 40 mg, Oral, Early Morning, Mira Zuniga PA-C, 40 mg at 06/20/19 0545    tamsulosin (FLOMAX) capsule 0 8 mg, 0 8 mg, Oral, HS, Mira Zuniga PA-C, 0 8 mg at 06/19/19 2135    thiamine (VITAMIN B1) tablet 100 mg, 100 mg, Oral, Daily, Lazara Mejia, DO, 100 mg at 06/20/19 0942    tiZANidine (ZANAFLEX) tablet 4 mg, 4 mg, Oral, BID PRN, CANDE Garcia-C    traMADol (ULTRAM) tablet 100 mg, 100 mg, Oral, BID PRN, CANDE Garcia-C, 100 mg at 06/19/19 2135    zolpidem (AMBIEN) tablet 10 mg, 10 mg, Oral, HS PRN, CANDE Garcia-C, 10 mg at 06/19/19 2773    This note was completed in part utilizing [employer] Fluency Direct Software  Grammatical errors, random word insertions, spelling mistakes, and incomplete sentences may be an occasional consequence of this system secondary to software limitations, ambient noise, and hardware issues  If you have any questions or concerns about the content, text, or information contained within the body of this dictation, please contact the provider for clarification

## 2019-06-20 NOTE — ASSESSMENT & PLAN NOTE
Resolved  C/b by htn nstemi type 1  Now improved, continue cozaar 100mg and lopressor 12 5mg bid  Encouraged low sodium nsa diet    D/w pt and son at length for dietary modifications

## 2019-06-20 NOTE — PLAN OF CARE
Problem: Potential for Falls  Goal: Patient will remain free of falls  Description  INTERVENTIONS:  - Assess patient frequently for physical needs  -  Identify cognitive and physical deficits and behaviors that affect risk of falls  -  [place] fall precautions as indicated by assessment   - Educate patient/family on patient safety including physical limitations  - Instruct patient to call for assistance with activity based on assessment  - Modify environment to reduce risk of injury  - Consider OT/PT consult to assist with strengthening/mobility  6/20/2019 1119 by Devorah France RN  Outcome: Progressing  6/20/2019 1119 by Devorah France RN  Outcome: Progressing     Problem: CARDIOVASCULAR - ADULT  Goal: Maintains optimal cardiac output and hemodynamic stability  Description  INTERVENTIONS:  - Monitor I/O, vital signs and rhythm  - Monitor for S/S and trends of decreased cardiac output i e  bleeding, hypotension  - Administer and titrate ordered vasoactive medications to optimize hemodynamic stability  - Assess quality of pulses, skin color and temperature  - Assess for signs of decreased coronary artery perfusion - ex   Angina  - Instruct patient to report change in severity of symptoms  6/20/2019 1119 by Devorah France RN  Outcome: Progressing  6/20/2019 1119 by Devorah France RN  Outcome: Progressing  Goal: Absence of cardiac dysrhythmias or at baseline rhythm  Description  INTERVENTIONS:  - Continuous cardiac monitoring, monitor vital signs, obtain 12 lead EKG if indicated  - Administer antiarrhythmic and heart rate control medications as ordered  - Monitor electrolytes and administer replacement therapy as ordered  6/20/2019 1119 by Devorah France RN  Outcome: Progressing  6/20/2019 1119 by Devorah France RN  Outcome: Progressing     Problem: PAIN - ADULT  Goal: Verbalizes/displays adequate comfort level or baseline comfort level  Description  Interventions:  - Encourage patient to monitor pain and request assistance  - Assess pain using appropriate pain scale  - Administer analgesics based on type and severity of pain and evaluate response  - Implement non-pharmacological measures as appropriate and evaluate response  - Consider cultural and social influences on pain and pain management  - Notify physician/advanced practitioner if interventions unsuccessful or patient reports new pain  6/20/2019 1119 by Kade Genao RN  Outcome: Progressing  6/20/2019 1119 by Kade Genao RN  Outcome: Progressing     Problem: SAFETY ADULT  Goal: Maintain or return to baseline ADL function  Description  INTERVENTIONS:  -  Assess patient's ability to carry out ADLs; assess patient's baseline for ADL function and identify physical deficits which impact ability to perform ADLs (bathing, care of mouth/teeth, toileting, grooming, dressing, etc )  - Assess/evaluate cause of self-care deficits   - Assess range of motion  - Assess patient's mobility; develop plan if impaired  - Assess patient's need for assistive devices and provide as appropriate  - Encourage maximum independence but intervene and supervise when necessary  ¯ Involve family in performance of ADLs  ¯ Assess for home care needs following discharge   ¯ Request OT consult to assist with ADL evaluation and planning for discharge  ¯ Provide patient education as appropriate  6/20/2019 1119 by Kade Genao RN  Outcome: Progressing  6/20/2019 1119 by Kade Genao RN  Outcome: Progressing  Goal: Maintain or return mobility status to optimal level  Description  INTERVENTIONS:  - Assess patient's baseline mobility status (ambulation, transfers, stairs, etc )    - Identify cognitive and physical deficits and behaviors that affect mobility  - Identify mobility aids required to assist with transfers and/or ambulation (gait belt, sit-to-stand, lift, walker, cane, etc )  - [place] fall precautions as indicated by assessment  - Record patient progress and toleration of activity level on Mobility SBAR; progress patient to next Phase/Stage  - Instruct patient to call for assistance with activity based on assessment  - Request Rehabilitation consult to assist with strengthening/weightbearing, etc   6/20/2019 1119 by Juan Méndez RN  Outcome: Progressing  6/20/2019 1119 by Juan Méndez RN  Outcome: Progressing     Problem: DISCHARGE PLANNING  Goal: Discharge to home or other facility with appropriate resources  Description  INTERVENTIONS:  - Identify barriers to discharge w/patient and caregiver  - Arrange for needed discharge resources and transportation as appropriate  - Identify discharge learning needs (meds, wound care, etc )  - Arrange for interpretive services to assist at discharge as needed  - Refer to Case Management Department for coordinating discharge planning if the patient needs post-hospital services based on physician/advanced practitioner order or complex needs related to functional status, cognitive ability, or social support system  6/20/2019 1119 by Juan Méndez RN  Outcome: Progressing  6/20/2019 1119 by Juan Méndez RN  Outcome: Progressing     Problem: Knowledge Deficit  Goal: Patient/family/caregiver demonstrates understanding of disease process, treatment plan, medications, and discharge instructions  Description  Complete learning assessment and assess knowledge base    Interventions:  - Provide teaching at level of understanding  - Provide teaching via preferred learning methods  6/20/2019 1119 by Juan Méndez RN  Outcome: Progressing  6/20/2019 1119 by Juan Méndez RN  Outcome: Progressing

## 2019-06-20 NOTE — PLAN OF CARE
Problem: Potential for Falls  Goal: Patient will remain free of falls  Description  INTERVENTIONS:  - Assess patient frequently for physical needs  -  Identify cognitive and physical deficits and behaviors that affect risk of falls  -  [place] fall precautions as indicated by assessment   - Educate patient/family on patient safety including physical limitations  - Instruct patient to call for assistance with activity based on assessment  - Modify environment to reduce risk of injury  - Consider OT/PT consult to assist with strengthening/mobility  Outcome: Progressing     Problem: CARDIOVASCULAR - ADULT  Goal: Maintains optimal cardiac output and hemodynamic stability  Description  INTERVENTIONS:  - Monitor I/O, vital signs and rhythm  - Monitor for S/S and trends of decreased cardiac output i e  bleeding, hypotension  - Administer and titrate ordered vasoactive medications to optimize hemodynamic stability  - Assess quality of pulses, skin color and temperature  - Assess for signs of decreased coronary artery perfusion - ex   Angina  - Instruct patient to report change in severity of symptoms  Outcome: Progressing  Goal: Absence of cardiac dysrhythmias or at baseline rhythm  Description  INTERVENTIONS:  - Continuous cardiac monitoring, monitor vital signs, obtain 12 lead EKG if indicated  - Administer antiarrhythmic and heart rate control medications as ordered  - Monitor electrolytes and administer replacement therapy as ordered  Outcome: Progressing     Problem: PAIN - ADULT  Goal: Verbalizes/displays adequate comfort level or baseline comfort level  Description  Interventions:  - Encourage patient to monitor pain and request assistance  - Assess pain using appropriate pain scale  - Administer analgesics based on type and severity of pain and evaluate response  - Implement non-pharmacological measures as appropriate and evaluate response  - Consider cultural and social influences on pain and pain management  - Notify physician/advanced practitioner if interventions unsuccessful or patient reports new pain  Outcome: Progressing     Problem: SAFETY ADULT  Goal: Maintain or return to baseline ADL function  Description  INTERVENTIONS:  -  Assess patient's ability to carry out ADLs; assess patient's baseline for ADL function and identify physical deficits which impact ability to perform ADLs (bathing, care of mouth/teeth, toileting, grooming, dressing, etc )  - Assess/evaluate cause of self-care deficits   - Assess range of motion  - Assess patient's mobility; develop plan if impaired  - Assess patient's need for assistive devices and provide as appropriate  - Encourage maximum independence but intervene and supervise when necessary  ¯ Involve family in performance of ADLs  ¯ Assess for home care needs following discharge   ¯ Request OT consult to assist with ADL evaluation and planning for discharge  ¯ Provide patient education as appropriate  Outcome: Progressing  Goal: Maintain or return mobility status to optimal level  Description  INTERVENTIONS:  - Assess patient's baseline mobility status (ambulation, transfers, stairs, etc )    - Identify cognitive and physical deficits and behaviors that affect mobility  - Identify mobility aids required to assist with transfers and/or ambulation (gait belt, sit-to-stand, lift, walker, cane, etc )  - [place] fall precautions as indicated by assessment  - Record patient progress and toleration of activity level on Mobility SBAR; progress patient to next Phase/Stage  - Instruct patient to call for assistance with activity based on assessment  - Request Rehabilitation consult to assist with strengthening/weightbearing, etc   Outcome: Progressing     Problem: DISCHARGE PLANNING  Goal: Discharge to home or other facility with appropriate resources  Description  INTERVENTIONS:  - Identify barriers to discharge w/patient and caregiver  - Arrange for needed discharge resources and transportation as appropriate  - Identify discharge learning needs (meds, wound care, etc )  - Arrange for interpretive services to assist at discharge as needed  - Refer to Case Management Department for coordinating discharge planning if the patient needs post-hospital services based on physician/advanced practitioner order or complex needs related to functional status, cognitive ability, or social support system  Outcome: Progressing     Problem: Knowledge Deficit  Goal: Patient/family/caregiver demonstrates understanding of disease process, treatment plan, medications, and discharge instructions  Description  Complete learning assessment and assess knowledge base    Interventions:  - Provide teaching at level of understanding  - Provide teaching via preferred learning methods  Outcome: Progressing

## 2019-06-21 ENCOUNTER — APPOINTMENT (OUTPATIENT)
Dept: PHYSICAL THERAPY | Facility: MEDICAL CENTER | Age: 69
End: 2019-06-21
Payer: MEDICARE

## 2019-06-21 ENCOUNTER — TRANSITIONAL CARE MANAGEMENT (OUTPATIENT)
Dept: INTERNAL MEDICINE CLINIC | Facility: CLINIC | Age: 69
End: 2019-06-21

## 2019-06-21 PROCEDURE — 93321 DOPPLER ECHO F-UP/LMTD STD: CPT | Performed by: INTERNAL MEDICINE

## 2019-06-21 PROCEDURE — 93325 DOPPLER ECHO COLOR FLOW MAPG: CPT | Performed by: INTERNAL MEDICINE

## 2019-06-21 PROCEDURE — 93308 TTE F-UP OR LMTD: CPT | Performed by: INTERNAL MEDICINE

## 2019-06-24 ENCOUNTER — TELEPHONE (OUTPATIENT)
Dept: CARDIOLOGY CLINIC | Facility: CLINIC | Age: 69
End: 2019-06-24

## 2019-06-24 ENCOUNTER — APPOINTMENT (OUTPATIENT)
Dept: PHYSICAL THERAPY | Facility: MEDICAL CENTER | Age: 69
End: 2019-06-24
Payer: MEDICARE

## 2019-06-24 ENCOUNTER — OFFICE VISIT (OUTPATIENT)
Dept: INTERNAL MEDICINE CLINIC | Facility: CLINIC | Age: 69
End: 2019-06-24

## 2019-06-24 VITALS
WEIGHT: 226 LBS | BODY MASS INDEX: 33.47 KG/M2 | HEART RATE: 57 BPM | HEIGHT: 69 IN | DIASTOLIC BLOOD PRESSURE: 70 MMHG | OXYGEN SATURATION: 96 % | SYSTOLIC BLOOD PRESSURE: 120 MMHG

## 2019-06-24 DIAGNOSIS — I10 ESSENTIAL HYPERTENSION: Primary | ICD-10-CM

## 2019-06-24 DIAGNOSIS — I21.4 NSTEMI (NON-ST ELEVATED MYOCARDIAL INFARCTION) (HCC): ICD-10-CM

## 2019-06-24 PROCEDURE — 99496 TRANSJ CARE MGMT HIGH F2F 7D: CPT | Performed by: INTERNAL MEDICINE

## 2019-06-25 ENCOUNTER — OFFICE VISIT (OUTPATIENT)
Dept: CARDIOLOGY CLINIC | Facility: CLINIC | Age: 69
End: 2019-06-25
Payer: MEDICARE

## 2019-06-25 VITALS
SYSTOLIC BLOOD PRESSURE: 108 MMHG | HEART RATE: 56 BPM | BODY MASS INDEX: 33.55 KG/M2 | DIASTOLIC BLOOD PRESSURE: 62 MMHG | HEIGHT: 69 IN | WEIGHT: 226.5 LBS

## 2019-06-25 DIAGNOSIS — I25.10 CORONARY ARTERY DISEASE INVOLVING NATIVE HEART WITHOUT ANGINA PECTORIS, UNSPECIFIED VESSEL OR LESION TYPE: Primary | ICD-10-CM

## 2019-06-25 DIAGNOSIS — I10 ESSENTIAL HYPERTENSION: ICD-10-CM

## 2019-06-25 DIAGNOSIS — E78.2 MIXED HYPERLIPIDEMIA: ICD-10-CM

## 2019-06-25 DIAGNOSIS — G47.33 OBSTRUCTIVE SLEEP APNEA: ICD-10-CM

## 2019-06-25 PROBLEM — E78.5 DYSLIPIDEMIA: Status: ACTIVE | Noted: 2019-06-25

## 2019-06-25 PROCEDURE — 99214 OFFICE O/P EST MOD 30 MIN: CPT | Performed by: PHYSICIAN ASSISTANT

## 2019-06-25 PROCEDURE — 1124F ACP DISCUSS-NO DSCNMKR DOCD: CPT | Performed by: PHYSICIAN ASSISTANT

## 2019-06-26 ENCOUNTER — TELEPHONE (OUTPATIENT)
Dept: CARDIOLOGY CLINIC | Facility: CLINIC | Age: 69
End: 2019-06-26

## 2019-06-26 ENCOUNTER — APPOINTMENT (OUTPATIENT)
Dept: PHYSICAL THERAPY | Facility: MEDICAL CENTER | Age: 69
End: 2019-06-26
Payer: MEDICARE

## 2019-06-27 ENCOUNTER — CLINICAL SUPPORT (OUTPATIENT)
Dept: CARDIAC REHAB | Age: 69
End: 2019-06-27
Payer: MEDICARE

## 2019-06-27 DIAGNOSIS — I25.10 CORONARY ARTERY DISEASE INVOLVING NATIVE HEART WITHOUT ANGINA PECTORIS, UNSPECIFIED VESSEL OR LESION TYPE: ICD-10-CM

## 2019-06-27 DIAGNOSIS — Z95.5 STENTED CORONARY ARTERY: Primary | ICD-10-CM

## 2019-06-27 PROCEDURE — 93797 PHYS/QHP OP CAR RHAB WO ECG: CPT

## 2019-06-28 ENCOUNTER — APPOINTMENT (OUTPATIENT)
Dept: PHYSICAL THERAPY | Facility: MEDICAL CENTER | Age: 69
End: 2019-06-28
Payer: MEDICARE

## 2019-06-28 ENCOUNTER — TELEPHONE (OUTPATIENT)
Dept: SLEEP CENTER | Facility: CLINIC | Age: 69
End: 2019-06-28

## 2019-06-28 NOTE — TELEPHONE ENCOUNTER
----- Message from Shayla Guevara DO sent at 6/27/2019  6:20 PM EDT -----  Chart reviewed  Study approved   ----- Message -----  From: Chi Roman MA  Sent: 6/26/2019   8:37 AM EDT  To: Sleep Medicine [place] Provider    This sleep study needs approval      If approved please sign and return to clerical pool  If denied please include reasons why  Also provide alternative testing if warranted  Please sign and return to clerical pool

## 2019-07-01 ENCOUNTER — CLINICAL SUPPORT (OUTPATIENT)
Dept: CARDIAC REHAB | Age: 69
End: 2019-07-01
Payer: MEDICARE

## 2019-07-01 DIAGNOSIS — Z95.5 STENTED CORONARY ARTERY: ICD-10-CM

## 2019-07-01 DIAGNOSIS — I21.4 NSTEMI (NON-ST ELEVATION MYOCARDIAL INFARCTION) (HCC): ICD-10-CM

## 2019-07-01 PROCEDURE — 93798 PHYS/QHP OP CAR RHAB W/ECG: CPT

## 2019-07-03 ENCOUNTER — CLINICAL SUPPORT (OUTPATIENT)
Dept: CARDIAC REHAB | Age: 69
End: 2019-07-03
Payer: MEDICARE

## 2019-07-03 DIAGNOSIS — I21.4 NSTEMI (NON-ST ELEVATION MYOCARDIAL INFARCTION) (HCC): ICD-10-CM

## 2019-07-03 PROCEDURE — 93798 PHYS/QHP OP CAR RHAB W/ECG: CPT

## 2019-07-05 ENCOUNTER — CLINICAL SUPPORT (OUTPATIENT)
Dept: CARDIAC REHAB | Age: 69
End: 2019-07-05
Payer: MEDICARE

## 2019-07-05 DIAGNOSIS — I21.4 NSTEMI (NON-ST ELEVATION MYOCARDIAL INFARCTION) (HCC): ICD-10-CM

## 2019-07-05 DIAGNOSIS — Z95.5 STENTED CORONARY ARTERY: ICD-10-CM

## 2019-07-05 PROCEDURE — 93798 PHYS/QHP OP CAR RHAB W/ECG: CPT

## 2019-07-08 ENCOUNTER — CLINICAL SUPPORT (OUTPATIENT)
Dept: CARDIAC REHAB | Age: 69
End: 2019-07-08
Payer: MEDICARE

## 2019-07-08 DIAGNOSIS — I21.4 NSTEMI (NON-ST ELEVATION MYOCARDIAL INFARCTION) (HCC): ICD-10-CM

## 2019-07-08 DIAGNOSIS — Z95.5 STENTED CORONARY ARTERY: ICD-10-CM

## 2019-07-08 PROCEDURE — 93798 PHYS/QHP OP CAR RHAB W/ECG: CPT

## 2019-07-10 ENCOUNTER — CLINICAL SUPPORT (OUTPATIENT)
Dept: CARDIAC REHAB | Age: 69
End: 2019-07-10
Payer: MEDICARE

## 2019-07-10 DIAGNOSIS — E78.5 HYPERLIPIDEMIA: ICD-10-CM

## 2019-07-10 DIAGNOSIS — I21.4 NSTEMI (NON-ST ELEVATION MYOCARDIAL INFARCTION) (HCC): ICD-10-CM

## 2019-07-10 DIAGNOSIS — I25.10 CAD (CORONARY ARTERY DISEASE): ICD-10-CM

## 2019-07-10 DIAGNOSIS — Z95.5 STENTED CORONARY ARTERY: ICD-10-CM

## 2019-07-10 DIAGNOSIS — I10 ESSENTIAL HYPERTENSION: ICD-10-CM

## 2019-07-10 PROCEDURE — 93798 PHYS/QHP OP CAR RHAB W/ECG: CPT

## 2019-07-10 RX ORDER — ATORVASTATIN CALCIUM 80 MG/1
80 TABLET, FILM COATED ORAL
Qty: 30 TABLET | Refills: 0 | Status: SHIPPED | OUTPATIENT
Start: 2019-07-10 | End: 2019-08-11 | Stop reason: SDUPTHER

## 2019-07-10 RX ORDER — CLOPIDOGREL BISULFATE 75 MG/1
75 TABLET ORAL DAILY
Qty: 30 TABLET | Refills: 0 | Status: SHIPPED | OUTPATIENT
Start: 2019-07-10 | End: 2019-08-11 | Stop reason: SDUPTHER

## 2019-07-10 RX ORDER — LOSARTAN POTASSIUM 100 MG/1
100 TABLET ORAL DAILY
Qty: 30 TABLET | Refills: 0 | Status: SHIPPED | OUTPATIENT
Start: 2019-07-10 | End: 2019-09-18 | Stop reason: SDUPTHER

## 2019-07-10 NOTE — TELEPHONE ENCOUNTER
The patient is calling the office on the prescription line stating that he was in the hospital about a month ago  The patient was given medication fills for his:    Metoprolol 25MG   Losartan 100MG  Plavix 75MG   lipitor 80MG    The patient doesn't have anymore refills on this and needs new ones sent in  The patient is coming in the office on July 23rd 2019 for the hospital Follow up with Dr Susan Isabel  The patient also recently seen Luca Olivarez  on June 25th 2019

## 2019-07-12 ENCOUNTER — CLINICAL SUPPORT (OUTPATIENT)
Dept: CARDIAC REHAB | Age: 69
End: 2019-07-12
Payer: MEDICARE

## 2019-07-12 DIAGNOSIS — I21.4 NSTEMI (NON-ST ELEVATION MYOCARDIAL INFARCTION) (HCC): ICD-10-CM

## 2019-07-12 DIAGNOSIS — Z95.5 STENTED CORONARY ARTERY: ICD-10-CM

## 2019-07-12 PROCEDURE — 93798 PHYS/QHP OP CAR RHAB W/ECG: CPT

## 2019-07-15 ENCOUNTER — CLINICAL SUPPORT (OUTPATIENT)
Dept: CARDIAC REHAB | Age: 69
End: 2019-07-15
Payer: MEDICARE

## 2019-07-15 DIAGNOSIS — Z95.5 STENTED CORONARY ARTERY: ICD-10-CM

## 2019-07-15 DIAGNOSIS — I21.4 NSTEMI (NON-ST ELEVATION MYOCARDIAL INFARCTION) (HCC): ICD-10-CM

## 2019-07-15 PROCEDURE — 93798 PHYS/QHP OP CAR RHAB W/ECG: CPT

## 2019-07-17 ENCOUNTER — CLINICAL SUPPORT (OUTPATIENT)
Dept: CARDIAC REHAB | Age: 69
End: 2019-07-17
Payer: MEDICARE

## 2019-07-17 DIAGNOSIS — Z95.5 STENTED CORONARY ARTERY: ICD-10-CM

## 2019-07-17 DIAGNOSIS — I21.4 NSTEMI (NON-ST ELEVATION MYOCARDIAL INFARCTION) (HCC): ICD-10-CM

## 2019-07-17 PROCEDURE — 93798 PHYS/QHP OP CAR RHAB W/ECG: CPT

## 2019-07-19 ENCOUNTER — CLINICAL SUPPORT (OUTPATIENT)
Dept: CARDIAC REHAB | Age: 69
End: 2019-07-19
Payer: MEDICARE

## 2019-07-19 DIAGNOSIS — I21.4 NSTEMI (NON-ST ELEVATION MYOCARDIAL INFARCTION) (HCC): ICD-10-CM

## 2019-07-19 DIAGNOSIS — Z95.5 STENTED CORONARY ARTERY: ICD-10-CM

## 2019-07-19 PROCEDURE — 93798 PHYS/QHP OP CAR RHAB W/ECG: CPT

## 2019-07-22 ENCOUNTER — CLINICAL SUPPORT (OUTPATIENT)
Dept: CARDIAC REHAB | Age: 69
End: 2019-07-22
Payer: MEDICARE

## 2019-07-22 DIAGNOSIS — I21.4 NSTEMI (NON-ST ELEVATION MYOCARDIAL INFARCTION) (HCC): ICD-10-CM

## 2019-07-22 DIAGNOSIS — Z95.5 STENTED CORONARY ARTERY: ICD-10-CM

## 2019-07-22 PROCEDURE — 93798 PHYS/QHP OP CAR RHAB W/ECG: CPT

## 2019-07-23 ENCOUNTER — OFFICE VISIT (OUTPATIENT)
Dept: CARDIOLOGY CLINIC | Facility: CLINIC | Age: 69
End: 2019-07-23
Payer: MEDICARE

## 2019-07-23 VITALS
DIASTOLIC BLOOD PRESSURE: 70 MMHG | OXYGEN SATURATION: 94 % | BODY MASS INDEX: 33.98 KG/M2 | HEIGHT: 69 IN | SYSTOLIC BLOOD PRESSURE: 120 MMHG | HEART RATE: 56 BPM | WEIGHT: 229.4 LBS

## 2019-07-23 DIAGNOSIS — I25.10 CORONARY ARTERY DISEASE INVOLVING NATIVE CORONARY ARTERY OF NATIVE HEART WITHOUT ANGINA PECTORIS: Primary | ICD-10-CM

## 2019-07-23 PROCEDURE — 99213 OFFICE O/P EST LOW 20 MIN: CPT | Performed by: INTERNAL MEDICINE

## 2019-07-23 NOTE — PROGRESS NOTES
[address] [facility] [place]  [address]  [facility] [facility]  [phone]    Cardiology Follow up    Patient:  Radha Flaherty  :  1950  MRN:  [id number]    History of Present Illness:     81-year-old man with past medical history of coronary disease status post non STEMI in 2019 status post drug-eluting stent to 99% 2nd marginal with residual 60% 1st diagonal, 60% left AV groove artery stenosis, mid RCA 50% stenosis, bifascicular block, hypertension, dyslipidemia, obstructive sleep apnea, obesity, BPH presents for hospital follow-up  He has been feeling quite well and has not had any chest pain, shortness of breath, palpitations, dizziness, or syncope  He is working out on his own and with cardiac rehab  He is meeting with a nutritionist             Patient Active Problem List   Diagnosis    Anxiety    BPH with obstruction/lower urinary tract symptoms    Elevated glucose    Erectile dysfunction of non-organic origin    GERD without esophagitis    Hyperlipidemia    Hypertension    Obesity (BMI 30 0-34  9)    Obstructive sleep apnea    Osteoarthritis    Psoriasis    RBBB    Calculus of kidney    Primary insomnia    GERD (gastroesophageal reflux disease)    Low back pain    Alcohol use    Dyslipidemia       Past Surgical History  Past Surgical History:   Procedure Laterality Date    APPENDECTOMY      CARPAL TUNNEL RELEASE      ROTATOR CUFF REPAIR      SHOULDER ARTHROSCOPY Left     Last ASsessed:10/24/16    TOTAL HIP ARTHROPLASTY      TRIGGER FINGER RELEASE         Social History   Social History     Socioeconomic History    Marital status:      Spouse name: Not on file    Number of children: Not on file    Years of education: Not on file    Highest education level: Not on file   Occupational History    Not on file   Social Needs    Financial resource strain: Not on file    Food insecurity:     Worry: Not on file     Inability: Not on file  Transportation needs:     Medical: Not on file     Non-medical: Not on file   Tobacco Use    Smoking status: Never Smoker    Smokeless tobacco: Never Used   Substance and Sexual Activity    Alcohol use: Yes     Comment: 1 drink daily    Drug use: No    Sexual activity: Not on file   Lifestyle    Physical activity:     Days per week: Not on file     Minutes per session: Not on file    Stress: Not on file   Relationships    Social connections:     Talks on phone: Not on file     Gets together: Not on file     Attends Caodaism service: Not on file     Active member of club or organization: Not on file     Attends meetings of clubs or organizations: Not on file     Relationship status: Not on file    Intimate partner violence:     Fear of current or ex partner: Not on file     Emotionally abused: Not on file     Physically abused: Not on file     Forced sexual activity: Not on file   Other Topics Concern    Not on file   Social History Narrative    Not on file        Allergies   Allergen Reactions    Latex Hives    Other     Pollen Extract     Shellfish-Derived Products      Zeke Miller       Family History   Family History   Problem Relation Age of Onset    Arthritis Mother     Hypertension Mother     Irritable bowel syndrome Mother     Osteoporosis Mother     Arthritis Other        Review of Systems:  Review of Systems   Constitutional: Negative for chills, fatigue and fever  HENT: Negative for hearing loss and trouble swallowing  Eyes: Negative for pain  Respiratory: Negative for cough, chest tightness and shortness of breath  Cardiovascular: Negative for chest pain, palpitations and leg swelling  Gastrointestinal: Negative for abdominal pain, blood in stool, nausea and vomiting  Endocrine: Negative for cold intolerance and heat intolerance  Genitourinary: Negative for difficulty urinating, frequency and hematuria     Musculoskeletal: Negative for arthralgias and neck pain    Skin: Negative for rash  Allergic/Immunologic: Negative for environmental allergies  Neurological: Negative for dizziness, weakness and headaches  Hematological: Does not bruise/bleed easily  Psychiatric/Behavioral: Negative for decreased concentration and sleep disturbance  The patient is not nervous/anxious            Current Outpatient Medications:     Acetaminophen 325 MG CAPS, Take 650 mg by mouth every 6 (six) hours, Disp: , Rfl:     aspirin (ECOTRIN LOW STRENGTH) 81 mg EC tablet, Take 1 tablet (81 mg total) by mouth daily, Disp: 30 tablet, Rfl: 0    atorvastatin (LIPITOR) 80 mg tablet, Take 1 tablet (80 mg total) by mouth daily with dinner, Disp: 30 tablet, Rfl: 0    clopidogrel (PLAVIX) 75 mg tablet, Take 1 tablet (75 mg total) by mouth daily, Disp: 30 tablet, Rfl: 0    losartan (COZAAR) 100 MG tablet, Take 1 tablet (100 mg total) by mouth daily, Disp: 30 tablet, Rfl: 0    metoprolol tartrate (LOPRESSOR) 25 mg tablet, Take 0 5 tablets (12 5 mg total) by mouth every 12 (twelve) hours, Disp: 30 tablet, Rfl: 0    omeprazole (PriLOSEC) 20 mg delayed release capsule, take 1 capsule by mouth once daily, Disp: 90 capsule, Rfl: 3    oxybutynin (DITROPAN) 5 mg tablet, Take 5 mg by mouth 2 (two) times a day , Disp: , Rfl: 0    tamsulosin (FLOMAX) 0 4 mg, take 2 capsules by mouth at bedtime (Patient taking differently: take 1 capsule in the am and one in the evening), Disp: 540 capsule, Rfl: 0    TIZANIDINE HCL PO, Take 4 mg by mouth as needed , Disp: , Rfl:     traMADol (ULTRAM) 50 mg tablet, Take 100 mg by mouth as needed , Disp: , Rfl: 0    zolpidem (AMBIEN) 10 mg tablet, Take 1 tablet (10 mg total) by mouth daily at bedtime as needed for sleep (Patient taking differently: Take 10 mg by mouth daily ), Disp: 90 tablet, Rfl: 0     Physical Exam:    Vitals:    07/23/19 0907   BP: 120/70   Pulse: 56   SpO2: 94%   Weight: 104 kg (229 lb 6 4 oz)   Height: 5' 9" (1 753 m)       Physical Exam Constitutional: He is oriented to person, place, and time  He appears well-developed and well-nourished  HENT:   Head: Normocephalic  Right Ear: External ear normal    Left Ear: External ear normal    Mouth/Throat: Oropharynx is clear and moist    Eyes: Pupils are equal, round, and reactive to light  Neck: No JVD present  Carotid bruit is not present  Cardiovascular: Normal rate, regular rhythm and intact distal pulses  Exam reveals no gallop and no friction rub  No murmur heard  Pulmonary/Chest: Effort normal and breath sounds normal  No tachypnea  No respiratory distress  He has no wheezes  He has no rales  He exhibits no tenderness  Abdominal: Soft  He exhibits no distension  There is no tenderness  There is no rebound and no guarding  Musculoskeletal: He exhibits no edema  Neurological: He is alert and oriented to person, place, and time  Skin: Skin is warm and dry  Psychiatric: He has a normal mood and affect  His behavior is normal  Judgment and thought content normal    Nursing note and vitals reviewed  Labs:not applicable    Assessment/Plan:    1  Coronary disease status post non-STEMI  He continues on the once a day beta-blocker, the losartan, the aspirin Plavix and statin  I would like to get fasting lipids and lipoprotein a before the next visit  We will speak on the phone about the results  We will see him back in 4-5 months  Thank you so much, please do not hesitate to contact me with any questions or concerns        Carmen Gar MD  7/23/2019  9:11 AM

## 2019-07-24 ENCOUNTER — CLINICAL SUPPORT (OUTPATIENT)
Dept: CARDIAC REHAB | Age: 69
End: 2019-07-24
Payer: MEDICARE

## 2019-07-24 DIAGNOSIS — Z95.5 STENTED CORONARY ARTERY: ICD-10-CM

## 2019-07-24 DIAGNOSIS — I21.4 NSTEMI (NON-ST ELEVATION MYOCARDIAL INFARCTION) (HCC): ICD-10-CM

## 2019-07-24 PROCEDURE — 93798 PHYS/QHP OP CAR RHAB W/ECG: CPT

## 2019-07-26 ENCOUNTER — CLINICAL SUPPORT (OUTPATIENT)
Dept: CARDIAC REHAB | Age: 69
End: 2019-07-26
Payer: MEDICARE

## 2019-07-26 DIAGNOSIS — I21.4 NSTEMI (NON-ST ELEVATION MYOCARDIAL INFARCTION) (HCC): ICD-10-CM

## 2019-07-26 DIAGNOSIS — Z95.5 STENTED CORONARY ARTERY: ICD-10-CM

## 2019-07-26 PROCEDURE — 93798 PHYS/QHP OP CAR RHAB W/ECG: CPT

## 2019-07-26 NOTE — PROGRESS NOTES
Cardiac Rehabilitation Plan of Care   30 day       Today's date: 2019   Visits: 13  Patient name: Renny Rojas      : 1950  Age: 80 y o  MRN: [id number]  Referring Physician: Silas Benson MD  Provider: Frank Kidd  Clinician: Bushra Matthews MS    Dx:   Encounter Diagnoses   Name Primary?  NSTEMI (non-ST elevation myocardial infarction) ([facility] [address] )     Stented coronary artery      Date of onset: 19      SUMMARY OF PROGRESS:  Kimberly Armendariz has completed 13 sessions at cardiac rehab  He exercises for 40-60 minutes at 4 1-5 0 METs  He reports no cardiac signs or symptoms  He continues to go to the gym 5-6 days/ week and spends about 2 hours there  He has a  who helps him  He wanted to push himself here at rehab so he knows what he is capable of doing on his own at the gym  He continues to follow a heart healthy diet, eating a lot of fruits and vegetables  He would like to make weight loss a significant focus here at rehab  His emotional well being has improved since his event and he has better family support recently  Kimberly Armendariz states he enjoys coming to rehab to be monitored so he can feel safe exercising on his own       Medication compliance: Yes   Comments:   Fall Risk: Low   Comments:     EKG changes: NSR, occ PACs      EXERCISE ASSESSMENT and PLAN    Current Exercise Program in Rehab:       Frequency: 3 days/week with additional days at the gym       Minutes: 40-60         METS: 4 1-5            HR: 105 - 130   RPE: 4-5         Modalities: Treadmill and Lifecycle      Exercise Progression 30 Day Goals :    Frequency: 3 days/week   with additional days at the gym    Minutes: 50-60   METS: 4 5-5 5   HR: 105-130    RPE: 4-6   Modalities: Treadmill, Airdyne bike, UBE, Lifecycle, Elliptical and Rower    Strength trainin-3 days / week  12-15 repetitions  1-2 sets per modality    Modalities: Chest Press, Pull Downs, Arm Extension and Arm Curl    Progressing:  Yes, Kimberly Armendariz continues to increase intensity/duration as tolerated  Home Exercise: Type: TM at the gym, Frequency: 5-6 days/week, Duration: 2 hours    Goals: Increase in peak CR METs by 40% and Resume ADLs with increased strength, trail biking  Education: Benefit of exercise for CAD risk factors, signs and sxs, RPE scale and class: Risk Factors for Heart Disease   Plan:education on home exercise guidelines and continue to exercise outside of CR  Readiness to change: Action      NUTRITION ASSESSMENT AND PLAN    Weight control:    Starting weight: 225   Current weight: 233  Waist circumference:    Startin 75   Current:    Diabetes: N/A  Lipid management: Discussed diet and lipid management and Last lipid profile 19  Chol 211  TRG 95  HDL 50    Goals:reduced BMI to < 25, LDL <100, CHOL <200, decreased body fat% <25%   , reduced waist circumference <40 inches, Improved Rate Your Plate score  >32 and 2 5-5%  wt loss  Education: heart healthy eating  hydration  wt  loss  portion control  Progressing: Yes, Amrit [place] continues to attend education classes and keeps his diet in focus  Plan: Education class: Reading Food Labels, Increase PUFA and MUFA, increase fruits/vegs and Reduce added sugars <25g/day  Readiness to change: Action      PSYCHOSOCIAL ASSESSMENT AND PLAN    Emotional: PHQ-9 = 3 = Minimal Depression  Self-reported stress level: 1   Social support: Good   Goals:  follow up with therapist, Social [email or web address] Score < 3, Pain in [place] Score < 3, Change in [facility] in [facility] Score < 3  and improved sleep  Education: signs/sxs of depression, benefits of positive support system, depression and CAD, class:  Stress and Your Health  and class:  Relaxation  Progressing: Yes, Kayy emotional well being has improved since the start of rehab     Plan: Practice relaxation techniques and regular visits with therapist  Readiness to change: Action      OTHER CORE COMPONENTS     Tobacco:   Social History     Tobacco Use Smoking Status Never Smoker   Smokeless Tobacco Never Used       Tobacco Use Intervention: Referral to tobacco expert:   N/A    Blood pressure:    Restin/78   Exercise: 182/80    Goals: consistent BP < 130/80 and reduced dietary sodium <2300mg  Education:  understanding HTN and CAD, low sodium diet and HTN and Education class: Understanding Heart Disease  Progressing: Yes, Kayy BP has greatly improved so far and continues to be educated at rehab     Plan: Class: Common Heart Medications  Readiness to change: Action

## 2019-07-29 ENCOUNTER — CLINICAL SUPPORT (OUTPATIENT)
Dept: CARDIAC REHAB | Age: 69
End: 2019-07-29
Payer: MEDICARE

## 2019-07-29 DIAGNOSIS — Z95.5 STENTED CORONARY ARTERY: ICD-10-CM

## 2019-07-29 DIAGNOSIS — I21.4 NSTEMI (NON-ST ELEVATION MYOCARDIAL INFARCTION) (HCC): ICD-10-CM

## 2019-07-29 PROCEDURE — 93798 PHYS/QHP OP CAR RHAB W/ECG: CPT

## 2019-07-31 ENCOUNTER — CLINICAL SUPPORT (OUTPATIENT)
Dept: CARDIAC REHAB | Age: 69
End: 2019-07-31
Payer: MEDICARE

## 2019-07-31 DIAGNOSIS — Z95.5 STENTED CORONARY ARTERY: ICD-10-CM

## 2019-07-31 DIAGNOSIS — I21.4 NSTEMI (NON-ST ELEVATION MYOCARDIAL INFARCTION) (HCC): ICD-10-CM

## 2019-07-31 PROCEDURE — 93798 PHYS/QHP OP CAR RHAB W/ECG: CPT

## 2019-08-02 ENCOUNTER — CLINICAL SUPPORT (OUTPATIENT)
Dept: CARDIAC REHAB | Age: 69
End: 2019-08-02
Payer: MEDICARE

## 2019-08-02 DIAGNOSIS — Z95.5 STENTED CORONARY ARTERY: ICD-10-CM

## 2019-08-02 DIAGNOSIS — I21.4 NSTEMI (NON-ST ELEVATION MYOCARDIAL INFARCTION) (HCC): ICD-10-CM

## 2019-08-02 PROCEDURE — 93798 PHYS/QHP OP CAR RHAB W/ECG: CPT

## 2019-08-05 ENCOUNTER — CLINICAL SUPPORT (OUTPATIENT)
Dept: CARDIAC REHAB | Age: 69
End: 2019-08-05
Payer: MEDICARE

## 2019-08-05 DIAGNOSIS — Z95.5 STENTED CORONARY ARTERY: ICD-10-CM

## 2019-08-05 DIAGNOSIS — I21.4 NSTEMI (NON-ST ELEVATION MYOCARDIAL INFARCTION) (HCC): ICD-10-CM

## 2019-08-05 PROCEDURE — 93798 PHYS/QHP OP CAR RHAB W/ECG: CPT

## 2019-08-06 DIAGNOSIS — M54.5 CHRONIC LOW BACK PAIN, UNSPECIFIED BACK PAIN LATERALITY, WITH SCIATICA PRESENCE UNSPECIFIED: Primary | ICD-10-CM

## 2019-08-06 DIAGNOSIS — G89.29 CHRONIC LOW BACK PAIN, UNSPECIFIED BACK PAIN LATERALITY, WITH SCIATICA PRESENCE UNSPECIFIED: Primary | ICD-10-CM

## 2019-08-06 DIAGNOSIS — G89.29 CHRONIC IDIOPATHIC ANAL PAIN: ICD-10-CM

## 2019-08-06 DIAGNOSIS — K62.89 CHRONIC IDIOPATHIC ANAL PAIN: ICD-10-CM

## 2019-08-06 DIAGNOSIS — F51.01 PRIMARY INSOMNIA: ICD-10-CM

## 2019-08-06 RX ORDER — ZOLPIDEM TARTRATE 10 MG/1
10 TABLET ORAL
Qty: 90 TABLET | Refills: 0 | Status: SHIPPED | OUTPATIENT
Start: 2019-08-06 | End: 2020-01-03

## 2019-08-07 ENCOUNTER — CLINICAL SUPPORT (OUTPATIENT)
Dept: CARDIAC REHAB | Age: 69
End: 2019-08-07
Payer: MEDICARE

## 2019-08-07 DIAGNOSIS — Z95.5 STENTED CORONARY ARTERY: ICD-10-CM

## 2019-08-07 DIAGNOSIS — I21.4 NSTEMI (NON-ST ELEVATION MYOCARDIAL INFARCTION) (HCC): ICD-10-CM

## 2019-08-07 PROCEDURE — 93798 PHYS/QHP OP CAR RHAB W/ECG: CPT

## 2019-08-09 ENCOUNTER — CLINICAL SUPPORT (OUTPATIENT)
Dept: CARDIAC REHAB | Age: 69
End: 2019-08-09
Payer: MEDICARE

## 2019-08-09 DIAGNOSIS — I21.4 NSTEMI (NON-ST ELEVATION MYOCARDIAL INFARCTION) (HCC): ICD-10-CM

## 2019-08-09 DIAGNOSIS — Z95.5 STENTED CORONARY ARTERY: ICD-10-CM

## 2019-08-09 PROCEDURE — 93798 PHYS/QHP OP CAR RHAB W/ECG: CPT

## 2019-08-11 DIAGNOSIS — I25.10 CAD (CORONARY ARTERY DISEASE): ICD-10-CM

## 2019-08-11 DIAGNOSIS — E78.5 HYPERLIPIDEMIA: ICD-10-CM

## 2019-08-12 ENCOUNTER — CLINICAL SUPPORT (OUTPATIENT)
Dept: CARDIAC REHAB | Age: 69
End: 2019-08-12
Payer: MEDICARE

## 2019-08-12 ENCOUNTER — EVALUATION (OUTPATIENT)
Dept: PHYSICAL THERAPY | Facility: MEDICAL CENTER | Age: 69
End: 2019-08-12
Payer: MEDICARE

## 2019-08-12 DIAGNOSIS — M54.50 CHRONIC BILATERAL LOW BACK PAIN WITHOUT SCIATICA: Primary | ICD-10-CM

## 2019-08-12 DIAGNOSIS — I25.10 CAD (CORONARY ARTERY DISEASE): ICD-10-CM

## 2019-08-12 DIAGNOSIS — G89.29 CHRONIC BILATERAL LOW BACK PAIN WITHOUT SCIATICA: Primary | ICD-10-CM

## 2019-08-12 DIAGNOSIS — Z95.5 STENTED CORONARY ARTERY: ICD-10-CM

## 2019-08-12 DIAGNOSIS — I21.4 NSTEMI (NON-ST ELEVATION MYOCARDIAL INFARCTION) (HCC): ICD-10-CM

## 2019-08-12 PROCEDURE — 97161 PT EVAL LOW COMPLEX 20 MIN: CPT | Performed by: PHYSICAL THERAPIST

## 2019-08-12 PROCEDURE — 93798 PHYS/QHP OP CAR RHAB W/ECG: CPT

## 2019-08-12 RX ORDER — ATORVASTATIN CALCIUM 80 MG/1
TABLET, FILM COATED ORAL
Qty: 90 TABLET | Refills: 3 | Status: SHIPPED | OUTPATIENT
Start: 2019-08-12 | End: 2020-05-04

## 2019-08-12 RX ORDER — CLOPIDOGREL BISULFATE 75 MG/1
75 TABLET ORAL DAILY
Qty: 90 TABLET | Refills: 3 | Status: SHIPPED | OUTPATIENT
Start: 2019-08-12 | End: 2020-09-04

## 2019-08-12 RX ORDER — CLOPIDOGREL BISULFATE 75 MG/1
TABLET ORAL
Qty: 30 TABLET | Refills: 0 | Status: SHIPPED | OUTPATIENT
Start: 2019-08-12 | End: 2019-08-12 | Stop reason: SDUPTHER

## 2019-08-12 NOTE — PROGRESS NOTES
PT Evaluation     Today's date: 2019  Patient name: Katy Kendrick  : 1950  MRN: [id number]  Referring provider: Aggie Mark Dr, MD  Dx:   Encounter Diagnosis     ICD-10-CM    1  Chronic bilateral low back pain without sciatica M54 5     G89 29                   Assessment/Plan    Subjective Evaluation    History of Present Illness  Mechanism of injury: Katy Kendrick is a 80 y o  Male presenting to therapy with recent flare up of chronic low back pain  He reports that he is continually active in gym and unfortunately suffered a MI that placed him in cardiac rehab and out of gym for a while  He is currently back to his normal routine and cleared from cardiac rehab standpoint  He reports that his back flared up one week ago after carrying up a bed in his house  Pain is familiar and located in bilateral lower back  He was in process of receiving rhizotomies prior to cardiac issues and is scheduled for his 3rd round this week      Pain  Current pain ratin  At best pain rating: 3  At worst pain rating: 10  Quality: dull ache and radiating          Objective   Red flag screen (-)  Lumbar AROM:  Moderate flexion limitation with pain, significant extension limitation with pain   Pain lateral to L3-L5 region with paraspinal muscular spasm and tenderness  Lower rib hypomobility bilaterally  Dermatomes/myotomes/reflexes WNL              Precautions: Recent MI       Manual              Prone MFR to paraspinals AF            Supine lumbopelvic rolling AF                                                       Exercise Diary                                                                                                                                                                                                                                                                                      Modalities

## 2019-08-14 ENCOUNTER — CLINICAL SUPPORT (OUTPATIENT)
Dept: CARDIAC REHAB | Age: 69
End: 2019-08-14
Payer: MEDICARE

## 2019-08-14 DIAGNOSIS — Z95.5 STENTED CORONARY ARTERY: ICD-10-CM

## 2019-08-14 DIAGNOSIS — I21.4 NSTEMI (NON-ST ELEVATION MYOCARDIAL INFARCTION) (HCC): ICD-10-CM

## 2019-08-14 PROCEDURE — 93798 PHYS/QHP OP CAR RHAB W/ECG: CPT

## 2019-08-19 ENCOUNTER — CLINICAL SUPPORT (OUTPATIENT)
Dept: CARDIAC REHAB | Age: 69
End: 2019-08-19
Payer: MEDICARE

## 2019-08-19 DIAGNOSIS — I21.4 NSTEMI (NON-ST ELEVATION MYOCARDIAL INFARCTION) (HCC): ICD-10-CM

## 2019-08-19 DIAGNOSIS — Z95.5 STENTED CORONARY ARTERY: ICD-10-CM

## 2019-08-19 PROCEDURE — 93798 PHYS/QHP OP CAR RHAB W/ECG: CPT

## 2019-08-21 ENCOUNTER — CLINICAL SUPPORT (OUTPATIENT)
Dept: CARDIAC REHAB | Age: 69
End: 2019-08-21
Payer: MEDICARE

## 2019-08-21 DIAGNOSIS — Z95.5 STENTED CORONARY ARTERY: ICD-10-CM

## 2019-08-21 DIAGNOSIS — I25.10 CORONARY ARTERY DISEASE INVOLVING NATIVE HEART WITHOUT ANGINA PECTORIS, UNSPECIFIED VESSEL OR LESION TYPE: Primary | ICD-10-CM

## 2019-08-21 DIAGNOSIS — I21.4 NSTEMI (NON-ST ELEVATION MYOCARDIAL INFARCTION) (HCC): ICD-10-CM

## 2019-08-21 PROCEDURE — 93798 PHYS/QHP OP CAR RHAB W/ECG: CPT

## 2019-08-21 NOTE — PROGRESS NOTES
Cardiac Rehabilitation Plan of Care   60 day      Today's date: 2019   Visits: 23  Patient name: Alejandrina Kaplan      : 1950  Age: 80 y o  MRN: [id number]  Referring Physician: Brett Nair MD  Provider: [facility]  Clinician: Jason Soriano RN    Dx:   Encounter Diagnoses   Name Primary?  NSTEMI (non-ST elevation myocardial infarction) ([address] )     Stented coronary artery      Date of onset: 19      SUMMARY OF PROGRESS:  Paty Gentile completes 40-50 mins of exercise at 4 1-4 9 METs and tolerates well  Denies any cardiac symptoms  His resting HR 49-62 with exercise HR ranging   Resting //74 with appropriate increase during exercise  NSR with occasional PVC on telemetry  He continues to go to the gym 5-6 days/week  He continues to follow a low sodium diet, no red meat  Reviewed heart healthy diet  Has moderate stress at home d/t his mother's health  States he has had trouble sleeping and this past week has used cannabis gels each night and has had a full nights sleep  Denies depression  Medication compliance: Yes   Comments:   Fall Risk: Low   Comments:     EKG changes: NSR, occ PVCs      EXERCISE ASSESSMENT and PLAN    Current Exercise Program in Rehab:       Frequency: 3 days/week with additional days at the gym       Minutes: 40-60         METS: 4 1-5            HR: 90 - 130   RPE: 4-5         Modalities: Treadmill and Lifecycle      Exercise Progression 30 Day Goals :    Frequency: 3 days/week   with additional days at the gym    Minutes: 50-60   METS: 4 5-5 5   HR: 105-130    RPE: 4-6   Modalities: Treadmill, Airdyne bike, UBE, Lifecycle, Elliptical and Rower    Strength training:  No strength training    Paty Gentile has been weight training at his gym    Progressing:  Yes, Paty Gentile continues to increase intensity/duration as tolerated, progressed to 1 min walk/jog(4 2 speed/1 incline) intervals    Home Exercise: Type: TM at the gym, Frequency: 5-6 days/week, Duration: 2 hours    Goals: Increase in peak CR METs by 40% and Resume ADLs with increased strength, trail biking  Education: Benefit of exercise for CAD risk factors, signs and sxs, RPE scale and class: Risk Factors for Heart Disease   Plan:education on home exercise guidelines and continue to exercise outside of CR  Readiness to change: Action      NUTRITION ASSESSMENT AND PLAN    Weight control:    Starting weight: 225   Current weight: 230  Waist circumference:    Startin 75   Current:    Diabetes: N/A  Lipid management: Discussed diet and lipid management and Last lipid profile 19  Chol 211  TRG 95  HDL 50    Goals:reduced BMI to < 25, LDL <100, CHOL <200, decreased body fat% <25%   , reduced waist circumference <40 inches, Improved Rate Your Plate score  >50 and 2 5-5%  wt loss  Education: heart healthy eating  hydration  wt  loss  portion control  Class:  Reading Food Labels  Class:  Healthy Food Choices  Progressing: Yes, Elise Mcleod continues to attend education classes and keeps his diet in focus  Plan: Increase PUFA and MUFA, increase fruits/vegs and Reduce added sugars <25g/day  Readiness to change: Action      PSYCHOSOCIAL ASSESSMENT AND PLAN    Emotional: Patient reports he is coping well with good social support and denies depression or anxiety  Self-reported stress level: 6   Social support: Good   Goals:  follow up with therapist, Social Support in [place] Score < 3, Pain in [place] Score < 3, Change in Health in [place] Score < 3  and improved sleep  Education: signs/sxs of depression, benefits of positive support system, depression and CAD, class:  Stress and Your Health  and class:  Relaxation  Progressing: Yes, [place] emotional well being has improved since the start of rehab   Stress levels up d/t his mother's health and his son  Plan: Practice relaxation techniques and regular visits with therapist  Readiness to change: Action      OTHER CORE COMPONENTS     Tobacco:   Social History Tobacco Use   Smoking Status Never Smoker   Smokeless Tobacco Never Used       Tobacco Use Intervention: Referral to tobacco expert:   N/A    Blood pressure:    Restin/80, 122/72   Exercise: 164/70    Goals: consistent BP < 130/80 and reduced dietary sodium <2300mg  Education:  understanding HTN and CAD, low sodium diet and HTN and Education class: Understanding Heart Disease  Progressing: Yes, no added salt, follows low sodium diet   Plan: compliant with medications, continue low sodium diet  Readiness to change: Action

## 2019-08-23 ENCOUNTER — CLINICAL SUPPORT (OUTPATIENT)
Dept: CARDIAC REHAB | Age: 69
End: 2019-08-23
Payer: MEDICARE

## 2019-08-23 DIAGNOSIS — Z95.5 STENTED CORONARY ARTERY: ICD-10-CM

## 2019-08-23 DIAGNOSIS — I21.4 NSTEMI (NON-ST ELEVATION MYOCARDIAL INFARCTION) (HCC): ICD-10-CM

## 2019-08-23 PROCEDURE — 93798 PHYS/QHP OP CAR RHAB W/ECG: CPT

## 2019-08-26 ENCOUNTER — CLINICAL SUPPORT (OUTPATIENT)
Dept: CARDIAC REHAB | Age: 69
End: 2019-08-26
Payer: MEDICARE

## 2019-08-26 DIAGNOSIS — I21.4 NSTEMI (NON-ST ELEVATION MYOCARDIAL INFARCTION) (HCC): ICD-10-CM

## 2019-08-26 DIAGNOSIS — Z95.5 STENTED CORONARY ARTERY: ICD-10-CM

## 2019-08-26 PROCEDURE — 93798 PHYS/QHP OP CAR RHAB W/ECG: CPT

## 2019-08-28 ENCOUNTER — APPOINTMENT (OUTPATIENT)
Dept: CARDIAC REHAB | Age: 69
End: 2019-08-28
Payer: MEDICARE

## 2019-09-04 ENCOUNTER — CLINICAL SUPPORT (OUTPATIENT)
Dept: CARDIAC REHAB | Age: 69
End: 2019-09-04
Payer: MEDICARE

## 2019-09-04 ENCOUNTER — APPOINTMENT (OUTPATIENT)
Dept: CARDIAC REHAB | Age: 69
End: 2019-09-04
Payer: MEDICARE

## 2019-09-04 DIAGNOSIS — R35.1 NOCTURIA: ICD-10-CM

## 2019-09-04 DIAGNOSIS — I21.4 NSTEMI (NON-ST ELEVATION MYOCARDIAL INFARCTION) (HCC): ICD-10-CM

## 2019-09-04 DIAGNOSIS — Z95.5 STENTED CORONARY ARTERY: ICD-10-CM

## 2019-09-04 PROCEDURE — 93798 PHYS/QHP OP CAR RHAB W/ECG: CPT

## 2019-09-06 ENCOUNTER — CLINICAL SUPPORT (OUTPATIENT)
Dept: CARDIAC REHAB | Age: 69
End: 2019-09-06
Payer: MEDICARE

## 2019-09-06 DIAGNOSIS — I21.4 NSTEMI (NON-ST ELEVATED MYOCARDIAL INFARCTION) (HCC): ICD-10-CM

## 2019-09-06 PROCEDURE — 93798 PHYS/QHP OP CAR RHAB W/ECG: CPT

## 2019-09-06 RX ORDER — OXYBUTYNIN CHLORIDE 5 MG/1
TABLET ORAL
Qty: 60 TABLET | Refills: 3 | Status: SHIPPED | OUTPATIENT
Start: 2019-09-06 | End: 2019-10-08 | Stop reason: SDUPTHER

## 2019-09-09 ENCOUNTER — APPOINTMENT (OUTPATIENT)
Dept: CARDIAC REHAB | Age: 69
End: 2019-09-09
Payer: MEDICARE

## 2019-09-11 ENCOUNTER — CLINICAL SUPPORT (OUTPATIENT)
Dept: CARDIAC REHAB | Age: 69
End: 2019-09-11
Payer: MEDICARE

## 2019-09-11 DIAGNOSIS — Z95.5 STENTED CORONARY ARTERY: ICD-10-CM

## 2019-09-11 DIAGNOSIS — I21.4 NSTEMI (NON-ST ELEVATION MYOCARDIAL INFARCTION) (HCC): ICD-10-CM

## 2019-09-11 PROCEDURE — 93798 PHYS/QHP OP CAR RHAB W/ECG: CPT

## 2019-09-13 ENCOUNTER — CLINICAL SUPPORT (OUTPATIENT)
Dept: CARDIAC REHAB | Age: 69
End: 2019-09-13
Payer: MEDICARE

## 2019-09-13 DIAGNOSIS — Z95.5 STENTED CORONARY ARTERY: ICD-10-CM

## 2019-09-13 DIAGNOSIS — I21.4 NSTEMI (NON-ST ELEVATION MYOCARDIAL INFARCTION) (HCC): ICD-10-CM

## 2019-09-13 PROCEDURE — 93798 PHYS/QHP OP CAR RHAB W/ECG: CPT

## 2019-09-16 ENCOUNTER — CLINICAL SUPPORT (OUTPATIENT)
Dept: CARDIAC REHAB | Age: 69
End: 2019-09-16
Payer: MEDICARE

## 2019-09-16 DIAGNOSIS — I21.4 NSTEMI (NON-ST ELEVATION MYOCARDIAL INFARCTION) (HCC): ICD-10-CM

## 2019-09-16 DIAGNOSIS — Z95.5 STENTED CORONARY ARTERY: ICD-10-CM

## 2019-09-16 PROCEDURE — 93798 PHYS/QHP OP CAR RHAB W/ECG: CPT

## 2019-09-18 ENCOUNTER — CLINICAL SUPPORT (OUTPATIENT)
Dept: CARDIAC REHAB | Age: 69
End: 2019-09-18
Payer: MEDICARE

## 2019-09-18 DIAGNOSIS — I21.4 NSTEMI (NON-ST ELEVATION MYOCARDIAL INFARCTION) (HCC): ICD-10-CM

## 2019-09-18 DIAGNOSIS — Z95.5 STENTED CORONARY ARTERY: ICD-10-CM

## 2019-09-18 DIAGNOSIS — I10 ESSENTIAL HYPERTENSION: ICD-10-CM

## 2019-09-18 PROCEDURE — 93798 PHYS/QHP OP CAR RHAB W/ECG: CPT

## 2019-09-18 RX ORDER — LOSARTAN POTASSIUM 100 MG/1
TABLET ORAL
Qty: 90 TABLET | Refills: 1 | Status: SHIPPED | OUTPATIENT
Start: 2019-09-18 | End: 2020-03-27 | Stop reason: SDUPTHER

## 2019-09-20 ENCOUNTER — CLINICAL SUPPORT (OUTPATIENT)
Dept: CARDIAC REHAB | Age: 69
End: 2019-09-20
Payer: MEDICARE

## 2019-09-20 DIAGNOSIS — I21.4 NSTEMI (NON-ST ELEVATION MYOCARDIAL INFARCTION) (HCC): ICD-10-CM

## 2019-09-20 DIAGNOSIS — Z95.5 STENTED CORONARY ARTERY: ICD-10-CM

## 2019-09-20 PROCEDURE — 93798 PHYS/QHP OP CAR RHAB W/ECG: CPT

## 2019-09-20 NOTE — PROGRESS NOTES
Cardiac Rehabilitation Plan of Care   90 day       Today's date: 2019   Visits: 32  Patient name: Brandi Estrella      : 1950  Age: 80 y o  MRN: [id number]  Referring Physician: Raymundo Lane MD  Provider: Elbert Razo  Clinician: Kael Castillo MS, CCRP    Dx:   Encounter Diagnoses   Name Primary?  NSTEMI (non-ST elevation myocardial infarction) ([address] )     Stented coronary artery      Date of onset: 19      SUMMARY OF PROGRESS:  Elise Mcleod continues to do very well  He has remained free of cardiac complaints  He is has been increasing exercise intensity and is performing HIIT intervals on the elliptical - 20 secs of "jogging", 1 min "walking"  Which he tolerates x 45 mins  HR reaching 135 and /90 at 5-6 METs  Resting /82 - 146/72  NSR w/ BBB, no ectopy  He continues his regular cardio and strength training workouts with his  at [employer] [place]  I encouraged him to take at least one day of rest from exercise  He has been gaining wt despite his focus on more of a plant based diet eating primarily fish for protein  He admits to 2 hard liquor drinks/night as well as snacking on caramel corn  I reminded him of the effects of sugar to not only his waist/body wt but to his lipid profile and encouraged him to reduce drink intake to 1/night and consider switching to dry red wine for reduced sugars  He has 4 more monitored exercise sessions  A d/c note will follow with final outcomes          Medication compliance: Yes   Comments:   Fall Risk: Low   Comments:     EKG changes: NSR      EXERCISE ASSESSMENT and PLAN    Current Exercise Program in Rehab:       Frequency: 3 days/week with additional days at the gym       Minutes: 40-60         METS: 4 1-5            HR: 90 - 135   RPE: 4-5         Modalities: Treadmill and Lifecycle      Exercise Progression 30 Day Goals :    Frequency: 3 days/week   with additional days at the gym    Minutes: 50-60   METS: 4 5-5 5   HR: 105-135    RPE: 4-6   Modalities: Treadmill, Airdyne bike, UBE, Lifecycle, Elliptical and Rower    Strength training:  No strength training  Nova Doctor has been weight training at his gym    Progressing:  Yes, [facility] continues to increase intensity/duration as tolerated, progressed to HIIT on the elliptical 20 sec "jog" 1 min "walk"    Home Exercise: Type: TM at the gym, Frequency: 5-6 days/week, Duration: 2 hours    Goals: Increase in peak CR METs by 40% and Resume ADLs with increased strength, trail biking  Education: Benefit of exercise for CAD risk factors, signs and sxs, RPE scale and class: Risk Factors for Heart Disease   Plan:education on home exercise guidelines and continue to exercise outside of CR  Readiness to change: Action      NUTRITION ASSESSMENT AND PLAN    Weight control:    Starting weight: 225   Current weight: 235  Waist circumference:    Startin 75   Current:    Diabetes: N/A  Lipid management: Discussed diet and lipid management and Last lipid profile 19  Chol 211  TRG 95  HDL 50    Goals:reduced BMI to < 25, LDL <100, CHOL <200, decreased body fat% <25%   , reduced waist circumference <40 inches, Improved Rate Your Plate score  >32 and 2 5-5%  wt loss  Education: heart healthy eating  hydration  wt  loss  portion control  Class:  Reading Food Labels  Class:  Healthy Food Choices  Progressing: Yes, Nova Doctor continues to attend education classes and keeps his diet in focus    Plan: Increase PUFA and MUFA, increase fruits/vegs and Reduce added sugars <25g/day, eliminate hard liquor and caramel corn snack   Readiness to change: Action      PSYCHOSOCIAL ASSESSMENT AND PLAN    Emotional: Patient reports he is coping well with good social support and denies depression or anxiety  Self-reported stress level: 6   Social support: Good   Goals:  follow up with therapist, Social Support in [place] Score < 3, Pain in [place] Score < 3, Change in Health in [place] Score < 3  and improved sleep  Education: signs/sxs of depression, benefits of positive support system, depression and CAD, class:  Stress and Your Health  and class:  Relaxation  Progressing: Yes, Delgado's emotional well being has improved since the start of rehab   Stress levels up d/t his mother's health and his son  Plan: Practice relaxation techniques and regular visits with therapist  Readiness to change: Action      OTHER CORE COMPONENTS     Tobacco:   Social History     Tobacco Use   Smoking Status Never Smoker   Smokeless Tobacco Never Used       Tobacco Use Intervention: Referral to tobacco expert:   N/A    Blood pressure:    Restin/82-  146/72   Exercise: 210/90    Goals: consistent BP < 130/80 and reduced dietary sodium <2300mg  Education:  understanding HTN and CAD, low sodium diet and HTN and Education class: Understanding Heart Disease  Progressing: Yes, no added salt, follows low sodium diet   Plan: compliant with medications, continue low sodium diet  Readiness to change: Action

## 2019-09-23 ENCOUNTER — CLINICAL SUPPORT (OUTPATIENT)
Dept: CARDIAC REHAB | Age: 69
End: 2019-09-23
Payer: MEDICARE

## 2019-09-23 DIAGNOSIS — I21.4 NSTEMI (NON-ST ELEVATION MYOCARDIAL INFARCTION) (HCC): ICD-10-CM

## 2019-09-23 DIAGNOSIS — Z95.5 STENTED CORONARY ARTERY: ICD-10-CM

## 2019-09-23 PROCEDURE — 93798 PHYS/QHP OP CAR RHAB W/ECG: CPT

## 2019-09-25 ENCOUNTER — CLINICAL SUPPORT (OUTPATIENT)
Dept: CARDIAC REHAB | Age: 69
End: 2019-09-25
Payer: MEDICARE

## 2019-09-25 DIAGNOSIS — I21.4 NSTEMI (NON-ST ELEVATED MYOCARDIAL INFARCTION) (HCC): ICD-10-CM

## 2019-09-25 PROCEDURE — 93798 PHYS/QHP OP CAR RHAB W/ECG: CPT

## 2019-09-27 ENCOUNTER — CLINICAL SUPPORT (OUTPATIENT)
Dept: CARDIAC REHAB | Age: 69
End: 2019-09-27
Payer: MEDICARE

## 2019-09-27 DIAGNOSIS — Z95.5 STENTED CORONARY ARTERY: ICD-10-CM

## 2019-09-27 DIAGNOSIS — I21.4 NSTEMI (NON-ST ELEVATION MYOCARDIAL INFARCTION) (HCC): ICD-10-CM

## 2019-09-27 PROCEDURE — 93798 PHYS/QHP OP CAR RHAB W/ECG: CPT

## 2019-09-30 ENCOUNTER — CLINICAL SUPPORT (OUTPATIENT)
Dept: CARDIAC REHAB | Age: 69
End: 2019-09-30
Payer: MEDICARE

## 2019-09-30 DIAGNOSIS — I21.4 NSTEMI (NON-ST ELEVATION MYOCARDIAL INFARCTION) (HCC): ICD-10-CM

## 2019-09-30 DIAGNOSIS — Z95.5 STENTED CORONARY ARTERY: ICD-10-CM

## 2019-09-30 PROCEDURE — 93798 PHYS/QHP OP CAR RHAB W/ECG: CPT

## 2019-09-30 NOTE — PROGRESS NOTES
Cardiac Rehabilitation Plan of Care   discharge      Today's date: 2019   Visits: 36  Patient name: Bernarda Weston      : 1950  Age: 80 y o  MRN: [id number]  Referring Physician: Luis Alberto Stevenson MD  Provider: Jeannine Rea  Clinician: Arminda Walsh MS, CCRP    Dx:   Encounter Diagnoses   Name Primary?  NSTEMI (non-ST elevation myocardial infarction) ([facility] [address] )     Stented coronary artery      Date of onset: 19      SUMMARY OF PROGRESS:  This is a final note for [employer]  He had a 29% improvement in his functional capacity based on  the submaximal TM ETT meeting the test termination criteria of RHR +30 stage at 1:30 of stage V (7 5 METs)  His exercise capacity increased by 29% increasing his max METs achieved in cardiac rehab from 4 1 to 5 3  He had a 10lb wt gain but lost 2 inches on his waist   His Rate Your Plate score increased by 12% and he states he has been focusing on a more plant based diet however, he admits that he eats too much sugar and has nightly cocktails  He attended our educational lectures on heart healthy eating and risk factor reduction  Most recent lipid profile 2019  He completes 40 mins at 5 3 METs and has been tolerating HIIT on the elliptical with short bursts of "jogging"  Resting /70 with normal hemodynamic response to exercise reaching 172/84 - 210/84  NSR on tele with occ PVCs  Exercise -135  He has been supplementing his cardiac rehab with wt training and cardio at [address]  He reports he has lost his fear of resuming a normal workout  We had a lengthy discussion regarding his dietary habits  He is determined to loose 20 lbs   He will continue at [address] 150-200 mins/wk      Medication compliance: Yes   Comments:   Fall Risk: Low   Comments:     EKG changes: NSR, occ PVCs      EXERCISE ASSESSMENT and PLAN    Current Exercise Program in Rehab:       Frequency: 3 days/week with additional days at the gym       Minutes: 40-60         METS: 4 1-5 3           HR: 110-135   RPE: 4-5         Modalities: Treadmill, Lifecycle and Elliptical      Exercise  Goals :    Frequency: 3 days/week   with additional days at the gym    Minutes: 50-60   METS: 4 5-6 0   HR: 105-130    RPE: 4-6   Modalities: Treadmill, Lifecycle and Elliptical    Strength training:  No strength training  Ramana Arora has been weight training at his gym    Progressing:  Goals met:  Increase in peak CR METs by 29%, increased functional capacity by 29%, and Resume ADLs with increased strength, trail biking      Home Exercise: Type: elliptical at the gym, Frequency: 5-6 days/week, Duration: 2 hours    Goals: Education: Benefit of exercise for CAD risk factors, signs and sxs, RPE scale and class: Risk Factors for Heart Disease , education on home exercise guidelines  Plan: [facility]  [employer]  Readiness to change: Maintenance      NUTRITION ASSESSMENT AND PLAN    Weight control:    Starting weight: 225   Current weight: 235  Waist circumference:    Startin 75   Current:  41  Diabetes: N/A  Lipid management: Discussed diet and lipid management and Last lipid profile 19  Chol 211  TRG 95  HDL 50    Goals:reduced BMI to < 25, LDL <100, CHOL <200, decreased body fat% <25%   , reduced waist circumference <40 inches,and 2 5-5%  wt loss  Education: heart healthy eating  hydration  wt  loss  portion control  Class:  Reading Food Labels  Class:  Healthy Food Choices  Progressing: goals met:  Improved Rate Your Plate score  >28, goals not met: reduced BMI to < 25, LDL <100, CHOL <200, decreased body fat% <25%   , reduced waist circumference <40 inches,and 2 5-5%  wt loss  Plan: Increase PUFA and MUFA, increase fruits/vegs and Reduce added sugars <25g/day  Readiness to change: Action      PSYCHOSOCIAL ASSESSMENT AND PLAN    Emotional: PHQ-9 = 3 - mild depression   Patient reports he is coping well with good social support and denies depression or anxiety  Self-reported stress level: 6   Social support: Good   Goals:  follow up with therapist, and improved sleep  Education: signs/sxs of depression, benefits of positive support system, depression and CAD, class:  Stress and Your Health  and class:  Relaxation  Progressing: Yes, Kayy emotional well being has improved since the start of rehab   Stress levels up d/t his mother's health and his son, goals met: Social Support in [facility] Score < 3, Pain in [facility] Score < 3, Change in Health in [facility] Score < 3    Plan: Practice relaxation techniques and regular visits with therapist  Readiness to change: Action      OTHER CORE COMPONENTS     Tobacco:   Social History     Tobacco Use   Smoking Status Never Smoker   Smokeless Tobacco Never Used       Tobacco Use Intervention: Referral to tobacco expert:   N/A    Blood pressure:    Restin/70   Exercise: 174/84 - 210/80    Goals: consistent BP < 130/80 and reduced dietary sodium <2300mg  Education:  understanding HTN and CAD, low sodium diet and HTN and Education class: Understanding Heart Disease  Progressing: goals met: reduced dietary sodium <2300mg, goals not met: consistent BP < 130/80  Plan: compliant with medications, continue low sodium diet, avoid processed foods  Readiness to change: Action

## 2019-10-08 ENCOUNTER — OFFICE VISIT (OUTPATIENT)
Dept: INTERNAL MEDICINE CLINIC | Facility: CLINIC | Age: 69
End: 2019-10-08
Payer: MEDICARE

## 2019-10-08 VITALS — BODY MASS INDEX: 34.8 KG/M2 | OXYGEN SATURATION: 94 % | HEIGHT: 69 IN | HEART RATE: 53 BPM | WEIGHT: 235 LBS

## 2019-10-08 DIAGNOSIS — Z23 NEED FOR VACCINATION WITH 13-POLYVALENT PNEUMOCOCCAL CONJUGATE VACCINE: ICD-10-CM

## 2019-10-08 DIAGNOSIS — F51.01 PRIMARY INSOMNIA: ICD-10-CM

## 2019-10-08 DIAGNOSIS — Z23 FLU VACCINE NEED: Primary | ICD-10-CM

## 2019-10-08 PROCEDURE — G0439 PPPS, SUBSEQ VISIT: HCPCS | Performed by: INTERNAL MEDICINE

## 2019-10-08 PROCEDURE — 90662 IIV NO PRSV INCREASED AG IM: CPT | Performed by: INTERNAL MEDICINE

## 2019-10-08 PROCEDURE — G0008 ADMIN INFLUENZA VIRUS VAC: HCPCS | Performed by: INTERNAL MEDICINE

## 2019-10-08 PROCEDURE — 90670 PCV13 VACCINE IM: CPT | Performed by: INTERNAL MEDICINE

## 2019-10-08 PROCEDURE — G0009 ADMIN PNEUMOCOCCAL VACCINE: HCPCS | Performed by: INTERNAL MEDICINE

## 2019-10-08 RX ORDER — BETAMETHASONE DIPROPIONATE 0.5 MG/G
CREAM TOPICAL
Refills: 0 | COMMUNITY
Start: 2019-09-26

## 2019-10-08 RX ORDER — CALCIPOTRIENE 50 UG/G
CREAM TOPICAL
Refills: 0 | COMMUNITY
Start: 2019-08-13

## 2019-10-08 NOTE — PROGRESS NOTES
Assessment/Plan:     Diagnoses and all orders for this visit:    Flu vaccine need  -     influenza vaccine, 9210-1548, high-dose, PF 0 5 mL (FLUZONE HIGH-DOSE)    Other orders  -     betamethasone, augmented, (DIPROLENE-AF) 0 05 % cream; apply twice daily to rash x 2 weeks  then Saturday and Sunday only  -     calcipotriene (DOVONEX) 0 005 % cream; apply to affected area twice a day Jaron          Subjective:      Patient ID: Jose Martin Fam is a 80 y o  male  HPI  [place] is here today for visit he wound up the in the hospital in June and had an angioplasty and stent placed  He continues under the care of Dr Ama Maceknzie his dermatologist for groin rash  He has trouble sleeping he has a CPAP machine that he uses but he has not seen his sleep specialist in many years he has a couple of cocktails in the evening I wonder if this interferes with his sleep is requesting a sleep the occasion he has not had Prevnar he is up-to-date with colonoscopy he works out in [employer] on a regular basis    The following portions of the patient's history were reviewed and updated as appropriate: allergies, current medications, past family history, past medical history, past social history, past surgical history and problem list     Review of Systems      Objective:    BP  Pulse (!) 53   Ht 5' 9" (1 753 m)   Wt 107 kg (235 lb)   SpO2 94%   BMI 34 70 kg/m²          Physical Exam   appear reminiscent of psoriasis for flat red in and not particularly raised and does not is well in no distress his pressure was a he is a Pravin I did exam the rash which showed looks    Let me try this again  The dictating system again has destroyed my dictationI Sp appears well in no distress he is quite animated P   His blood pressure was 128/80 the heart lungs and extremities unremarkable I did examine his groin rash and he has what appears to be mainly intertrigo and penile irritation it is red dry flat and does not appear psoriatic  We are going to give him some trazodone 50 mg for sleep at night I would like him to see 1 of the sleep specialist for an evaluation his CPAP mask seems to be working okay but he obviously has a major sleep disturbance  Will also repeat his urinalysis he had a trace of blood in the last urine he does see Dr Karmen Yip get in touch with Dr Tegan Lozano in this regard    He is up-to-date with colonoscopy he has not had which will take care of today no other changes are made in his medications

## 2019-10-09 RX ORDER — TRAZODONE HYDROCHLORIDE 50 MG/1
50 TABLET ORAL
Qty: 30 TABLET | Refills: 3 | Status: SHIPPED | OUTPATIENT
Start: 2019-10-09 | End: 2019-12-30 | Stop reason: SDUPTHER

## 2019-12-01 DIAGNOSIS — K21.9 GASTROESOPHAGEAL REFLUX DISEASE, ESOPHAGITIS PRESENCE NOT SPECIFIED: ICD-10-CM

## 2019-12-01 RX ORDER — OMEPRAZOLE 20 MG/1
CAPSULE, DELAYED RELEASE ORAL
Qty: 90 CAPSULE | Refills: 3 | Status: SHIPPED | OUTPATIENT
Start: 2019-12-01 | End: 2021-01-04 | Stop reason: SDUPTHER

## 2019-12-30 DIAGNOSIS — F51.01 PRIMARY INSOMNIA: ICD-10-CM

## 2019-12-30 RX ORDER — TRAZODONE HYDROCHLORIDE 50 MG/1
50 TABLET ORAL
Qty: 90 TABLET | Refills: 1 | Status: SHIPPED | OUTPATIENT
Start: 2019-12-30 | End: 2020-06-24

## 2019-12-31 DIAGNOSIS — R35.1 NOCTURIA: Primary | ICD-10-CM

## 2019-12-31 RX ORDER — OXYBUTYNIN CHLORIDE 5 MG/1
TABLET ORAL
Qty: 60 TABLET | Refills: 0 | Status: SHIPPED | OUTPATIENT
Start: 2019-12-31 | End: 2020-02-11

## 2020-01-03 ENCOUNTER — OFFICE VISIT (OUTPATIENT)
Dept: CARDIOLOGY CLINIC | Facility: CLINIC | Age: 70
End: 2020-01-03
Payer: MEDICARE

## 2020-01-03 ENCOUNTER — APPOINTMENT (OUTPATIENT)
Dept: LAB | Facility: MEDICAL CENTER | Age: 70
End: 2020-01-03
Payer: MEDICARE

## 2020-01-03 VITALS
BODY MASS INDEX: 35.84 KG/M2 | HEART RATE: 49 BPM | HEIGHT: 69 IN | DIASTOLIC BLOOD PRESSURE: 78 MMHG | WEIGHT: 242 LBS | SYSTOLIC BLOOD PRESSURE: 138 MMHG

## 2020-01-03 DIAGNOSIS — I25.10 CORONARY ARTERY DISEASE INVOLVING NATIVE CORONARY ARTERY OF NATIVE HEART WITHOUT ANGINA PECTORIS: Primary | ICD-10-CM

## 2020-01-03 DIAGNOSIS — I45.2 BIFASCICULAR BLOCK: ICD-10-CM

## 2020-01-03 DIAGNOSIS — E78.5 DYSLIPIDEMIA: ICD-10-CM

## 2020-01-03 DIAGNOSIS — I10 ESSENTIAL HYPERTENSION: ICD-10-CM

## 2020-01-03 DIAGNOSIS — I25.10 CAD (CORONARY ARTERY DISEASE): ICD-10-CM

## 2020-01-03 LAB
CHOLEST SERPL-MCNC: 112 MG/DL (ref 50–200)
CRP SERPL HS-MCNC: <0.9 MG/L
HDLC SERPL-MCNC: 53 MG/DL
LDLC SERPL CALC-MCNC: 50 MG/DL (ref 0–100)
NONHDLC SERPL-MCNC: 59 MG/DL
TRIGL SERPL-MCNC: 43 MG/DL

## 2020-01-03 PROCEDURE — 36415 COLL VENOUS BLD VENIPUNCTURE: CPT | Performed by: INTERNAL MEDICINE

## 2020-01-03 PROCEDURE — 99214 OFFICE O/P EST MOD 30 MIN: CPT | Performed by: INTERNAL MEDICINE

## 2020-01-03 PROCEDURE — 80061 LIPID PANEL: CPT | Performed by: INTERNAL MEDICINE

## 2020-01-03 PROCEDURE — 83695 ASSAY OF LIPOPROTEIN(A): CPT | Performed by: INTERNAL MEDICINE

## 2020-01-03 PROCEDURE — 86141 C-REACTIVE PROTEIN HS: CPT | Performed by: INTERNAL MEDICINE

## 2020-01-03 RX ORDER — METOPROLOL SUCCINATE 25 MG/1
12.5 TABLET, EXTENDED RELEASE ORAL DAILY
COMMUNITY
End: 2020-01-16 | Stop reason: SDUPTHER

## 2020-01-03 RX ORDER — METOPROLOL SUCCINATE 25 MG/1
12.5 TABLET, EXTENDED RELEASE ORAL DAILY
Qty: 45 TABLET | Refills: 3 | Status: SHIPPED | OUTPATIENT
Start: 2020-01-03 | End: 2020-01-03

## 2020-01-03 NOTE — PROGRESS NOTES
Cardiology Follow Up    Sara Torres  1950  [id number]  [facility]  [address] [address]  [address] [facility]  [phone]    1  Coronary artery disease involving native coronary artery of native heart without angina pectoris  DISCONTINUED: metoprolol succinate (TOPROL-XL) 25 mg 24 hr tablet   2  Dyslipidemia     3  Essential hypertension     4  Bifascicular block     5  CAD (coronary artery disease)         Discussion/Summary:    1  CAD: prior NSTEMI 6/2019  Continue DAPT one year minimum  Exercises regularly, no symptoms  Post MI, recommend continuing beta blocker, although low dose because of baseline sinus bradycardia  Since taking once a day, should do succinate  Changed  Continue high intensity atorvastatin  Lipid panel well controlled  2  HTN: BP controlled  3  HL: As above, lipids controlled, continue atorvastatin 80mg  History of Present Illness:     23-year-old gentleman  He is seeing me for the first time today  He has coronary disease identified in June of 2019  He had an NSTEMI  EF was preserved  He had a stent to the 2nd OM, 60% 1st diagonal, 60% circ, 50% mid RCA  He is on aspirin and Plavix  He has a baseline left anterior fascicular block  He has hypertension, doesn't check BP regularly at home, but when it's checked at other office visits, typically well controlled  Blood work earlier today showed LDL down to 50, was 142 at time of MI  He is on 12 5mg metoprolol, taking tartrate daily  Runs regularly on elliptical, denies symptoms with this      Problem List     Anxiety    BPH with obstruction/lower urinary tract symptoms    Elevated glucose    Erectile dysfunction of non-organic origin    GERD without esophagitis    Hyperlipidemia    Essential hypertension    Obesity (BMI 30 0-34 9)    Obstructive sleep apnea    Osteoarthritis    Psoriasis    Bifascicular block Calculus of kidney    Primary insomnia    GERD (gastroesophageal reflux disease)    Low back pain    Alcohol use    Dyslipidemia    Coronary artery disease involving native coronary artery of native heart without angina pectoris        Past Medical History:   Diagnosis Date    CAD (coronary artery disease)     s/p stent x1 on 6/19/19    Closed fracture of distal end of fibula with routine healing     unspecified fracture morphology, unspecified laterality, subsequent encounter;  Last Assessed:12/27/16    GERD (gastroesophageal reflux disease)     Hypertension     Kidney stones     Nocturia     Psoriasis     Sleep apnea      Social History     Tobacco Use    Smoking status: Never Smoker    Smokeless tobacco: Never Used   Substance Use Topics    Alcohol use: Yes     Comment: 1 drink daily    Drug use: No      Family History   Problem Relation Age of Onset    Arthritis Mother     Hypertension Mother     Irritable bowel syndrome Mother     Osteoporosis Mother     Arthritis Other      Past Surgical History:   Procedure Laterality Date    APPENDECTOMY      CARPAL TUNNEL RELEASE      ROTATOR CUFF REPAIR      SHOULDER ARTHROSCOPY Left     Last ASsessed:10/24/16    TOTAL HIP ARTHROPLASTY      TRIGGER FINGER RELEASE         Current Outpatient Medications:     Acetaminophen 325 MG CAPS, Take 650 mg by mouth every 6 (six) hours, Disp: , Rfl:     aspirin (ECOTRIN LOW STRENGTH) 81 mg EC tablet, Take 1 tablet (81 mg total) by mouth daily, Disp: 30 tablet, Rfl: 0    atorvastatin (LIPITOR) 80 mg tablet, take 1 tablet by mouth once daily WITH DINNER, Disp: 90 tablet, Rfl: 3    clopidogrel (PLAVIX) 75 mg tablet, Take 1 tablet (75 mg total) by mouth daily, Disp: 90 tablet, Rfl: 3    losartan (COZAAR) 100 MG tablet, take 1 tablet by mouth once daily, Disp: 90 tablet, Rfl: 1    metoprolol succinate (TOPROL-XL) 25 mg 24 hr tablet, Take 12 5 mg by mouth daily , Disp: , Rfl:     omeprazole (PriLOSEC) 20 mg delayed release capsule, take 1 capsule by mouth once daily, Disp: 90 capsule, Rfl: 3    oxybutynin (DITROPAN) 5 mg tablet, TAKE ONE TO TWO TABS BY MOUTH ONCE DAILY BEFORE BED , Disp: 60 tablet, Rfl: 0    tamsulosin (FLOMAX) 0 4 mg, take 2 capsules by mouth at bedtime (Patient taking differently: take 1 capsule in the am and one in the evening), Disp: 540 capsule, Rfl: 0    TIZANIDINE HCL PO, Take 4 mg by mouth as needed , Disp: , Rfl:     traMADol (ULTRAM) 50 mg tablet, Take 100 mg by mouth as needed , Disp: , Rfl: 0    traZODone (DESYREL) 50 mg tablet, Take 1 tablet (50 mg total) by mouth daily at bedtime, Disp: 90 tablet, Rfl: 1    betamethasone, augmented, (DIPROLENE-AF) 0 05 % cream, apply twice daily to rash x 2 weeks  then Saturday and Sunday only, Disp: , Rfl: 0    calcipotriene (DOVONEX) 0 005 % cream, apply to affected area twice a day ON MONDAY THROUGH FRIDAY, Disp: , Rfl: 0  Allergies   Allergen Reactions    Latex Hives    Other     Pollen Extract     Shellfish-Derived Products      [employer]       Vitals:    01/03/20 1509   BP: 138/78   BP Location: Right arm   Patient Position: Sitting   Cuff Size: Standard   Pulse: (!) 49   Weight: 110 kg (242 lb)   Height: 5' 9" (1 753 m)     Vitals:    01/03/20 1509   Weight: 110 kg (242 lb)      Height: 5' 9" (175 3 cm)   Body mass index is 35 74 kg/m²      Physical Exam:  GENERAL: Alert, well appearing, and in no distress  HEENT:  PERRL, EOMI, no scleral icterus, no conjunctival pallor  NECK:  Supple, No elevated JVP, no thyromegaly, no carotid bruits  HEART:  Regular rate and rhythm, normal S1/S2, no S3/S4, no murmur or rub  LUNGS:  Clear to auscultation bilaterally  ABDOMEN:  Soft, non-tender, positive bowel sounds, no rebound or guarding  EXTREMITIES:  No edema  VASCULAR:  Normal pedal pulses   NEURO: No focal deficits,  SKIN: Normal without suspicious lesions on exposed skin    ROS:  Except as noted in HPI, is otherwise reviewed in detail and a 12 point review of systems is negative  Labs:  Lab Results   Component Value Date    K 4 2 06/19/2019     06/19/2019    CREATININE 0 76 06/19/2019    BUN 13 06/19/2019    CO2 24 06/19/2019    ALT 24 06/18/2019    AST 24 06/18/2019    INR 0 98 06/18/2019    GLUF 108 (H) 06/19/2019    HGBA1C 5 2 06/19/2019    WBC 8 52 06/19/2019    HGB 15 4 06/19/2019    HCT 44 5 06/19/2019     06/19/2019     No results found for: CHOL  Lab Results   Component Value Date    LDLCALC 50 01/03/2020    LDLCALC 142 (H) 06/19/2019     Lab Results   Component Value Date    HDL 53 01/03/2020    HDL 50 06/19/2019     Lab Results   Component Value Date    TRIG 43 01/03/2020    TRIG 95 06/19/2019     Testing:  Echo:  LEFT VENTRICLE:  Systolic function was normal  Ejection fraction was estimated to be 60 %  There were no regional wall motion abnormalities  Doppler parameters were consistent with abnormal left ventricular relaxation (grade 1 diastolic dysfunction)      MITRAL VALVE:  There was mild annular calcification  There was mild to moderate regurgitation      AORTIC VALVE:  There was mild regurgitation      TRICUSPID VALVE:  There was trace regurgitation  Cardiac Cath:  CORONARY CIRCULATION:  1st diagonal: There was a tubular 60 % stenosis  2nd obtuse marginal: There was a 99 % stenosis  There was ROSIE grade 2 flow through the vessel (partial perfusion)  This lesion is a likely culprit for the patient's recent myocardial infarction  An intervention was performed  Left AV groove artery: There was a discrete 60 % stenosis  Mid RCA: There was a 50 % stenosis      1ST LESION INTERVENTIONS:  A successful balloon angioplasty with stent procedure was performed on the 99 % lesion in the 2nd obtuse marginal  Following intervention there was an excellent angiographic appearance with a 0 % residual stenosis    A [address] Rx 2 75 x 18mm drug-eluting stent was placed across the lesion and deployed at a maximum inflation pressure of 16 pam

## 2020-01-06 LAB — LPA SERPL-SCNC: 24.9 NMOL/L

## 2020-01-16 ENCOUNTER — TELEPHONE (OUTPATIENT)
Dept: CARDIOLOGY CLINIC | Facility: CLINIC | Age: 70
End: 2020-01-16

## 2020-01-16 DIAGNOSIS — I25.10 CORONARY ARTERY DISEASE INVOLVING NATIVE CORONARY ARTERY OF NATIVE HEART WITHOUT ANGINA PECTORIS: Primary | ICD-10-CM

## 2020-01-16 RX ORDER — METOPROLOL SUCCINATE 25 MG/1
12.5 TABLET, EXTENDED RELEASE ORAL DAILY
Qty: 45 TABLET | Refills: 3 | Status: SHIPPED | OUTPATIENT
Start: 2020-01-16 | End: 2020-07-20 | Stop reason: SDUPTHER

## 2020-01-16 NOTE — TELEPHONE ENCOUNTER
Raghavendra Eid was seen on 1/3  He called today, said you were going to change his metoprolol tartrate 12 5 mg daily to toprol xl, but said new script wasn't called in      Please advise

## 2020-01-30 ENCOUNTER — TELEPHONE (OUTPATIENT)
Dept: UROLOGY | Facility: MEDICAL CENTER | Age: 70
End: 2020-01-30

## 2020-01-30 DIAGNOSIS — N40.1 BENIGN PROSTATIC HYPERPLASIA WITH LOWER URINARY TRACT SYMPTOMS, SYMPTOM DETAILS UNSPECIFIED: ICD-10-CM

## 2020-01-30 RX ORDER — TAMSULOSIN HYDROCHLORIDE 0.4 MG/1
CAPSULE ORAL
Qty: 540 CAPSULE | Refills: 0 | Status: SHIPPED | OUTPATIENT
Start: 2020-01-30 | End: 2020-11-10 | Stop reason: SDUPTHER

## 2020-01-30 NOTE — TELEPHONE ENCOUNTER
Patient left a message on the Medication Refill voice mail line requesting a new prescription for Tamsulsoin 0 4mg, 90 day supply (180 capsules) and Oxybutynin IR 5mg, 90 day supply (180 tablets) to [facility] on [address]

## 2020-01-31 NOTE — TELEPHONE ENCOUNTER
The patient was last seen on 7/2/18 by Dr Amparo Suazo in the [facility] location  He is very overdue for an next office visit  This message will be forwarded to Call Center so they can schedule an appointment

## 2020-02-07 DIAGNOSIS — R35.1 NOCTURIA: ICD-10-CM

## 2020-02-11 RX ORDER — OXYBUTYNIN CHLORIDE 5 MG/1
TABLET ORAL
Qty: 60 TABLET | Refills: 0 | Status: SHIPPED | OUTPATIENT
Start: 2020-02-11 | End: 2020-03-09

## 2020-03-08 DIAGNOSIS — R35.1 NOCTURIA: ICD-10-CM

## 2020-03-09 RX ORDER — OXYBUTYNIN CHLORIDE 5 MG/1
TABLET ORAL
Qty: 60 TABLET | Refills: 0 | Status: SHIPPED | OUTPATIENT
Start: 2020-03-09 | End: 2020-04-02 | Stop reason: SDUPTHER

## 2020-03-27 DIAGNOSIS — I10 ESSENTIAL HYPERTENSION: ICD-10-CM

## 2020-03-27 RX ORDER — LOSARTAN POTASSIUM 100 MG/1
100 TABLET ORAL DAILY
Qty: 90 TABLET | Refills: 3 | Status: SHIPPED | OUTPATIENT
Start: 2020-03-27 | End: 2021-03-24 | Stop reason: SDUPTHER

## 2020-03-27 RX ORDER — LOSARTAN POTASSIUM 100 MG/1
TABLET ORAL
Qty: 90 TABLET | Refills: 1 | OUTPATIENT
Start: 2020-03-27

## 2020-04-01 DIAGNOSIS — R35.1 NOCTURIA: ICD-10-CM

## 2020-04-02 RX ORDER — OXYBUTYNIN CHLORIDE 5 MG/1
TABLET ORAL
Qty: 180 TABLET | Refills: 0 | Status: SHIPPED | OUTPATIENT
Start: 2020-04-02 | End: 2020-06-24

## 2020-05-04 DIAGNOSIS — E78.5 HYPERLIPIDEMIA: ICD-10-CM

## 2020-05-04 RX ORDER — ATORVASTATIN CALCIUM 80 MG/1
TABLET, FILM COATED ORAL
Qty: 90 TABLET | Refills: 3 | Status: SHIPPED | OUTPATIENT
Start: 2020-05-04 | End: 2020-07-27 | Stop reason: SDUPTHER

## 2020-06-24 DIAGNOSIS — R35.1 NOCTURIA: ICD-10-CM

## 2020-06-24 DIAGNOSIS — F51.01 PRIMARY INSOMNIA: ICD-10-CM

## 2020-06-24 RX ORDER — TRAZODONE HYDROCHLORIDE 50 MG/1
TABLET ORAL
Qty: 90 TABLET | Refills: 1 | Status: SHIPPED | OUTPATIENT
Start: 2020-06-24 | End: 2020-09-29 | Stop reason: SDUPTHER

## 2020-06-24 RX ORDER — OXYBUTYNIN CHLORIDE 5 MG/1
TABLET ORAL
Qty: 180 TABLET | Refills: 0 | Status: SHIPPED | OUTPATIENT
Start: 2020-06-24 | End: 2020-09-22

## 2020-07-04 ENCOUNTER — TELEPHONE (OUTPATIENT)
Dept: OTHER | Facility: OTHER | Age: 70
End: 2020-07-04

## 2020-07-04 NOTE — TELEPHONE ENCOUNTER
Patient reports "feeling weird", twinges in chest and left arm for two weeks, took BP today and it is 150/100  Wants to know if he can take extra Losartan

## 2020-07-05 ENCOUNTER — APPOINTMENT (EMERGENCY)
Dept: NON INVASIVE DIAGNOSTICS | Facility: HOSPITAL | Age: 70
DRG: 305 | End: 2020-07-05
Payer: MEDICARE

## 2020-07-05 ENCOUNTER — HOSPITAL ENCOUNTER (INPATIENT)
Facility: HOSPITAL | Age: 70
LOS: 1 days | Discharge: HOME | DRG: 305 | End: 2020-07-07
Attending: EMERGENCY MEDICINE | Admitting: INTERNAL MEDICINE
Payer: MEDICARE

## 2020-07-05 ENCOUNTER — APPOINTMENT (EMERGENCY)
Dept: CT IMAGING | Facility: HOSPITAL | Age: 70
DRG: 305 | End: 2020-07-05
Payer: MEDICARE

## 2020-07-05 DIAGNOSIS — I16.0 HYPERTENSIVE URGENCY: ICD-10-CM

## 2020-07-05 DIAGNOSIS — R91.8 PULMONARY NODULES: ICD-10-CM

## 2020-07-05 DIAGNOSIS — I25.10 CAD (CORONARY ARTERY DISEASE): ICD-10-CM

## 2020-07-05 DIAGNOSIS — R07.9 CHEST PAIN: Primary | ICD-10-CM

## 2020-07-05 PROBLEM — M79.605 LEFT LEG PAIN: Status: ACTIVE | Noted: 2020-07-05

## 2020-07-05 LAB
ALBUMIN SERPL BCP-MCNC: 4.1 G/DL (ref 3.5–5)
ALP SERPL-CCNC: 48 U/L (ref 46–116)
ALT SERPL W P-5'-P-CCNC: 27 U/L (ref 12–78)
ANION GAP SERPL CALCULATED.3IONS-SCNC: 7 MMOL/L (ref 4–13)
AST SERPL W P-5'-P-CCNC: 14 U/L (ref 5–45)
BASOPHILS # BLD AUTO: 0.03 THOUSANDS/ΜL (ref 0–0.1)
BASOPHILS NFR BLD AUTO: 0 % (ref 0–1)
BILIRUB SERPL-MCNC: 1.23 MG/DL (ref 0.2–1)
BUN SERPL-MCNC: 18 MG/DL (ref 5–25)
CALCIUM SERPL-MCNC: 9.4 MG/DL (ref 8.3–10.1)
CHLORIDE SERPL-SCNC: 105 MMOL/L (ref 100–108)
CO2 SERPL-SCNC: 29 MMOL/L (ref 21–32)
CREAT SERPL-MCNC: 0.93 MG/DL (ref 0.6–1.3)
EOSINOPHIL # BLD AUTO: 0.08 THOUSAND/ΜL (ref 0–0.61)
EOSINOPHIL NFR BLD AUTO: 1 % (ref 0–6)
ERYTHROCYTE [DISTWIDTH] IN BLOOD BY AUTOMATED COUNT: 12.6 % (ref 11.6–15.1)
GFR SERPL CREATININE-BSD FRML MDRD: 84 ML/MIN/1.73SQ M
GLUCOSE SERPL-MCNC: 103 MG/DL (ref 65–140)
HCT VFR BLD AUTO: 47.3 % (ref 36.5–49.3)
HGB BLD-MCNC: 16.5 G/DL (ref 12–17)
IMM GRANULOCYTES # BLD AUTO: 0.04 THOUSAND/UL (ref 0–0.2)
IMM GRANULOCYTES NFR BLD AUTO: 1 % (ref 0–2)
LIPASE SERPL-CCNC: 88 U/L (ref 73–393)
LYMPHOCYTES # BLD AUTO: 2.16 THOUSANDS/ΜL (ref 0.6–4.47)
LYMPHOCYTES NFR BLD AUTO: 26 % (ref 14–44)
MCH RBC QN AUTO: 33.5 PG (ref 26.8–34.3)
MCHC RBC AUTO-ENTMCNC: 34.9 G/DL (ref 31.4–37.4)
MCV RBC AUTO: 96 FL (ref 82–98)
MONOCYTES # BLD AUTO: 0.95 THOUSAND/ΜL (ref 0.17–1.22)
MONOCYTES NFR BLD AUTO: 12 % (ref 4–12)
NEUTROPHILS # BLD AUTO: 4.98 THOUSANDS/ΜL (ref 1.85–7.62)
NEUTS SEG NFR BLD AUTO: 60 % (ref 43–75)
NRBC BLD AUTO-RTO: 0 /100 WBCS
PLATELET # BLD AUTO: 228 THOUSANDS/UL (ref 149–390)
PMV BLD AUTO: 9.9 FL (ref 8.9–12.7)
POTASSIUM SERPL-SCNC: 4.5 MMOL/L (ref 3.5–5.3)
PROT SERPL-MCNC: 7.5 G/DL (ref 6.4–8.2)
RBC # BLD AUTO: 4.92 MILLION/UL (ref 3.88–5.62)
SARS-COV-2 RNA RESP QL NAA+PROBE: NEGATIVE
SODIUM SERPL-SCNC: 141 MMOL/L (ref 136–145)
TROPONIN I SERPL-MCNC: <0.02 NG/ML
TSH SERPL DL<=0.05 MIU/L-ACNC: 3.46 UIU/ML (ref 0.36–3.74)
WBC # BLD AUTO: 8.24 THOUSAND/UL (ref 4.31–10.16)

## 2020-07-05 PROCEDURE — 36415 COLL VENOUS BLD VENIPUNCTURE: CPT | Performed by: EMERGENCY MEDICINE

## 2020-07-05 PROCEDURE — 96360 HYDRATION IV INFUSION INIT: CPT

## 2020-07-05 PROCEDURE — 84484 ASSAY OF TROPONIN QUANT: CPT | Performed by: EMERGENCY MEDICINE

## 2020-07-05 PROCEDURE — 80053 COMPREHEN METABOLIC PANEL: CPT | Performed by: EMERGENCY MEDICINE

## 2020-07-05 PROCEDURE — 99285 EMERGENCY DEPT VISIT HI MDM: CPT | Performed by: EMERGENCY MEDICINE

## 2020-07-05 PROCEDURE — 99285 EMERGENCY DEPT VISIT HI MDM: CPT

## 2020-07-05 PROCEDURE — 93005 ELECTROCARDIOGRAM TRACING: CPT

## 2020-07-05 PROCEDURE — 84484 ASSAY OF TROPONIN QUANT: CPT | Performed by: PHYSICIAN ASSISTANT

## 2020-07-05 PROCEDURE — 71275 CT ANGIOGRAPHY CHEST: CPT

## 2020-07-05 PROCEDURE — 74174 CTA ABD&PLVS W/CONTRAST: CPT

## 2020-07-05 PROCEDURE — 83690 ASSAY OF LIPASE: CPT | Performed by: EMERGENCY MEDICINE

## 2020-07-05 PROCEDURE — 1124F ACP DISCUSS-NO DSCNMKR DOCD: CPT | Performed by: INTERNAL MEDICINE

## 2020-07-05 PROCEDURE — 84443 ASSAY THYROID STIM HORMONE: CPT | Performed by: EMERGENCY MEDICINE

## 2020-07-05 PROCEDURE — 70450 CT HEAD/BRAIN W/O DYE: CPT

## 2020-07-05 PROCEDURE — 99220 PR INITIAL OBSERVATION CARE/DAY 70 MINUTES: CPT | Performed by: PHYSICIAN ASSISTANT

## 2020-07-05 PROCEDURE — 96361 HYDRATE IV INFUSION ADD-ON: CPT

## 2020-07-05 PROCEDURE — 93971 EXTREMITY STUDY: CPT

## 2020-07-05 PROCEDURE — 94660 CPAP INITIATION&MGMT: CPT

## 2020-07-05 PROCEDURE — 87635 SARS-COV-2 COVID-19 AMP PRB: CPT | Performed by: EMERGENCY MEDICINE

## 2020-07-05 PROCEDURE — 85025 COMPLETE CBC W/AUTO DIFF WBC: CPT | Performed by: EMERGENCY MEDICINE

## 2020-07-05 RX ORDER — HYDRALAZINE HYDROCHLORIDE 20 MG/ML
10 INJECTION INTRAMUSCULAR; INTRAVENOUS ONCE
Status: COMPLETED | OUTPATIENT
Start: 2020-07-05 | End: 2020-07-05

## 2020-07-05 RX ORDER — TAMSULOSIN HYDROCHLORIDE 0.4 MG/1
0.4 CAPSULE ORAL 2 TIMES DAILY
Status: DISCONTINUED | OUTPATIENT
Start: 2020-07-05 | End: 2020-07-07 | Stop reason: HOSPADM

## 2020-07-05 RX ORDER — FLUTICASONE PROPIONATE 50 MCG
2 SPRAY, SUSPENSION (ML) NASAL 2 TIMES DAILY PRN
COMMUNITY

## 2020-07-05 RX ORDER — TRAZODONE HYDROCHLORIDE 50 MG/1
50 TABLET ORAL
Status: DISCONTINUED | OUTPATIENT
Start: 2020-07-05 | End: 2020-07-07 | Stop reason: HOSPADM

## 2020-07-05 RX ORDER — HYDRALAZINE HYDROCHLORIDE 20 MG/ML
10 INJECTION INTRAMUSCULAR; INTRAVENOUS EVERY 6 HOURS PRN
Status: DISCONTINUED | OUTPATIENT
Start: 2020-07-05 | End: 2020-07-07 | Stop reason: HOSPADM

## 2020-07-05 RX ORDER — ONDANSETRON 2 MG/ML
4 INJECTION INTRAMUSCULAR; INTRAVENOUS EVERY 6 HOURS PRN
Status: DISCONTINUED | OUTPATIENT
Start: 2020-07-05 | End: 2020-07-07 | Stop reason: HOSPADM

## 2020-07-05 RX ORDER — ACETAMINOPHEN 325 MG/1
975 TABLET ORAL ONCE
Status: COMPLETED | OUTPATIENT
Start: 2020-07-05 | End: 2020-07-05

## 2020-07-05 RX ORDER — ASPIRIN 81 MG/1
81 TABLET ORAL DAILY
Status: DISCONTINUED | OUTPATIENT
Start: 2020-07-06 | End: 2020-07-07 | Stop reason: HOSPADM

## 2020-07-05 RX ORDER — NITROGLYCERIN 0.4 MG/1
0.4 TABLET SUBLINGUAL
Status: DISCONTINUED | OUTPATIENT
Start: 2020-07-05 | End: 2020-07-07 | Stop reason: HOSPADM

## 2020-07-05 RX ORDER — ATORVASTATIN CALCIUM 80 MG/1
80 TABLET, FILM COATED ORAL
Status: DISCONTINUED | OUTPATIENT
Start: 2020-07-05 | End: 2020-07-07 | Stop reason: HOSPADM

## 2020-07-05 RX ORDER — LORATADINE 10 MG/1
10 TABLET ORAL DAILY
Status: DISCONTINUED | OUTPATIENT
Start: 2020-07-06 | End: 2020-07-07 | Stop reason: HOSPADM

## 2020-07-05 RX ORDER — CALCIUM CARBONATE 200(500)MG
1000 TABLET,CHEWABLE ORAL 3 TIMES DAILY PRN
Status: DISCONTINUED | OUTPATIENT
Start: 2020-07-05 | End: 2020-07-07 | Stop reason: HOSPADM

## 2020-07-05 RX ORDER — CLOPIDOGREL BISULFATE 75 MG/1
75 TABLET ORAL DAILY
Status: DISCONTINUED | OUTPATIENT
Start: 2020-07-06 | End: 2020-07-07 | Stop reason: HOSPADM

## 2020-07-05 RX ORDER — TRAMADOL HYDROCHLORIDE 50 MG/1
100 TABLET ORAL EVERY 6 HOURS PRN
Status: DISCONTINUED | OUTPATIENT
Start: 2020-07-05 | End: 2020-07-07 | Stop reason: HOSPADM

## 2020-07-05 RX ORDER — LOSARTAN POTASSIUM 50 MG/1
100 TABLET ORAL DAILY
Status: DISCONTINUED | OUTPATIENT
Start: 2020-07-06 | End: 2020-07-07 | Stop reason: HOSPADM

## 2020-07-05 RX ORDER — METOPROLOL SUCCINATE 25 MG/1
12.5 TABLET, EXTENDED RELEASE ORAL DAILY
Status: DISCONTINUED | OUTPATIENT
Start: 2020-07-06 | End: 2020-07-07 | Stop reason: HOSPADM

## 2020-07-05 RX ORDER — OXYBUTYNIN CHLORIDE 5 MG/1
5 TABLET ORAL
Status: DISCONTINUED | OUTPATIENT
Start: 2020-07-05 | End: 2020-07-07 | Stop reason: HOSPADM

## 2020-07-05 RX ORDER — AMLODIPINE BESYLATE 5 MG/1
5 TABLET ORAL DAILY
Status: DISCONTINUED | OUTPATIENT
Start: 2020-07-06 | End: 2020-07-06

## 2020-07-05 RX ORDER — ACETAMINOPHEN 325 MG/1
650 TABLET ORAL EVERY 4 HOURS PRN
Status: DISCONTINUED | OUTPATIENT
Start: 2020-07-05 | End: 2020-07-07 | Stop reason: HOSPADM

## 2020-07-05 RX ORDER — PANTOPRAZOLE SODIUM 40 MG/1
40 TABLET, DELAYED RELEASE ORAL
Status: DISCONTINUED | OUTPATIENT
Start: 2020-07-06 | End: 2020-07-07 | Stop reason: HOSPADM

## 2020-07-05 RX ORDER — ASPIRIN 81 MG/1
243 TABLET, CHEWABLE ORAL ONCE
Status: COMPLETED | OUTPATIENT
Start: 2020-07-05 | End: 2020-07-05

## 2020-07-05 RX ORDER — TIZANIDINE 4 MG/1
4 TABLET ORAL EVERY 8 HOURS PRN
Status: DISCONTINUED | OUTPATIENT
Start: 2020-07-05 | End: 2020-07-07 | Stop reason: HOSPADM

## 2020-07-05 RX ADMIN — ASPIRIN 81 MG 243 MG: 81 TABLET ORAL at 16:54

## 2020-07-05 RX ADMIN — ACETAMINOPHEN 650 MG: 325 TABLET ORAL at 19:51

## 2020-07-05 RX ADMIN — TRAMADOL HYDROCHLORIDE 100 MG: 50 TABLET, FILM COATED ORAL at 23:15

## 2020-07-05 RX ADMIN — NITROGLYCERIN 0.4 MG: 0.4 TABLET SUBLINGUAL at 19:19

## 2020-07-05 RX ADMIN — TRAZODONE HYDROCHLORIDE 50 MG: 50 TABLET ORAL at 21:08

## 2020-07-05 RX ADMIN — ATORVASTATIN CALCIUM 80 MG: 80 TABLET, FILM COATED ORAL at 21:07

## 2020-07-05 RX ADMIN — HYDRALAZINE HYDROCHLORIDE 10 MG: 20 INJECTION INTRAMUSCULAR; INTRAVENOUS at 17:35

## 2020-07-05 RX ADMIN — IOHEXOL 100 ML: 350 INJECTION, SOLUTION INTRAVENOUS at 15:42

## 2020-07-05 RX ADMIN — OXYBUTYNIN CHLORIDE 5 MG: 5 TABLET ORAL at 21:08

## 2020-07-05 RX ADMIN — TAMSULOSIN HYDROCHLORIDE 0.4 MG: 0.4 CAPSULE ORAL at 21:08

## 2020-07-05 RX ADMIN — ACETAMINOPHEN 975 MG: 325 TABLET ORAL at 14:53

## 2020-07-05 RX ADMIN — TIZANIDINE 4 MG: 4 TABLET ORAL at 23:15

## 2020-07-05 RX ADMIN — SODIUM CHLORIDE 500 ML: 0.9 INJECTION, SOLUTION INTRAVENOUS at 14:50

## 2020-07-05 NOTE — ED PROVIDER NOTES
History  Chief Complaint   Patient presents with    Hypertension     pt states for the last few weeks he hasn't been feeling well, bp at home was 198/100 last night  pt called cardiology office and was told to take extra metoprolol and bp was still elevated this morning  reports chest pain that radiates down left arm x2 weeks along with LLE pain  76 yo M presenting for evaluation of multiple sx  States that for the past 2 weeks he has been feeling generally unwell, weak/tired and laying around the house which is not like him  He states he has had a HA, mostly frontal, L>R, gradual onset and constant  Not one to typically get HAs  He also reports chest discomfort described as intermittent twinges to L side of chest that radiates down his L arm to his wrist  Also reports L sided leg pain, mostly to calf/ankle  No injury  Denies fevers, chills, SOB, cough, abdominal pain, N/V/D/C, urinary complaints  Pt states he started to check his BP at home and noted it was elevated so called cardiology yesterday  They recommended he increase his Metoprolol from 1/2 a pill to 1 full pill (12 5 mg to 25 mg)  He did this last night and this morning  H/o CAD s/p stenting, HTN  Denies h/o DVT/PE    MDM: 76 yo M with CP- will get cardiac workup, given that his pain extends to L side of body/extremities, will get CTA to r/o dissection, venous duplex L leg to r/o DVT, TSH to assess for hypothyroidism, covid testing, CTH given no history of prior HAs to r/o space occupying lesion/etc , reassess, admit           Per Cardiology noted from January 2020 (Dr Alvarado [place])  - CAD, HTN, HLD, bifascicular block  - NSTEMI 6/2019  Stent to the 2nd OM, 60% 1st diagonal, 60% circ 50% mid RCA      Prior to Admission Medications   Prescriptions Last Dose Informant Patient Reported? Taking?    Acetaminophen 325 MG CAPS  Self Yes Yes   Sig: Take 650 mg by mouth every 6 (six) hours   Fexofenadine HCl (ALLEGRA ALLERGY PO)  Self Yes Yes   Sig: Take by mouth TIZANIDINE HCL PO  Self Yes Yes   Sig: Take 4 mg by mouth as needed    aspirin (ECOTRIN LOW STRENGTH) 81 mg EC tablet  Self No Yes   Sig: Take 1 tablet (81 mg total) by mouth daily   atorvastatin (LIPITOR) 80 mg tablet   No Yes   Sig: take 1 tablet by mouth once daily WITH DINNER   betamethasone, augmented, (DIPROLENE-AF) 0 05 % cream Not Taking at Unknown time Self Yes No   Sig: apply twice daily to rash x 2 weeks  then Saturday and Sunday only   calcipotriene (DOVONEX) 0 005 % cream Not Taking at Unknown time Self Yes No   Sig: apply to affected area twice a day ON MONDAY THROUGH FRIDAY   clopidogrel (PLAVIX) 75 mg tablet  Self No Yes   Sig: Take 1 tablet (75 mg total) by mouth daily   losartan (COZAAR) 100 MG tablet   No Yes   Sig: Take 1 tablet (100 mg total) by mouth daily   metoprolol succinate (TOPROL-XL) 25 mg 24 hr tablet   No Yes   Sig: Take 0 5 tablets (12 5 mg total) by mouth daily   omeprazole (PriLOSEC) 20 mg delayed release capsule  Self No Yes   Sig: take 1 capsule by mouth once daily   oxybutynin (DITROPAN) 5 mg tablet   No Yes   Sig: TAKE 1 TO 2 TABLETS BY MOUTH ONCE DAILY BEFORE BED   tamsulosin (FLOMAX) 0 4 mg   No Yes   Sig: take 2 capsules by mouth at bedtime   traMADol (ULTRAM) 50 mg tablet  Self Yes Yes   Sig: Take 100 mg by mouth as needed    traZODone (DESYREL) 50 mg tablet   No Yes   Sig: take 1 tablet by mouth at bedtime      Facility-Administered Medications: None       Past Medical History:   Diagnosis Date    CAD (coronary artery disease)     s/p stent x1 on 6/19/19    Closed fracture of distal end of fibula with routine healing     unspecified fracture morphology, unspecified laterality, subsequent encounter;  Last Assessed:12/27/16    GERD (gastroesophageal reflux disease)     Heart attack ([address] )     Hypertension     Kidney stones     Nocturia     Psoriasis     Sleep apnea        Past Surgical History:   Procedure Laterality Date    APPENDECTOMY      CARPAL TUNNEL RELEASE  ROTATOR CUFF REPAIR      SHOULDER ARTHROSCOPY Left     Last ASsessed:10/24/16    TOTAL HIP ARTHROPLASTY      TRIGGER FINGER RELEASE         Family History   Problem Relation Age of Onset    Arthritis Mother     Hypertension Mother     Irritable bowel syndrome Mother     Osteoporosis Mother     Arthritis Other      I have reviewed and agree with the history as documented  E-Cigarette/Vaping     E-Cigarette/Vaping Substances     Social History     Tobacco Use    Smoking status: Never Smoker    Smokeless tobacco: Never Used   Substance Use Topics    Alcohol use: Yes     Comment: 1 drink daily    Drug use: No       Review of Systems   Constitutional: Positive for activity change and fatigue  Negative for chills, fever and unexpected weight change  HENT: Negative for ear pain, rhinorrhea and sore throat  Respiratory: Negative for cough and shortness of breath  Cardiovascular: Positive for chest pain  Negative for leg swelling  Gastrointestinal: Negative for abdominal pain, constipation, diarrhea, nausea and vomiting  Endocrine: Negative for polydipsia, polyphagia and polyuria  Genitourinary: Negative for dysuria, frequency, hematuria and urgency  Musculoskeletal: Negative for back pain, myalgias and neck pain         + L arm/leg pain   Skin: Negative for color change and rash  Allergic/Immunologic: Negative for environmental allergies and immunocompromised state  Neurological: Positive for headaches  Negative for dizziness, weakness, light-headedness and numbness  Hematological: Negative for adenopathy  Does not bruise/bleed easily  Psychiatric/Behavioral: Negative for agitation and confusion  All other systems reviewed and are negative  Physical Exam  Physical Exam   Constitutional: He is oriented to person, place, and time  He appears well-developed and well-nourished  HENT:   Head: Normocephalic and atraumatic     Nose: Nose normal    Mouth/Throat: Oropharynx is clear and moist    Eyes: Conjunctivae and EOM are normal    Neck: Normal range of motion  Neck supple  Cardiovascular: Normal rate, regular rhythm, normal heart sounds and intact distal pulses  No rash/skin changes, no chest wall ttp   Pulmonary/Chest: Effort normal and breath sounds normal  No stridor  No respiratory distress  He has no wheezes  He has no rales  He exhibits no tenderness  Abdominal: Soft  He exhibits no distension  There is no tenderness  There is no rebound and no guarding  Musculoskeletal: He exhibits no edema or deformity  No calf swelling noted  TTP over L calf/ankle, no peripheral edema   Neurological: He is alert and oriented to person, place, and time  He displays normal reflexes  No cranial nerve deficit or sensory deficit  He exhibits normal muscle tone  Coordination normal    Skin: Skin is warm and dry  Capillary refill takes less than 2 seconds  No rash noted  Psychiatric: He has a normal mood and affect  Judgment and thought content normal    Nursing note and vitals reviewed        Vital Signs  ED Triage Vitals   Temperature Pulse Respirations Blood Pressure SpO2   07/05/20 1414 07/05/20 1418 07/05/20 1414 07/05/20 1418 07/05/20 1418   (!) 97 1 °F (36 2 °C) 58 16 (!) 192/96 97 %      Temp Source Heart Rate Source Patient Position - Orthostatic VS BP Location FiO2 (%)   07/05/20 1414 07/05/20 1418 07/05/20 1418 07/05/20 1418 --   Temporal Monitor Sitting Right arm       Pain Score       07/05/20 1441       7           Vitals:    07/05/20 1418 07/05/20 1630   BP: (!) 192/96 (!) 184/96   Pulse: 58 (!) 44   Patient Position - Orthostatic VS: Sitting Lying         Visual Acuity      ED Medications  Medications   hydrALAZINE (APRESOLINE) injection 10 mg (has no administration in time range)   acetaminophen (TYLENOL) tablet 975 mg (975 mg Oral Given 7/5/20 1453)   sodium chloride 0 9 % bolus 500 mL (0 mL Intravenous Stopped 7/5/20 1634)   iohexol (OMNIPAQUE) 350 MG/ML injection (MULTI-DOSE) 100 mL (100 mL Intravenous Given 7/5/20 1542)   aspirin chewable tablet 243 mg (243 mg Oral Given 7/5/20 1654)       Diagnostic Studies  Results Reviewed     Procedure Component Value Units Date/Time    Novel Coronavirus Toni S[facility] [[id number]]  (Normal) Collected:  07/05/20 1452    Lab Status:  Final result Specimen:  Nares from Nose Updated:  07/05/20 1622     SARS-CoV-2 Negative    Narrative: The specimen collection materials, transport medium, and/or testing methodology utilized in the production of these test results have been proven to be reliable in a limited validation with an abbreviated program under the Emergency Utilization Authorization provided by the FDA  Testing reported as "Presumptive positive" will be confirmed with secondary testing with a reference laboratory to ensure result accuracy  Clinical caution and judgement should be used with the interpretation of these results with consideration of the clinical impression and other laboratory testing  Testing reported as "Positive" or "Negative" has been proven to be accurate according to standard laboratory validation requirements  All testing is performed with control materials showing appropriate reactivity at standard intervals  Lipase [[id number]]  (Normal) Collected:  07/05/20 1452    Lab Status:  Final result Specimen:  Blood from Arm, Right Updated:  07/05/20 1531     Lipase 88 u/L     TSH, 3rd generation with Free T4 reflex [[id number]]  (Normal) Collected:  07/05/20 1452    Lab Status:  Final result Specimen:  Blood from Arm, Right Updated:  07/05/20 1531     TSH 3RD GENERATON 3 459 uIU/mL     Narrative:       Patients undergoing fluorescein dye angiography may retain small amounts of fluorescein in the body for 48-72 hours post procedure  Samples containing fluorescein can produce falsely depressed TSH values  If the patient had this procedure,a specimen should be resubmitted post fluorescein clearance  Comprehensive metabolic panel [[id number]]  (Abnormal) Collected:  07/05/20 1452    Lab Status:  Final result Specimen:  Blood from Arm, Right Updated:  07/05/20 1523     Sodium 141 mmol/L      Potassium 4 5 mmol/L      Chloride 105 mmol/L      CO2 29 mmol/L      ANION GAP 7 mmol/L      BUN 18 mg/dL      Creatinine 0 93 mg/dL      Glucose 103 mg/dL      Calcium 9 4 mg/dL      AST 14 U/L      ALT 27 U/L      Alkaline Phosphatase 48 U/L      Total Protein 7 5 g/dL      Albumin 4 1 g/dL      Total Bilirubin 1 23 mg/dL      eGFR 84 ml/min/1 73sq m     Narrative:       [place] guidelines for Chronic Kidney Disease (CKD):     Stage 1 with normal or high GFR (GFR > 90 mL/min/1 73 square meters)    Stage 2 Mild CKD (GFR = 60-89 mL/min/1 73 square meters)    Stage 3A Moderate CKD (GFR = 45-59 mL/min/1 73 square meters)    Stage 3B Moderate CKD (GFR = 30-44 mL/min/1 73 square meters)    Stage 4 Severe CKD (GFR = 15-29 mL/min/1 73 square meters)    Stage 5 End Stage CKD (GFR <15 mL/min/1 73 square meters)  Note: GFR calculation is accurate only with a steady state creatinine    Troponin I [[id number]]  (Normal) Collected:  07/05/20 1452    Lab Status:  Final result Specimen:  Blood from Arm, Right Updated:  07/05/20 1523     Troponin I <0 02 ng/mL     CBC and differential [[id number]] Collected:  07/05/20 1452    Lab Status:  Final result Specimen:  Blood from Arm, Right Updated:  07/05/20 1500     WBC 8 24 Thousand/uL      RBC 4 92 Million/uL      Hemoglobin 16 5 g/dL      Hematocrit 47 3 %      MCV 96 fL      MCH 33 5 pg      MCHC 34 9 g/dL      RDW 12 6 %      MPV 9 9 fL      Platelets 868 Thousands/uL      nRBC 0 /100 WBCs      Neutrophils Relative 60 %      Immat GRANS % 1 %      Lymphocytes Relative 26 %      Monocytes Relative 12 %      Eosinophils Relative 1 %      Basophils Relative 0 %      Neutrophils Absolute 4 98 Thousands/µL      Immature Grans Absolute 0 04 Thousand/uL Lymphocytes Absolute 2 16 Thousands/µL      Monocytes Absolute 0 95 Thousand/µL      Eosinophils Absolute 0 08 Thousand/µL      Basophils Absolute 0 03 Thousands/µL                  CT head without contrast   ED Interpretation by Ashley Uribe DO (07/05 1559)   IMPRESSION:           1  Stable exam without acute intracranial abnormality noted           Final Result by Saturnino Khan MD (07/05 6974)         1  Stable exam without acute intracranial abnormality noted  Workstation performed: [id number]         CTA chest abdomen pelvis w wo contrast   ED Interpretation by Ashley Uribe DO (07/05 2300)   IMPRESSION:       No acute vascular findings        Scattered small likely benign subpleural lung nodules versus nodular pleural thickening as detailed above  The findings are new and while likely benign should probably be reassessed in 6 months with repeat chest CT        Gallstones without evidence of cholecystitis        Colonic diverticulosis without diverticulitis        Single tiny right kidney stone of about 1 mm size        Degenerative changes of the spine and right hip  Left hip prosthesis appears grossly satisfactory  Final Result by Merary Tamez MD (07/05 1630)      No acute vascular findings  Scattered small likely benign subpleural lung nodules versus nodular pleural thickening as detailed above  The findings are new and while likely benign should probably be reassessed in 6 months with repeat chest CT  Gallstones without evidence of cholecystitis  Colonic diverticulosis without diverticulitis  Single tiny right kidney stone of about 1 mm size  Degenerative changes of the spine and right hip  Left hip prosthesis appears grossly satisfactory  The study was marked in EPIC for significant notification                    Workstation performed: [id number]         VAS lower limb venous duplex study, unilateral/limited    (Results Pending) Procedures  Procedures         ED Course  ED Course as of Jul 05 1733   Sun Jul 05, 2020   1429 Blood Pressure(!): 192/96   1454 EKG: sinus bradycardia @ 44 bpm, LAD, RBBB, large t waves v3-v6      1632 Pt still c/o chest discomfort/"twinges", BP now 170s  Discussed all results with pt, only pending test is the duplex  I discussed admitting for cardiac rule out and pt is agreeable   Discussed incidental pulm nodules that will need outpt f/u      1731 Venous duplex negative per vascular tech                HEART Risk Score      Most Recent Value   Heart Score Risk Calculator   History  1 Filed at: 07/05/2020 1531   ECG  1 Filed at: 07/05/2020 1531   Age  2 Filed at: 07/05/2020 1531   Risk Factors  2 Filed at: 07/05/2020 1531   Troponin  0 Filed at: 07/05/2020 1531   HEART Score  6 Filed at: 07/05/2020 1531                                      MDM  Number of Diagnoses or Management Options  CAD (coronary artery disease):   Chest pain:   Pulmonary nodules:   Diagnosis management comments: 78 yo M presenting with multiple complaints, CP being most concerning complaint, given his h/o CAD, will admit for ACs r/o  Discussed incidental finding of pulmonary nodules that will need outpatient monitoring       Amount and/or Complexity of Data Reviewed  Clinical lab tests: ordered and reviewed  Tests in the radiology section of CPT®: ordered and reviewed  Tests in the medicine section of CPT®: ordered and reviewed  Review and summarize past medical records: yes  Independent visualization of images, tracings, or specimens: yes          Disposition  Final diagnoses:   Chest pain   CAD (coronary artery disease)   Pulmonary nodules     Time reflects when diagnosis was documented in both MDM as applicable and the Disposition within this note     Time User Action Codes Description Comment    7/5/2020  4:41 PM Gris BARBER Add [R07 9] Chest pain     7/5/2020  4:42 PM Marsha Escalera Add [I25 10] CAD (coronary artery disease) 7/5/2020  5:01 PM Prudence BARBER Add [R91 8] Pulmonary nodules       ED Disposition     ED Disposition Condition Date/Time Comment    Admit Stable Sun Jul 5, 2020  4:41 PM Case was discussed with DONTE and the patient's admission status was agreed to be Admission Status: observation status to the service of Dr Simran Brito   Follow-up Information    None         Patient's Medications   Discharge Prescriptions    No medications on file     No discharge procedures on file      PDMP Review     None          ED Provider  Electronically Signed by           Lucila Louis DO  07/05/20 6083

## 2020-07-05 NOTE — ED NOTES
Vascular tech Rob Garland responded back to RN that she was not the vascular tech that is on call for the [facility]  Page sent to Vascular tech Debi Glynn for [address]  at this time  Debi Glynn responded and will be in shortly        Polo Salinas RN  07/05/20 9437

## 2020-07-05 NOTE — ASSESSMENT & PLAN NOTE
Follows outpatient with pain management  Recently had steroid injection in spine last week  Continue p r n   Tramadol and tizanidine

## 2020-07-05 NOTE — ASSESSMENT & PLAN NOTE
Patient notes 2 weeks of intermittent left leg cramping and aching  Lower extremity duplex ordered to rule out DVT  Nontender on exam, consider muscle strain  P r n   Tramadol and tizanidine as prescribed by pain management  Activity as tolerated

## 2020-07-05 NOTE — ED NOTES
Vascular tech aware of order for pt  Will be in approx 1 hour to be in to see pt        Loly Shah RN  07/05/20 4379

## 2020-07-05 NOTE — H&P
[facility]  H&P- Shanae Mares 1950, 77 y o  male MRN: [id number]    Unit/Bed#: E4 -01 Encounter: [id number]    Primary Care Provider: Allyssa Jacobson MD   Date and time admitted to hospital: 7/5/2020  2:21 PM        * Hypertensive urgency  Assessment & Plan  Patient presents to ED with complaint of persistent hypertension with intermittent chest pain, fatigue, lower extremity cramping  /96 at time of presentation  Home regimen losartan 100 mg daily with metoprolol XL 12 5 mg daily  Was in contact with cardiologist yesterday and was told to take extra dose of metoprolol  Heart rate in low 40s at time of admission, will avoid further beta-blockade  P r n  Hydralazine IV while inpatient  Notes intermittent left-sided chest pain for the past 2 weeks  CTA negative for acute abnormalities  Troponin x1 negative, continue to trend x3  EKG without acute ST or T-wave changes  Does have history of CAD requiring stenting in June 2019  Start Norvasc 5 mg daily   Cardiology consult      Left leg pain  Assessment & Plan  Patient notes 2 weeks of intermittent left leg cramping and aching  Lower extremity duplex ordered to rule out DVT  Nontender on exam, consider muscle strain  P r n  Tramadol and tizanidine as prescribed by pain management  Activity as tolerated    Low back pain  Assessment & Plan  Follows outpatient with pain management  Recently had steroid injection in spine last week  Continue p r n  Tramadol and tizanidine    GERD (gastroesophageal reflux disease)  Assessment & Plan  Continue PPI    Obesity (BMI 30 0-34  9)  Assessment & Plan  Patient notes he exercises for multiple hours daily  Would likely benefit from diet modification    BPH with obstruction/lower urinary tract symptoms  Assessment & Plan  Continue Flomax and Ditropan    VTE Prophylaxis: Enoxaparin (Lovenox)     Code Status:  Full code  POLST: POLST form is not discussed and not completed at this time    Discussion with family:  None    Anticipated Length of Stay:  Patient will be admitted on an Observation basis with an anticipated length of stay of  less than 2 midnights  Justification for Hospital Stay:  Hypertensive urgency    Total Time for Visit, including Counseling / Coordination of Care: 1 hour  Greater than 50% of this total time spent on direct patient counseling and coordination of care  Chief Complaint:   High blood pressure    History of Present Illness:    Raghavendra Chow is a 77 y o  male with past medical history of hypertension , chronic pain , GERD, BPH, CAD who presents with high blood pressure  Patient reports for the past 2 weeks he has been feeling unwell  Notes significant fatigue, intermittent chest pains, headache and pain in his lower left extremity  Patient notes he took his blood pressure yesterday noted to be 200/100  Patient notes he called his cardiologist who advised him to increase his metoprolol  Patient took increased dose yesterday and today without improvement in his blood pressure  Patient notes his heart rate has been in 40s since taking extra metoprolol  Patient also notes intermittent chest pain that radiates to his back  Notes the pain is a sharp, intermittent pain on the left side of his chest that comes randomly  Notes diaphoresis at night but denies nausea, vomiting or shortness of breath associated with his chest pain  Patient notes history of stenting 1 year ago, has been compliant with aspirin and Plavix  Patient notes pain in his left leg is a cramping/aching feeling that intermittently bothers him  Patient denies history of blood clots  Does note he works out approximately 3 hours per day 5 days a week  Review of Systems:    Review of Systems   Constitutional: Positive for diaphoresis and fatigue  Negative for chills and fever  HENT: Negative for trouble swallowing  Eyes: Negative for visual disturbance     Respiratory: Negative for cough and shortness of breath  Cardiovascular: Positive for chest pain  Negative for leg swelling  Gastrointestinal: Negative for abdominal pain and vomiting  Genitourinary: Negative for difficulty urinating  Musculoskeletal: Positive for arthralgias and myalgias  Skin: Negative for rash  Neurological: Positive for headaches  Negative for dizziness  Psychiatric/Behavioral: Negative for confusion  Past Medical and Surgical History:     Past Medical History:   Diagnosis Date    CAD (coronary artery disease)     s/p stent x1 on 6/19/19    Closed fracture of distal end of fibula with routine healing     unspecified fracture morphology, unspecified laterality, subsequent encounter; Last Assessed:12/27/16    GERD (gastroesophageal reflux disease)     Heart attack ([facility] [address] )     Hypertension     Kidney stones     Nocturia     Psoriasis     Sleep apnea        Past Surgical History:   Procedure Laterality Date    APPENDECTOMY      CARPAL TUNNEL RELEASE      ROTATOR CUFF REPAIR      SHOULDER ARTHROSCOPY Left     Last ASsessed:10/24/16    TOTAL HIP ARTHROPLASTY      TRIGGER FINGER RELEASE         Meds/Allergies:    Prior to Admission medications    Medication Sig Start Date End Date Taking?  Authorizing Provider   Acetaminophen 325 MG CAPS Take 650 mg by mouth every 6 (six) hours   Yes Historical Provider, MD   aspirin (ECOTRIN LOW STRENGTH) 81 mg EC tablet Take 1 tablet (81 mg total) by mouth daily 6/21/19  Yes Mariia Colby PA-C   atorvastatin (LIPITOR) 80 mg tablet take 1 tablet by mouth once daily WITH DINNER 5/4/20  Yes Mahogany Rodriguez PA-C   clopidogrel (PLAVIX) 75 mg tablet Take 1 tablet (75 mg total) by mouth daily 8/12/19  Yes Alejandra Harvey PA-C   Fexofenadine HCl (ALLEGRA ALLERGY PO) Take by mouth   Yes Historical Provider, MD   losartan (COZAAR) 100 MG tablet Take 1 tablet (100 mg total) by mouth daily 3/27/20  Yes Fatoumata Barajas MD   metoprolol succinate (TOPROL-XL) 25 mg 24 hr tablet Take 0 5 tablets (12 5 mg total) by mouth daily 1/16/20  Yes Bella Foss MD   omeprazole (PriLOSEC) 20 mg delayed release capsule take 1 capsule by mouth once daily 12/1/19  Yes Franca Bowie MD   oxybutynin (DITROPAN) 5 mg tablet TAKE 1 TO 2 TABLETS BY MOUTH ONCE DAILY BEFORE BED 6/24/20  Yes ALEIDA Shah   tamsulosin [facility]) 0 4 mg take 2 capsules by mouth at bedtime 1/30/20  Yes Franca Bowie MD   TIZANIDINE HCL PO Take 4 mg by mouth as needed    Yes Historical Provider, MD   traMADol (ULTRAM) 50 mg tablet Take 100 mg by mouth as needed  6/3/19  Yes Historical Provider, MD   traZODone (DESYREL) 50 mg tablet take 1 tablet by mouth at bedtime 6/24/20  Yes Allyssa Jacobson MD   betamethasone, augmented, (DIPROLENE-AF) 0 05 % cream apply twice daily to rash x 2 weeks  then Saturday and Sunday only 9/26/19   Historical Provider, MD   calcipotriene (DOVONEX) 0 005 % cream apply to affected area twice a day [facility] 8/13/19   Historical Provider, MD     I have reviewed home medications with patient personally  Allergies:    Allergies   Allergen Reactions    Latex Hives    Other     Pollen Extract     Shellfish-Derived Products      [employer]       Social History:     Marital Status:    Occupation:  Unknown  Patient Pre-hospital Living Situation:  Home  Patient Pre-hospital Level of Mobility:  Ambulatory  Patient Pre-hospital Diet Restrictions:  None  Substance Use History:   Social History     Substance and Sexual Activity   Alcohol Use Yes    Comment: 1 drink daily     Social History     Tobacco Use   Smoking Status Never Smoker   Smokeless Tobacco Never Used     Social History     Substance and Sexual Activity   Drug Use No       Family History:    Family History   Problem Relation Age of Onset    Arthritis Mother     Hypertension Mother     Irritable bowel syndrome Mother     Osteoporosis Mother     Arthritis Other        Physical Exam:     Vitals:   Blood Pressure: (!) 184/96 (07/05/20 1630)  Pulse: (!) 44 (07/05/20 1630)  Temperature: (!) 97 1 °F (36 2 °C) (07/05/20 1414)  Temp Source: Temporal (07/05/20 1414)  Respirations: 18 (07/05/20 1630)  Weight - Scale: 108 kg (238 lb 1 6 oz) (07/05/20 1414)  SpO2: 92 % (07/05/20 1630)    Physical Exam   Constitutional: He is oriented to person, place, and time  He appears well-nourished  No distress  HENT:   Head: Normocephalic and atraumatic  Eyes: Conjunctivae are normal  No scleral icterus  Neck: Neck supple  Cardiovascular: Regular rhythm and normal heart sounds  Bradycardia present  No murmur heard  Pulmonary/Chest: Effort normal and breath sounds normal  No respiratory distress  He has no wheezes  Abdominal: Soft  Bowel sounds are normal  He exhibits no distension  There is no tenderness  Musculoskeletal: He exhibits no edema, tenderness or deformity  Neurological: He is alert and oriented to person, place, and time  Skin: Skin is warm and dry  Psychiatric: He has a normal mood and affect  His behavior is normal  Thought content normal    Vitals reviewed  Additional Data:     Lab Results: I have personally reviewed pertinent reports  Results from last 7 days   Lab Units 07/05/20  1452   WBC Thousand/uL 8 24   HEMOGLOBIN g/dL 16 5   HEMATOCRIT % 47 3   PLATELETS Thousands/uL 228   NEUTROS PCT % 60   LYMPHS PCT % 26   MONOS PCT % 12   EOS PCT % 1     Results from last 7 days   Lab Units 07/05/20  1452   SODIUM mmol/L 141   POTASSIUM mmol/L 4 5   CHLORIDE mmol/L 105   CO2 mmol/L 29   BUN mg/dL 18   CREATININE mg/dL 0 93   ANION GAP mmol/L 7   CALCIUM mg/dL 9 4   ALBUMIN g/dL 4 1   TOTAL BILIRUBIN mg/dL 1 23*   ALK PHOS U/L 48   ALT U/L 27   AST U/L 14   GLUCOSE RANDOM mg/dL 103                       Imaging: I have personally reviewed pertinent reports  CT head without contrast   ED Interpretation by Jose Ramon Meyer DO (07/05 2319)   IMPRESSION:           1    Stable exam without acute intracranial abnormality noted           Final Result by Narayan Iqbal MD (07/05 4225)         1  Stable exam without acute intracranial abnormality noted  Workstation performed: [id number]         CTA chest abdomen pelvis w wo contrast   ED Interpretation by Jacobo Field DO (07/05 1710)   IMPRESSION:       No acute vascular findings        Scattered small likely benign subpleural lung nodules versus nodular pleural thickening as detailed above  The findings are new and while likely benign should probably be reassessed in 6 months with repeat chest CT        Gallstones without evidence of cholecystitis        Colonic diverticulosis without diverticulitis        Single tiny right kidney stone of about 1 mm size        Degenerative changes of the spine and right hip  Left hip prosthesis appears grossly satisfactory  Final Result by Navjot Almanzar MD (07/05 1630)      No acute vascular findings  Scattered small likely benign subpleural lung nodules versus nodular pleural thickening as detailed above  The findings are new and while likely benign should probably be reassessed in 6 months with repeat chest CT  Gallstones without evidence of cholecystitis  Colonic diverticulosis without diverticulitis  Single tiny right kidney stone of about 1 mm size  Degenerative changes of the spine and right hip  Left hip prosthesis appears grossly satisfactory  The study was marked in EPIC for significant notification  Workstation performed: [id number]         VAS lower limb venous duplex study, unilateral/limited    (Results Pending)       EKG, Pathology, and Other Studies Reviewed on Admission:   · EKG:  Sinus bradycardia, no acute ST or T-wave changes    Allscripts / Epic Records Reviewed: Yes     ** Please Note: This note has been constructed using a voice recognition system   **

## 2020-07-05 NOTE — ASSESSMENT & PLAN NOTE
Patient presents to ED with complaint of persistent hypertension with intermittent chest pain, fatigue, lower extremity cramping  /96 at time of presentation  Home regimen losartan 100 mg daily with metoprolol XL 12 5 mg daily  Was in contact with cardiologist yesterday and was told to take extra dose of metoprolol  Heart rate in low 40s at time of admission, will avoid further beta-blockade  P r n   Hydralazine IV while inpatient  Notes intermittent left-sided chest pain for the past 2 weeks  CTA negative for acute abnormalities  Troponin x1 negative, continue to trend x3  EKG without acute ST or T-wave changes  Does have history of CAD requiring stenting in June 2019  Start Norvasc 5 mg daily   Cardiology consult

## 2020-07-06 LAB
ANION GAP SERPL CALCULATED.3IONS-SCNC: 6 MMOL/L (ref 4–13)
ATRIAL RATE: 44 BPM
BASOPHILS # BLD AUTO: 0.02 THOUSANDS/ΜL (ref 0–0.1)
BASOPHILS NFR BLD AUTO: 0 % (ref 0–1)
BUN SERPL-MCNC: 17 MG/DL (ref 5–25)
CALCIUM SERPL-MCNC: 9.1 MG/DL (ref 8.3–10.1)
CHLORIDE SERPL-SCNC: 105 MMOL/L (ref 100–108)
CO2 SERPL-SCNC: 28 MMOL/L (ref 21–32)
CREAT SERPL-MCNC: 0.91 MG/DL (ref 0.6–1.3)
EOSINOPHIL # BLD AUTO: 0.09 THOUSAND/ΜL (ref 0–0.61)
EOSINOPHIL NFR BLD AUTO: 1 % (ref 0–6)
ERYTHROCYTE [DISTWIDTH] IN BLOOD BY AUTOMATED COUNT: 12.9 % (ref 11.6–15.1)
GFR SERPL CREATININE-BSD FRML MDRD: 86 ML/MIN/1.73SQ M
GLUCOSE SERPL-MCNC: 107 MG/DL (ref 65–140)
HCT VFR BLD AUTO: 48.3 % (ref 36.5–49.3)
HGB BLD-MCNC: 16.1 G/DL (ref 12–17)
IMM GRANULOCYTES # BLD AUTO: 0.04 THOUSAND/UL (ref 0–0.2)
IMM GRANULOCYTES NFR BLD AUTO: 1 % (ref 0–2)
LYMPHOCYTES # BLD AUTO: 2.27 THOUSANDS/ΜL (ref 0.6–4.47)
LYMPHOCYTES NFR BLD AUTO: 27 % (ref 14–44)
MAGNESIUM SERPL-MCNC: 2.1 MG/DL (ref 1.6–2.6)
MCH RBC QN AUTO: 32.8 PG (ref 26.8–34.3)
MCHC RBC AUTO-ENTMCNC: 33.3 G/DL (ref 31.4–37.4)
MCV RBC AUTO: 98 FL (ref 82–98)
MONOCYTES # BLD AUTO: 0.93 THOUSAND/ΜL (ref 0.17–1.22)
MONOCYTES NFR BLD AUTO: 11 % (ref 4–12)
NEUTROPHILS # BLD AUTO: 4.99 THOUSANDS/ΜL (ref 1.85–7.62)
NEUTS SEG NFR BLD AUTO: 60 % (ref 43–75)
NRBC BLD AUTO-RTO: 0 /100 WBCS
P AXIS: 25 DEGREES
PLATELET # BLD AUTO: 217 THOUSANDS/UL (ref 149–390)
PMV BLD AUTO: 9.5 FL (ref 8.9–12.7)
POTASSIUM SERPL-SCNC: 4.6 MMOL/L (ref 3.5–5.3)
PR INTERVAL: 138 MS
QRS AXIS: -63 DEGREES
QRSD INTERVAL: 160 MS
QT INTERVAL: 516 MS
QTC INTERVAL: 441 MS
RBC # BLD AUTO: 4.91 MILLION/UL (ref 3.88–5.62)
SODIUM SERPL-SCNC: 139 MMOL/L (ref 136–145)
T WAVE AXIS: 45 DEGREES
VENTRICULAR RATE: 44 BPM
WBC # BLD AUTO: 8.34 THOUSAND/UL (ref 4.31–10.16)

## 2020-07-06 PROCEDURE — 85025 COMPLETE CBC W/AUTO DIFF WBC: CPT | Performed by: PHYSICIAN ASSISTANT

## 2020-07-06 PROCEDURE — 80048 BASIC METABOLIC PNL TOTAL CA: CPT | Performed by: PHYSICIAN ASSISTANT

## 2020-07-06 PROCEDURE — 99214 OFFICE O/P EST MOD 30 MIN: CPT | Performed by: INTERNAL MEDICINE

## 2020-07-06 PROCEDURE — 93010 ELECTROCARDIOGRAM REPORT: CPT | Performed by: INTERNAL MEDICINE

## 2020-07-06 PROCEDURE — 93971 EXTREMITY STUDY: CPT | Performed by: SURGERY

## 2020-07-06 PROCEDURE — 94660 CPAP INITIATION&MGMT: CPT

## 2020-07-06 PROCEDURE — 99225 PR SBSQ OBSERVATION CARE/DAY 25 MINUTES: CPT | Performed by: INTERNAL MEDICINE

## 2020-07-06 PROCEDURE — 83735 ASSAY OF MAGNESIUM: CPT | Performed by: PHYSICIAN ASSISTANT

## 2020-07-06 RX ORDER — KETOROLAC TROMETHAMINE 30 MG/ML
30 INJECTION, SOLUTION INTRAMUSCULAR; INTRAVENOUS ONCE
Status: COMPLETED | OUTPATIENT
Start: 2020-07-06 | End: 2020-07-06

## 2020-07-06 RX ORDER — AMLODIPINE BESYLATE 10 MG/1
10 TABLET ORAL DAILY
Status: DISCONTINUED | OUTPATIENT
Start: 2020-07-07 | End: 2020-07-07 | Stop reason: HOSPADM

## 2020-07-06 RX ORDER — AMLODIPINE BESYLATE 5 MG/1
5 TABLET ORAL ONCE
Status: COMPLETED | OUTPATIENT
Start: 2020-07-06 | End: 2020-07-06

## 2020-07-06 RX ADMIN — ONDANSETRON 4 MG: 2 INJECTION INTRAMUSCULAR; INTRAVENOUS at 19:11

## 2020-07-06 RX ADMIN — ATORVASTATIN CALCIUM 80 MG: 80 TABLET, FILM COATED ORAL at 16:35

## 2020-07-06 RX ADMIN — ASPIRIN 81 MG: 81 TABLET, COATED ORAL at 08:05

## 2020-07-06 RX ADMIN — TRAZODONE HYDROCHLORIDE 50 MG: 50 TABLET ORAL at 21:40

## 2020-07-06 RX ADMIN — AMLODIPINE BESYLATE 5 MG: 5 TABLET ORAL at 18:58

## 2020-07-06 RX ADMIN — LOSARTAN POTASSIUM 100 MG: 50 TABLET, FILM COATED ORAL at 08:05

## 2020-07-06 RX ADMIN — PANTOPRAZOLE SODIUM 40 MG: 40 TABLET, DELAYED RELEASE ORAL at 05:35

## 2020-07-06 RX ADMIN — CLOPIDOGREL BISULFATE 75 MG: 75 TABLET ORAL at 08:05

## 2020-07-06 RX ADMIN — HYDRALAZINE HYDROCHLORIDE 10 MG: 20 INJECTION INTRAMUSCULAR; INTRAVENOUS at 21:38

## 2020-07-06 RX ADMIN — OXYBUTYNIN CHLORIDE 5 MG: 5 TABLET ORAL at 21:39

## 2020-07-06 RX ADMIN — ENOXAPARIN SODIUM 40 MG: 40 INJECTION SUBCUTANEOUS at 08:04

## 2020-07-06 RX ADMIN — AMLODIPINE BESYLATE 5 MG: 5 TABLET ORAL at 08:06

## 2020-07-06 RX ADMIN — METOPROLOL SUCCINATE 12.5 MG: 25 TABLET, EXTENDED RELEASE ORAL at 08:05

## 2020-07-06 RX ADMIN — ACETAMINOPHEN 650 MG: 325 TABLET ORAL at 16:35

## 2020-07-06 RX ADMIN — ACETAMINOPHEN 650 MG: 325 TABLET ORAL at 08:10

## 2020-07-06 RX ADMIN — KETOROLAC TROMETHAMINE 30 MG: 30 INJECTION, SOLUTION INTRAMUSCULAR at 22:16

## 2020-07-06 RX ADMIN — LORATADINE 10 MG: 10 TABLET ORAL at 08:05

## 2020-07-06 RX ADMIN — TAMSULOSIN HYDROCHLORIDE 0.4 MG: 0.4 CAPSULE ORAL at 21:39

## 2020-07-06 RX ADMIN — TAMSULOSIN HYDROCHLORIDE 0.4 MG: 0.4 CAPSULE ORAL at 08:05

## 2020-07-06 NOTE — CASE MANAGEMENT
This case management assistant reviewed an observation paper with the patient and the patient signed

## 2020-07-06 NOTE — CONSULTS
Cardiology Consultation  MD Jarvis Lezama MD Emmitt Jain, DO, [facility] MD Loly Renner DO, Prateek Palma DO, [facility] - [place]  ----------------------------------------------------------------  [address] [facility]  [address] [facility], [address]    Mk Jeronimo 77 y o  male MRN: [id number]  Unit/Bed#: E4 -01 Encounter: [id number]      Reason for Consultation: Accelerated Hypertension      ASSESSMENT:   Accelerated hypertension  Non anginal chest discomfort  CAD/NSTEMI s/p cath w/ FARHAN-OM2 with residual 60% D1, 60% left AV groove, 50% mRCA, June 2019  LVEF 95%, grade 1 diastolic dysfunction, mild MAC, mild-to-moderate MR, mild AR, trace TR, June 2019  Left leg pain   Hypertension  Dyslipidemia  BARB on CPAP  Obesity    PLAN:  Patient has been having markedly elevated blood pressures with symptoms that are inconsistent with his prior cardiovascular disease  He takes 12 5 of Toprol-XL, 100 losartan for his blood pressure  Agree with starting the patient on amlodipine 5 mg daily  Would up titrate amlodipine further for blood pressure goal of a systolic of less than 037 and diastolic of less than 90  IV hydralazine p r n    Continue aspirin and Plavix for dual anti-platelet therapy; is been greater than 12 months but would continue Plavix at this time to decrease risk of repeat event  Continue high-intensity statin  Check 2D echocardiogram to reassess cardiac structure and function with markedly elevated blood pressure  Cardiac enzymes negative over 3 sets and ECG unchanged from prior; doubt ACS  Lower extremity discomfort sounds muscular in nature; good peripheral pulses bilaterally  If echo similar to prior and blood pressure controlled, outpatient follow-up for additional CV testing as clinically indicated with Dr Alpa Narvaez    Signed: Candie Cano      History of Present Illness:  Mk Jeronimo is a 77 y o  male with Hypertension, dyslipidemia, BARB and history of non ST elevation myocardial infarction with prior stent to the OM2 in June of 2019 presents with an episode of generalized fatigue and intermittent axillary sharp discomfort/back discomfort  Patient states that he has been experiencing progressive fatigue over the past 3 weeks  He receives cortisone injections due to chronic back pain  He previously had been working out but has had decreased activity level due to COVID-19  He does not note any exertional component to his fatigue  Intermittently he was having sharp pinpoint discomfort in the left axilla with some subtle discomfort in left side of his back  He states that the symptoms are very different from the symptoms he experienced with his non ST elevation MI back in June of 2019  He denies any lower extremity swelling, orthopnea or paroxysmal nocturnal dyspnea  He took his blood pressure at home which was found to be markedly elevated  He called his primary cardiologist's office and the on call cardiologist told him to take a full dose of his metoprolol medication  His blood pressure did not substantially improved  He subsequently presented to [facility] in [facility] for evaluation  In the emergency department, CTA was performed and showed no evidence of dissection  Blood pressure was found to be 200/100 mm Hg  He follows with Dr Don Bull as an outpatient  Patient states that his prior anginal equivalent was a crushing back discomfort which she has not experienced over the last 3 weeks or since his MI in June of 2019  Review of Systems:  Review of Systems   Constitution: Positive for malaise/fatigue  Negative for decreased appetite, fever, weight gain and weight loss  HENT: Negative for congestion and sore throat  Eyes: Negative for visual disturbance  Cardiovascular: Positive for chest pain  Negative for dyspnea on exertion, leg swelling, near-syncope and palpitations     Respiratory: Negative for cough and shortness of breath  Hematologic/Lymphatic: Negative for bleeding problem  Skin: Negative for rash  Musculoskeletal: Positive for back pain  Negative for myalgias and neck pain  Gastrointestinal: Negative for abdominal pain and nausea  Neurological: Negative for light-headedness and weakness  Psychiatric/Behavioral: Negative for depression  Past Medical History:   Diagnosis Date    CAD (coronary artery disease)     s/p stent x1 on 6/19/19    Closed fracture of distal end of fibula with routine healing     unspecified fracture morphology, unspecified laterality, subsequent encounter;  Last Assessed:12/27/16    GERD (gastroesophageal reflux disease)     Heart attack ([facility] [address] )     Hypertension     Kidney stones     Nocturia     Psoriasis     Sleep apnea        Past Surgical History:   Procedure Laterality Date    APPENDECTOMY      CARPAL TUNNEL RELEASE      ROTATOR CUFF REPAIR      SHOULDER ARTHROSCOPY Left     Last ASsessed:10/24/16    TOTAL HIP ARTHROPLASTY      TRIGGER FINGER RELEASE         Social History     Socioeconomic History    Marital status:      Spouse name: None    Number of children: None    Years of education: None    Highest education level: None   Occupational History    None   Social Needs    Financial resource strain: None    Food insecurity:     Worry: None     Inability: None    Transportation needs:     Medical: None     Non-medical: None   Tobacco Use    Smoking status: Never Smoker    Smokeless tobacco: Never Used   Substance and Sexual Activity    Alcohol use: Yes     Frequency: 4 or more times a week     Drinks per session: 1 or 2     Binge frequency: Never     Comment: 1 drink daily    Drug use: No    Sexual activity: None   Lifestyle    Physical activity:     Days per week: None     Minutes per session: None    Stress: None   Relationships    Social connections:     Talks on phone: None     Gets together: None     Attends Synagogue service: None     Active member of club or organization: None     Attends meetings of clubs or organizations: None     Relationship status: None    Intimate partner violence:     Fear of current or ex partner: None     Emotionally abused: None     Physically abused: None     Forced sexual activity: None   Other Topics Concern    None   Social History Narrative    None       Family History   Problem Relation Age of Onset    Arthritis Mother     Hypertension Mother     Irritable bowel syndrome Mother     Osteoporosis Mother     Arthritis Other        Allergies   Allergen Reactions    Latex Hives    Other     Pollen Extract     Shellfish-Derived Products      [employer]       No current facility-administered medications on file prior to encounter        Current Outpatient Medications on File Prior to Encounter   Medication Sig    aspirin (ECOTRIN LOW STRENGTH) 81 mg EC tablet Take 1 tablet (81 mg total) by mouth daily    atorvastatin (LIPITOR) 80 mg tablet take 1 tablet by mouth once daily WITH DINNER    clopidogrel (PLAVIX) 75 mg tablet Take 1 tablet (75 mg total) by mouth daily    Fexofenadine HCl (ALLEGRA ALLERGY PO) Take by mouth    fluticasone (FLONASE) 50 mcg/act nasal spray 2 sprays into each nostril 2 (two) times a day as needed for rhinitis    losartan (COZAAR) 100 MG tablet Take 1 tablet (100 mg total) by mouth daily    metoprolol succinate (TOPROL-XL) 25 mg 24 hr tablet Take 0 5 tablets (12 5 mg total) by mouth daily    omeprazole (PriLOSEC) 20 mg delayed release capsule take 1 capsule by mouth once daily    oxybutynin (DITROPAN) 5 mg tablet TAKE 1 TO 2 TABLETS BY MOUTH ONCE DAILY BEFORE BED (Patient taking differently: Take 5 mg by mouth 2 (two) times a day Take 1 to 2 tablets by mouth once daily before bed )    tamsulosin (FLOMAX) 0 4 mg take 2 capsules by mouth at bedtime (Patient taking differently: Take 0 4 mg by mouth 2 (two) times a day )    TIZANIDINE HCL PO Take 4 mg by mouth as needed     traMADol (ULTRAM) 50 mg tablet Take 100 mg by mouth as needed     traZODone (DESYREL) 50 mg tablet take 1 tablet by mouth at bedtime    Acetaminophen 325 MG CAPS Take 650 mg by mouth every 6 (six) hours    betamethasone, augmented, (DIPROLENE-AF) 0 05 % cream apply twice daily to rash x 2 weeks  then Saturday and Sunday only    calcipotriene (DOVONEX) 0 005 % cream apply to affected area twice a day [facility]          Current Facility-Administered Medications:  acetaminophen 650 mg Oral Q4H PRN Jennifer Smudde, PA-C   amLODIPine 5 mg Oral Daily Jennifer Smudde, PA-C   aspirin 81 mg Oral Daily Jennifer Smudde, PA-C   atorvastatin 80 mg Oral Daily With IGNACIO Causey, PA-C   calcium carbonate 1,000 mg Oral TID PRN Carmelo Snare, PA-C   clopidogrel 75 mg Oral Daily Jennifer Smudde, PA-C   enoxaparin 40 mg Subcutaneous Daily Jennifer Smudde, PA-C   hydrALAZINE 10 mg Intravenous Q6H PRN Jennifer Smudde, PA-C   loratadine 10 mg Oral Daily Jennifer Smudde, PA-C   losartan 100 mg Oral Daily Jennifer Smudde, PA-C   metoprolol succinate 12 5 mg Oral Daily Jennifer Smudde, PA-C   nitroglycerin 0 4 mg Sublingual Q5 Min PRN Jennifer Smudde, PA-C   ondansetron 4 mg Intravenous Q6H PRN Jennifer Smudde, PA-C   oxybutynin 5 mg Oral HS Jennifer Smudde, PA-C   pantoprazole 40 mg Oral Daily Before Breakfast Jennifer Smudde, PA-C   tamsulosin 0 4 mg Oral BID Jennifer Smudde, PA-C   tiZANidine 4 mg Oral Q8H PRN Jennifer Smudde, PA-C   traMADol 100 mg Oral Q6H PRN Avani Pour Smudde, PA-C   traZODone 50 mg Oral HS Jennifer Smudde, PA-C            Vitals:    07/05/20 1808 07/1950 07/05/20 2300 07/06/20 0810   BP: (!) 179/84 134/76 141/74 170/77   BP Location: Left arm Right arm Right arm Left arm   Pulse: (!) 54 (!) 52 (!) 50 56   Resp: 18 18 17 18   Temp: 99 5 °F (37 5 °C) 98 3 °F (36 8 °C) 97 9 °F (36 6 °C) 98 °F (36 7 °C)   TempSrc: Temporal Temporal Temporal Temporal   SpO2: 97% 96% 93% 94%   Weight: 108 kg (238 lb 8 6 oz)      Height:  5' 9" (1 753 m)         I/O last 3 completed shifts: In: 1340 [P O :840; IV Piggyback:500]  Out: -       Intake/Output Summary (Last 24 hours) at 2020 0837  Last data filed at 2020 0536  Gross per 24 hour   Intake 1340 ml   Output --   Net 1340 ml       Weight change:     PHYSICAL EXAMINATION:  Gen: Awake, Alert, NAD  HEENT: AT/NC, Anicteric, mmm  Neck: Supple, No elevated JVP  Resp: CTA bilaterally no w/r/r  CV: RRR +S1, S2, No m/r/g  Abd: Soft, NT/ND + BS  Ext: warm, no LE edema bilaterally  Neuro: Follows commands, moves all extermities  Psych: Appropriate affect    Lab Results:  Results from last 7 days   Lab Units 20  0534   WBC Thousand/uL 8 34   HEMOGLOBIN g/dL 16 1   HEMATOCRIT % 48 3   PLATELETS Thousands/uL 217     Results from last 7 days   Lab Units 20  0534 20  1452   POTASSIUM mmol/L 4 6 4 5   CHLORIDE mmol/L 105 105   CO2 mmol/L 28 29   BUN mg/dL 17 18   CREATININE mg/dL 0 91 0 93   CALCIUM mg/dL 9 1 9 4   ALK PHOS U/L  --  48   ALT U/L  --  27   AST U/L  --  14     No results found for: TROPONINT      Results from last 7 days   Lab Units 20  2244 20  1942 20  1452   TROPONIN I ng/mL <0 02 <0 02 <0 02                   No results found for this or any previous visit  Results for orders placed during the hospital encounter of 19   Cardiac catheterization    Narrative [facility] [address]  [address]    [place], [address]  [phone]    [facility]    Invasive Cardiovascular Lab Complete Report    Patient: Dixon Thomas  MR number: [id number]  Account number: [de-identified]  Study date: 2019  Gender: Male  : 1950  Height: 68 9 in  Weight: 231 lb  BSA: 2 2 mï¾²    Allergies: LATEX, OTHER, POLLEN EXTRACT, SHELLFISH-DERIVED PRODUCTS    Diagnostic Cardiologist:  Carole Falcno MD  Interventional Cardiologist:  Carole Falcon MD    SUMMARY    CORONARY CIRCULATION:  1st diagonal: There was a tubular 60 % stenosis  2nd obtuse marginal: There was a 99 % stenosis  There was ROSIE grade 2 flow through the vessel (partial perfusion)  This lesion is a likely culprit for the patient's recent myocardial infarction  An intervention was performed  Left AV groove artery: There was a discrete 60 % stenosis  Mid RCA: There was a 50 % stenosis  1ST LESION INTERVENTIONS:  A successful balloon angioplasty with stent procedure was performed on the 99 % lesion in the 2nd obtuse marginal  Following intervention there was an excellent angiographic appearance with a 0 % residual stenosis  A [address] Rx 2 75 x 18mm drug-eluting stent was placed across the lesion and deployed at a maximum inflation pressure of 16 pam  INDICATIONS:  --  Possible CAD: myocardial infarction without ST elevation (NSTEMI)  PROCEDURES PERFORMED    --  Left coronary angiography  --  Right coronary angiography  --  Inpatient  --  Coronary Catheterization (w/o LHC)  --  Mod Sedation Same Physician Initial 15min  --  Mod Sedation Same Physician Add 15min  --  Coronary Drug Eluting Stent w/PTCA  --  Intervention on OM2: balloon angioplasty, stent  PROCEDURE: The risks and alternatives of the procedures and conscious sedation were explained to the patient and informed consent was obtained  The patient was brought to the cath lab and placed on the table  The planned puncture sites  were prepped and draped in the usual sterile fashion  Oxygen 4 L/min  --  Right radial artery access  After performing an Luis's test to verify adequate ulnar artery supply to the hand, the radial site was prepped  The puncture site was infiltrated with local anesthetic  The vessel was accessed using the  modified Seldinger technique, a wire was advanced into the vessel, and a sheath was advanced over the wire into the vessel  --  Left coronary artery angiography   A catheter was advanced over a guidewire into the aorta and positioned in the left coronary artery ostium under fluoroscopic guidance  Angiography was performed  --  Right coronary artery angiography  A catheter was advanced over a guidewire into the aorta and positioned in the right coronary artery ostium under fluoroscopic guidance  Angiography was performed  --  Inpatient  --  Coronary Catheterization (w/o [facility])  --  Mod Sedation Same Physician Initial 15min  --  Mod Sedation Same Physician Add 15min  LESION INTERVENTION: A successful balloon angioplasty with stent procedure was performed on the 99 % lesion in the 2nd obtuse marginal  Following intervention there was an excellent angiographic appearance with a 0 % residual stenosis  There was ROSIE 2 flow before the procedure and ROSIE 3 flow after the procedure  There was no dissection  --  Vessel setup was performed  A Launcher 6Fr Ebu 3 75 guiding catheter was used to cannulate the vessel  --  Vessel setup was performed  A Runthrough NS 180cm wire was used to cross the lesion  --  Balloon angioplasty was performed, using a Trek Rx 2 5 x 15mm balloon, with 1 inflations and a maximum inflation pressure of 10 pam  --  A Xience Mamie Rx 2 75 x 18mm drug-eluting stent was placed across the lesion and deployed at a maximum inflation pressure of 16 pam  --  Balloon angioplasty was performed, using a NC Trek Rx 3 0 x 12mm balloon, with 2 inflations and a maximum inflation pressure of 20 pam  INTERVENTIONS:  --  Coronary Drug Eluting Stent w/PTCA  PROCEDURE COMPLETION: The patient tolerated the procedure well and was discharged from the cath lab  TIMING: Test started at 11:52  Test concluded at 12:33  HEMOSTASIS: The sheath was removed  The site was compressed with a Hemoband  device  Hemostasis was obtained  MEDICATIONS GIVEN: Fentanyl (1OOmcg/2 ml), 50 mcg, IV, at 11:52  Versed (2mg/2ml), 2 mg, IV, at 11:52  Hydrocortisone 100mg/ml, 100 mg, IV, at 11:53  0 9 NSS, infusion rate of 100 ml/hr, IV, at 11:53  1%  Lidocaine, 2 ml, subcutaneously, at 11:57  Fentanyl (1OOmcg/2 ml), 50 mcg, IV, at 11:58  Versed (2mg/2ml), 1 mg, IV, at 11:58  Verapamil (5mg/2ml), 2 5 mg, IV, at 11:59  Heparin 1000 units/ml, 4,000 units, IV, at 11:59  Nitroglycerin  (200mcg/ml), 200 mcg, at 11:59  Heparin 1000 units/ml, 8,000 units, IV, at 12:04  Nitro Paste, 1, topically, at 12:06, in  Fentanyl (1OOmcg/2 ml), 50 mcg, IV, at 12:07  Versed (2mg/2ml), 1 mg, IV, at 12:07  Heparin 1000 units/ml, 1,000  units, IV, at 12:15  Benadryl (50mg/ml), 25 mg, IV, at 12:16  Fentanyl (1OOmcg/2 ml), 50 mcg, IV, at 12:18  Versed (2mg/2ml), 1 mg, IV, at 12:18  Nitroglycerine (200mcg/ml), 200 mcg, at 12:19  Nitroglycerine (200mcg/ml), 200 mcg, at 12:29  CONTRAST GIVEN: 100 ml Omnipaque (350mg I /ml)  50 ml Omnipaque (350mg I /ml)  RADIATION EXPOSURE: Fluoroscopy time: 6 35 min  CORONARY VESSELS:   --  The coronary circulation is co-dominant  --  Left main: The vessel was very large sized  Angiography showed no evidence of disease  --  LAD: The vessel was medium sized  Angiography showed the vessel to wrap around the cardiac apex  There was one major diagonal branch  --  1st diagonal: The vessel was small sized  Angiography showed moderate atherosclerosis  There was a tubular 60 % stenosis  --  Circumflex: The vessel was very large sized  Angiography showed mild atherosclerosis  There were three major obtuse marginals  --  2nd obtuse marginal: The vessel was medium sized  There was a 99 % stenosis  There was ROSIE grade 2 flow through the vessel (partial perfusion)  This lesion is a likely culprit for the patient's recent myocardial infarction  An  intervention was performed  --  Left AV groove artery: There was a discrete 60 % stenosis  --  Ramus intermedius: The vessel was small to medium sized  Angiography showed minor luminal irregularities  --  RCA: The vessel was small to medium sized  Angiography showed mild atherosclerosis    --  Mid RCA: There was a 50 % stenosis  IMPRESSIONS:  There is significant double vessel coronary artery disease  RECOMMENDATIONS  The patient should continue with the present medications  Patient management should include increasing lipid lowering therapy  DISPOSITION:  The patient left the catheterization laboratory in stable condition  Prepared and signed by    Jenean Osler, MD  Signed 2019 12:40:27    Study diagram    Angiographic findings  Native coronary lesions:  ï¾·D1: Lesion 1: tubular, 60 % stenosis  ï¾·OM2: Lesion 1: 99 % stenosis  ï¾·Left AV groove artery: Lesion 1: discrete, 60 % stenosis  ï¾·Mid RCA: Lesion 1: 50 % stenosis  Intervention results  Native coronary lesions:  ï¾·Successful balloon angioplasty and stent of the 99 % stenosis in OM2  Appearance excellent with 0 % residual stenosis  Stent: Klaudia i3 membranecci Rx 2 75 x 18mm drug-eluting  Hemodynamic tables    Pressures:  Baseline  Pressures:  - HR: 75  Pressures:  - Rhythm:  Pressures:  -- Aortic Pressure (S/D/M): 121/79/93    Outputs:  Baseline  Outputs:  -- CALCULATIONS: [age]  Outputs:  -- CALCULATIONS: Body Surface Area: 2 20  Outputs:  -- CALCULATIONS: Height in cm: 175 00  Outputs:  -- CALCULATIONS: Sex: Male  Outputs:  -- CALCULATIONS: Weight in k 00         ECG:  Sinus bradycardia 44 beats per minute voltage criteria for LVH right bundle-branch block with LAFB, 2020    CTA C/A/P 2020:  IMPRESSION:     No acute vascular findings      Scattered small likely benign subpleural lung nodules versus nodular pleural thickening as detailed above  The findings are new and while likely benign should probably be reassessed in 6 months with repeat chest CT      Gallstones without evidence of cholecystitis      Colonic diverticulosis without diverticulitis      Single tiny right kidney stone of about 1 mm size      Degenerative changes of the spine and right hip    Left hip prosthesis appears grossly satisfactory      The study was marked in EPIC for significant notification        This note was completed in part utilizing [employer] Software  Grammatical errors, random word insertions, spelling mistakes, and incomplete sentences may be an occasional consequence of this system secondary to software limitations, ambient noise, and hardware issues  If you have any questions or concerns about the content, text, or information contained within the body of this dictation, please contact the provider for clarification

## 2020-07-06 NOTE — ASSESSMENT & PLAN NOTE
Patient presents to ED with complaint of persistent hypertension with intermittent chest pain, fatigue, lower extremity cramping  /96 at time of presentation  Home regimen losartan 100 mg daily with metoprolol XL 12 5 mg daily, Norvasc 5 mg  Was in contact with cardiologist yesterday and was told to take extra dose of metoprolol  Heart rate in low 40s at time of admission, will avoid further beta-blockade  P r n   Hydralazine IV while inpatient  Notes intermittent left-sided chest pain for the past 2 weeks  CTA negative for acute abnormalities  Troponin negative x3  Cardiology consultation appreciated  Plan for echocardiogram

## 2020-07-06 NOTE — PROGRESS NOTES
Progress Note - Bright Harder 1950, 77 y o  male MRN: [id number]    Unit/Bed#: E4 -01 Encounter: [id number]    Primary Care Provider: Rigo Kilpatrick MD   Date and time admitted to hospital: 7/5/2020  2:21 PM        * Hypertensive urgency  Assessment & Plan  Patient presents to ED with complaint of persistent hypertension with intermittent chest pain, fatigue, lower extremity cramping  /96 at time of presentation  Home regimen losartan 100 mg daily with metoprolol XL 12 5 mg daily, Norvasc 5 mg  Was in contact with cardiologist yesterday and was told to take extra dose of metoprolol  Heart rate in low 40s at time of admission, will avoid further beta-blockade  P r n  Hydralazine IV while inpatient  Notes intermittent left-sided chest pain for the past 2 weeks  CTA negative for acute abnormalities  Troponin negative x3  Cardiology consultation appreciated  Plan for echocardiogram      Left leg pain  Assessment & Plan  Patient notes 2 weeks of intermittent left leg cramping and aching  Lower extremity duplex ordered to rule out DVT  Nontender on exam, consider muscle strain  P r n  Tramadol and tizanidine as prescribed by pain management  Activity as tolerated    Low back pain  Assessment & Plan  Follows outpatient with pain management  Recently had steroid injection in spine last week  Continue p r n  Tramadol and tizanidine    GERD (gastroesophageal reflux disease)  Assessment & Plan  Continue PPI    Obesity (BMI 30 0-34  9)  Assessment & Plan  Patient notes he exercises for multiple hours daily  Would likely benefit from diet modification    BPH with obstruction/lower urinary tract symptoms  Assessment & Plan  Continue Flomax and Ditropan          VTE Pharmacologic Prophylaxis:   Pharmacologic:  Lovenox    Patient Centered Rounds: I have performed bedside rounds with nursing staff today      Education and Discussions with Family / Patient:  Updated son, at bedside    Time Spent for Care: 20 minutes  More than 50% of total time spent on counseling and coordination of care as described above  Current Length of Stay: 0 day(s)    Current Patient Status: Observation   Certification Statement: The patient will continue to require additional inpatient hospital stay due to Hypertensive urgency    Discharge Plan / Estimated Discharge Date:  24 hours    Code Status: Level 1 - Full Code      Subjective:   Patient seen and examined at bedside, complaining of some headaches, denies any further episodes of chest pain    Objective:     Vitals:   Temp (24hrs), Av 4 °F (36 9 °C), Min:97 9 °F (36 6 °C), Max:99 5 °F (37 5 °C)    Temp:  [97 9 °F (36 6 °C)-99 5 °F (37 5 °C)] 98 °F (36 7 °C)  HR:  [44-58] 58  Resp:  [17-18] 18  BP: (134-184)/(74-96) 156/75  SpO2:  [92 %-97 %] 94 %  Body mass index is 35 23 kg/m²  Input and Output Summary (last 24 hours): Intake/Output Summary (Last 24 hours) at 2020 1421  Last data filed at 2020 0536  Gross per 24 hour   Intake 1340 ml   Output --   Net 1340 ml       Physical Exam:    Constitutional: Patient is oriented to person, place and time, no acute distress  HEENT:  Normocephalic, atraumatic  Cardiovascular: Normal S1S2, RRR, No murmurs/rubs/gallops appreciated  Pulmonary:  Bilateral air entry, No rhonchi/rales/wheezing appreciated  Abdominal: Soft, Bowel sounds present, Non-tender, Non-distended  Extremities:  No cyanosis, clubbing or edema  Neurological: Cranial nerves II-XII grossly intact, sensation intact, otherwise no focal neurological symptoms       Additional Data:     Labs:    Results from last 7 days   Lab Units 20  0534   WBC Thousand/uL 8 34   HEMOGLOBIN g/dL 16 1   HEMATOCRIT % 48 3   PLATELETS Thousands/uL 217   NEUTROS PCT % 60   LYMPHS PCT % 27   MONOS PCT % 11   EOS PCT % 1     Results from last 7 days   Lab Units 20  0534 20  1452   POTASSIUM mmol/L 4 6 4 5   CHLORIDE mmol/L 105 105   CO2 mmol/L 28 29   BUN mg/dL 17 18 CREATININE mg/dL 0 91 0 93   CALCIUM mg/dL 9 1 9 4   ALK PHOS U/L  --  48   ALT U/L  --  27   AST U/L  --  14            I Have Reviewed All Lab Data Listed Above          Recent Cultures (last 7 days):           Last 24 Hours Medication List:     Current Facility-Administered Medications:  acetaminophen 650 mg Oral Q4H PRN Jennifer Smudde, PA-C   amLODIPine 5 mg Oral Daily Jennifer Smudde, PA-C   aspirin 81 mg Oral Daily Jennifer Smudde, PA-C   atorvastatin 80 mg Oral Daily With IGNACIO Causey, PA-C   calcium carbonate 1,000 mg Oral TID PRN Tisha Night, PA-C   clopidogrel 75 mg Oral Daily Jennifer Smudde, PA-C   enoxaparin 40 mg Subcutaneous Daily Jennifer Smudde, PA-C   hydrALAZINE 10 mg Intravenous Q6H PRN Jennifer Smudde, PA-C   loratadine 10 mg Oral Daily Jennifer Smudde, PA-C   losartan 100 mg Oral Daily Jennifer Smudde, PA-C   metoprolol succinate 12 5 mg Oral Daily Jennifer Smudde, PA-C   nitroglycerin 0 4 mg Sublingual Q5 Min PRN Jennifer Smudde, PA-C   ondansetron 4 mg Intravenous Q6H PRN Jennifer Smudde, PA-C   oxybutynin 5 mg Oral HS Jennifer Smudde, PA-C   pantoprazole 40 mg Oral Daily Before Breakfast Jennifer Smudde, PA-C   tamsulosin 0 4 mg Oral BID Jennifer Smudde, PA-C   tiZANidine 4 mg Oral Q8H PRN Jennifer Smudde, PA-C   traMADol 100 mg Oral Q6H PRN Jennifer Smudde, PA-C   traZODone 50 mg Oral HS Tisha Night, PA-C        Today, Patient Was Seen By: Nova Quiros MD

## 2020-07-06 NOTE — PLAN OF CARE
Problem: Potential for Falls  Goal: Patient will remain free of falls  Description  INTERVENTIONS:  - Assess patient frequently for physical needs  -  Identify cognitive and physical deficits and behaviors that affect risk of falls    -  [place] fall precautions as indicated by assessment   - Educate patient/family on patient safety including physical limitations  - Instruct patient to call for assistance with activity based on assessment  - Modify environment to reduce risk of injury  - Consider OT/PT consult to assist with strengthening/mobility  Outcome: Progressing     Problem: PAIN - ADULT  Goal: Verbalizes/displays adequate comfort level or baseline comfort level  Description  Interventions:  - Encourage patient to monitor pain and request assistance  - Assess pain using appropriate pain scale  - Administer analgesics based on type and severity of pain and evaluate response  - Implement non-pharmacological measures as appropriate and evaluate response  - Consider cultural and social influences on pain and pain management  - Notify physician/advanced practitioner if interventions unsuccessful or patient reports new pain  Outcome: Progressing     Problem: SAFETY ADULT  Goal: Maintain or return to baseline ADL function  Description  INTERVENTIONS:  -  Assess patient's ability to carry out ADLs; assess patient's baseline for ADL function and identify physical deficits which impact ability to perform ADLs (bathing, care of mouth/teeth, toileting, grooming, dressing, etc )  - Assess/evaluate cause of self-care deficits   - Assess range of motion  - Assess patient's mobility; develop plan if impaired  - Assess patient's need for assistive devices and provide as appropriate  - Encourage maximum independence but intervene and supervise when necessary  - Involve family in performance of ADLs  - Assess for home care needs following discharge   - Consider OT consult to assist with ADL evaluation and planning for discharge  - Provide patient education as appropriate  Outcome: Progressing  Goal: Maintain or return mobility status to optimal level  Description  INTERVENTIONS:  - Assess patient's baseline mobility status (ambulation, transfers, stairs, etc )    - Identify cognitive and physical deficits and behaviors that affect mobility  - Identify mobility aids required to assist with transfers and/or ambulation (gait belt, sit-to-stand, lift, walker, cane, etc )  - [place] fall precautions as indicated by assessment  - Record patient progress and toleration of activity level on Mobility SBAR; progress patient to next Phase/Stage  - Instruct patient to call for assistance with activity based on assessment  - Consider rehabilitation consult to assist with strengthening/weightbearing, etc   Outcome: Progressing     Problem: DISCHARGE PLANNING  Goal: Discharge to home or other facility with appropriate resources  Description  INTERVENTIONS:  - Identify barriers to discharge w/patient and caregiver  - Arrange for needed discharge resources and transportation as appropriate  - Identify discharge learning needs (meds, wound care, etc )  - Arrange for interpretive services to assist at discharge as needed  - Refer to Case Management Department for coordinating discharge planning if the patient needs post-hospital services based on physician/advanced practitioner order or complex needs related to functional status, cognitive ability, or social support system  Outcome: Progressing     Problem: Knowledge Deficit  Goal: Patient/family/caregiver demonstrates understanding of disease process, treatment plan, medications, and discharge instructions  Description  Complete learning assessment and assess knowledge base    Interventions:  - Provide teaching at level of understanding  - Provide teaching via preferred learning methods  Outcome: Progressing     Problem: CARDIOVASCULAR - ADULT  Goal: Maintains optimal cardiac output and hemodynamic stability  Description  INTERVENTIONS:  - Monitor I/O, vital signs and rhythm  - Monitor for S/S and trends of decreased cardiac output  - Administer and titrate ordered vasoactive medications to optimize hemodynamic stability  - Assess quality of pulses, skin color and temperature  - Assess for signs of decreased coronary artery perfusion  - Instruct patient to report change in severity of symptoms  Outcome: Progressing  Goal: Absence of cardiac dysrhythmias or at baseline rhythm  Description  INTERVENTIONS:  - Continuous cardiac monitoring, vital signs, obtain 12 lead EKG if ordered  - Administer antiarrhythmic and heart rate control medications as ordered  - Monitor electrolytes and administer replacement therapy as ordered  Outcome: Progressing

## 2020-07-06 NOTE — UTILIZATION REVIEW
Initial Clinical Review    Admission: Date/Time/Statement: Admission Orders (From admission, onward)     Ordered        07/05/20 1642  Place in Observation  Once                   Orders Placed This Encounter   Procedures    Place in Observation     Standing Status:   Standing     Number of Occurrences:   1     Order Specific Question:   Admitting Physician     Answer:   Vale Dubon [[id number]]     Order Specific Question:   Level of Care     Answer:   Med Surg [[id number]]     ED Arrival Information     Expected Arrival Acuity Means of Arrival Escorted By Service Admission Type    - 7/5/2020 14:04 Emergent Walk-In Self Hospitalist Emergency    Arrival Complaint    elevated BP        Chief Complaint   Patient presents with    Hypertension     pt states for the last few weeks he hasn't been feeling well, bp at home was 198/100 last night  pt called cardiology office and was told to take extra metoprolol and bp was still elevated this morning  reports chest pain that radiates down left arm x2 weeks along with LLE pain  Assessment/Plan: 77 y o  male with past medical history of hypertension , chronic pain , GERD, BPH, CAD who presents with high blood pressure  Patient reports for the past 2 weeks he has been feeling unwell  Notes significant fatigue, intermittent chest pains, headache and pain in his lower left extremity  Patient notes he took his blood pressure yesterday noted to be 200/100  Patient notes he called his cardiologist who advised him to increase his metoprolol  Patient took increased dose yesterday and today without improvement in his blood pressure  Patient notes his heart rate has been in 40s since taking extra metoprolol  Patient also notes intermittent chest pain that radiates to his back  Notes the pain is a sharp, intermittent pain on the left side of his chest that comes randomly  Notes diaphoresis at night but denies nausea, vomiting or shortness of breath associated with his chest pain  Patient notes history of stenting 1 year ago, has been compliant with aspirin and Plavix  Hypertensive urgency  Assessment & Plan  Patient presents to ED with complaint of persistent hypertension with intermittent chest pain, fatigue, lower extremity cramping  /96 at time of presentation  Home regimen losartan 100 mg daily with metoprolol XL 12 5 mg daily  Was in contact with cardiologist yesterday and was told to take extra dose of metoprolol  Heart rate in low 40s at time of admission, will avoid further beta-blockade  P r n  Hydralazine IV while inpatient  Notes intermittent left-sided chest pain for the past 2 weeks  CTA negative for acute abnormalities  Troponin x1 negative, continue to trend x3  EKG without acute ST or T-wave changes  Does have history of CAD requiring stenting in June 2019  Start Norvasc 5 mg daily   Cardiology consult  Left leg pain  Assessment & Plan  Patient notes 2 weeks of intermittent left leg cramping and aching  Lower extremity duplex ordered to rule out DVT  Nontender on exam, consider muscle strain  P r n  Tramadol and tizanidine as prescribed by pain management  Activity as tolerated  Low back pain  Assessment & Plan  Follows outpatient with pain management  Recently had steroid injection in spine last week  Continue p r n  Tramadol and tizanidine  GERD (gastroesophageal reflux disease)  Assessment & Plan  Continue PPI  Obesity (BMI 30 0-34  9)  Assessment & Plan  Patient notes he exercises for multiple hours daily  Would likely benefit from diet modification  BPH with obstruction/lower urinary tract symptoms  Assessment & Plan  Continue Flomax and Ditropan  7/6 Cardiology  Patient has been having markedly elevated blood pressures with symptoms that are inconsistent with his prior cardiovascular disease  He takes 12 5 of Toprol-XL, 100 losartan for his blood pressure  Agree with starting the patient on amlodipine 5 mg daily  Would up titrate amlodipine further for blood pressure goal of a systolic of less than 783 and diastolic of less than 90  IV hydralazine p r n    Continue aspirin and Plavix for dual anti-platelet therapy; is been greater than 12 months but would continue Plavix at this time to decrease risk of repeat event  Continue high-intensity statin  Check 2D echocardiogram to reassess cardiac structure and function with markedly elevated blood pressure  ED Triage Vitals   Temperature Pulse Respirations Blood Pressure SpO2   07/05/20 1414 07/05/20 1418 07/05/20 1414 07/05/20 1418 07/05/20 1418   (!) 97 1 °F (36 2 °C) 58 16 (!) 192/96 97 %      Temp Source Heart Rate Source Patient Position - Orthostatic VS BP Location FiO2 (%)   07/05/20 1414 07/05/20 1418 07/05/20 1418 07/05/20 1418 --   Temporal Monitor Sitting Right arm       Pain Score       07/05/20 1441       7        Wt Readings from Last 1 Encounters:   07/05/20 108 kg (238 lb 8 6 oz)     Additional Vital Signs:   Date/Time  Temp  Pulse  Resp  BP  MAP (mmHg)  SpO2  O2 Device  Patient Position - Orthostatic VS   07/06/20 0810  98 °F (36 7 °C)  56  18  170/77  --  94 %  None (Room air)  Lying   07/05/20 2300  97 9 °F (36 6 °C)  50Abnormal   17  141/74  --  93 %  None (Room air)  Lying   07/1950  98 3 °F (36 8 °C)  52Abnormal   18  134/76  --  96 %  None (Room air)  Lying   07/05/20 1808  99 5 °F (37 5 °C)  54Abnormal   18  179/84Abnormal   --  97 %  None (Room air)  Sitting   07/05/20 1630  --  44Abnormal   18  184/96Abnormal   133  92 %  None (Room air)  Lying   07/05/20 1418  --  58  --  192/96Abnormal   --  97 %  None (Room air)  Sitting   07/05/20 1414  97 1 °F (36 2 °C)Abnormal   --  16  --  --  --  --  --      Weights (last 14 days)     Date/Time  Weight  Weight Method  Height   07/1950  --  --  5' 9" (1 753 m)   07/05/20 1808  108 kg (238 lb 8 6 oz)  Standing scale  --   07/05/20 1414  108 kg (238 lb 1 6 oz)  Standing scale       Pertinent Labs/Diagnostic Test Results:   Results from last 7 days Lab Units 07/05/20  1452   SARS-COV-2  Negative     Results from last 7 days   Lab Units 07/06/20  0534 07/05/20  1452   WBC Thousand/uL 8 34 8 24   HEMOGLOBIN g/dL 16 1 16 5   HEMATOCRIT % 48 3 47 3   PLATELETS Thousands/uL 217 228   NEUTROS ABS Thousands/µL 4 99 4 98         Results from last 7 days   Lab Units 07/06/20  0534 07/05/20  1452   SODIUM mmol/L 139 141   POTASSIUM mmol/L 4 6 4 5   CHLORIDE mmol/L 105 105   CO2 mmol/L 28 29   ANION GAP mmol/L 6 7   BUN mg/dL 17 18   CREATININE mg/dL 0 91 0 93   EGFR ml/min/1 73sq m 86 84   CALCIUM mg/dL 9 1 9 4   MAGNESIUM mg/dL 2 1  --      Results from last 7 days   Lab Units 07/05/20  1452   AST U/L 14   ALT U/L 27   ALK PHOS U/L 48   TOTAL PROTEIN g/dL 7 5   ALBUMIN g/dL 4 1   TOTAL BILIRUBIN mg/dL 1 23*         Results from last 7 days   Lab Units 07/06/20  0534 07/05/20  1452   GLUCOSE RANDOM mg/dL 107 103             No results found for: BETA-HYDROXYBUTYRATE                   Results from last 7 days   Lab Units 07/05/20  2244 07/05/20  1942 07/05/20  1452   TROPONIN I ng/mL <0 02 <0 02 <0 02             Results from last 7 days   Lab Units 07/05/20  1452   TSH 3RD GENERATON uIU/mL 3 459           Results from last 7 days   Lab Units 07/05/20  1452   LIPASE u/L 88     7/5  CT head without contrast   ED Interpretation by Shayla Sethi DO (07/05 1559)   IMPRESSION:           1  Stable exam without acute intracranial abnormality noted  CTA chest abdomen pelvis w wo contrast   IMPRESSION:       No acute vascular findings        Scattered small likely benign subpleural lung nodules versus nodular pleural thickening as detailed above  The findings are new and while likely benign should probably be reassessed in 6 months with repeat chest CT        Gallstones without evidence of cholecystitis        Colonic diverticulosis without diverticulitis        Single tiny right kidney stone of about 1 mm size        Degenerative changes of the spine and right hip    Left hip prosthesis appears grossly satisfactory               · EKG:  Sinus bradycardia, no acute ST or T-wave changes  7/5 VAS bilateral  CONCLUSION:  RIGHT LOWER LIMB LIMITED:  Evaluation shows no evidence of thrombus in the common femoral vein  Doppler evaluation shows a normal response to augmentation maneuvers      LEFT LOWER LIMB:  No evidence of acute or chronic deep vein thrombosis  No evidence of superficial thrombophlebitis noted  Doppler evaluation shows a normal response to augmentation maneuvers  Popliteal, posterior tibial and anterior tibial arterial Doppler waveforms are  triphasic                         ED Treatment:   Medication Administration from 07/05/2020 1404 to 07/05/2020 1748       Date/Time Order Dose Route Action Action by Comments     07/05/2020 1453 acetaminophen (TYLENOL) tablet 975 mg 975 mg Oral Given Cecilia Chamberlain      07/05/2020 1634 sodium chloride 0 9 % bolus 500 mL 0 mL Intravenous Stopped Charity Leonard RN      07/05/2020 1450 sodium chloride 0 9 % bolus 500 mL 500 mL Intravenous New Bag Cecilia Chamberlain      07/05/2020 1654 aspirin chewable tablet 243 mg 243 mg Oral Given Cecilia Chamberlain      07/05/2020 1735 hydrALAZINE (APRESOLINE) injection 10 mg 10 mg Intravenous Given Cecilia Chamberlain         Past Medical History:   Diagnosis Date    CAD (coronary artery disease)     s/p stent x1 on 6/19/19    Closed fracture of distal end of fibula with routine healing     unspecified fracture morphology, unspecified laterality, subsequent encounter; Last Assessed:12/27/16    GERD (gastroesophageal reflux disease)     Heart attack ([address] )     Hypertension     Kidney stones     Nocturia     Psoriasis     Sleep apnea      Present on Admission:   BPH with obstruction/lower urinary tract symptoms   GERD (gastroesophageal reflux disease)   Low back pain   Obesity (BMI 30 0-34  9)      Admitting Diagnosis: Chest pain [R07 9]  CAD (coronary artery disease) [I25 10]  BP (high blood pressure) [I10]  Pulmonary nodules [R91 8]  Age/Sex: 77 y o  male  Admission Orders:  Scheduled Medications:    Medications:  amLODIPine 5 mg Oral Daily   aspirin 81 mg Oral Daily   atorvastatin 80 mg Oral Daily With Dinner   clopidogrel 75 mg Oral Daily   enoxaparin 40 mg Subcutaneous Daily   loratadine 10 mg Oral Daily   losartan 100 mg Oral Daily   metoprolol succinate 12 5 mg Oral Daily   oxybutynin 5 mg Oral HS   pantoprazole 40 mg Oral Daily Before Breakfast   tamsulosin 0 4 mg Oral BID   traZODone 50 mg Oral HS     Continuous IV Infusions:     PRN Meds:    acetaminophen 650 mg Oral Q4H PRN   calcium carbonate 1,000 mg Oral TID PRN   hydrALAZINE 10 mg Intravenous Q6H PRN   nitroglycerin 0 4 mg Sublingual Q5 Min PRN   ondansetron 4 mg Intravenous Q6H PRN   tiZANidine 4 mg Oral Q8H PRN   traMADol 100 mg Oral Q6H PRN       IP CONSULT TO CARDIOLOGY    Network Utilization Review Department  [email or web address]  ATTENTION: Please call with any questions or concerns to [phone] and carefully listen to the prompts so that you are directed to the right person  All voicemails are confidential   Priyanka Pop all requests for admission clinical reviews, approved or denied determinations and any other requests to dedicated fax number below belonging to the campus where the patient is receiving treatment   List of dedicated fax numbers for the Facilities:  FACILITY NAME UR FAX NUMBER   ADMISSION DENIALS (Administrative/Medical Necessity) [phone]   [address]  (Maternity/NICU/Pediatrics) [phone]   [facility] [phone]   [facility] [phone]   [address] [facility] [address] [facility] [address] [facility] [phone]

## 2020-07-06 NOTE — ASSESSMENT & PLAN NOTE
Patient notes 2 weeks of intermittent left leg cramping and aching  Lower extremity duplex negative for DVT bilaterally  Nontender on exam, consider muscle strain  P r n   Tramadol and tizanidine as prescribed by pain management  Activity as tolerated

## 2020-07-07 ENCOUNTER — APPOINTMENT (OUTPATIENT)
Dept: NON INVASIVE DIAGNOSTICS | Facility: HOSPITAL | Age: 70
DRG: 305 | End: 2020-07-07
Payer: MEDICARE

## 2020-07-07 VITALS
HEIGHT: 69 IN | RESPIRATION RATE: 18 BRPM | OXYGEN SATURATION: 98 % | WEIGHT: 238.54 LBS | SYSTOLIC BLOOD PRESSURE: 156 MMHG | HEART RATE: 58 BPM | TEMPERATURE: 98.3 F | BODY MASS INDEX: 35.33 KG/M2 | DIASTOLIC BLOOD PRESSURE: 83 MMHG

## 2020-07-07 LAB
ANION GAP SERPL CALCULATED.3IONS-SCNC: 8 MMOL/L (ref 4–13)
BASOPHILS # BLD AUTO: 0.03 THOUSANDS/ΜL (ref 0–0.1)
BASOPHILS NFR BLD AUTO: 0 % (ref 0–1)
BUN SERPL-MCNC: 20 MG/DL (ref 5–25)
CALCIUM SERPL-MCNC: 9.2 MG/DL (ref 8.3–10.1)
CHLORIDE SERPL-SCNC: 103 MMOL/L (ref 100–108)
CO2 SERPL-SCNC: 28 MMOL/L (ref 21–32)
CREAT SERPL-MCNC: 0.92 MG/DL (ref 0.6–1.3)
EOSINOPHIL # BLD AUTO: 0.1 THOUSAND/ΜL (ref 0–0.61)
EOSINOPHIL NFR BLD AUTO: 1 % (ref 0–6)
ERYTHROCYTE [DISTWIDTH] IN BLOOD BY AUTOMATED COUNT: 12.9 % (ref 11.6–15.1)
GFR SERPL CREATININE-BSD FRML MDRD: 85 ML/MIN/1.73SQ M
GLUCOSE SERPL-MCNC: 109 MG/DL (ref 65–140)
HCT VFR BLD AUTO: 49.6 % (ref 36.5–49.3)
HGB BLD-MCNC: 16.5 G/DL (ref 12–17)
IMM GRANULOCYTES # BLD AUTO: 0.04 THOUSAND/UL (ref 0–0.2)
IMM GRANULOCYTES NFR BLD AUTO: 1 % (ref 0–2)
LYMPHOCYTES # BLD AUTO: 1.9 THOUSANDS/ΜL (ref 0.6–4.47)
LYMPHOCYTES NFR BLD AUTO: 24 % (ref 14–44)
MCH RBC QN AUTO: 32.8 PG (ref 26.8–34.3)
MCHC RBC AUTO-ENTMCNC: 33.3 G/DL (ref 31.4–37.4)
MCV RBC AUTO: 99 FL (ref 82–98)
MONOCYTES # BLD AUTO: 0.98 THOUSAND/ΜL (ref 0.17–1.22)
MONOCYTES NFR BLD AUTO: 13 % (ref 4–12)
NEUTROPHILS # BLD AUTO: 4.77 THOUSANDS/ΜL (ref 1.85–7.62)
NEUTS SEG NFR BLD AUTO: 61 % (ref 43–75)
NRBC BLD AUTO-RTO: 0 /100 WBCS
PLATELET # BLD AUTO: 216 THOUSANDS/UL (ref 149–390)
PMV BLD AUTO: 9.6 FL (ref 8.9–12.7)
POTASSIUM SERPL-SCNC: 4.3 MMOL/L (ref 3.5–5.3)
RBC # BLD AUTO: 5.03 MILLION/UL (ref 3.88–5.62)
SODIUM SERPL-SCNC: 139 MMOL/L (ref 136–145)
TSH SERPL DL<=0.05 MIU/L-ACNC: 3.74 UIU/ML (ref 0.36–3.74)
WBC # BLD AUTO: 7.82 THOUSAND/UL (ref 4.31–10.16)

## 2020-07-07 PROCEDURE — 80048 BASIC METABOLIC PNL TOTAL CA: CPT | Performed by: INTERNAL MEDICINE

## 2020-07-07 PROCEDURE — 99239 HOSP IP/OBS DSCHRG MGMT >30: CPT | Performed by: INTERNAL MEDICINE

## 2020-07-07 PROCEDURE — 85025 COMPLETE CBC W/AUTO DIFF WBC: CPT | Performed by: INTERNAL MEDICINE

## 2020-07-07 PROCEDURE — 99232 SBSQ HOSP IP/OBS MODERATE 35: CPT | Performed by: INTERNAL MEDICINE

## 2020-07-07 PROCEDURE — 93306 TTE W/DOPPLER COMPLETE: CPT

## 2020-07-07 PROCEDURE — 84443 ASSAY THYROID STIM HORMONE: CPT | Performed by: PHYSICIAN ASSISTANT

## 2020-07-07 PROCEDURE — 93306 TTE W/DOPPLER COMPLETE: CPT | Performed by: INTERNAL MEDICINE

## 2020-07-07 RX ORDER — AMLODIPINE BESYLATE 10 MG/1
10 TABLET ORAL DAILY
Qty: 30 TABLET | Refills: 0 | Status: SHIPPED | OUTPATIENT
Start: 2020-07-08 | End: 2020-07-10

## 2020-07-07 RX ADMIN — TRAMADOL HYDROCHLORIDE 100 MG: 50 TABLET, FILM COATED ORAL at 09:02

## 2020-07-07 RX ADMIN — TRAMADOL HYDROCHLORIDE 100 MG: 50 TABLET, FILM COATED ORAL at 02:22

## 2020-07-07 RX ADMIN — ATORVASTATIN CALCIUM 80 MG: 80 TABLET, FILM COATED ORAL at 16:14

## 2020-07-07 RX ADMIN — LORATADINE 10 MG: 10 TABLET ORAL at 08:58

## 2020-07-07 RX ADMIN — TRAMADOL HYDROCHLORIDE 100 MG: 50 TABLET, FILM COATED ORAL at 16:11

## 2020-07-07 RX ADMIN — ASPIRIN 81 MG: 81 TABLET, COATED ORAL at 09:00

## 2020-07-07 RX ADMIN — PANTOPRAZOLE SODIUM 40 MG: 40 TABLET, DELAYED RELEASE ORAL at 05:47

## 2020-07-07 RX ADMIN — CLOPIDOGREL BISULFATE 75 MG: 75 TABLET ORAL at 08:58

## 2020-07-07 RX ADMIN — TAMSULOSIN HYDROCHLORIDE 0.4 MG: 0.4 CAPSULE ORAL at 08:58

## 2020-07-07 RX ADMIN — METOPROLOL SUCCINATE 12.5 MG: 25 TABLET, EXTENDED RELEASE ORAL at 08:58

## 2020-07-07 RX ADMIN — AMLODIPINE BESYLATE 10 MG: 10 TABLET ORAL at 08:58

## 2020-07-07 RX ADMIN — LOSARTAN POTASSIUM 100 MG: 50 TABLET, FILM COATED ORAL at 09:00

## 2020-07-07 NOTE — PROGRESS NOTES
Progress Note - Cardiology   Shanae Mares 77 y o  male MRN: [id number]  Unit/Bed#: E4 -01 Encounter: [id number]        Problem List:  Principal Problem:    Hypertensive urgency  Active Problems:    BPH with obstruction/lower urinary tract symptoms    Obesity (BMI 30 0-34  9)    GERD (gastroesophageal reflux disease)    Low back pain    Left leg pain      Asessment:  1  Accelerated hypertension  2  Non anginal chest discomfort  3  CAD/NSTEMI s/p cath w/ FARHAN-OM2 with residual 60% D1, 60% left AV groove, 50% mRCA, June 2019  4  LVEF 79%, grade 1 diastolic dysfunction, mild MAC, mild-to-moderate MR, mild AR, trace TR, June 2019  5  Left leg pain   6  Hypertension  7  Dyslipidemia  8  BARB on CPAP  9  Obesity    Plan/ Discussion:  Pressures remain high today trending 150/75  Norvasc was started yesterday and increased to 10 mg today  He continues to occasionally have atypical chest pain which is likely noncardiac  He seems to also have a lot of anxiety which I feel is probably contributing to his hypertension  · Continue amlodipine 10 mg daily, I did discuss with him that this may take several days or even up to 1-2 weeks to take full effect  · Aspirin/ statin/ Plavix/ metoprolol succinate for CAD  · Continue losartan at 100 mg  · If BP refractory as an outpatient may consider combination medication (possibly valsartan-amlodipine) along with a thiazide diuretic  · He wishes to have a p r n  Medication for blood pressure home, will discuss options with attending  · Echo pending, if normal can likely be discharged today    Subjective:  Has a headache  Continues to have atypical stinging chest pain  Occasionally radiating down his left arm        Vitals:  Vitals:    07/05/20 1414 07/05/20 1808   Weight: 108 kg (238 lb 1 6 oz) 108 kg (238 lb 8 6 oz)   ,  Vitals:    07/06/20 2138 07/07/20 0025 07/07/20 0312 07/07/20 0700   BP: (!) 182/83 129/74 145/69 157/92   BP Location:  Left arm Left arm Left arm   Pulse: 77 (!) 54 55   Resp:  18 18 18   Temp:  97 7 °F (36 5 °C) (!) 97 4 °F (36 3 °C) 98 °F (36 7 °C)   TempSrc:  Temporal Temporal Temporal   SpO2:  94% 96% 97%   Weight:       Height:           Exam:  General: Alert awake and oriented, no acute distress    Heart:  Regular rate and rhythm, no murmurs   Respiratory effort:  Breathing comfortably on room air   Lungs:  Clear bilaterally without wheezing, rales, crackles   Lower Limbs:  No edema           Telemetry:       bradycardic, Heart Rate 45-60    Medications:    Current Facility-Administered Medications:     acetaminophen (TYLENOL) tablet 650 mg, 650 mg, Oral, Q4H PRN, Jennifer Smudde, PA-C, 650 mg at 07/06/20 1635    amLODIPine (NORVASC) tablet 10 mg, 10 mg, Oral, Daily, Caridad Gaspar MD, 10 mg at 07/07/20 0858    aspirin (ECOTRIN LOW STRENGTH) EC tablet 81 mg, 81 mg, Oral, Daily, Jennifer Smudde, PA-C, 81 mg at 07/07/20 0900    atorvastatin (LIPITOR) tablet 80 mg, 80 mg, Oral, Daily With Jesus Manuel Villalta Smudde, PA-C, 80 mg at 07/06/20 1635    calcium carbonate (TUMS) chewable tablet 1,000 mg, 1,000 mg, Oral, TID PRN, Kale Code Smudde, PA-C    clopidogrel (PLAVIX) tablet 75 mg, 75 mg, Oral, Daily, Jennifer Smudde, PA-C, 75 mg at 07/07/20 0858    enoxaparin (LOVENOX) subcutaneous injection 40 mg, 40 mg, Subcutaneous, Daily, Jennifer Smudde, PA-C, 40 mg at 07/06/20 0804    hydrALAZINE (APRESOLINE) injection 10 mg, 10 mg, Intravenous, Q6H PRN, Kale Code Smudde, PA-C, 10 mg at 07/06/20 2138    loratadine (CLARITIN) tablet 10 mg, 10 mg, Oral, Daily, Jennifer Smudde, PA-C, 10 mg at 07/07/20 0858    losartan (COZAAR) tablet 100 mg, 100 mg, Oral, Daily, Jennifer Smudde, PA-C, 100 mg at 07/07/20 0900    metoprolol succinate (TOPROL-XL) 24 hr tablet 12 5 mg, 12 5 mg, Oral, Daily, Jennifer Smudde, PA-C, 12 5 mg at 07/07/20 0858    nitroglycerin (NITROSTAT) SL tablet 0 4 mg, 0 4 mg, Sublingual, Q5 Min PRN, Jennifer Smudde, PA-C, 0 4 mg at 07/05/20 1919    ondansetron (ZOFRAN) injection 4 mg, 4 mg, Intravenous, Q6H PRN, Jennifer Smudde, PA-C, 4 mg at 07/06/20 1911    oxybutynin (DITROPAN) tablet 5 mg, 5 mg, Oral, HS, Jennifer Smudde, PA-C, 5 mg at 07/06/20 2139    pantoprazole (PROTONIX) EC tablet 40 mg, 40 mg, Oral, Daily Before Breakfast, Jennifer Smudde, PA-C, 40 mg at 07/07/20 0547    tamsulosin (FLOMAX) capsule 0 4 mg, 0 4 mg, Oral, BID, Jennifer Smudde, PA-C, 0 4 mg at 07/07/20 0858    tiZANidine (ZANAFLEX) tablet 4 mg, 4 mg, Oral, Q8H PRN, Jennifer Smudde, PA-C, 4 mg at 07/05/20 2315    traMADol (ULTRAM) tablet 100 mg, 100 mg, Oral, Q6H PRN, Jennifer Smudde, PA-C, 100 mg at 07/07/20 0902    traZODone (DESYREL) tablet 50 mg, 50 mg, Oral, HS, Jennifer Smudde, PA-C, 50 mg at 07/06/20 2140      Labs/Data:        Results from last 7 days   Lab Units 07/07/20  0553 07/06/20  0534 07/05/20  1452   WBC Thousand/uL 7 82 8 34 8 24   HEMOGLOBIN g/dL 16 5 16 1 16 5   HEMATOCRIT % 49 6* 48 3 47 3   PLATELETS Thousands/uL 216 217 228     Results from last 7 days   Lab Units 07/07/20  0553 07/06/20  0534 07/05/20  2244 07/05/20  1942 07/05/20  1452   POTASSIUM mmol/L 4 3 4 6  --   --  4 5   CHLORIDE mmol/L 103 105  --   --  105   CO2 mmol/L 28 28  --   --  29   BUN mg/dL 20 17  --   --  18   TROPONIN I ng/mL  --   --  <0 02 <0 02 <0 02

## 2020-07-07 NOTE — ASSESSMENT & PLAN NOTE
Patient presents to ED with complaint of persistent hypertension with intermittent chest pain, fatigue, lower extremity cramping  /96 at time of presentation  Improved with addition of Norvasc 10 mg, will continue with losartan 100 mg daily with metoprolol XL 12 5 mg daily  CTA negative for acute abnormalities  Troponin negative x3  Cardiology consultation appreciated

## 2020-07-07 NOTE — UTILIZATION REVIEW
PLEASE NOTE:  PATIENT UPGRADED to IP 7/7 @ 0908  From  OBS 7/5 @ 8966  Due to Hypertensive Urgency      07/07/20 0908  Inpatient Admission Once     Transfer Service: General Medicine       Question Answer Comment   Admitting Physician MARIA C CHRISTINA    Level of Care Med Surg    Estimated length of stay More than 2 Midnights    Certification I certify that inpatient services are medically necessary for this patient for a duration of greater than two midnights  See H&P and MD Progress Notes for additional information about the patient's course of treatment          07/07/20 5205

## 2020-07-07 NOTE — ASSESSMENT & PLAN NOTE
Follows outpatient with pain management  Recently had steroid injection in spine last week  Seems to be improved

## 2020-07-07 NOTE — INCIDENTAL FINDINGS
The following findings require follow up:  Radiographic finding   Finding:  CT chest revealed scattered small likely benign subpleural lung nodules versus nodular pleural thickening   Follow up required:  Repeat CT chest in 6 months   Follow up should be done within 6 months     Please notify the following clinician to assist with the follow up:   Dr Carol Luna

## 2020-07-07 NOTE — PLAN OF CARE
Problem: Potential for Falls  Goal: Patient will remain free of falls  Description  INTERVENTIONS:  - Assess patient frequently for physical needs  -  Identify cognitive and physical deficits and behaviors that affect risk of falls    -  [place] fall precautions as indicated by assessment   - Educate patient/family on patient safety including physical limitations  - Instruct patient to call for assistance with activity based on assessment  - Modify environment to reduce risk of injury  - Consider OT/PT consult to assist with strengthening/mobility  Outcome: Progressing     Problem: PAIN - ADULT  Goal: Verbalizes/displays adequate comfort level or baseline comfort level  Description  Interventions:  - Encourage patient to monitor pain and request assistance  - Assess pain using appropriate pain scale  - Administer analgesics based on type and severity of pain and evaluate response  - Implement non-pharmacological measures as appropriate and evaluate response  - Consider cultural and social influences on pain and pain management  - Notify physician/advanced practitioner if interventions unsuccessful or patient reports new pain  Outcome: Progressing     Problem: SAFETY ADULT  Goal: Maintain or return to baseline ADL function  Description  INTERVENTIONS:  -  Assess patient's ability to carry out ADLs; assess patient's baseline for ADL function and identify physical deficits which impact ability to perform ADLs (bathing, care of mouth/teeth, toileting, grooming, dressing, etc )  - Assess/evaluate cause of self-care deficits   - Assess range of motion  - Assess patient's mobility; develop plan if impaired  - Assess patient's need for assistive devices and provide as appropriate  - Encourage maximum independence but intervene and supervise when necessary  - Involve family in performance of ADLs  - Assess for home care needs following discharge   - Consider OT consult to assist with ADL evaluation and planning for discharge  - Provide patient education as appropriate  Outcome: Progressing  Goal: Maintain or return mobility status to optimal level  Description  INTERVENTIONS:  - Assess patient's baseline mobility status (ambulation, transfers, stairs, etc )    - Identify cognitive and physical deficits and behaviors that affect mobility  - Identify mobility aids required to assist with transfers and/or ambulation (gait belt, sit-to-stand, lift, walker, cane, etc )  - [place] fall precautions as indicated by assessment  - Record patient progress and toleration of activity level on Mobility SBAR; progress patient to next Phase/Stage  - Instruct patient to call for assistance with activity based on assessment  - Consider rehabilitation consult to assist with strengthening/weightbearing, etc   Outcome: Progressing     Problem: DISCHARGE PLANNING  Goal: Discharge to home or other facility with appropriate resources  Description  INTERVENTIONS:  - Identify barriers to discharge w/patient and caregiver  - Arrange for needed discharge resources and transportation as appropriate  - Identify discharge learning needs (meds, wound care, etc )  - Arrange for interpretive services to assist at discharge as needed  - Refer to Case Management Department for coordinating discharge planning if the patient needs post-hospital services based on physician/advanced practitioner order or complex needs related to functional status, cognitive ability, or social support system  Outcome: Progressing     Problem: Knowledge Deficit  Goal: Patient/family/caregiver demonstrates understanding of disease process, treatment plan, medications, and discharge instructions  Description  Complete learning assessment and assess knowledge base    Interventions:  - Provide teaching at level of understanding  - Provide teaching via preferred learning methods  Outcome: Progressing     Problem: CARDIOVASCULAR - ADULT  Goal: Maintains optimal cardiac output and hemodynamic stability  Description  INTERVENTIONS:  - Monitor I/O, vital signs and rhythm  - Monitor for S/S and trends of decreased cardiac output  - Administer and titrate ordered vasoactive medications to optimize hemodynamic stability  - Assess quality of pulses, skin color and temperature  - Assess for signs of decreased coronary artery perfusion  - Instruct patient to report change in severity of symptoms  Outcome: Progressing  Goal: Absence of cardiac dysrhythmias or at baseline rhythm  Description  INTERVENTIONS:  - Continuous cardiac monitoring, vital signs, obtain 12 lead EKG if ordered  - Administer antiarrhythmic and heart rate control medications as ordered  - Monitor electrolytes and administer replacement therapy as ordered  Outcome: Progressing

## 2020-07-08 ENCOUNTER — TELEPHONE (OUTPATIENT)
Dept: CARDIOLOGY CLINIC | Facility: CLINIC | Age: 70
End: 2020-07-08

## 2020-07-08 LAB
ATRIAL RATE: 47 BPM
P AXIS: 58 DEGREES
PR INTERVAL: 154 MS
QRS AXIS: -74 DEGREES
QRSD INTERVAL: 166 MS
QT INTERVAL: 508 MS
QTC INTERVAL: 449 MS
T WAVE AXIS: 21 DEGREES
VENTRICULAR RATE: 47 BPM

## 2020-07-08 PROCEDURE — 93010 ELECTROCARDIOGRAM REPORT: CPT | Performed by: INTERNAL MEDICINE

## 2020-07-08 NOTE — TELEPHONE ENCOUNTER
Pt called with BP Readings, he is feeling that his BP is fluctuating: This AM upon waking his BP was 175/105  Upon AM recheck /78, 4:30pm 145/90, 4:45pm 133/84      Meds:  Amlodipine 10mg daily  Losartan 100mg daily  Metoprolol Succinate 12 5mg daily  Pt takes all meds in the AM     I instructed pt to log the time, BP and HR and any symptoms/what activity he is doing  Will bring to his upcoming appt 7/20/20 hospital f/u  Instructed to contact on call/proceed to ER if needed until he hears from office  Please advise

## 2020-07-09 ENCOUNTER — TRANSITIONAL CARE MANAGEMENT (OUTPATIENT)
Dept: INTERNAL MEDICINE CLINIC | Facility: CLINIC | Age: 70
End: 2020-07-09

## 2020-07-09 NOTE — TELEPHONE ENCOUNTER
Spoke with pt, MD instructions given  Pt verbalized understanding  Instructed to call office if he had any issue prior to his visit 7/20/20

## 2020-07-10 ENCOUNTER — TELEPHONE (OUTPATIENT)
Dept: CARDIOLOGY CLINIC | Facility: CLINIC | Age: 70
End: 2020-07-10

## 2020-07-10 DIAGNOSIS — I10 ESSENTIAL HYPERTENSION: Primary | ICD-10-CM

## 2020-07-10 RX ORDER — NIFEDIPINE 60 MG/1
60 TABLET, EXTENDED RELEASE ORAL DAILY
Qty: 90 TABLET | Refills: 3 | Status: SHIPPED | OUTPATIENT
Start: 2020-07-10 | End: 2020-09-22 | Stop reason: SDUPTHER

## 2020-07-10 NOTE — TELEPHONE ENCOUNTER
Patient called with complaints of cramping from his fingers to his ankles  He believes it is from Amlodipine  Please advise

## 2020-07-12 ENCOUNTER — TELEPHONE (OUTPATIENT)
Dept: OTHER | Facility: OTHER | Age: 70
End: 2020-07-12

## 2020-07-14 NOTE — PHYSICIAN ADVISOR
Current patient class: Inpatient  The patient is currently on Hospital Day: 3 at [facility]      The patient was admitted to the hospital at [facility] on 7/7/20 for the following diagnosis:  Chest pain [R07 9]  CAD (coronary artery disease) [I25 10]  BP (high blood pressure) [I10]  Pulmonary nodules [R91 8]       There is documentation in the medical record of an expected length of stay of at least 2 midnights  The patient is therefore expected to satisfy the 2 midnight benchmark and given the 2 midnight presumption is appropriate for INPATIENT ADMISSION  Given this expectation of a satisfying stay, CMS instructs us that the patient is most often appropriate for inpatient admission under part A provided medical necessity is documented in the chart  After review of the relevant documentation, labs, vital signs and test results, the patient is appropriate for INPATIENT ADMISSION  Admission to the hospital as an inpatient is a complex decision making process which requires the practitioner to consider the patients presenting complaint, history and physical examination and all relevant testing  With this in mind, in this case, the patient was deemed appropriate for INPATIENT ADMISSION  After review of the documentation and testing available at the time of the admission I concur with this clinical determination of medical necessity  Rationale is as follows: The patient is a 77 yrs old Male who presented to the ED at 7/5/2020  2:21 PM with a chief complaint of Hypertension (pt states for the last few weeks he hasn't been feeling well, bp at home was 198/100 last night  pt called cardiology office and was told to take extra metoprolol and bp was still elevated this morning  reports chest pain that radiates down left arm x2 weeks along with LLE pain  )  The patient was noted to have some hypertensive urgency the patient was seen in consultation by Cardiology    During the hospitalization even on day 2 the patient continued to have elevated blood pressures with systolic greater than 640  During the hospitalization the patient did receive some IV hydralazine as well as IV Zofran  Given the above and further need for monitoring patient came with chest pain noted to have hypertensive urgency in need of IV medications the patient did cross the 2 midnight benchmark set by Medicare and is inpatient status appropriate based on medical necessity  The patients vitals on arrival were ED Triage Vitals   Temperature Pulse Respirations Blood Pressure SpO2   07/05/20 1414 07/05/20 1418 07/05/20 1414 07/05/20 1418 07/05/20 1418   (!) 97 1 °F (36 2 °C) 58 16 (!) 192/96 97 %      Temp Source Heart Rate Source Patient Position - Orthostatic VS BP Location FiO2 (%)   07/05/20 1414 07/05/20 1418 07/05/20 1418 07/05/20 1418 --   Temporal Monitor Sitting Right arm       Pain Score       07/05/20 1441       7           Past Medical History:   Diagnosis Date    CAD (coronary artery disease)     s/p stent x1 on 6/19/19    Closed fracture of distal end of fibula with routine healing     unspecified fracture morphology, unspecified laterality, subsequent encounter;  Last Assessed:12/27/16    GERD (gastroesophageal reflux disease)     [facility] [address] )     Hypertension     Kidney stones     Nocturia     Psoriasis     Sleep apnea      Past Surgical History:   Procedure Laterality Date    APPENDECTOMY      CARPAL TUNNEL RELEASE      ROTATOR CUFF REPAIR      SHOULDER ARTHROSCOPY Left     Last ASsessed:10/24/16    TOTAL HIP ARTHROPLASTY      TRIGGER FINGER RELEASE             Consults have been placed to:   IP CONSULT TO CARDIOLOGY    Vitals:    07/07/20 0312 07/07/20 0700 07/07/20 1239 07/07/20 1611   BP: 145/69 157/92 149/71 156/83   BP Location: Left arm Left arm  Left arm   Pulse: (!) 54 55 (!) 51 58   Resp: 18 18  18   Temp: (!) 97 4 °F (36 3 °C) 98 °F (36 7 °C)  98 3 °F (36 8 °C)   TempSrc: Temporal Temporal  Tympanic   SpO2: 96% 97%  98%   Weight:       Height:           Most recent labs:    No results for input(s): WBC, HGB, HCT, PLT, K, NA, CALCIUM, BUN, CREATININE, LIPASE, AMYLASE, INR, TROPONINI, CKTOTAL, AST, ALT, ALKPHOS, BILITOT in the last 72 hours  Scheduled Meds:  Continuous Infusions:  No current facility-administered medications for this encounter  PRN Meds:      Surgical procedures (if appropriate):

## 2020-07-20 ENCOUNTER — OFFICE VISIT (OUTPATIENT)
Dept: CARDIOLOGY CLINIC | Facility: CLINIC | Age: 70
End: 2020-07-20
Payer: MEDICARE

## 2020-07-20 VITALS
DIASTOLIC BLOOD PRESSURE: 60 MMHG | OXYGEN SATURATION: 93 % | TEMPERATURE: 97.8 F | WEIGHT: 235.3 LBS | HEIGHT: 69 IN | HEART RATE: 56 BPM | BODY MASS INDEX: 34.85 KG/M2 | SYSTOLIC BLOOD PRESSURE: 100 MMHG

## 2020-07-20 DIAGNOSIS — I10 ESSENTIAL HYPERTENSION: ICD-10-CM

## 2020-07-20 DIAGNOSIS — M54.9 UPPER BACK PAIN: Primary | ICD-10-CM

## 2020-07-20 DIAGNOSIS — I25.10 CORONARY ARTERY DISEASE INVOLVING NATIVE CORONARY ARTERY OF NATIVE HEART WITHOUT ANGINA PECTORIS: Primary | ICD-10-CM

## 2020-07-20 PROCEDURE — 3078F DIAST BP <80 MM HG: CPT | Performed by: INTERNAL MEDICINE

## 2020-07-20 PROCEDURE — 1160F RVW MEDS BY RX/DR IN RCRD: CPT | Performed by: INTERNAL MEDICINE

## 2020-07-20 PROCEDURE — 3074F SYST BP LT 130 MM HG: CPT | Performed by: INTERNAL MEDICINE

## 2020-07-20 PROCEDURE — 1036F TOBACCO NON-USER: CPT | Performed by: INTERNAL MEDICINE

## 2020-07-20 PROCEDURE — 4040F PNEUMOC VAC/ADMIN/RCVD: CPT | Performed by: INTERNAL MEDICINE

## 2020-07-20 PROCEDURE — 1111F DSCHRG MED/CURRENT MED MERGE: CPT | Performed by: INTERNAL MEDICINE

## 2020-07-20 PROCEDURE — 3008F BODY MASS INDEX DOCD: CPT | Performed by: INTERNAL MEDICINE

## 2020-07-20 PROCEDURE — 99214 OFFICE O/P EST MOD 30 MIN: CPT | Performed by: INTERNAL MEDICINE

## 2020-07-20 RX ORDER — METOPROLOL SUCCINATE 25 MG/1
12.5 TABLET, EXTENDED RELEASE ORAL DAILY
Qty: 45 TABLET | Refills: 3 | Status: SHIPPED | OUTPATIENT
Start: 2020-07-20 | End: 2021-01-21

## 2020-07-20 NOTE — PROGRESS NOTES
Cardiology Follow Up    Kristan Marinelli  1950  [id number]  [address] [facility]  [address]  [phone]    1  Coronary artery disease involving native coronary artery of native heart without angina pectoris  metoprolol succinate (TOPROL-XL) 25 mg 24 hr tablet    NM myocardial perfusion spect (stress and/or rest)   2  Essential hypertension  NM myocardial perfusion spect (stress and/or rest)       Discussion/Summary:    1  CAD: prior NSTEMI 6/2019  Continue with DAPT currently  He has some symptoms similar to his prior angina  Although blood pressure lability is suspected, check stress test with known residual CAD as noted below  Changed  Continue high intensity atorvastatin  Lipid panel well controlled  2  HTN: BP is labile  He didn't tolerate amlodipine, but is doing ok with procardia  BP tends to be higher in AM, but then improves as day goes on  Doesn't get many low numbers as today in the office  He is asymptomatic  Will put in a new rx for the succinate, the sustained release metoprolol should ideally be beneficial with his BP throughout the day  3  HL: As above, lipids controlled, continue atorvastatin 80mg  Previous History:  70-year-old gentleman  He has coronary disease identified in June of 2019  He had an NSTEMI  EF was preserved  He had a stent to the 2nd OM, 60% 1st diagonal, 60% circ, 50% mid RCA  He is on aspirin and Plavix  He has a baseline left anterior fascicular block  He has hypertension    Interval History:  Since last visit with me, he has contacted me with labile blood pressures  July 5th, he went to the emergency room with intermittent chest pain, fatigue  Blood pressure was markedly elevated  His medications were adjusted and amlodipine was added, but he felt like he had a side effect with this, no subsequently stopped    I substituted Procardia Pt. Requesting to be seen sooner then 1st avail. For Consult. Pt. States that she was [facility] and an Xray was done. Pt. States that conclusion is as follows.  - Moderate-sized right knee effusion with layering densities raising the poss instead  There was an issue with his metoprolol succinate prescription  He is still taking tartrate once a day  Checking his blood pressure more regularly now  It is higher in the morning, but then seems to decrease as the day goes on  He has occasionally feeling symptoms similar to what he felt prior to his MI and stent  Problem List     Anxiety    BPH with obstruction/lower urinary tract symptoms    Elevated glucose    Erectile dysfunction of non-organic origin    GERD without esophagitis    Hyperlipidemia    Essential hypertension    Obesity (BMI 30 0-34 9)    Obstructive sleep apnea    Osteoarthritis    Psoriasis    Bifascicular block    Calculus of kidney    Primary insomnia    GERD (gastroesophageal reflux disease)    Low back pain    Alcohol use    Dyslipidemia    Coronary artery disease involving native coronary artery of native heart without angina pectoris        Past Medical History:   Diagnosis Date    CAD (coronary artery disease)     s/p stent x1 on 6/19/19    Closed fracture of distal end of fibula with routine healing     unspecified fracture morphology, unspecified laterality, subsequent encounter;  Last Assessed:12/27/16    GERD (gastroesophageal reflux disease)     Heart attack ([address] )     Hypertension     Kidney stones     Nocturia     Psoriasis     Sleep apnea      Social History     Tobacco Use    Smoking status: Never Smoker    Smokeless tobacco: Never Used   Substance Use Topics    Alcohol use: Yes     Frequency: 4 or more times a week     Drinks per session: 1 or 2     Binge frequency: Never     Comment: 1 drink daily    Drug use: No      Family History   Problem Relation Age of Onset    Arthritis Mother     Hypertension Mother     Irritable bowel syndrome Mother     Osteoporosis Mother     Arthritis Other      Past Surgical History:   Procedure Laterality Date    APPENDECTOMY      CARPAL TUNNEL RELEASE      ROTATOR CUFF REPAIR      SHOULDER ARTHROSCOPY Left Last ASsessed:10/24/16    TOTAL HIP ARTHROPLASTY      TRIGGER FINGER RELEASE         Current Outpatient Medications:     aspirin (ECOTRIN LOW STRENGTH) 81 mg EC tablet, Take 1 tablet (81 mg total) by mouth daily, Disp: 30 tablet, Rfl: 0    atorvastatin (LIPITOR) 80 mg tablet, take 1 tablet by mouth once daily WITH DINNER, Disp: 90 tablet, Rfl: 3    clopidogrel (PLAVIX) 75 mg tablet, Take 1 tablet (75 mg total) by mouth daily, Disp: 90 tablet, Rfl: 3    Fexofenadine HCl (ALLEGRA ALLERGY PO), Take by mouth, Disp: , Rfl:     fluticasone (FLONASE) 50 mcg/act nasal spray, 2 sprays into each nostril 2 (two) times a day as needed for rhinitis, Disp: , Rfl:     losartan (COZAAR) 100 MG tablet, Take 1 tablet (100 mg total) by mouth daily, Disp: 90 tablet, Rfl: 3    metoprolol succinate (TOPROL-XL) 25 mg 24 hr tablet, Take 0 5 tablets (12 5 mg total) by mouth daily, Disp: 45 tablet, Rfl: 3    NIFEdipine (PROCARDIA XL) 60 mg 24 hr tablet, Take 1 tablet (60 mg total) by mouth daily, Disp: 90 tablet, Rfl: 3    omeprazole (PriLOSEC) 20 mg delayed release capsule, take 1 capsule by mouth once daily, Disp: 90 capsule, Rfl: 3    oxybutynin (DITROPAN) 5 mg tablet, TAKE 1 TO 2 TABLETS BY MOUTH ONCE DAILY BEFORE BED (Patient taking differently: Take 5 mg by mouth 2 (two) times a day Take 1 to 2 tablets by mouth once daily before bed ), Disp: 180 tablet, Rfl: 0    tamsulosin (FLOMAX) 0 4 mg, take 2 capsules by mouth at bedtime (Patient taking differently: Take 0 4 mg by mouth 2 (two) times a day ), Disp: 540 capsule, Rfl: 0    TIZANIDINE HCL PO, Take 4 mg by mouth as needed , Disp: , Rfl:     traMADol (ULTRAM) 50 mg tablet, Take 100 mg by mouth as needed , Disp: , Rfl: 0    traZODone (DESYREL) 50 mg tablet, take 1 tablet by mouth at bedtime, Disp: 90 tablet, Rfl: 1    Acetaminophen 325 MG CAPS, Take 650 mg by mouth every 6 (six) hours, Disp: , Rfl:     betamethasone, augmented, (DIPROLENE-AF) 0 05 % cream, apply twice daily to rash x 2 weeks  then Saturday and Sunday only, Disp: , Rfl: 0    calcipotriene (DOVONEX) 0 005 % cream, apply to affected area twice a day ON MONDAY THROUGH FRIDAY, Disp: , Rfl: 0  Allergies   Allergen Reactions    Latex Hives    Other     Pollen Extract     Shellfish-Derived Products      [employer]       Vitals:    07/20/20 1511   BP: 100/60   BP Location: Right arm   Patient Position: Sitting   Cuff Size: Standard   Pulse: 56   Temp: 97 8 °F (36 6 °C)   SpO2: 93%   Weight: 107 kg (235 lb 4 8 oz)   Height: 5' 9" (1 753 m)     Vitals:    07/20/20 1511   Weight: 107 kg (235 lb 4 8 oz)      Height: 5' 9" (175 3 cm)   Body mass index is 34 75 kg/m²  Physical Exam:  GEN: Jovanny Sánchez appears well, alert and oriented x 3, pleasant and cooperative   HEENT: pupils equal, round, and reactive to light; extraocular muscles intact  NECK: supple, no carotid bruits   HEART: regular rhythm, normal S1 and S2, no murmurs, clicks, gallops or rubs   LUNGS: clear to auscultation bilaterally; no wheezes, rales, or rhonchi   ABDOMEN: normal bowel sounds, soft, no tenderness, no distention  EXTREMITIES: peripheral pulses normal; no clubbing, cyanosis, or edema  NEURO: no focal findings   SKIN: normal without suspicious lesions on exposed skin    ROS:  Except as noted in HPI, is otherwise reviewed in detail and a 12 point review of systems is negative      Labs:  Lab Results   Component Value Date    K 4 3 07/07/2020     07/07/2020    CREATININE 0 92 07/07/2020    BUN 20 07/07/2020    CO2 28 07/07/2020    ALT 27 07/05/2020    AST 14 07/05/2020    INR 0 98 06/18/2019    GLUF 108 (H) 06/19/2019    HGBA1C 5 2 06/19/2019    WBC 7 82 07/07/2020    HGB 16 5 07/07/2020    HCT 49 6 (H) 07/07/2020     07/07/2020     No results found for: CHOL  Lab Results   Component Value Date    LDLCALC 50 01/03/2020    LDLCALC 142 (H) 06/19/2019     Lab Results   Component Value Date    HDL 53 01/03/2020    HDL 50 06/19/2019     Lab Results   Component Value Date    TRIG 43 01/03/2020    TRIG 95 06/19/2019     Testing:  Echo:  LEFT VENTRICLE:  Systolic function was normal  Ejection fraction was estimated to be 60 %  There were no regional wall motion abnormalities  Doppler parameters were consistent with abnormal left ventricular relaxation (grade 1 diastolic dysfunction)      MITRAL VALVE:  There was mild annular calcification  There was mild to moderate regurgitation      AORTIC VALVE:  There was mild regurgitation      TRICUSPID VALVE:  There was trace regurgitation  Cardiac Cath 6/2019  CORONARY CIRCULATION:  1st diagonal: There was a tubular 60 % stenosis  2nd obtuse marginal: There was a 99 % stenosis  There was ROSIE grade 2 flow through the vessel (partial perfusion)  This lesion is a likely culprit for the patient's recent myocardial infarction  An intervention was performed  Left AV groove artery: There was a discrete 60 % stenosis  Mid RCA: There was a 50 % stenosis      1ST LESION INTERVENTIONS:  A successful balloon angioplasty with stent procedure was performed on the 99 % lesion in the 2nd obtuse marginal  Following intervention there was an excellent angiographic appearance with a 0 % residual stenosis  A [employer] [place] Rx 2 75 x 18mm drug-eluting stent was placed across the lesion and deployed at a maximum inflation pressure of 16 pam

## 2020-07-22 ENCOUNTER — EVALUATION (OUTPATIENT)
Dept: PHYSICAL THERAPY | Facility: MEDICAL CENTER | Age: 70
End: 2020-07-22
Payer: MEDICARE

## 2020-07-22 DIAGNOSIS — M54.50 CHRONIC BILATERAL LOW BACK PAIN WITHOUT SCIATICA: Primary | ICD-10-CM

## 2020-07-22 DIAGNOSIS — G89.29 CHRONIC BILATERAL LOW BACK PAIN WITHOUT SCIATICA: Primary | ICD-10-CM

## 2020-07-22 PROCEDURE — 97161 PT EVAL LOW COMPLEX 20 MIN: CPT | Performed by: PHYSICAL THERAPIST

## 2020-07-22 NOTE — PROGRESS NOTES
PT Evaluation     Today's date: 2020  Patient name: Hollie Sanders  : 1950  MRN: [id number]  Referring provider: Jessica Saini MD  Dx:   Encounter Diagnosis     ICD-10-CM    1  Chronic bilateral low back pain without sciatica M54 5     G89 29                   Assessment  Assessment details: Hollie Sanders is a 77 y o  male was evaluated on 2020  for Chronic bilateral low back pain without sciatica  (primary encounter diagnosis)  Hollie Sanders has the above listed impairments resulting in functional deficits and negative impact to quality of life  Patient is appropriate for skilled PT intervention to promote maximal return to function and patient specific goals  Patient agrees with outlined treatment plan and all questions were answered to their satisfaction  Impairments: abnormal muscle firing, abnormal muscle tone, abnormal or restricted ROM, impaired physical strength, lacks appropriate home exercise program and pain with function  Understanding of Dx/Px/POC: good   Prognosis: good    Goals  Patient will successfully transition to home exercise program   Patient will be able to manage symptoms independently      Trisha Koar will be able to get up in the morning with 50% reduced stiffness  Patient will be able to perform yardwork with 50% reduction in pain and stiffness      Plan  Patient would benefit from: skilled PT  Referral necessary: No  Planned modality interventions: thermotherapy: hydrocollator packs  Planned therapy interventions: home exercise program, manual therapy, neuromuscular re-education, patient education, functional ROM exercises, strengthening, stretching, joint mobilization, graded activity, graded exercise, therapeutic exercise, body mechanics training, motor coordination training and activity modification  Frequency: 2x week  Duration in weeks: 12  Treatment plan discussed with: patient        Subjective Evaluation    History of Present Illness  Mechanism of injury: Joanna Gonzalez is a 77 y o  Male prior patient who has returned to therapy due to his chronic low back pain flare ups  He has been managing his pain with trigger point injections for many years and usually receives 6 weeks of relief post injections however most recent injection provided little relief  Pain worst in the morning, located in bilateral lower back and worse with movement  Denies any LE radiation or bowel or bladder changes  Pain  Current pain ratin  At best pain ratin  At worst pain rating: 10  Quality: dull ache, squeezing and pressure          Objective     General Comments:      Lumbar Comments  Standing posture :   Forward flexed slightly  Lumbar AROM:  Mild limitations in all directions with reports of pulling/pain at end ranges  TTP throughout lumbar paraspinals     Hip mobility with moderate limitations in extension bilaterally   Poor lumbopelvic contorl              Precautions: HTN, L JABIER       Manuals                                                                 Neuro Re-Ed                                                                                                        Ther Ex                                                                                                                     Ther Activity                                       Gait Training                                       Modalities

## 2020-07-24 ENCOUNTER — OFFICE VISIT (OUTPATIENT)
Dept: PHYSICAL THERAPY | Facility: MEDICAL CENTER | Age: 70
End: 2020-07-24
Payer: MEDICARE

## 2020-07-24 DIAGNOSIS — M54.50 CHRONIC BILATERAL LOW BACK PAIN WITHOUT SCIATICA: Primary | ICD-10-CM

## 2020-07-24 DIAGNOSIS — G89.29 CHRONIC BILATERAL LOW BACK PAIN WITHOUT SCIATICA: Primary | ICD-10-CM

## 2020-07-24 PROCEDURE — 97140 MANUAL THERAPY 1/> REGIONS: CPT | Performed by: PHYSICAL THERAPIST

## 2020-07-24 NOTE — PROGRESS NOTES
Daily Note     Today's date: 2020  Patient name: Estee Clancy  : 1950  MRN: [id number]  Referring provider: Brody Pope MD  Dx:   Encounter Diagnosis     ICD-10-CM    1  Chronic bilateral low back pain without sciatica M54 5     G89 29                   Subjective: Sp reports that he did well for a day or two after last appointment, pain returned today       Objective: See treatment diary below      Assessment: Tolerated treatment well  Patient with significant muscular soreness and spasm in paraspinals  Reduced post MFR to area  Plan: Continue per plan of care        Precautions: HTN, L JABIER       Manuals             Paraspinal MFR AF            Lumbar UPA  AF                                      Neuro Re-Ed                                                                                                        Ther Ex                                                                                                                     Ther Activity                                       Gait Training                                       Modalities

## 2020-07-26 DIAGNOSIS — E78.5 HYPERLIPIDEMIA: ICD-10-CM

## 2020-07-27 ENCOUNTER — OFFICE VISIT (OUTPATIENT)
Dept: PHYSICAL THERAPY | Facility: MEDICAL CENTER | Age: 70
End: 2020-07-27
Payer: MEDICARE

## 2020-07-27 DIAGNOSIS — M54.50 CHRONIC BILATERAL LOW BACK PAIN WITHOUT SCIATICA: Primary | ICD-10-CM

## 2020-07-27 DIAGNOSIS — G89.29 CHRONIC BILATERAL LOW BACK PAIN WITHOUT SCIATICA: Primary | ICD-10-CM

## 2020-07-27 PROCEDURE — 97140 MANUAL THERAPY 1/> REGIONS: CPT | Performed by: PHYSICAL THERAPIST

## 2020-07-27 RX ORDER — ATORVASTATIN CALCIUM 80 MG/1
TABLET, FILM COATED ORAL
Qty: 90 TABLET | Refills: 3 | OUTPATIENT
Start: 2020-07-27

## 2020-07-27 RX ORDER — ATORVASTATIN CALCIUM 80 MG/1
80 TABLET, FILM COATED ORAL
Qty: 90 TABLET | Refills: 3 | Status: SHIPPED | OUTPATIENT
Start: 2020-07-27 | End: 2021-08-02

## 2020-07-27 NOTE — TELEPHONE ENCOUNTER
Hello, look like this gentleman follows with you  Wanted to forward script request your way  Hope all is well  Thanks!   Arleen

## 2020-07-27 NOTE — PROGRESS NOTES
Daily Note     Today's date: 2020  Patient name: Abdirahman Gibbs  : 1950  MRN: [id number]  Referring provider: Sheldon Mascorro MD  Dx:   Encounter Diagnosis     ICD-10-CM    1  Chronic bilateral low back pain without sciatica M54 5     G89 29                   Subjective: Layo Aguilera reports that he usually has a good day or two after PT  Normal soreness and stiffness today       Objective: See treatment diary below      Assessment: Tolerated treatment well  Patient very tender over QL and paraspinals today  Pain greatly reduced post MFR and thoracic rotational mobilizations due to rib pain       Plan: Continue per plan of care        Precautions: HTN, L JABIER       Manuals            Paraspinal MFR AF AF           Lumbar UPA  AF AF           Thoracic rotations in varied flexed/ext  AF                        Neuro Re-Ed                                                                                                        Ther Ex                                                                                                                     Ther Activity                                       Gait Training                                       Modalities

## 2020-07-29 ENCOUNTER — OFFICE VISIT (OUTPATIENT)
Dept: PHYSICAL THERAPY | Facility: MEDICAL CENTER | Age: 70
End: 2020-07-29
Payer: MEDICARE

## 2020-07-29 DIAGNOSIS — G89.29 CHRONIC BILATERAL LOW BACK PAIN WITHOUT SCIATICA: Primary | ICD-10-CM

## 2020-07-29 DIAGNOSIS — M54.50 CHRONIC BILATERAL LOW BACK PAIN WITHOUT SCIATICA: Primary | ICD-10-CM

## 2020-07-29 PROCEDURE — 97140 MANUAL THERAPY 1/> REGIONS: CPT | Performed by: PHYSICAL THERAPIST

## 2020-07-29 NOTE — PROGRESS NOTES
Daily Note     Today's date: 2020  Patient name: Joanna Gonzalez  : 1950  MRN: [id number]  Referring provider: Dorothey Boeck, MD  Dx:   Encounter Diagnosis     ICD-10-CM    1  Chronic bilateral low back pain without sciatica M54 5     G89 29                   Subjective: Ned Trujillo reports that he is having much less soreness and pain throughout day, still waking up in morning with significant stiffness       Objective: See treatment diary below      Assessment: Tolerated treatment well  Patient responding well to MFR to lumbar spine and thoracic/rib rotational mobilizations  Progress as blanco      Plan: Continue per plan of care        Precautions: HTN, L JABIER       Manuals           Paraspinal MFR AF AF AF          Lumbar UPA  AF AF AF          Thoracic rotations in varied flexed/ext  AF AF                       Neuro Re-Ed                                                                                                        Ther Ex                                                                                                                     Ther Activity                                       Gait Training                                       Modalities

## 2020-07-31 ENCOUNTER — OFFICE VISIT (OUTPATIENT)
Dept: PHYSICAL THERAPY | Facility: MEDICAL CENTER | Age: 70
End: 2020-07-31
Payer: MEDICARE

## 2020-07-31 DIAGNOSIS — M54.50 CHRONIC BILATERAL LOW BACK PAIN WITHOUT SCIATICA: Primary | ICD-10-CM

## 2020-07-31 DIAGNOSIS — G89.29 CHRONIC BILATERAL LOW BACK PAIN WITHOUT SCIATICA: Primary | ICD-10-CM

## 2020-07-31 PROCEDURE — 97140 MANUAL THERAPY 1/> REGIONS: CPT | Performed by: PHYSICAL THERAPIST

## 2020-07-31 NOTE — PROGRESS NOTES
Daily Note     Today's date: 2020  Patient name: Ashley Narayan  : 1950  MRN: [id number]  Referring provider: Brent Jacobson MD  Dx:   Encounter Diagnosis     ICD-10-CM    1  Chronic bilateral low back pain without sciatica M54 5     G89 29                   Subjective: Sp reports he was more sore this morning but again is loosening up more quickly than before       Objective: See treatment diary below      Assessment: Tolerated treatment well  Patient responds well to MFR and joint mobilizations for lasting pain relief  Progress as able      Plan: Continue per plan of care        Precautions: HTN, L JABIER       Manuals          Paraspinal MFR AF AF AF AF         Lumbar UPA  AF AF AF AF         Thoracic rotations in varied flexed/ext  AF AF AF                      Neuro Re-Ed                                                                                                        Ther Ex                                                                                                                     Ther Activity                                       Gait Training                                       Modalities

## 2020-08-03 ENCOUNTER — OFFICE VISIT (OUTPATIENT)
Dept: PHYSICAL THERAPY | Facility: MEDICAL CENTER | Age: 70
End: 2020-08-03
Payer: MEDICARE

## 2020-08-03 DIAGNOSIS — G89.29 CHRONIC BILATERAL LOW BACK PAIN WITHOUT SCIATICA: Primary | ICD-10-CM

## 2020-08-03 DIAGNOSIS — M54.50 CHRONIC BILATERAL LOW BACK PAIN WITHOUT SCIATICA: Primary | ICD-10-CM

## 2020-08-03 PROCEDURE — 97140 MANUAL THERAPY 1/> REGIONS: CPT | Performed by: PHYSICAL THERAPIST

## 2020-08-03 NOTE — PROGRESS NOTES
Daily Note     Today's date: 8/3/2020  Patient name: Will Bernal  : 1950  MRN: [id number]  Referring provider: Josef Pruitt MD  Dx:   Encounter Diagnosis     ICD-10-CM    1  Chronic bilateral low back pain without sciatica  M54 5     G89 29                   Subjective: Jennifer Mena reports that he is doing well, having less stiffness and soreness in morning with less time to loosen up       Objective: See treatment diary below      Assessment: Tolerated treatment well  Patient benefits from lumbar mobiliztaions  Remains with multiple QL trigger points that are eased post MFR to area  Plan: Continue per plan of care        Precautions: HTN, L JABIER       Manuals 7/24 7/27 7/29 7/31 8/3        Paraspinal MFR AF AF AF AF AF        Lumbar UPA  AF AF AF AF AF        Thoracic rotations in varied flexed/ext  AF AF AF AF                     Neuro Re-Ed                                                                                                        Ther Ex                                                                                                                     Ther Activity                                       Gait Training                                       Modalities

## 2020-08-05 ENCOUNTER — HOSPITAL ENCOUNTER (OUTPATIENT)
Dept: NON INVASIVE DIAGNOSTICS | Facility: HOSPITAL | Age: 70
Discharge: HOME | End: 2020-08-05
Attending: INTERNAL MEDICINE
Payer: MEDICARE

## 2020-08-05 ENCOUNTER — HOSPITAL ENCOUNTER (OUTPATIENT)
Dept: NUCLEAR MEDICINE | Facility: HOSPITAL | Age: 70
Discharge: HOME | End: 2020-08-05
Attending: INTERNAL MEDICINE
Payer: MEDICARE

## 2020-08-05 DIAGNOSIS — I25.10 CORONARY ARTERY DISEASE INVOLVING NATIVE CORONARY ARTERY OF NATIVE HEART WITHOUT ANGINA PECTORIS: ICD-10-CM

## 2020-08-05 DIAGNOSIS — I10 ESSENTIAL HYPERTENSION: ICD-10-CM

## 2020-08-05 PROCEDURE — 78452 HT MUSCLE IMAGE SPECT MULT: CPT

## 2020-08-05 PROCEDURE — 93018 CV STRESS TEST I&R ONLY: CPT

## 2020-08-05 PROCEDURE — 93017 CV STRESS TEST TRACING ONLY: CPT

## 2020-08-05 PROCEDURE — A9502 TC99M TETROFOSMIN: HCPCS

## 2020-08-05 PROCEDURE — 93016 CV STRESS TEST SUPVJ ONLY: CPT

## 2020-08-06 LAB
CHEST PAIN STATEMENT: NORMAL
MAX DIASTOLIC BP: 80 MMHG
MAX HEART RATE: 127 BPM
MAX PREDICTED HEART RATE: 152 BPM
MAX. SYSTOLIC BP: 200 MMHG
PROTOCOL NAME: NORMAL
TARGET HR FORMULA: NORMAL
TEST INDICATION: NORMAL
TIME IN EXERCISE PHASE: NORMAL

## 2020-08-10 ENCOUNTER — OFFICE VISIT (OUTPATIENT)
Dept: PHYSICAL THERAPY | Facility: MEDICAL CENTER | Age: 70
End: 2020-08-10
Payer: MEDICARE

## 2020-08-10 ENCOUNTER — TELEPHONE (OUTPATIENT)
Dept: CARDIOLOGY CLINIC | Facility: CLINIC | Age: 70
End: 2020-08-10

## 2020-08-10 DIAGNOSIS — M54.50 CHRONIC BILATERAL LOW BACK PAIN WITHOUT SCIATICA: Primary | ICD-10-CM

## 2020-08-10 DIAGNOSIS — G89.29 CHRONIC BILATERAL LOW BACK PAIN WITHOUT SCIATICA: Primary | ICD-10-CM

## 2020-08-10 PROCEDURE — 97140 MANUAL THERAPY 1/> REGIONS: CPT | Performed by: PHYSICAL THERAPIST

## 2020-08-10 NOTE — TELEPHONE ENCOUNTER
Spoke with patient regarding test results  ----- Message from Fermin Rider sent at 8/7/2020 11:33 AM EDT -----    ----- Message -----  From: KELLEY WASHINGTON [facility], MD  Sent: 8/6/2020   1:28 PM EDT  To: [facility]    Please let patient know his stress test showed small area of prior heart attack, but no changes concerning for a change in his other arteries  Continue with medical management

## 2020-08-10 NOTE — PROGRESS NOTES
Daily Note     Today's date: 8/10/2020  Patient name: Lonny Akers  : 1950  MRN: [id number]  Referring provider: Viktoriya Araujo MD  Dx:   Encounter Diagnosis     ICD-10-CM    1  Chronic bilateral low back pain without sciatica  M54 5     G89 29                   Subjective: Baylee Mackey reports that he is doing fairly well today, back pain generally less than it has been      Objective: See treatment diary below      Assessment: Tolerated treatment well  Patient experienced significant relief of low back stiffness and muscular pain after manual techniques      Plan: Continue per plan of care        Precautions: HTN, L JABIER       Manuals 7/24 7/27 7/29 7/31 8/3 8/10       Paraspinal MFR AF AF AF AF AF AF       Lumbar UPA  AF AF AF AF AF AF       Thoracic rotations in varied flexed/ext  AF AF AF AF AF                    Neuro Re-Ed                                                                                                        Ther Ex                                                                                                                     Ther Activity                                       Gait Training                                       Modalities

## 2020-08-12 ENCOUNTER — OFFICE VISIT (OUTPATIENT)
Dept: PHYSICAL THERAPY | Facility: MEDICAL CENTER | Age: 70
End: 2020-08-12
Payer: MEDICARE

## 2020-08-12 DIAGNOSIS — M54.50 CHRONIC BILATERAL LOW BACK PAIN WITHOUT SCIATICA: Primary | ICD-10-CM

## 2020-08-12 DIAGNOSIS — G89.29 CHRONIC BILATERAL LOW BACK PAIN WITHOUT SCIATICA: Primary | ICD-10-CM

## 2020-08-12 PROCEDURE — 97140 MANUAL THERAPY 1/> REGIONS: CPT | Performed by: PHYSICAL THERAPIST

## 2020-08-12 NOTE — PROGRESS NOTES
Daily Note     Today's date: 2020  Patient name: Joanna Gonzalez  : 1950  MRN: [id number]  Referring provider: Dorothey Boeck, MD  Dx:   Encounter Diagnosis     ICD-10-CM    1  Chronic bilateral low back pain without sciatica  M54 5     G89 29                   Subjective: Sp reports that pain has been reducing and morning pain in low back has also been reduced in intensity       Objective: See treatment diary below      Assessment: Tolerated treatment well  Patient continues to respond well to MFR to paraspinals and lumbar joint mobilization  Continue along current POC given lasting pain relief      Plan: Continue per plan of care        Precautions: HTN, L JABIER       Manuals 7/24 7/27 7/29 7/31 8/3 8/10 8/12      Paraspinal MFR AF AF AF AF AF AF AF      Lumbar UPA  AF AF AF AF AF AF AF      Thoracic rotations in varied flexed/ext  AF AF AF AF AF AF                   Neuro Re-Ed                                                                                                        Ther Ex                                                                                                                     Ther Activity                                       Gait Training                                       Modalities

## 2020-08-14 ENCOUNTER — OFFICE VISIT (OUTPATIENT)
Dept: PHYSICAL THERAPY | Facility: MEDICAL CENTER | Age: 70
End: 2020-08-14
Payer: MEDICARE

## 2020-08-14 DIAGNOSIS — G89.29 CHRONIC BILATERAL LOW BACK PAIN WITHOUT SCIATICA: Primary | ICD-10-CM

## 2020-08-14 DIAGNOSIS — M54.50 CHRONIC BILATERAL LOW BACK PAIN WITHOUT SCIATICA: Primary | ICD-10-CM

## 2020-08-14 PROCEDURE — 97140 MANUAL THERAPY 1/> REGIONS: CPT | Performed by: PHYSICAL THERAPIST

## 2020-08-15 NOTE — PROGRESS NOTES
Daily Note     Today's date: 8/15/2020  Patient name: Lainey Leonard  : 1950  MRN: [id number]  Referring provider: Traci Dueñas MD  Dx:   Encounter Diagnosis     ICD-10-CM    1  Chronic bilateral low back pain without sciatica  M54 5     G89 29                   Subjective: Sp reports greatly reduced pain in the morning when waking up       Objective: See treatment diary below      Assessment: Tolerated treatment well  Patient with continued but less instense muscular trigger points that are improved with direct MFR       Plan: Continue per plan of care        Precautions: HTN, L JABIER       Manuals 7/24 7/27 7/29 7/31 8/3 8/10 8/12 8/14     Paraspinal MFR AF AF AF AF AF AF AF AF     Lumbar UPA  AF AF AF AF AF AF AF AF     Thoracic rotations in varied flexed/ext  AF AF AF AF AF AF AF                  Neuro Re-Ed                                                                                                        Ther Ex                                                                                                                     Ther Activity                                       Gait Training                                       Modalities

## 2020-08-17 ENCOUNTER — OFFICE VISIT (OUTPATIENT)
Dept: PHYSICAL THERAPY | Facility: MEDICAL CENTER | Age: 70
End: 2020-08-17
Payer: MEDICARE

## 2020-08-17 DIAGNOSIS — M54.50 CHRONIC BILATERAL LOW BACK PAIN WITHOUT SCIATICA: Primary | ICD-10-CM

## 2020-08-17 DIAGNOSIS — G89.29 CHRONIC BILATERAL LOW BACK PAIN WITHOUT SCIATICA: Primary | ICD-10-CM

## 2020-08-17 PROCEDURE — 97140 MANUAL THERAPY 1/> REGIONS: CPT | Performed by: PHYSICAL THERAPIST

## 2020-08-17 NOTE — PROGRESS NOTES
Daily Note     Today's date: 2020  Patient name: Mk Jeronimo  : 1950  MRN: [id number]  Referring provider: Arya Rod MD  Dx:   Encounter Diagnosis     ICD-10-CM    1  Chronic bilateral low back pain without sciatica  M54 5     G89 29                   Subjective: Toya Ingram reports that he is doing well  Did have more intense back pain when standing for long periods this weekend cooking and cleaning      Objective: See treatment diary below      Assessment: Tolerated treatment well  Patient with reduced pain and stiffness post MFR and imroved pain free active motion and reduction in guarded lifting       Plan: Continue per plan of care        Precautions: HTN, L JBAIER       Manuals 7/24 7/27 7/29 7/31 8/3 8/10 8/12 8/14 8/17    Paraspinal MFR AF AF AF AF AF AF AF AF AF    Lumbar UPA  AF AF AF AF AF AF AF AF AF    Thoracic rotations in varied flexed/ext  AF AF AF AF AF AF AF AF                 Neuro Re-Ed                                                                                                        Ther Ex                                                                                                                     Ther Activity                                       Gait Training                                       Modalities

## 2020-08-19 ENCOUNTER — OFFICE VISIT (OUTPATIENT)
Dept: PHYSICAL THERAPY | Facility: MEDICAL CENTER | Age: 70
End: 2020-08-19
Payer: MEDICARE

## 2020-08-19 DIAGNOSIS — M54.50 CHRONIC BILATERAL LOW BACK PAIN WITHOUT SCIATICA: Primary | ICD-10-CM

## 2020-08-19 DIAGNOSIS — G89.29 CHRONIC BILATERAL LOW BACK PAIN WITHOUT SCIATICA: Primary | ICD-10-CM

## 2020-08-19 PROCEDURE — 97140 MANUAL THERAPY 1/> REGIONS: CPT | Performed by: PHYSICAL THERAPIST

## 2020-08-19 NOTE — PROGRESS NOTES
Daily Note     Today's date: 2020  Patient name: Ashley Narayan  : 1950  MRN: [id number]  Referring provider: Brent Jacobson MD  Dx:   Encounter Diagnosis     ICD-10-CM    1  Chronic bilateral low back pain without sciatica  M54 5     G89 29                   Subjective: Maciel Cooney reports that he started his weight routine again and is feeling more muscular sore today but not normal back pain       Objective: See treatment diary below      Assessment: Tolerated treatment well  Patient with increased superficial tenderness to paraspinals however joint mobility was improved and less painful than previous  Plan: Continue per plan of care        Precautions: HTN, L JABIER       Manuals 7/24 7/27 7/29 7/31 8/3 8/10 8/12 8/14 8/17 8/19   Paraspinal MFR AF AF AF AF AF AF AF AF AF AF   Lumbar UPA  AF AF AF AF AF AF AF AF AF AF   Thoracic rotations in varied flexed/ext  AF AF AF AF AF AF AF AF AF                Neuro Re-Ed                                                                                                        Ther Ex                                                                                                                     Ther Activity                                       Gait Training                                       Modalities

## 2020-08-21 ENCOUNTER — OFFICE VISIT (OUTPATIENT)
Dept: PHYSICAL THERAPY | Facility: MEDICAL CENTER | Age: 70
End: 2020-08-21
Payer: MEDICARE

## 2020-08-21 DIAGNOSIS — G89.29 CHRONIC BILATERAL LOW BACK PAIN WITHOUT SCIATICA: Primary | ICD-10-CM

## 2020-08-21 DIAGNOSIS — M54.50 CHRONIC BILATERAL LOW BACK PAIN WITHOUT SCIATICA: Primary | ICD-10-CM

## 2020-08-21 PROCEDURE — 97140 MANUAL THERAPY 1/> REGIONS: CPT | Performed by: PHYSICAL THERAPIST

## 2020-08-21 NOTE — PROGRESS NOTES
Daily Note     Today's date: 2020  Patient name: Scarlet Byers  : 1950  MRN: [id number]  Referring provider: Therese Kaplan MD  Dx:   Encounter Diagnosis     ICD-10-CM    1  Chronic bilateral low back pain without sciatica  M54 5     G89 29                   Subjective: Sp reports  No issues, better standing tolerance lately  Worried about golf this weekend       Objective: See treatment diary below      Assessment: Tolerated treatment well  Patient would benefit from continued PT for muscular spasm control and mobility       Plan: Continue per plan of care        Precautions: HTN, L JABIER       Manuals 8/21 7/27 7/29 7/31 8/3 8/10 8/12 8/14 8/17 8/19   Paraspinal MFR AF AF AF AF AF AF AF AF AF AF   Lumbar UPA  AF AF AF AF AF AF AF AF AF AF   Thoracic rotations in varied flexed/ext AF AF AF AF AF AF AF AF AF AF                Neuro Re-Ed                                                                                                        Ther Ex                                                                                                                     Ther Activity                                       Gait Training                                       Modalities

## 2020-08-24 ENCOUNTER — OFFICE VISIT (OUTPATIENT)
Dept: PHYSICAL THERAPY | Facility: MEDICAL CENTER | Age: 70
End: 2020-08-24
Payer: MEDICARE

## 2020-08-24 DIAGNOSIS — M54.50 CHRONIC BILATERAL LOW BACK PAIN WITHOUT SCIATICA: Primary | ICD-10-CM

## 2020-08-24 DIAGNOSIS — G89.29 CHRONIC BILATERAL LOW BACK PAIN WITHOUT SCIATICA: Primary | ICD-10-CM

## 2020-08-24 PROCEDURE — 97140 MANUAL THERAPY 1/> REGIONS: CPT | Performed by: PHYSICAL THERAPIST

## 2020-08-24 NOTE — PROGRESS NOTES
Daily Note     Today's date: 2020  Patient name: Mk Jeronimo  : 1950  MRN: [id number]  Referring provider: Arya Rod MD  Dx:   Encounter Diagnosis     ICD-10-CM    1  Chronic bilateral low back pain without sciatica  M54 5     G89 29                   Subjective: Toya Ingram reports that he was able to golf without much issue afterwards  Objective: See treatment diary below      Assessment: Tolerated treatment well  Patient progressing well with myofascial release which translates to better functional tolerance of golfing and activity  Plan: Continue per plan of care        Precautions: HTN, L JABIER       Manuals  ]8/24 7/29 7/31 8/3 8/10 8/12 8/14 8/17 8/19   Paraspinal MFR AF AF AF AF AF AF AF AF AF AF   Lumbar UPA  AF AF AF AF AF AF AF AF AF AF   Thoracic rotations in varied flexed/ext AF AF AF AF AF AF AF AF AF AF                Neuro Re-Ed                                                                                                        Ther Ex                                                                                                                     Ther Activity                                       Gait Training                                       Modalities

## 2020-08-26 ENCOUNTER — OFFICE VISIT (OUTPATIENT)
Dept: PHYSICAL THERAPY | Facility: MEDICAL CENTER | Age: 70
End: 2020-08-26
Payer: MEDICARE

## 2020-08-26 DIAGNOSIS — G89.29 CHRONIC BILATERAL LOW BACK PAIN WITHOUT SCIATICA: Primary | ICD-10-CM

## 2020-08-26 DIAGNOSIS — M54.50 CHRONIC BILATERAL LOW BACK PAIN WITHOUT SCIATICA: Primary | ICD-10-CM

## 2020-08-26 PROCEDURE — 97140 MANUAL THERAPY 1/> REGIONS: CPT | Performed by: PHYSICAL THERAPIST

## 2020-08-26 NOTE — PROGRESS NOTES
Daily Note     Today's date: 2020  Patient name: Nimesh Allen  : 1950  MRN: [id number]  Referring provider: Ml Dominguez MD  Dx:   Encounter Diagnosis     ICD-10-CM    1  Chronic bilateral low back pain without sciatica  M54 5     G89 29                   Subjective: Marie Hutchison reports that he is having much less stiffness in morning, both intensity and duration       Objective: See treatment diary below      Assessment: Tolerated treatment well  Patient with less intensity of trigger points today bilaterally and improved lumbar PAIVM into rotatoin      Plan: Continue per plan of care        Precautions: HTN, L JABIER       Manuals  ]8/24 8/26 7/31 8/3 8/10 8/12 8/14 8/17 8/19   Paraspinal MFR AF AF AF AF AF AF AF AF AF AF   Lumbar UPA  AF AF AF AF AF AF AF AF AF AF   Thoracic rotations in varied flexed/ext AF AF AF AF AF AF AF AF AF AF                Neuro Re-Ed                                                                                                        Ther Ex                                                                                                                     Ther Activity                                       Gait Training                                       Modalities

## 2020-08-28 ENCOUNTER — OFFICE VISIT (OUTPATIENT)
Dept: PHYSICAL THERAPY | Facility: MEDICAL CENTER | Age: 70
End: 2020-08-28
Payer: MEDICARE

## 2020-08-28 DIAGNOSIS — G89.29 CHRONIC BILATERAL LOW BACK PAIN WITHOUT SCIATICA: Primary | ICD-10-CM

## 2020-08-28 DIAGNOSIS — M54.50 CHRONIC BILATERAL LOW BACK PAIN WITHOUT SCIATICA: Primary | ICD-10-CM

## 2020-08-28 PROCEDURE — 97140 MANUAL THERAPY 1/> REGIONS: CPT | Performed by: PHYSICAL THERAPIST

## 2020-08-28 NOTE — PROGRESS NOTES
Daily Note     Today's date: 2020  Patient name: Will Bernal  : 1950  MRN: [id number]  Referring provider: Josef Pruitt MD  Dx:   Encounter Diagnosis     ICD-10-CM    1  Chronic bilateral low back pain without sciatica  M54 5     G89 29                   Subjective:  Jennifer Mena reports that he is seeing great improvements in his morning pain and length of time stiffness is experienced       Objective: See treatment diary below      Assessment: Tolerated treatment well  Patient responding well to manual therapy directed at lumbar spine and paraspinal musculature  Plan: Continue per plan of care        Precautions: HTN, L JABIER       Manuals  ]8/24 8/26 8/28 8/3 8/10 8/12 8/14 8/17 8/19   Paraspinal MFR AF AF AF AF AF AF AF AF AF AF   Lumbar UPA  AF AF AF AF AF AF AF AF AF AF   Thoracic rotations in varied flexed/ext AF AF AF AF AF AF AF AF AF AF                Neuro Re-Ed                                                                                                        Ther Ex                                                                                                                     Ther Activity                                       Gait Training                                       Modalities

## 2020-08-31 ENCOUNTER — OFFICE VISIT (OUTPATIENT)
Dept: PHYSICAL THERAPY | Facility: MEDICAL CENTER | Age: 70
End: 2020-08-31
Payer: MEDICARE

## 2020-08-31 DIAGNOSIS — M54.50 CHRONIC BILATERAL LOW BACK PAIN WITHOUT SCIATICA: Primary | ICD-10-CM

## 2020-08-31 DIAGNOSIS — G89.29 CHRONIC BILATERAL LOW BACK PAIN WITHOUT SCIATICA: Primary | ICD-10-CM

## 2020-08-31 PROCEDURE — 97140 MANUAL THERAPY 1/> REGIONS: CPT | Performed by: PHYSICAL THERAPIST

## 2020-08-31 NOTE — PROGRESS NOTES
Daily Note     Today's date: 2020  Patient name: Kary Max  : 1950  MRN: [id number]  Referring provider: Meryle Laws, MD  Dx:   Encounter Diagnosis     ICD-10-CM    1  Chronic bilateral low back pain without sciatica  M54 5     G89 29                   Subjective: Ganga Nam reports that he is doing much better in the morning with less time to loosen up        Objective: See treatment diary below      Assessment: Tolerated treatment well  Patient with decreased myofascial restrictions and pain causing trigger points       Plan: Continue per plan of care        Precautions: HTN, L JABIER       Manuals  ]8/24 8/26 8/28 8/31 8/10 8/12 8/14 8/17 8/19   Paraspinal MFR AF AF AF AF AF AF AF AF AF AF   Lumbar UPA  AF AF AF AF AF AF AF AF AF AF   Thoracic rotations in varied flexed/ext AF AF AF AF AF AF AF AF AF AF                Neuro Re-Ed                                                                                                        Ther Ex                                                                                                                     Ther Activity                                       Gait Training                                       Modalities

## 2020-09-02 ENCOUNTER — OFFICE VISIT (OUTPATIENT)
Dept: PHYSICAL THERAPY | Facility: MEDICAL CENTER | Age: 70
End: 2020-09-02
Payer: MEDICARE

## 2020-09-02 DIAGNOSIS — M54.50 CHRONIC BILATERAL LOW BACK PAIN WITHOUT SCIATICA: Primary | ICD-10-CM

## 2020-09-02 DIAGNOSIS — G89.29 CHRONIC BILATERAL LOW BACK PAIN WITHOUT SCIATICA: Primary | ICD-10-CM

## 2020-09-02 PROCEDURE — 97140 MANUAL THERAPY 1/> REGIONS: CPT | Performed by: PHYSICAL THERAPIST

## 2020-09-02 PROCEDURE — 97140 MANUAL THERAPY 1/> REGIONS: CPT

## 2020-09-02 NOTE — PROGRESS NOTES
Daily Note     Today's date: 2020  Patient name: Marie Regan  : 1950  MRN: [id number]  Referring provider: Abbe Coburn MD  Dx:   Encounter Diagnosis     ICD-10-CM    1  Chronic bilateral low back pain without sciatica  M54 5     G89 29                   Subjective: Elaine Yara reports that he continues to do well with no complaints of pain  Notes that PT really has helped him  Objective: See treatment diary below      Assessment: Tolerated treatment well  Manuals were performed by PT AF to end session  Overall, pt continues to make progress towards his long term goals  Patient with decreased myofascial restrictions and pain causing trigger points       Plan: Continue per plan of care        Precautions: HTN, L JABIER       Manuals  ]   Paraspinal MFR AF AF AF AF AF MM   AF AF   Lumbar UPA  AF AF AF AF AF AF   AF AF   Thoracic rotations in varied flexed/ext AF AF AF AF AF AF   AF AF                Neuro Re-Ed                                                                                                        Ther Ex                                                                                                                     Ther Activity                                       Gait Training                                       Modalities

## 2020-09-03 ENCOUNTER — TELEPHONE (OUTPATIENT)
Dept: CARDIOLOGY CLINIC | Facility: CLINIC | Age: 70
End: 2020-09-03

## 2020-09-03 NOTE — TELEPHONE ENCOUNTER
P/c'd states his morning BP is slighly elevated before taking medication 143/92- avg hr 50-54  Takes all cardiac medication in the am  He will take procardia at night and call back in a week with BP's

## 2020-09-04 ENCOUNTER — OFFICE VISIT (OUTPATIENT)
Dept: PHYSICAL THERAPY | Facility: MEDICAL CENTER | Age: 70
End: 2020-09-04
Payer: MEDICARE

## 2020-09-04 DIAGNOSIS — I25.10 CAD (CORONARY ARTERY DISEASE): ICD-10-CM

## 2020-09-04 DIAGNOSIS — M54.50 CHRONIC BILATERAL LOW BACK PAIN WITHOUT SCIATICA: Primary | ICD-10-CM

## 2020-09-04 DIAGNOSIS — G89.29 CHRONIC BILATERAL LOW BACK PAIN WITHOUT SCIATICA: Primary | ICD-10-CM

## 2020-09-04 PROCEDURE — 97140 MANUAL THERAPY 1/> REGIONS: CPT | Performed by: PHYSICAL THERAPIST

## 2020-09-04 RX ORDER — CLOPIDOGREL BISULFATE 75 MG/1
TABLET ORAL
Qty: 90 TABLET | Refills: 3 | Status: SHIPPED | OUTPATIENT
Start: 2020-09-04 | End: 2021-08-30

## 2020-09-04 NOTE — PROGRESS NOTES
Daily Note     Today's date: 2020  Patient name: Ruben Bill  : 1950  MRN: [id number]  Referring provider: More Singh MD  Dx:   Encounter Diagnosis     ICD-10-CM    1  Chronic bilateral low back pain without sciatica  M54 5     G89 29                   Subjective: Sp reports continues relief of pain in morning when getting up       Objective: See treatment diary below      Assessment: Tolerated treatment well  Patient would benefit from continued PT to address mobility and soft tissue limitations        Plan: Continue per plan of care        Precautions: HTN, L JABIER       Manuals  ]   Paraspinal MFR AF AF AF AF AF MM AF  AF AF   Lumbar UPA  AF AF AF AF AF AF AF  AF AF   Thoracic rotations in varied flexed/ext AF AF AF AF AF AF AF  AF AF                Neuro Re-Ed                                                                                                        Ther Ex                                                                                                                     Ther Activity                                       Gait Training                                       Modalities

## 2020-09-08 ENCOUNTER — OFFICE VISIT (OUTPATIENT)
Dept: PHYSICAL THERAPY | Facility: MEDICAL CENTER | Age: 70
End: 2020-09-08
Payer: MEDICARE

## 2020-09-09 ENCOUNTER — TELEPHONE (OUTPATIENT)
Dept: CARDIOLOGY CLINIC | Facility: CLINIC | Age: 70
End: 2020-09-09

## 2020-09-09 NOTE — TELEPHONE ENCOUNTER
P/c'd states his BP is elevated in the am  He spaced medication and then started having Elevated bp in the evening  157/100, 150/87, 148/84        Taking: Toprol 12 5 mg qd               Procardia 60 mg qd               Losartan 100 mg      Please advise

## 2020-09-11 ENCOUNTER — OFFICE VISIT (OUTPATIENT)
Dept: PHYSICAL THERAPY | Facility: MEDICAL CENTER | Age: 70
End: 2020-09-11
Payer: MEDICARE

## 2020-09-11 DIAGNOSIS — G89.29 CHRONIC BILATERAL LOW BACK PAIN WITHOUT SCIATICA: Primary | ICD-10-CM

## 2020-09-11 DIAGNOSIS — M54.50 CHRONIC BILATERAL LOW BACK PAIN WITHOUT SCIATICA: Primary | ICD-10-CM

## 2020-09-11 PROCEDURE — 97140 MANUAL THERAPY 1/> REGIONS: CPT | Performed by: PHYSICAL THERAPIST

## 2020-09-11 NOTE — PROGRESS NOTES
Daily Note     Today's date: 2020  Patient name: Hollie Sanders  : 1950  MRN: [id number]  Referring provider: Jessica Saini MD  Dx:   Encounter Diagnosis     ICD-10-CM    1  Chronic bilateral low back pain without sciatica  M54 5     G89 29                   Subjective: Trisha Dickens reports that he is having more right sided low back pain and left hip pain today       Objective: See treatment diary below      Assessment: Tolerated treatment well  Patient with significant trigger point in glute medius today on R  Left lumbar mobility improved and transitional movements no longer offer as much of a challenge as previiouslyh  Plan: Continue per plan of care        Precautions: HTN, L JABIER       Manuals  ]   Paraspinal MFR AF AF AF AF AF MM AF AF AF AF   Lumbar UPA  AF AF AF AF AF AF AF AF AF AF   Thoracic rotations in varied flexed/ext AF AF AF AF AF AF AF AF AF AF                Neuro Re-Ed                                                                                                        Ther Ex                                                                                                                     Ther Activity                                       Gait Training                                       Modalities

## 2020-09-14 ENCOUNTER — OFFICE VISIT (OUTPATIENT)
Dept: PHYSICAL THERAPY | Facility: MEDICAL CENTER | Age: 70
End: 2020-09-14
Payer: MEDICARE

## 2020-09-14 DIAGNOSIS — M54.50 CHRONIC BILATERAL LOW BACK PAIN WITHOUT SCIATICA: Primary | ICD-10-CM

## 2020-09-14 DIAGNOSIS — G89.29 CHRONIC BILATERAL LOW BACK PAIN WITHOUT SCIATICA: Primary | ICD-10-CM

## 2020-09-14 PROCEDURE — 97140 MANUAL THERAPY 1/> REGIONS: CPT | Performed by: PHYSICAL THERAPIST

## 2020-09-14 NOTE — PROGRESS NOTES
Daily Note     Today's date: 2020  Patient name: Will Bernal  : 1950  MRN: [id number]  Referring provider: Josef Pruitt MD  Dx:   Encounter Diagnosis     ICD-10-CM    1  Chronic bilateral low back pain without sciatica  M54 5     G89 29                   Subjective: Sp reports being slightly sore from biking a lot but doing well in the morning         Objective: See treatment diary below      Assessment: Tolerated treatment well  Patient with diminished thoracolumbar rotation, improved post mobilizations   Hip MFR performed on L with good pain relief response  Plan: Continue per plan of care        Precautions: HTN, L JABIER       Manuals  ]   Paraspinal MFR AF AF AF AF AF MM AF AF AF AF   Lumbar UPA  AF AF AF AF AF AF AF AF AF AF   Thoracic rotations in varied flexed/ext AF AF AF AF AF AF AF AF AF AF                Neuro Re-Ed                                                                                                        Ther Ex                                                                                                                     Ther Activity                                       Gait Training                                       Modalities

## 2020-09-16 ENCOUNTER — OFFICE VISIT (OUTPATIENT)
Dept: PHYSICAL THERAPY | Facility: MEDICAL CENTER | Age: 70
End: 2020-09-16
Payer: MEDICARE

## 2020-09-16 DIAGNOSIS — M54.50 CHRONIC BILATERAL LOW BACK PAIN WITHOUT SCIATICA: Primary | ICD-10-CM

## 2020-09-16 DIAGNOSIS — G89.29 CHRONIC BILATERAL LOW BACK PAIN WITHOUT SCIATICA: Primary | ICD-10-CM

## 2020-09-16 PROCEDURE — 97140 MANUAL THERAPY 1/> REGIONS: CPT | Performed by: PHYSICAL THERAPIST

## 2020-09-18 ENCOUNTER — OFFICE VISIT (OUTPATIENT)
Dept: PHYSICAL THERAPY | Facility: MEDICAL CENTER | Age: 70
End: 2020-09-18
Payer: MEDICARE

## 2020-09-18 DIAGNOSIS — M54.50 CHRONIC BILATERAL LOW BACK PAIN WITHOUT SCIATICA: Primary | ICD-10-CM

## 2020-09-18 DIAGNOSIS — G89.29 CHRONIC BILATERAL LOW BACK PAIN WITHOUT SCIATICA: Primary | ICD-10-CM

## 2020-09-18 PROCEDURE — 97140 MANUAL THERAPY 1/> REGIONS: CPT | Performed by: PHYSICAL THERAPIST

## 2020-09-18 NOTE — PROGRESS NOTES
Daily Note     Today's date: 2020  Patient name: Junior Berry  : 1950  MRN: [id number]  Referring provider: Jenae Haas MD  Dx:   Encounter Diagnosis     ICD-10-CM    1  Chronic bilateral low back pain without sciatica  M54 5     G89 29                   Subjective:   Albaro Peres reports that he is doing well, has been able to golf without significant increase in soreness      Objective: See treatment diary below      Assessment: Tolerated treatment well  Patient with improved lumbar mobility and has been maintaining well between sessions  Better activity tolerance and less pain between his last injection and next availale      Plan: Continue per plan of care        Precautions: HTN, L JABIER       Manuals  ]   Paraspinal MFR AF AF AF AF AF MM AF AF AF AF   Lumbar UPA  AF AF AF AF AF AF AF AF AF AF   Thoracic rotations in varied flexed/ext AF AF AF AF AF AF AF AF AF AF                Neuro Re-Ed                                                                                                        Ther Ex                                                                                                                     Ther Activity                                       Gait Training                                       Modalities

## 2020-09-18 NOTE — PROGRESS NOTES
Daily Note     Today's date: 2020  Patient name: Ashley Narayan  : 1950  MRN: [id number]  Referring provider: Brent Jacobson MD  Dx:   Encounter Diagnosis     ICD-10-CM    1  Chronic bilateral low back pain without sciatica  M54 5     G89 29                   Subjective: Maciel Cooney reports that he is doing well regard to his back       Objective: See treatment diary below      Assessment: Tolerated treatment well  Patient responding well to lumbar mobilizatoins with improved motion and less pain during active motion testing       Plan: Continue per plan of care        Precautions: HTN, L JABIER       Manuals  ]   Paraspinal MFR AF AF AF AF AF MM AF AF AF AF   Lumbar UPA  AF AF AF AF AF AF AF AF AF AF   Thoracic rotations in varied flexed/ext AF AF AF AF AF AF AF AF AF AF                Neuro Re-Ed                                                                                                        Ther Ex                                                                                                                     Ther Activity                                       Gait Training                                       Modalities

## 2020-09-21 ENCOUNTER — OFFICE VISIT (OUTPATIENT)
Dept: PHYSICAL THERAPY | Facility: MEDICAL CENTER | Age: 70
End: 2020-09-21
Payer: MEDICARE

## 2020-09-21 DIAGNOSIS — M54.50 CHRONIC BILATERAL LOW BACK PAIN WITHOUT SCIATICA: Primary | ICD-10-CM

## 2020-09-21 DIAGNOSIS — G89.29 CHRONIC BILATERAL LOW BACK PAIN WITHOUT SCIATICA: Primary | ICD-10-CM

## 2020-09-21 PROCEDURE — 97140 MANUAL THERAPY 1/> REGIONS: CPT | Performed by: PHYSICAL THERAPIST

## 2020-09-22 DIAGNOSIS — R35.1 NOCTURIA: ICD-10-CM

## 2020-09-22 DIAGNOSIS — I10 ESSENTIAL HYPERTENSION: ICD-10-CM

## 2020-09-22 RX ORDER — NIFEDIPINE 90 MG/1
90 TABLET, FILM COATED, EXTENDED RELEASE ORAL DAILY
Qty: 90 TABLET | Refills: 3 | Status: SHIPPED | OUTPATIENT
Start: 2020-09-22 | End: 2021-09-16

## 2020-09-22 RX ORDER — OXYBUTYNIN CHLORIDE 5 MG/1
5 TABLET ORAL 2 TIMES DAILY
Qty: 180 TABLET | Refills: 5 | Status: SHIPPED | OUTPATIENT
Start: 2020-09-22 | End: 2021-06-28 | Stop reason: SDUPTHER

## 2020-09-22 NOTE — PROGRESS NOTES
Daily Note     Today's date: 2020  Patient name: Ty Hawthorne  : 1950  MRN: [id number]  Referring provider: Therese Kaplan MD  Dx:   Encounter Diagnosis     ICD-10-CM    1  Chronic bilateral low back pain without sciatica  M54 5     G89 29                   Subjective: Camelia Amin reports that he did not golf this weekend, foot related pain and not his low back  Otherwise doing well      Objective: See treatment diary below      Assessment: Tolerated treatment well  Patient responds well with extended pain relief post manuals and soft tissue mobilizations      Plan: Continue per plan of care        Precautions: HTN, L JABIER       Manuals    Paraspinal MFR AF AF AF AF AF MM AF AF AF AF   Lumbar UPA  AF AF AF AF AF AF AF AF AF AF   Thoracic rotations in varied flexed/ext AF AF AF AF AF AF AF AF AF AF                Neuro Re-Ed                                                                                                        Ther Ex                                                                                                                     Ther Activity                                       Gait Training                                       Modalities

## 2020-09-23 ENCOUNTER — APPOINTMENT (OUTPATIENT)
Dept: PHYSICAL THERAPY | Facility: MEDICAL CENTER | Age: 70
End: 2020-09-23
Payer: MEDICARE

## 2020-09-25 ENCOUNTER — OFFICE VISIT (OUTPATIENT)
Dept: PHYSICAL THERAPY | Facility: MEDICAL CENTER | Age: 70
End: 2020-09-25
Payer: MEDICARE

## 2020-09-25 DIAGNOSIS — M54.50 CHRONIC BILATERAL LOW BACK PAIN WITHOUT SCIATICA: Primary | ICD-10-CM

## 2020-09-25 DIAGNOSIS — G89.29 CHRONIC BILATERAL LOW BACK PAIN WITHOUT SCIATICA: Primary | ICD-10-CM

## 2020-09-25 PROCEDURE — 97140 MANUAL THERAPY 1/> REGIONS: CPT | Performed by: PHYSICAL THERAPIST

## 2020-09-25 NOTE — PROGRESS NOTES
Daily Note     Today's date: 2020  Patient name: Abdirahman Gibbs  : 1950  MRN: [id number]  Referring provider: Sheldon Mascorro MD  Dx:   Encounter Diagnosis     ICD-10-CM    1  Chronic bilateral low back pain without sciatica  M54 5     G89 29                   Subjective: Layo Aguilera reports that he is doing well and having much less stiffness in the morning       Objective: See treatment diary below      Assessment: Tolerated treatment well  Patient would benefit from continued PT to address chronic mobility issues  Plan: Continue per plan of care        Precautions: HTN, L JABIER       Manuals  9 25    Paraspinal MFR AF AF AF AF AF MM AF AF AF AF   Lumbar UPA  AF AF AF AF AF AF AF AF AF AF   Thoracic rotations in varied flexed/ext AF AF AF AF AF AF AF AF AF AF                Neuro Re-Ed                                                                                                        Ther Ex                                                                                                                     Ther Activity                                       Gait Training                                       Modalities

## 2020-09-28 ENCOUNTER — OFFICE VISIT (OUTPATIENT)
Dept: PHYSICAL THERAPY | Facility: MEDICAL CENTER | Age: 70
End: 2020-09-28
Payer: MEDICARE

## 2020-09-28 DIAGNOSIS — M54.50 CHRONIC BILATERAL LOW BACK PAIN WITHOUT SCIATICA: Primary | ICD-10-CM

## 2020-09-28 DIAGNOSIS — G89.29 CHRONIC BILATERAL LOW BACK PAIN WITHOUT SCIATICA: Primary | ICD-10-CM

## 2020-09-28 PROCEDURE — 97140 MANUAL THERAPY 1/> REGIONS: CPT | Performed by: PHYSICAL THERAPIST

## 2020-09-28 NOTE — PROGRESS NOTES
Daily Note     Today's date: 2020  Patient name: Bekah Shin  : 1950  MRN: [id number]  Referring provider: Jeremy Anne MD  Dx:   Encounter Diagnosis     ICD-10-CM    1  Chronic bilateral low back pain without sciatica  M54 5     G89 29                   Subjective: Sp reports increased stiffness and soreness after golf this weekend       Objective: See treatment diary below      Assessment: Tolerated treatment well  Patient responded well to manual therapy with reduction in intensity of low back soreness  Has injections scheduled for tomorrow  Plan: Continue per plan of care        Precautions: HTN, L JABIER       Manuals  9 25    Paraspinal MFR AF AF AF AF AF MM AF AF AF AF   Lumbar UPA  AF AF AF AF AF AF AF AF AF AF   Thoracic rotations in varied flexed/ext AF AF AF AF AF AF AF AF AF AF                Neuro Re-Ed                                                                                                        Ther Ex                                                                                                                     Ther Activity                                       Gait Training                                       Modalities

## 2020-09-29 DIAGNOSIS — F51.01 PRIMARY INSOMNIA: ICD-10-CM

## 2020-09-29 RX ORDER — TRAZODONE HYDROCHLORIDE 50 MG/1
50 TABLET ORAL
Qty: 90 TABLET | Refills: 1 | Status: SHIPPED | OUTPATIENT
Start: 2020-09-29 | End: 2021-06-17

## 2020-10-05 ENCOUNTER — TELEPHONE (OUTPATIENT)
Dept: CARDIOLOGY CLINIC | Facility: CLINIC | Age: 70
End: 2020-10-05

## 2020-10-07 ENCOUNTER — OFFICE VISIT (OUTPATIENT)
Dept: INTERNAL MEDICINE CLINIC | Facility: CLINIC | Age: 70
End: 2020-10-07
Payer: MEDICARE

## 2020-10-07 VITALS
BODY MASS INDEX: 36.29 KG/M2 | WEIGHT: 245 LBS | HEIGHT: 69 IN | TEMPERATURE: 97.9 F | HEART RATE: 55 BPM | OXYGEN SATURATION: 95 %

## 2020-10-07 DIAGNOSIS — F52.21 ERECTILE DYSFUNCTION OF NON-ORGANIC ORIGIN: ICD-10-CM

## 2020-10-07 DIAGNOSIS — R91.8 PULMONARY NODULES: ICD-10-CM

## 2020-10-07 DIAGNOSIS — R73.09 ELEVATED GLUCOSE: ICD-10-CM

## 2020-10-07 DIAGNOSIS — I10 ESSENTIAL HYPERTENSION: ICD-10-CM

## 2020-10-07 DIAGNOSIS — Z12.5 SCREENING PSA (PROSTATE SPECIFIC ANTIGEN): ICD-10-CM

## 2020-10-07 DIAGNOSIS — Z23 FLU VACCINE NEED: ICD-10-CM

## 2020-10-07 DIAGNOSIS — N40.1 BENIGN PROSTATIC HYPERPLASIA WITH LOWER URINARY TRACT SYMPTOMS, SYMPTOM DETAILS UNSPECIFIED: Primary | ICD-10-CM

## 2020-10-07 PROCEDURE — 99214 OFFICE O/P EST MOD 30 MIN: CPT | Performed by: INTERNAL MEDICINE

## 2020-10-07 PROCEDURE — G0008 ADMIN INFLUENZA VIRUS VAC: HCPCS | Performed by: INTERNAL MEDICINE

## 2020-10-07 PROCEDURE — 90662 IIV NO PRSV INCREASED AG IM: CPT | Performed by: INTERNAL MEDICINE

## 2020-10-14 ENCOUNTER — TELEPHONE (OUTPATIENT)
Dept: OTHER | Facility: OTHER | Age: 70
End: 2020-10-14

## 2020-10-21 ENCOUNTER — OFFICE VISIT (OUTPATIENT)
Dept: CARDIOLOGY CLINIC | Facility: CLINIC | Age: 70
End: 2020-10-21
Payer: MEDICARE

## 2020-10-21 VITALS
WEIGHT: 242.9 LBS | SYSTOLIC BLOOD PRESSURE: 130 MMHG | BODY MASS INDEX: 35.98 KG/M2 | HEIGHT: 69 IN | OXYGEN SATURATION: 92 % | TEMPERATURE: 97.1 F | DIASTOLIC BLOOD PRESSURE: 80 MMHG | HEART RATE: 53 BPM

## 2020-10-21 DIAGNOSIS — I10 ESSENTIAL HYPERTENSION: ICD-10-CM

## 2020-10-21 DIAGNOSIS — I25.10 CORONARY ARTERY DISEASE INVOLVING NATIVE CORONARY ARTERY OF NATIVE HEART WITHOUT ANGINA PECTORIS: Primary | ICD-10-CM

## 2020-10-21 DIAGNOSIS — I45.2 BIFASCICULAR BLOCK: ICD-10-CM

## 2020-10-21 PROCEDURE — 99214 OFFICE O/P EST MOD 30 MIN: CPT | Performed by: INTERNAL MEDICINE

## 2020-10-21 RX ORDER — NITROGLYCERIN 0.4 MG/1
0.4 TABLET SUBLINGUAL
Qty: 25 TABLET | Refills: 3 | Status: SHIPPED | OUTPATIENT
Start: 2020-10-21

## 2020-11-10 DIAGNOSIS — N40.1 BENIGN PROSTATIC HYPERPLASIA WITH LOWER URINARY TRACT SYMPTOMS, SYMPTOM DETAILS UNSPECIFIED: ICD-10-CM

## 2020-11-10 RX ORDER — TAMSULOSIN HYDROCHLORIDE 0.4 MG/1
0.4 CAPSULE ORAL 2 TIMES DAILY
Qty: 60 CAPSULE | Refills: 3 | Status: SHIPPED | OUTPATIENT
Start: 2020-11-10 | End: 2021-02-27

## 2020-11-12 ENCOUNTER — CLINICAL SUPPORT (OUTPATIENT)
Dept: INTERNAL MEDICINE CLINIC | Facility: CLINIC | Age: 70
End: 2020-11-12
Payer: MEDICARE

## 2020-11-12 DIAGNOSIS — Z23 NEED FOR 23-POLYVALENT PNEUMOCOCCAL POLYSACCHARIDE VACCINE: Primary | ICD-10-CM

## 2020-11-12 PROCEDURE — 90732 PPSV23 VACC 2 YRS+ SUBQ/IM: CPT

## 2020-11-12 PROCEDURE — G0009 ADMIN PNEUMOCOCCAL VACCINE: HCPCS

## 2020-12-28 ENCOUNTER — APPOINTMENT (OUTPATIENT)
Dept: RADIOLOGY | Facility: MEDICAL CENTER | Age: 70
End: 2020-12-28
Payer: MEDICARE

## 2020-12-28 ENCOUNTER — OFFICE VISIT (OUTPATIENT)
Dept: OBGYN CLINIC | Facility: MEDICAL CENTER | Age: 70
End: 2020-12-28
Payer: MEDICARE

## 2020-12-28 VITALS
HEIGHT: 69 IN | TEMPERATURE: 97.8 F | WEIGHT: 241 LBS | SYSTOLIC BLOOD PRESSURE: 130 MMHG | BODY MASS INDEX: 35.7 KG/M2 | HEART RATE: 79 BPM | DIASTOLIC BLOOD PRESSURE: 78 MMHG

## 2020-12-28 DIAGNOSIS — Z01.89 ENCOUNTER FOR LOWER EXTREMITY COMPARISON IMAGING STUDY: ICD-10-CM

## 2020-12-28 DIAGNOSIS — M25.561 RIGHT KNEE PAIN, UNSPECIFIED CHRONICITY: ICD-10-CM

## 2020-12-28 DIAGNOSIS — M17.11 PRIMARY OSTEOARTHRITIS OF RIGHT KNEE: ICD-10-CM

## 2020-12-28 DIAGNOSIS — M25.361 KNEE INSTABILITY, RIGHT: Primary | ICD-10-CM

## 2020-12-28 PROCEDURE — 73560 X-RAY EXAM OF KNEE 1 OR 2: CPT

## 2020-12-28 PROCEDURE — 73564 X-RAY EXAM KNEE 4 OR MORE: CPT

## 2020-12-28 PROCEDURE — 99204 OFFICE O/P NEW MOD 45 MIN: CPT | Performed by: ORTHOPAEDIC SURGERY

## 2020-12-28 PROCEDURE — 20610 DRAIN/INJ JOINT/BURSA W/O US: CPT | Performed by: ORTHOPAEDIC SURGERY

## 2020-12-28 RX ORDER — METHYLPREDNISOLONE ACETATE 40 MG/ML
2 INJECTION, SUSPENSION INTRA-ARTICULAR; INTRALESIONAL; INTRAMUSCULAR; SOFT TISSUE
Status: COMPLETED | OUTPATIENT
Start: 2020-12-28 | End: 2020-12-28

## 2020-12-28 RX ORDER — LIDOCAINE HYDROCHLORIDE 5 MG/ML
3 INJECTION, SOLUTION INFILTRATION; PERINEURAL
Status: COMPLETED | OUTPATIENT
Start: 2020-12-28 | End: 2020-12-28

## 2020-12-28 RX ADMIN — LIDOCAINE HYDROCHLORIDE 3 ML: 5 INJECTION, SOLUTION INFILTRATION; PERINEURAL at 10:31

## 2020-12-28 RX ADMIN — METHYLPREDNISOLONE ACETATE 2 ML: 40 INJECTION, SUSPENSION INTRA-ARTICULAR; INTRALESIONAL; INTRAMUSCULAR; SOFT TISSUE at 10:31

## 2020-12-30 ENCOUNTER — HOSPITAL ENCOUNTER (OUTPATIENT)
Dept: MRI IMAGING | Facility: HOSPITAL | Age: 70
Discharge: HOME | End: 2020-12-30
Attending: ORTHOPAEDIC SURGERY
Payer: MEDICARE

## 2020-12-30 DIAGNOSIS — M25.361 KNEE INSTABILITY, RIGHT: ICD-10-CM

## 2020-12-30 DIAGNOSIS — M17.11 PRIMARY OSTEOARTHRITIS OF RIGHT KNEE: ICD-10-CM

## 2020-12-30 DIAGNOSIS — M25.561 RIGHT KNEE PAIN, UNSPECIFIED CHRONICITY: ICD-10-CM

## 2020-12-30 PROCEDURE — 73721 MRI JNT OF LWR EXTRE W/O DYE: CPT

## 2020-12-30 PROCEDURE — G1004 CDSM NDSC: HCPCS

## 2021-01-04 DIAGNOSIS — K21.9 GASTROESOPHAGEAL REFLUX DISEASE: ICD-10-CM

## 2021-01-04 RX ORDER — OMEPRAZOLE 20 MG/1
20 CAPSULE, DELAYED RELEASE ORAL DAILY
Qty: 90 CAPSULE | Refills: 1 | Status: SHIPPED | OUTPATIENT
Start: 2021-01-04 | End: 2021-06-24

## 2021-01-06 ENCOUNTER — EVALUATION (OUTPATIENT)
Dept: PHYSICAL THERAPY | Facility: MEDICAL CENTER | Age: 71
End: 2021-01-06
Payer: MEDICARE

## 2021-01-06 DIAGNOSIS — M25.561 RIGHT KNEE PAIN, UNSPECIFIED CHRONICITY: Primary | ICD-10-CM

## 2021-01-06 DIAGNOSIS — M17.11 PRIMARY OSTEOARTHRITIS OF RIGHT KNEE: ICD-10-CM

## 2021-01-06 DIAGNOSIS — M25.361 KNEE INSTABILITY, RIGHT: ICD-10-CM

## 2021-01-06 PROCEDURE — 97161 PT EVAL LOW COMPLEX 20 MIN: CPT | Performed by: PHYSICAL THERAPIST

## 2021-01-06 NOTE — PROGRESS NOTES
PT Evaluation     Today's date: 2021  Patient name: Alesia Wyman  : 1950  MRN: [id number]  Referring provider: Aiden Carvalho DO  Dx:   Encounter Diagnosis     ICD-10-CM    1  Right knee pain, unspecified chronicity  M25 561 Ambulatory referral to Physical Therapy   2  Primary osteoarthritis of right knee  M17 11 Ambulatory referral to Physical Therapy   3  Knee instability, right  M25 361 Ambulatory referral to Physical Therapy                  Assessment  Assessment details: Alesia Wyman is a 72 y o  male was evaluated on 2021  for Right knee pain, unspecified chronicity  (primary encounter diagnosis)  Primary osteoarthritis of right knee  Knee instability, right  Alesia Wyman has the above listed impairments resulting in functional deficits and negative impact to quality of life  Patient is appropriate for skilled PT intervention to promote maximal return to function and patient specific goals  Patient agrees with outlined treatment plan and all questions were answered to their satisfaction        Impairments: abnormal muscle firing, abnormal muscle tone, abnormal or restricted ROM, impaired physical strength, lacks appropriate home exercise program and pain with function  Understanding of Dx/Px/POC: good   Prognosis: good    Goals  Impairment Goals  - Decrease pain to 0/10  - Improve ROM equal to contralateral side  - Increase strength equal to contralateral side    Functional Goals  - Increase Functional Status Measure to: above predicted  - Patient will be independent with comprehensive HEP  - Ambulation is improved to prior level of function  - Stair climbing is improved to prior level of function  - Squatting is improved to prior level of function        Plan  Patient would benefit from: skilled PT  Referral necessary: No  Planned modality interventions: thermotherapy: hydrocollator packs  Planned therapy interventions: home exercise program, manual therapy, neuromuscular re-education, patient education, functional ROM exercises, strengthening, stretching, joint mobilization, graded activity, graded exercise, therapeutic exercise, body mechanics training, motor coordination training and activity modification  Frequency: 2x week  Duration in weeks: 12  Treatment plan discussed with: patient        Subjective Evaluation    History of Present Illness  Mechanism of injury: Garfield Reyes is a 72 y o male presenting to therapy with complaints of right knee pain  He notes no specific injury, but began noting pain after snow blowing a few weeks ago the following day  He was unable to bear weight on his knee, iced for a few days and gradually improved  He then missed a step and re aggravated the pain once again  No clicking or locking  Received steroid injection which did bring his pain down to 5/10  Recent ALEM indicated generalized OA, meniscal tear and possible recent rupture of bakers cyst   Currently he is having 5/10 pain with ambulation  Pain  Current pain rating: 3  At best pain rating: 3  At worst pain ratin  Quality: dull ache          Objective     Observations   Left Knee   Negative for edema  Right Knee   Negative for edema  Palpation     Right   Tenderness of the medial gastrocnemius  Active Range of Motion   Left Knee   Normal active range of motion    Right Knee   Flexion: 115 degrees   Extension: 5 degrees     Strength/Myotome Testing     Left Knee   Flexion: 4+  Prone flexion: 4+  Extension: 4+    Right Knee   Flexion: 4  Prone flexion: 4  Extension: 4    Tests     Right Knee   Positive bounce home  Negative lateral Ld and medial Ld         Flowsheet Rows      Most Recent Value   PT/OT G-Codes   Current Score  54   Projected Score  72             Precautions: Hx MI      Manuals                                                                 Neuro Re-Ed Ther Ex                                                                                                                     Ther Activity                                       Gait Training                                       Modalities

## 2021-01-07 ENCOUNTER — OFFICE VISIT (OUTPATIENT)
Dept: OBGYN CLINIC | Facility: MEDICAL CENTER | Age: 71
End: 2021-01-07
Payer: MEDICARE

## 2021-01-07 VITALS
SYSTOLIC BLOOD PRESSURE: 134 MMHG | DIASTOLIC BLOOD PRESSURE: 75 MMHG | WEIGHT: 247 LBS | HEART RATE: 80 BPM | BODY MASS INDEX: 36.58 KG/M2 | HEIGHT: 69 IN | TEMPERATURE: 98.7 F

## 2021-01-07 DIAGNOSIS — M17.11 PRIMARY OSTEOARTHRITIS OF RIGHT KNEE: ICD-10-CM

## 2021-01-07 DIAGNOSIS — S83.241A ACUTE MEDIAL MENISCUS TEAR, RIGHT, INITIAL ENCOUNTER: Primary | ICD-10-CM

## 2021-01-07 DIAGNOSIS — M71.21 BAKER'S CYST OF KNEE, RIGHT: ICD-10-CM

## 2021-01-07 PROCEDURE — 99213 OFFICE O/P EST LOW 20 MIN: CPT | Performed by: ORTHOPAEDIC SURGERY

## 2021-01-07 NOTE — PROGRESS NOTES
Assessment/Plan:  1  Acute medial meniscus tear, right, initial encounter    2  Primary osteoarthritis of right knee    3  Baker's cyst of knee, right      No orders of the defined types were placed in this encounter  · MRI right knee reviewed with patient demonstrating medial meniscus tear, chronic MCL injury, and ruptured Baker's cyst    · Reviewed non operative and operative treatment for meniscus tear including partial medial meniscectomy  Patient will continue non op treatment at this time  · Continue PT    · Continue tylenol as needed for pain up to 3,000 mg per day  Avoids NSAIDs due to being on plavix  Return in about 6 weeks (around 2/18/2021) for right knee follow up  I answered all of the patient's questions during the visit and provided education of the patient's condition during the visit  The patient verbalized understanding of the information given and agrees with the plan  This note was dictated using [employer] software  It may contain errors including improperly dictated words  Please contact physician directly for any questions  Subjective   Chief Complaint:   Chief Complaint   Patient presents with    Right Knee - Follow-up       HPI  Sarika Green is a 72 y o  male who presents for follow up for right knee pain  Patient is here for MRI review  Patient received right knee CSI on 12/28/20 and reports ongoing pain relief  Patient notes his knee pain has improved and gets better daily  He rates it as a 3/10 at this time when it was 10/10 previously  Patient notes some persistent pain on the medial knee  Denies instability, locking, catching  Taking tylenol as needed for pain  Patient began PT and has it scheduled through next week  He does not have a knee brace  Review of Systems  ROS:    See HPI for musculoskeletal review     All other systems reviewed are negative     History:  Past Medical History:   Diagnosis Date    CAD (coronary artery disease)     s/p stent x1 on 6/19/19    Closed fracture of distal end of fibula with routine healing     unspecified fracture morphology, unspecified laterality, subsequent encounter;  Last Assessed:12/27/16    GERD (gastroesophageal reflux disease)     Heart attack ([facility] [address] )     Hypertension     Kidney stones     Nocturia     Psoriasis     Sleep apnea      Past Surgical History:   Procedure Laterality Date    APPENDECTOMY      CARPAL TUNNEL RELEASE      ROTATOR CUFF REPAIR      SHOULDER ARTHROSCOPY Left     Last ASsessed:10/24/16    TOTAL HIP ARTHROPLASTY      TRIGGER FINGER RELEASE       Social History   Social History     Substance and Sexual Activity   Alcohol Use Yes    Frequency: 4 or more times a week    Drinks per session: 1 or 2    Binge frequency: Never    Comment: 1 drink daily     Social History     Substance and Sexual Activity   Drug Use No     Social History     Tobacco Use   Smoking Status Never Smoker   Smokeless Tobacco Never Used     Family History:   Family History   Problem Relation Age of Onset    Arthritis Mother     Hypertension Mother     Irritable bowel syndrome Mother     Osteoporosis Mother     Arthritis Other        Current Outpatient Medications on File Prior to Visit   Medication Sig Dispense Refill    Acetaminophen 325 MG CAPS Take 650 mg by mouth every 6 (six) hours      aspirin (ECOTRIN LOW STRENGTH) 81 mg EC tablet Take 1 tablet (81 mg total) by mouth daily 30 tablet 0    atorvastatin (LIPITOR) 80 mg tablet Take 1 tablet (80 mg total) by mouth daily with dinner 90 tablet 3    betamethasone, augmented, (DIPROLENE-AF) 0 05 % cream apply twice daily to rash x 2 weeks  then Saturday and Sunday only  0    calcipotriene (DOVONEX) 0 005 % cream apply to affected area twice a day ON MONDAY THROUGH FRIDAY  0    clopidogrel (PLAVIX) 75 mg tablet take 1 tablet by mouth once daily 90 tablet 3    fluticasone (FLONASE) 50 mcg/act nasal spray 2 sprays into each nostril 2 (two) times a day as needed for rhinitis      losartan (COZAAR) 100 MG tablet Take 1 tablet (100 mg total) by mouth daily 90 tablet 3    metoprolol succinate (TOPROL-XL) 25 mg 24 hr tablet Take 0 5 tablets (12 5 mg total) by mouth daily 45 tablet 3    NIFEdipine (ADALAT CC) 90 mg 24 hr tablet Take 1 tablet (90 mg total) by mouth daily 90 tablet 3    nitroglycerin (NITROSTAT) 0 4 mg SL tablet Place 1 tablet (0 4 mg total) under the tongue every 5 (five) minutes as needed for chest pain 25 tablet 3    omeprazole (PriLOSEC) 20 mg delayed release capsule Take 1 capsule (20 mg total) by mouth daily 90 capsule 1    oxybutynin (DITROPAN) 5 mg tablet Take 1 tablet (5 mg total) by mouth 2 (two) times a day Take 1 to 2 tablets by mouth once daily before bed  180 tablet 5    tamsulosin (FLOMAX) 0 4 mg Take 1 capsule (0 4 mg total) by mouth 2 (two) times a day 60 capsule 3    TIZANIDINE HCL PO Take 4 mg by mouth as needed       traMADol (ULTRAM) 50 mg tablet Take 100 mg by mouth as needed   0    traZODone (DESYREL) 50 mg tablet Take 1 tablet (50 mg total) by mouth daily at bedtime 90 tablet 1     No current facility-administered medications on file prior to visit        Allergies   Allergen Reactions    Latex Hives    Other     Pollen Extract     Shellfish-Derived Products      [facility]        Objective     /75   Pulse 80   Temp 98 7 °F (37 1 °C)   Ht 5' 9" (1 753 m)   Wt 112 kg (247 lb)   BMI 36 48 kg/m²      PE:  AAOx 3  WDWN  Hearing intact, no drainage from eyes  no audible wheezing  no abdominal distension  LE compartments soft, skin intact    Ortho Exam:  right Knee:   No erythema  no swelling  no effusion  no warmth  +TTP over medial joint line  AROM: 0- 125  Stable to varus/valgus stress    Imaging Studies: I have personally reviewed pertinent films in PACS  MRI right knee: medial meniscus tear, chronic MCL injury, Baker's cyst

## 2021-01-13 ENCOUNTER — OFFICE VISIT (OUTPATIENT)
Dept: PHYSICAL THERAPY | Facility: MEDICAL CENTER | Age: 71
End: 2021-01-13
Payer: MEDICARE

## 2021-01-13 DIAGNOSIS — M25.361 KNEE INSTABILITY, RIGHT: ICD-10-CM

## 2021-01-13 DIAGNOSIS — M25.561 RIGHT KNEE PAIN, UNSPECIFIED CHRONICITY: Primary | ICD-10-CM

## 2021-01-13 DIAGNOSIS — M17.11 PRIMARY OSTEOARTHRITIS OF RIGHT KNEE: ICD-10-CM

## 2021-01-13 PROCEDURE — 97110 THERAPEUTIC EXERCISES: CPT | Performed by: PHYSICAL THERAPIST

## 2021-01-13 PROCEDURE — 97140 MANUAL THERAPY 1/> REGIONS: CPT | Performed by: PHYSICAL THERAPIST

## 2021-01-15 NOTE — PROGRESS NOTES
Daily Note     Today's date: 1/15/2021  Patient name: Leida Salgado  : 1950  MRN: [id number]  Referring provider: Timo Craig DO  Dx:   Encounter Diagnosis     ICD-10-CM    1  Right knee pain, unspecified chronicity  M25 561    2  Primary osteoarthritis of right knee  M17 11    3  Knee instability, right  M25 361                   Subjective: Mukesh Coburn reports that he is doing well, had less pain after evaluation       Objective: See treatment diary below      Assessment: Tolerated treatment well  Patient exhibited good technique with therapeutic exercises and would benefit from continued PT      Plan: Continue per plan of care        Precautions: Hx MI      Manuals             Prone gastroc/hamstring MFR  AF                                                   Neuro Re-Ed                                                                                                        Ther Ex             Gastroc stretch 30 sec  X 3            Hamstring stretch 30 sec  X 3                                                                                          Ther Activity                                       Gait Training                                       Modalities

## 2021-01-18 ENCOUNTER — OFFICE VISIT (OUTPATIENT)
Dept: PHYSICAL THERAPY | Facility: MEDICAL CENTER | Age: 71
End: 2021-01-18
Payer: MEDICARE

## 2021-01-18 DIAGNOSIS — M25.561 RIGHT KNEE PAIN, UNSPECIFIED CHRONICITY: Primary | ICD-10-CM

## 2021-01-18 DIAGNOSIS — M25.361 KNEE INSTABILITY, RIGHT: ICD-10-CM

## 2021-01-18 DIAGNOSIS — M17.11 PRIMARY OSTEOARTHRITIS OF RIGHT KNEE: ICD-10-CM

## 2021-01-18 PROCEDURE — 97110 THERAPEUTIC EXERCISES: CPT | Performed by: PHYSICAL THERAPIST

## 2021-01-18 PROCEDURE — 97140 MANUAL THERAPY 1/> REGIONS: CPT | Performed by: PHYSICAL THERAPIST

## 2021-01-18 NOTE — PROGRESS NOTES
Daily Note     Today's date: 2021  Patient name: Charlene Gibbs  : 1950  MRN: [id number]  Referring provider: Angela Barone DO  Dx:   Encounter Diagnosis     ICD-10-CM    1  Right knee pain, unspecified chronicity  M25 561    2  Primary osteoarthritis of right knee  M17 11    3  Knee instability, right  M25 361                   Subjective: Raghavendra Eid reports that he is having less trouble going down stairs  Objective: See treatment diary below      Assessment: Tolerated treatment well  Patient exhibited good technique with therapeutic exercises and would benefit from continued PT      Plan: Continue per plan of care        Precautions: Hx MI      Manuals            Prone gastroc/hamstring MFR  AF AF                                                  Neuro Re-Ed                                                                                                        Ther Ex             Gastroc stretch 30 sec  X 3 30 sec   3            Hamstring stretch 30 sec  X 3 30 sec  X 3                                                                                          Ther Activity                                       Gait Training                                       Modalities

## 2021-01-20 ENCOUNTER — OFFICE VISIT (OUTPATIENT)
Dept: PHYSICAL THERAPY | Facility: MEDICAL CENTER | Age: 71
End: 2021-01-20
Payer: MEDICARE

## 2021-01-20 DIAGNOSIS — M17.11 PRIMARY OSTEOARTHRITIS OF RIGHT KNEE: Primary | ICD-10-CM

## 2021-01-20 DIAGNOSIS — M25.361 KNEE INSTABILITY, RIGHT: ICD-10-CM

## 2021-01-20 DIAGNOSIS — M25.561 RIGHT KNEE PAIN, UNSPECIFIED CHRONICITY: ICD-10-CM

## 2021-01-20 PROCEDURE — 97140 MANUAL THERAPY 1/> REGIONS: CPT | Performed by: PHYSICAL THERAPIST

## 2021-01-20 NOTE — PROGRESS NOTES
Daily Note     Today's date: 2021  Patient name: Jamaal Lomas  : 1950  MRN: [id number]  Referring provider: Fredo Christianson DO  Dx:   Encounter Diagnosis     ICD-10-CM    1  Primary osteoarthritis of right knee  M17 11    2  Knee instability, right  M25 361    3  Right knee pain, unspecified chronicity  M25 561                   Subjective: Sp reports his knee is doing well  Stairs and walking much less difficulty       Objective: See treatment diary below      Assessment: Tolerated treatment well  Patient exhibited good technique with therapeutic exercises and would benefit from continued PT      Plan: Continue per plan of care        Precautions: Hx MI      Manuals           Prone gastroc/hamstring MFR  AF AF                                                  Neuro Re-Ed                                                                                                        Ther Ex             Gastroc stretch 30 sec  X 3 30 sec   3  30 sec  X 3           Hamstring stretch 30 sec  X 3 30 sec  X 3  30 sec  X 3                                                                                        Ther Activity                                       Gait Training                                       Modalities

## 2021-01-21 ENCOUNTER — OFFICE VISIT (OUTPATIENT)
Dept: CARDIOLOGY CLINIC | Facility: CLINIC | Age: 71
End: 2021-01-21
Payer: MEDICARE

## 2021-01-21 ENCOUNTER — IMMUNIZATIONS (OUTPATIENT)
Dept: FAMILY MEDICINE CLINIC | Facility: HOSPITAL | Age: 71
End: 2021-01-21

## 2021-01-21 VITALS
BODY MASS INDEX: 37.18 KG/M2 | WEIGHT: 251 LBS | HEIGHT: 69 IN | SYSTOLIC BLOOD PRESSURE: 134 MMHG | HEART RATE: 48 BPM | DIASTOLIC BLOOD PRESSURE: 82 MMHG

## 2021-01-21 DIAGNOSIS — I45.2 BIFASCICULAR BLOCK: ICD-10-CM

## 2021-01-21 DIAGNOSIS — I10 ESSENTIAL HYPERTENSION: ICD-10-CM

## 2021-01-21 DIAGNOSIS — Z23 ENCOUNTER FOR IMMUNIZATION: Primary | ICD-10-CM

## 2021-01-21 DIAGNOSIS — I25.10 CORONARY ARTERY DISEASE INVOLVING NATIVE CORONARY ARTERY OF NATIVE HEART WITHOUT ANGINA PECTORIS: Primary | ICD-10-CM

## 2021-01-21 PROCEDURE — 91301 SARS-COV-2 / COVID-19 MRNA VACCINE (MODERNA) 100 MCG: CPT

## 2021-01-21 PROCEDURE — 99214 OFFICE O/P EST MOD 30 MIN: CPT | Performed by: INTERNAL MEDICINE

## 2021-01-21 PROCEDURE — 93000 ELECTROCARDIOGRAM COMPLETE: CPT | Performed by: INTERNAL MEDICINE

## 2021-01-21 PROCEDURE — 0011A SARS-COV-2 / COVID-19 MRNA VACCINE (MODERNA) 100 MCG: CPT

## 2021-01-21 NOTE — PROGRESS NOTES
Cardiology Follow Up    [facility]  1950  [id number]  [facility]  [address]  [phone]    1  Coronary artery disease involving native coronary artery of native heart without angina pectoris  Lipid panel    LDL cholesterol, direct   2  Essential hypertension     3  Bifascicular block  POCT ECG       Discussion/Summary:    1  CAD: prior NSTEMI 6/2019  Continue with DAPT currently  Stress test done in August 2020, no ischemia,  Continues to feel well  Has gained weight, going to get back to exercising more  2  HTN: BP controlled  See below, as I plan to stop his metoprolol  3  Bifascicular block:  Heart rate is in the 40s today on EKG  He is on a low dose of Toprol-XL  While he did have NSTEMI in June of 2019, I think the benefit of the low-dose beta-blocker right now is minimal compared to having his heart rate be so bradycardic in the 40s  While he is asymptomatic, I advised him to stop the 12 5 mg of Toprol-XL  He will occasionally monitor blood pressure  If it creeps up, can add chlorthalidone    3  HL:  Last lipid panel was a year ago  Recent blood work by PCP did not include a lipid panel  I ordered this for him  On high-intensity atorvastatin  Previous History:  58-year-old gentleman  He has coronary disease identified in June of 2019  He had an NSTEMI  EF was preserved  He had a stent to the 2nd OM, 60% 1st diagonal, 60% circ, 50% mid RCA  He is on aspirin and Plavix  He has a baseline left anterior fascicular block, RBBB  He has hypertension    Interval History:    Since last visit, he has not checked his blood pressure is much because it was making him more anxious  Based on office visits, overall this has been controlled    Generally feeling well except for some congestion that he wakes up with, but this is better when he takes guaifenesin  Heart rate is slow in the office today  His heart rate is well is getting some slow readings, but he denies any major dizziness or lightheadedness  No syncope  He continues on a low dose of Toprol-XL  Problem List     Anxiety    BPH with obstruction/lower urinary tract symptoms    Elevated glucose    Erectile dysfunction of non-organic origin    GERD without esophagitis    Hyperlipidemia    Essential hypertension    Obesity (BMI 30 0-34 9)    Obstructive sleep apnea    Osteoarthritis    Psoriasis    Bifascicular block    Calculus of kidney    Primary insomnia    GERD (gastroesophageal reflux disease)    Low back pain    Alcohol use    Dyslipidemia    Coronary artery disease involving native coronary artery of native heart without angina pectoris        Past Medical History:   Diagnosis Date    CAD (coronary artery disease)     s/p stent x1 on 6/19/19    Closed fracture of distal end of fibula with routine healing     unspecified fracture morphology, unspecified laterality, subsequent encounter;  Last Assessed:12/27/16    GERD (gastroesophageal reflux disease)     [facility] [address] )     Hypertension     Kidney stones     Nocturia     Psoriasis     Sleep apnea      Social History     Tobacco Use    Smoking status: Never Smoker    Smokeless tobacco: Never Used   Substance Use Topics    Alcohol use: Yes     Frequency: 4 or more times a week     Drinks per session: 1 or 2     Binge frequency: Never     Comment: 1 drink daily    Drug use: No      Family History   Problem Relation Age of Onset    Arthritis Mother     Hypertension Mother     Irritable bowel syndrome Mother     Osteoporosis Mother     Arthritis Other      Past Surgical History:   Procedure Laterality Date    APPENDECTOMY      CARPAL TUNNEL RELEASE      ROTATOR CUFF REPAIR      SHOULDER ARTHROSCOPY Left     Last ASsessed:10/24/16    TOTAL HIP ARTHROPLASTY      TRIGGER FINGER RELEASE         Current Outpatient Medications:     Acetaminophen 325 MG CAPS, Take 650 mg by mouth every 6 (six) hours, Disp: , Rfl:     aspirin (ECOTRIN LOW STRENGTH) 81 mg EC tablet, Take 1 tablet (81 mg total) by mouth daily, Disp: 30 tablet, Rfl: 0    atorvastatin (LIPITOR) 80 mg tablet, Take 1 tablet (80 mg total) by mouth daily with dinner, Disp: 90 tablet, Rfl: 3    clopidogrel (PLAVIX) 75 mg tablet, take 1 tablet by mouth once daily, Disp: 90 tablet, Rfl: 3    fluticasone (FLONASE) 50 mcg/act nasal spray, 2 sprays into each nostril 2 (two) times a day as needed for rhinitis, Disp: , Rfl:     losartan (COZAAR) 100 MG tablet, Take 1 tablet (100 mg total) by mouth daily, Disp: 90 tablet, Rfl: 3    NIFEdipine (ADALAT CC) 90 mg 24 hr tablet, Take 1 tablet (90 mg total) by mouth daily, Disp: 90 tablet, Rfl: 3    nitroglycerin (NITROSTAT) 0 4 mg SL tablet, Place 1 tablet (0 4 mg total) under the tongue every 5 (five) minutes as needed for chest pain, Disp: 25 tablet, Rfl: 3    omeprazole (PriLOSEC) 20 mg delayed release capsule, Take 1 capsule (20 mg total) by mouth daily, Disp: 90 capsule, Rfl: 1    oxybutynin (DITROPAN) 5 mg tablet, Take 1 tablet (5 mg total) by mouth 2 (two) times a day Take 1 to 2 tablets by mouth once daily before bed , Disp: 180 tablet, Rfl: 5    tamsulosin (FLOMAX) 0 4 mg, Take 1 capsule (0 4 mg total) by mouth 2 (two) times a day, Disp: 60 capsule, Rfl: 3    TIZANIDINE HCL PO, Take 4 mg by mouth as needed , Disp: , Rfl:     traMADol (ULTRAM) 50 mg tablet, Take 100 mg by mouth as needed , Disp: , Rfl: 0    traZODone (DESYREL) 50 mg tablet, Take 1 tablet (50 mg total) by mouth daily at bedtime, Disp: 90 tablet, Rfl: 1    betamethasone, augmented, (DIPROLENE-AF) 0 05 % cream, apply twice daily to rash x 2 weeks  then Saturday and Sunday only, Disp: , Rfl: 0    calcipotriene (DOVONEX) 0 005 % cream, apply to affected area twice a day ON MONDAY THROUGH FRIDAY, Disp: , Rfl: 0  Allergies   Allergen Reactions    Latex Hives    Other     Pollen Extract     Shellfish-Derived Products      [facility]       Vitals:    01/21/21 1302   BP: 134/82   BP Location: Right arm   Patient Position: Sitting   Cuff Size: Large   Pulse: (!) 48   Weight: 114 kg (251 lb)   Height: 5' 9" (1 753 m)     Vitals:    01/21/21 1302   Weight: 114 kg (251 lb)      Height: 5' 9" (175 3 cm)   Body mass index is 37 07 kg/m²  Physical Exam:  GEN: Kael Stuart appears well, alert and oriented x 3, pleasant and cooperative   HEENT: pupils equal, round, and reactive to light; extraocular muscles intact  NECK: supple, no carotid bruits   HEART: regular rhythm, normal S1 and S2, no murmurs, clicks, gallops or rubs   LUNGS: clear to auscultation bilaterally; no wheezes, rales, or rhonchi   ABDOMEN: normal bowel sounds, soft, no tenderness, no distention  EXTREMITIES: peripheral pulses normal; no clubbing, cyanosis, or edema  NEURO: no focal findings   SKIN: normal without suspicious lesions on exposed skin    ROS:  Positive for headaches  Except as noted in HPI, is otherwise reviewed in detail and a 12 point review of systems is negative  ROS reviewed and is unchanged    Labs:  Lab Results   Component Value Date    K 4 3 07/07/2020     07/07/2020    CREATININE 0 92 07/07/2020    BUN 20 07/07/2020    CO2 28 07/07/2020    ALT 27 07/05/2020    AST 14 07/05/2020    INR 0 98 06/18/2019    GLUF 108 (H) 06/19/2019    HGBA1C 5 2 06/19/2019    WBC 7 82 07/07/2020    HGB 16 5 07/07/2020    HCT 49 6 (H) 07/07/2020     07/07/2020     No results found for: CHOL  Lab Results   Component Value Date    LDLCALC 50 01/03/2020    LDLCALC 142 (H) 06/19/2019     Lab Results   Component Value Date    HDL 53 01/03/2020    HDL 50 06/19/2019     Lab Results   Component Value Date    TRIG 43 01/03/2020    TRIG 95 06/19/2019     Testing:  Echo:  LEFT VENTRICLE:  Systolic function was normal  Ejection fraction was estimated to be 60 %    There were no regional wall motion abnormalities  Doppler parameters were consistent with abnormal left ventricular relaxation (grade 1 diastolic dysfunction)      MITRAL VALVE:  There was mild annular calcification  There was mild to moderate regurgitation      AORTIC VALVE:  There was mild regurgitation      TRICUSPID VALVE:  There was trace regurgitation  Cardiac Cath 6/2019  CORONARY CIRCULATION:  1st diagonal: There was a tubular 60 % stenosis  2nd obtuse marginal: There was a 99 % stenosis  There was ROSIE grade 2 flow through the vessel (partial perfusion)  This lesion is a likely culprit for the patient's recent myocardial infarction  An intervention was performed  Left AV groove artery: There was a discrete 60 % stenosis  Mid RCA: There was a 50 % stenosis      1ST LESION INTERVENTIONS:  A successful balloon angioplasty with stent procedure was performed on the 99 % lesion in the 2nd obtuse marginal  Following intervention there was an excellent angiographic appearance with a 0 % residual stenosis  A [employer] [address] Rx 2 75 x 18mm drug-eluting stent was placed across the lesion and deployed at a maximum inflation pressure of 16 pam  EKG:  Sinus bradycardia, 46 BPM  RBBB, LAFB

## 2021-01-25 ENCOUNTER — OFFICE VISIT (OUTPATIENT)
Dept: PHYSICAL THERAPY | Facility: MEDICAL CENTER | Age: 71
End: 2021-01-25
Payer: MEDICARE

## 2021-01-25 DIAGNOSIS — M17.11 PRIMARY OSTEOARTHRITIS OF RIGHT KNEE: Primary | ICD-10-CM

## 2021-01-25 DIAGNOSIS — M25.561 RIGHT KNEE PAIN, UNSPECIFIED CHRONICITY: ICD-10-CM

## 2021-01-25 DIAGNOSIS — M25.361 KNEE INSTABILITY, RIGHT: ICD-10-CM

## 2021-01-25 PROCEDURE — 97140 MANUAL THERAPY 1/> REGIONS: CPT | Performed by: PHYSICAL THERAPIST

## 2021-01-26 NOTE — PROGRESS NOTES
Daily Note     Today's date: 2021  Patient name: Pollo Redd  : 1950  MRN: [id number]  Referring provider: Cintia Chin DO  Dx:   Encounter Diagnosis     ICD-10-CM    1  Primary osteoarthritis of right knee  M17 11    2  Knee instability, right  M25 361    3  Right knee pain, unspecified chronicity  M25 561                   Subjective  Sumit Deed reports he is doing well, knee has been continually improving       Objective: See treatment diary below      Assessment: Tolerated treatment well  Patient exhibited good technique with therapeutic exercises and would benefit from continued PT      Plan: Continue per plan of care        Precautions: Hx MI      Manuals          Prone gastroc/hamstring MFR  AF AF  AF                                                Neuro Re-Ed                                                                                                        Ther Ex             Gastroc stretch 30 sec  X 3 30 sec   3  30 sec  X 3  30 sec  X 3          Hamstring stretch 30 sec  X 3 30 sec  X 3  30 sec  X 3 30 sec  X 3          LAQ    30                                                                          Ther Activity                                       Gait Training                                       Modalities

## 2021-01-27 ENCOUNTER — OFFICE VISIT (OUTPATIENT)
Dept: PHYSICAL THERAPY | Facility: MEDICAL CENTER | Age: 71
End: 2021-01-27
Payer: MEDICARE

## 2021-01-27 DIAGNOSIS — M17.11 PRIMARY OSTEOARTHRITIS OF RIGHT KNEE: ICD-10-CM

## 2021-01-27 DIAGNOSIS — M25.361 KNEE INSTABILITY, RIGHT: Primary | ICD-10-CM

## 2021-01-27 DIAGNOSIS — M25.561 RIGHT KNEE PAIN, UNSPECIFIED CHRONICITY: ICD-10-CM

## 2021-01-27 PROCEDURE — 97140 MANUAL THERAPY 1/> REGIONS: CPT | Performed by: PHYSICAL THERAPIST

## 2021-01-27 NOTE — PROGRESS NOTES
Daily Note     Today's date: 2021  Patient name: Norbert Phillip  : 1950  MRN: [id number]  Referring provider: Cookie Holliday DO  Dx:   Encounter Diagnosis     ICD-10-CM    1  Knee instability, right  M25 361    2  Right knee pain, unspecified chronicity  M25 561    3  Primary osteoarthritis of right knee  M17 11                   Subjective: [place] Barefoot reports he is doing well, knee has been doing well       Objective: See treatment diary below      Assessment: Tolerated treatment well  Patient exhibited good technique with therapeutic exercises and would benefit from continued PT      Plan: Continue per plan of care        Precautions: Hx MI      Manuals          Prone gastroc/hamstring MFR  AF AF  AF                                                Neuro Re-Ed                                                                                                        Ther Ex             Gastroc stretch 30 sec  X 3 30 sec   3  30 sec  X 3  30 sec  X 3          Hamstring stretch 30 sec  X 3 30 sec  X 3  30 sec  X 3 30 sec  X 3          LAQ    30                                                                          Ther Activity                                       Gait Training                                       Modalities

## 2021-01-29 ENCOUNTER — OFFICE VISIT (OUTPATIENT)
Dept: PHYSICAL THERAPY | Facility: MEDICAL CENTER | Age: 71
End: 2021-01-29
Payer: MEDICARE

## 2021-01-29 DIAGNOSIS — M25.561 RIGHT KNEE PAIN, UNSPECIFIED CHRONICITY: ICD-10-CM

## 2021-01-29 DIAGNOSIS — M17.11 PRIMARY OSTEOARTHRITIS OF RIGHT KNEE: ICD-10-CM

## 2021-01-29 DIAGNOSIS — M25.361 KNEE INSTABILITY, RIGHT: Primary | ICD-10-CM

## 2021-01-29 PROCEDURE — 97140 MANUAL THERAPY 1/> REGIONS: CPT | Performed by: PHYSICAL THERAPIST

## 2021-02-01 ENCOUNTER — APPOINTMENT (OUTPATIENT)
Dept: PHYSICAL THERAPY | Facility: MEDICAL CENTER | Age: 71
End: 2021-02-01
Payer: MEDICARE

## 2021-02-03 NOTE — PROGRESS NOTES
Daily Note     Today's date: 2/3/2021  Patient name: Sarika Green  : 1950  MRN: [id number]  Referring provider: Agusto Guallpa DO  Dx:   Encounter Diagnosis     ICD-10-CM    1  Knee instability, right  M25 361    2  Right knee pain, unspecified chronicity  M25 561    3  Primary osteoarthritis of right knee  M17 11                   Subjective: Grupo Bowles reports that he is doing well and making good progress       Objective: See treatment diary below      Assessment: Tolerated treatment well  Patient exhibited good technique with therapeutic exercises and would benefit from continued PT      Plan: Continue per plan of care        Precautions: Hx MI      Manuals         Prone gastroc/hamstring MFR  AF AF  AF AF                                               Neuro Re-Ed                                                                                                        Ther Ex             Gastroc stretch 30 sec  X 3 30 sec   3  30 sec  X 3  30 sec  X 3  30 sec  X 3         Hamstring stretch 30 sec  X 3 30 sec  X 3  30 sec  X 3 30 sec  X 3  30 sec  X 3        LAQ    30 30                                                                         Ther Activity                                       Gait Training                                       Modalities

## 2021-02-08 ENCOUNTER — OFFICE VISIT (OUTPATIENT)
Dept: INTERNAL MEDICINE CLINIC | Facility: CLINIC | Age: 71
End: 2021-02-08
Payer: MEDICARE

## 2021-02-08 DIAGNOSIS — E66.01 MORBID OBESITY (HCC): ICD-10-CM

## 2021-02-08 DIAGNOSIS — M46.1 SACROILIITIS (HCC): ICD-10-CM

## 2021-02-08 DIAGNOSIS — K74.60 HEPATIC CIRRHOSIS, UNSPECIFIED HEPATIC CIRRHOSIS TYPE, UNSPECIFIED WHETHER ASCITES PRESENT (HCC): ICD-10-CM

## 2021-02-08 DIAGNOSIS — F52.21 ERECTILE DYSFUNCTION OF NON-ORGANIC ORIGIN: ICD-10-CM

## 2021-02-08 DIAGNOSIS — J32.9 SINUSITIS, UNSPECIFIED CHRONICITY, UNSPECIFIED LOCATION: Primary | ICD-10-CM

## 2021-02-08 PROCEDURE — 99214 OFFICE O/P EST MOD 30 MIN: CPT | Performed by: INTERNAL MEDICINE

## 2021-02-08 RX ORDER — DOXYCYCLINE HYCLATE 100 MG
100 TABLET ORAL 2 TIMES DAILY
Qty: 20 TABLET | Refills: 0 | Status: SHIPPED | OUTPATIENT
Start: 2021-02-08 | End: 2021-02-18

## 2021-02-08 RX ORDER — TADALAFIL 20 MG/1
20 TABLET ORAL DAILY PRN
Qty: 30 TABLET | Refills: 0 | Status: SHIPPED | OUTPATIENT
Start: 2021-02-08

## 2021-02-08 NOTE — PROGRESS NOTES
Assessment/Plan:     Diagnoses and all orders for this visit:    Sinusitis, unspecified chronicity, unspecified location  -     doxycycline hyclate (VIBRA-TABS) 100 mg tablet; Take 1 tablet (100 mg total) by mouth 2 (two) times a day for 10 days  -     tadalafil (CIALIS) 20 MG tablet; Take 1 tablet (20 mg total) by mouth daily as needed for erectile dysfunction  -     Ambulatory Referral to Otolaryngology; Future    Erectile dysfunction of non-organic origin  -     tadalafil (CIALIS) 20 MG tablet; Take 1 tablet (20 mg total) by mouth daily as needed for erectile dysfunction    Sacroiliitis (HCC)    Hepatic cirrhosis, unspecified hepatic cirrhosis type, unspecified whether ascites present ([facility] [address] )    Morbid obesity (HCC)          Subjective:      Patient ID: Dot Vázquez is a 72 y o  male  HPI   Natalie Evans is here for several reasons probably the most significant thing is that he has had trouble with the sinuses he has been using Mucinex and Claritin on a regular basis for years wears a CPAP device and has had is continually stuffy and he wakes up in the morning producing full of all the lumen is amounts of yellow green and brown nasal discharge material he is also had some morning headaches and his whole head feels congested he has had a couple of recent bloody noses  He is up-to-date with Cardiology his beta-blocker has been discontinued he was told that he might eventually need a pacemaker  He hurt his knee with outside activities had a Baker cyst and has seen an orthopedist   He has had pain in his left hip he 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 in [place] and he believes that the this occasionally dislocates    He has also had chronic back pain and is set set up for epidural steroids in the near future    The following portions of the patient's history were reviewed and updated as appropriate: allergies, current medications, past family history, past medical history, past social history, past surgical history and problem list     Review of Systems      Objective:      /90 (BP Location: Left arm, Patient Position: Sitting, Cuff Size: Standard)   Pulse 67   Temp (!) 97 1 °F (36 2 °C)   Resp 20   Ht 5' 9" (1 753 m)   Wt 114 kg (252 lb)   SpO2 95%   BMI 37 21 kg/m²     BP Readings from Last 3 Encounters:   02/08/21 144/90   01/21/21 134/82   01/07/21 134/75      Wt Readings from Last 3 Encounters:   02/08/21 114 kg (252 lb)   01/21/21 114 kg (251 lb)   01/07/21 112 kg (247 lb)      BMI: Estimated body mass index is 37 21 kg/m² as calculated from the following:    Height as of this encounter: 5' 9" (1 753 m)  Weight as of this encounter: 114 kg (252 lb)  BSA: Estimated body surface area is 2 28 meters squared as calculated from the following:    Height as of this encounter: 5' 9" (1 753 m)  Weight as of this encounter: 114 kg (252 lb)  Physical Exam  he appears well but anxious and in no distress his pressure was approximately 144/90 his pulse rate today was in the mid 60s the heart lungs unremarkable the nasal passages appear to be congested in the uvula is a little bit red and swollen his history suggests chronic persistent sinusitis possible made worse by CPAP  We are going to give him some doxycycline 100 mg twice a day for 10 days he will continue with fluticasone and will refer him to an otolaryngologist   I did consider CT scan of the sinuses but will at this decision be up to ENT    Will also give him a prescription for Cialis 20 mg follow-up with his other physicians is encouraged

## 2021-02-09 VITALS
BODY MASS INDEX: 37.33 KG/M2 | HEIGHT: 69 IN | SYSTOLIC BLOOD PRESSURE: 144 MMHG | RESPIRATION RATE: 20 BRPM | OXYGEN SATURATION: 95 % | DIASTOLIC BLOOD PRESSURE: 90 MMHG | TEMPERATURE: 97.1 F | WEIGHT: 252 LBS | HEART RATE: 67 BPM

## 2021-02-17 ENCOUNTER — IMMUNIZATIONS (OUTPATIENT)
Dept: FAMILY MEDICINE CLINIC | Facility: HOSPITAL | Age: 71
End: 2021-02-17

## 2021-02-17 DIAGNOSIS — Z23 ENCOUNTER FOR IMMUNIZATION: Primary | ICD-10-CM

## 2021-02-17 PROCEDURE — 91301 SARS-COV-2 / COVID-19 MRNA VACCINE (MODERNA) 100 MCG: CPT

## 2021-02-17 PROCEDURE — 0012A SARS-COV-2 / COVID-19 MRNA VACCINE (MODERNA) 100 MCG: CPT

## 2021-02-24 ENCOUNTER — OFFICE VISIT (OUTPATIENT)
Dept: PHYSICAL THERAPY | Facility: MEDICAL CENTER | Age: 71
End: 2021-02-24
Payer: MEDICARE

## 2021-02-24 DIAGNOSIS — M25.361 KNEE INSTABILITY, RIGHT: Primary | ICD-10-CM

## 2021-02-24 DIAGNOSIS — M25.561 RIGHT KNEE PAIN, UNSPECIFIED CHRONICITY: ICD-10-CM

## 2021-02-24 DIAGNOSIS — M17.11 PRIMARY OSTEOARTHRITIS OF RIGHT KNEE: ICD-10-CM

## 2021-02-24 PROCEDURE — 97140 MANUAL THERAPY 1/> REGIONS: CPT | Performed by: PHYSICAL THERAPIST

## 2021-02-24 PROCEDURE — 97110 THERAPEUTIC EXERCISES: CPT | Performed by: PHYSICAL THERAPIST

## 2021-02-24 NOTE — PROGRESS NOTES
Daily Note     Today's date: 2021  Patient name: Dawn Simmonds  : 1950  MRN: [id number]  Referring provider: Donella Castleman, DO  Dx:   Encounter Diagnosis     ICD-10-CM    1  Knee instability, right  M25 361    2  Right knee pain, unspecified chronicity  M25 561    3  Primary osteoarthritis of right knee  M17 11                   Subjective: Akshat Wild reports that he is doing well overall but his L hip has been more bothesomer recently       Objective: See treatment diary below      Assessment: Tolerated treatment well  Patient with significant L hip soft tiss restrictions, improved with MFR  Plan: Continue per plan of care        Precautions: Hx MI      Manuals        Prone gastroc/hamstring MFR  AF AF  AF AF AF       L hip MFR      AF                                 Neuro Re-Ed                                                                                                        Ther Ex             Gastroc stretch 30 sec  X 3 30 sec   3  30 sec  X 3  30 sec  X 3  30 sec  X 3         Hamstring stretch 30 sec  X 3 30 sec  X 3  30 sec  X 3 30 sec  X 3  30 sec  X 3 30 sec   X 3        LAQ    30 30                     Bridges      3X10                                              Ther Activity                                       Gait Training                                       Modalities

## 2021-02-26 ENCOUNTER — OFFICE VISIT (OUTPATIENT)
Dept: PHYSICAL THERAPY | Facility: MEDICAL CENTER | Age: 71
End: 2021-02-26
Payer: MEDICARE

## 2021-02-26 DIAGNOSIS — M25.361 KNEE INSTABILITY, RIGHT: Primary | ICD-10-CM

## 2021-02-26 DIAGNOSIS — M17.11 PRIMARY OSTEOARTHRITIS OF RIGHT KNEE: ICD-10-CM

## 2021-02-26 DIAGNOSIS — M25.561 RIGHT KNEE PAIN, UNSPECIFIED CHRONICITY: ICD-10-CM

## 2021-02-26 PROCEDURE — 97110 THERAPEUTIC EXERCISES: CPT | Performed by: PHYSICAL THERAPIST

## 2021-02-26 PROCEDURE — 97140 MANUAL THERAPY 1/> REGIONS: CPT | Performed by: PHYSICAL THERAPIST

## 2021-02-26 NOTE — PROGRESS NOTES
Daily Note     Today's date: 2021  Patient name: Valarie Montilla  : 1950  MRN: [id number]  Referring provider: Shantel Looney DO  Dx:   Encounter Diagnosis     ICD-10-CM    1  Knee instability, right  M25 361    2  Primary osteoarthritis of right knee  M17 11    3  Right knee pain, unspecified chronicity  M25 561                   Subjective: Venessa Lozano reports that he is doing well, Knee and hip feeling better      Objective: See treatment diary below      Assessment: Tolerated treatment well  Patient exhibited good technique with therapeutic exercises and would benefit from continued PT  Reduced hip pain after MFR and improved knee strength       Plan: Continue per plan of care        Precautions: Hx MI      Manuals        Prone gastroc/hamstring MFR  AF AF  AF AF AF       L hip MFR      AF                                 Neuro Re-Ed                                                                                                        Ther Ex             Gastroc stretch 30 sec  X 3 30 sec   3  30 sec  X 3  30 sec  X 3  30 sec  X 3         Hamstring stretch 30 sec  X 3 30 sec  X 3  30 sec  X 3 30 sec  X 3  30 sec  X 3 30 sec   X 3        LAQ    30 30        LTR      20       Bridges      3X10                                              Ther Activity                                       Gait Training                                       Modalities

## 2021-02-27 DIAGNOSIS — N40.1 BENIGN PROSTATIC HYPERPLASIA WITH LOWER URINARY TRACT SYMPTOMS, SYMPTOM DETAILS UNSPECIFIED: ICD-10-CM

## 2021-02-27 DIAGNOSIS — I25.10 CORONARY ARTERY DISEASE INVOLVING NATIVE CORONARY ARTERY OF NATIVE HEART WITHOUT ANGINA PECTORIS: ICD-10-CM

## 2021-02-27 RX ORDER — TAMSULOSIN HYDROCHLORIDE 0.4 MG/1
CAPSULE ORAL
Qty: 60 CAPSULE | Refills: 3 | Status: SHIPPED | OUTPATIENT
Start: 2021-02-27 | End: 2021-07-01

## 2021-03-01 ENCOUNTER — OFFICE VISIT (OUTPATIENT)
Dept: PHYSICAL THERAPY | Facility: MEDICAL CENTER | Age: 71
End: 2021-03-01
Payer: MEDICARE

## 2021-03-01 DIAGNOSIS — M25.361 KNEE INSTABILITY, RIGHT: Primary | ICD-10-CM

## 2021-03-01 DIAGNOSIS — M17.11 PRIMARY OSTEOARTHRITIS OF RIGHT KNEE: ICD-10-CM

## 2021-03-01 DIAGNOSIS — M25.561 RIGHT KNEE PAIN, UNSPECIFIED CHRONICITY: ICD-10-CM

## 2021-03-01 PROCEDURE — 97140 MANUAL THERAPY 1/> REGIONS: CPT | Performed by: PHYSICAL THERAPIST

## 2021-03-01 NOTE — PROGRESS NOTES
Daily Note     Today's date: 3/1/2021  Patient name: Jamaal Lomas  : 1950  MRN: [id number]  Referring provider: Fredo Christianson DO  Dx:   Encounter Diagnosis     ICD-10-CM    1  Knee instability, right  M25 361    2  Primary osteoarthritis of right knee  M17 11    3  Right knee pain, unspecified chronicity  M25 561                   Subjective: Jose Bravo reports that he is doing well, felt good after the weekend but is having more back and hip pain today  Knee still doing well at this point       Objective: See treatment diary below      Assessment: Tolerated treatment well  Patient exhibited good technique with therapeutic exercises and would benefit from continued PT      Plan: Continue per plan of care        Precautions: Hx MI      Manuals 1/13 1/18 1/20 1/27 1/29 2/26 3/1      Prone gastroc/hamstring MFR  AF AF  AF AF AF AF      L hip MFR      AF AF                                Neuro Re-Ed                                                                                                        Ther Ex             Gastroc stretch 30 sec  X 3 30 sec   3  30 sec  X 3  30 sec  X 3  30 sec  X 3   30 sec  X 3       Hamstring stretch 30 sec  X 3 30 sec  X 3  30 sec  X 3 30 sec  X 3  30 sec  X 3 30 sec   X 3  30 sec  X 3       LAQ    30 30        LTR      20 20      Bridges      3X10 3x10                                             Ther Activity                                       Gait Training                                       Modalities

## 2021-03-03 ENCOUNTER — OFFICE VISIT (OUTPATIENT)
Dept: PHYSICAL THERAPY | Facility: MEDICAL CENTER | Age: 71
End: 2021-03-03
Payer: MEDICARE

## 2021-03-03 DIAGNOSIS — M25.561 RIGHT KNEE PAIN, UNSPECIFIED CHRONICITY: ICD-10-CM

## 2021-03-03 DIAGNOSIS — M17.11 PRIMARY OSTEOARTHRITIS OF RIGHT KNEE: ICD-10-CM

## 2021-03-03 DIAGNOSIS — M25.361 KNEE INSTABILITY, RIGHT: Primary | ICD-10-CM

## 2021-03-03 PROCEDURE — 97110 THERAPEUTIC EXERCISES: CPT | Performed by: PHYSICAL THERAPIST

## 2021-03-03 NOTE — PROGRESS NOTES
Daily Note     Today's date: 3/3/2021  Patient name: Norbert Phillip  : 1950  MRN: [id number]  Referring provider: Cookie Holliday DO  Dx:   Encounter Diagnosis     ICD-10-CM    1  Knee instability, right  M25 361    2  Primary osteoarthritis of right knee  M17 11    3  Right knee pain, unspecified chronicity  M25 561                   Subjective: [place] Barefoot reports that he is doing well, having less overall pain and walking better       Objective: See treatment diary below      Assessment: Tolerated treatment well  Patient exhibited good technique with therapeutic exercises and would benefit from continued PT      Plan: Continue per plan of care        Precautions: Hx MI      Manuals 1/13 1/18 1/20 1/27 1/29 2/26 3/3      Prone gastroc/hamstring MFR  AF AF  AF AF AF AF      L hip MFR      AF AF                                Neuro Re-Ed                                                                                                        Ther Ex             Gastroc stretch 30 sec  X 3 30 sec   3  30 sec  X 3  30 sec  X 3  30 sec  X 3   30 sec  X 3       Hamstring stretch 30 sec  X 3 30 sec  X 3  30 sec  X 3 30 sec  X 3  30 sec  X 3 30 sec   X 3  30 sec  X 3       LAQ    30 30        LTR      20 20      Bridges      3X10 3x10                                             Ther Activity                                       Gait Training                                       Modalities

## 2021-03-05 ENCOUNTER — APPOINTMENT (OUTPATIENT)
Dept: PHYSICAL THERAPY | Facility: MEDICAL CENTER | Age: 71
End: 2021-03-05
Payer: MEDICARE

## 2021-03-07 RX ORDER — METOPROLOL SUCCINATE 25 MG/1
TABLET, EXTENDED RELEASE ORAL
Qty: 45 TABLET | Refills: 11 | Status: SHIPPED | OUTPATIENT
Start: 2021-03-07 | End: 2021-08-03

## 2021-03-12 ENCOUNTER — OFFICE VISIT (OUTPATIENT)
Dept: PHYSICAL THERAPY | Facility: MEDICAL CENTER | Age: 71
End: 2021-03-12
Payer: MEDICARE

## 2021-03-12 DIAGNOSIS — M25.361 KNEE INSTABILITY, RIGHT: Primary | ICD-10-CM

## 2021-03-12 DIAGNOSIS — M17.11 PRIMARY OSTEOARTHRITIS OF RIGHT KNEE: ICD-10-CM

## 2021-03-12 DIAGNOSIS — M25.561 RIGHT KNEE PAIN, UNSPECIFIED CHRONICITY: ICD-10-CM

## 2021-03-12 PROCEDURE — 97140 MANUAL THERAPY 1/> REGIONS: CPT | Performed by: PHYSICAL THERAPIST

## 2021-03-14 NOTE — PROGRESS NOTES
Daily Note     Today's date: 3/13/2021  Patient name: Valeria Duarte  : 1950  MRN: [id number]  Referring provider: Kayli Umanzor DO  Dx:   Encounter Diagnosis     ICD-10-CM    1  Knee instability, right  M25 361    2  Primary osteoarthritis of right knee  M17 11    3  Right knee pain, unspecified chronicity  M25 561                   Subjective: Sp reports he has been doing well, will continue with HEP for now       Objective: See treatment diary below      Assessment: Tolerated treatment well  Patient reviewed HEP, will continie independently and only return if needed      Plan: Continue per plan of care        Precautions: Hx MI      Manuals 1/13 1/18 1/20 1/27 1/29 2/26 3/3 3/13     Prone gastroc/hamstring MFR  AF AF  AF AF AF AF AF     L hip MFR      AF AF AF                               Neuro Re-Ed                                                                                                        Ther Ex             Gastroc stretch 30 sec  X 3 30 sec   3  30 sec  X 3  30 sec  X 3  30 sec  X 3   30 sec  X 3  30 sec  X3     Hamstring stretch 30 sec  X 3 30 sec  X 3  30 sec  X 3 30 sec  X 3  30 sec  X 3 30 sec   X 3  30 sec  X 3  30 sec X 3      LAQ    30 30        LTR      20 20      Bridges      3X10 3x10 3X10                                            Ther Activity                                       Gait Training                                       Modalities

## 2021-03-24 DIAGNOSIS — M46.1 SACROILIITIS (HCC): Primary | ICD-10-CM

## 2021-03-24 DIAGNOSIS — I10 ESSENTIAL HYPERTENSION: ICD-10-CM

## 2021-03-24 RX ORDER — LOSARTAN POTASSIUM 100 MG/1
100 TABLET ORAL DAILY
Qty: 90 TABLET | Refills: 3 | Status: SHIPPED | OUTPATIENT
Start: 2021-03-24 | End: 2022-03-09

## 2021-04-09 ENCOUNTER — APPOINTMENT (OUTPATIENT)
Dept: LAB | Facility: MEDICAL CENTER | Age: 71
End: 2021-04-09
Payer: MEDICARE

## 2021-04-09 LAB
BILIRUB UR QL STRIP: NEGATIVE
CHOLEST SERPL-MCNC: 120 MG/DL (ref 50–200)
CLARITY UR: CLEAR
COLOR UR: NORMAL
GLUCOSE UR STRIP-MCNC: NEGATIVE MG/DL
HDLC SERPL-MCNC: 66 MG/DL
HGB UR QL STRIP.AUTO: NEGATIVE
KETONES UR STRIP-MCNC: NEGATIVE MG/DL
LDLC SERPL CALC-MCNC: 44 MG/DL (ref 0–100)
LDLC SERPL DIRECT ASSAY-MCNC: 60 MG/DL (ref 0–100)
LEUKOCYTE ESTERASE UR QL STRIP: NEGATIVE
NITRITE UR QL STRIP: NEGATIVE
NONHDLC SERPL-MCNC: 54 MG/DL
PH UR STRIP.AUTO: 7 [PH]
PROT UR STRIP-MCNC: NEGATIVE MG/DL
SP GR UR STRIP.AUTO: 1.02 (ref 1–1.03)
TRIGL SERPL-MCNC: 50 MG/DL
UROBILINOGEN UR QL STRIP.AUTO: 0.2 E.U./DL

## 2021-04-09 PROCEDURE — 36415 COLL VENOUS BLD VENIPUNCTURE: CPT | Performed by: INTERNAL MEDICINE

## 2021-04-09 PROCEDURE — 83721 ASSAY OF BLOOD LIPOPROTEIN: CPT | Performed by: INTERNAL MEDICINE

## 2021-04-09 PROCEDURE — 81003 URINALYSIS AUTO W/O SCOPE: CPT | Performed by: INTERNAL MEDICINE

## 2021-04-09 PROCEDURE — 80061 LIPID PANEL: CPT | Performed by: INTERNAL MEDICINE

## 2021-04-13 ENCOUNTER — TELEPHONE (OUTPATIENT)
Dept: CARDIOLOGY CLINIC | Facility: CLINIC | Age: 71
End: 2021-04-13

## 2021-04-13 NOTE — TELEPHONE ENCOUNTER
----- Message from Isis Silveira MD sent at 4/12/2021  7:18 PM EDT -----  Please let patient know cholesterol remains well controlled, no changes  Tung Enamorado

## 2021-04-30 ENCOUNTER — EVALUATION (OUTPATIENT)
Dept: PHYSICAL THERAPY | Facility: MEDICAL CENTER | Age: 71
End: 2021-04-30
Payer: MEDICARE

## 2021-04-30 DIAGNOSIS — M54.50 CHRONIC BILATERAL LOW BACK PAIN WITHOUT SCIATICA: Primary | ICD-10-CM

## 2021-04-30 DIAGNOSIS — G89.29 CHRONIC BILATERAL LOW BACK PAIN WITHOUT SCIATICA: Primary | ICD-10-CM

## 2021-04-30 PROCEDURE — 97140 MANUAL THERAPY 1/> REGIONS: CPT | Performed by: PHYSICAL THERAPIST

## 2021-05-02 NOTE — PROGRESS NOTES
Daily Note     Today's date: 2021  Patient name: Clifford Thao  : 1950  MRN: [id number]  Referring provider: Diamond Gore DO  Dx:   Encounter Diagnosis     ICD-10-CM    1  Chronic bilateral low back pain without sciatica  M54 5     G89 29                   Subjective: Chiquita Hendricks reports that he was doing well but had to put of his normal low back injections due to COVID injection  Back pain flare up       Objective: See treatment diary below      Assessment: Tolerated treatment well  Patient positive response to mobiizations and soft tissue with redcued irritability of lumbar pain       Plan: Continue per plan of care   3     Precautions: Hx MI      Manuals 1/13 1/18 1/20 1/27 1/29 2/26 3/3 4/30     Prone gastroc/hamstring MFR  AF AF  AF AF AF AF AF     L hip MFR      AF AF AF                               Neuro Re-Ed                                                                                                        Ther Ex             Gastroc stretch 30 sec  X 3 30 sec   3  30 sec  X 3  30 sec  X 3  30 sec  X 3   30 sec  X 3  30 sec  X3     Hamstring stretch 30 sec  X 3 30 sec  X 3  30 sec  X 3 30 sec  X 3  30 sec  X 3 30 sec   X 3  30 sec  X 3  30 sec X 3      LAQ    30 30        LTR      20 20      Bridges      3X10 3x10 3X10                                            Ther Activity                                       Gait Training                                       Modalities

## 2021-05-03 ENCOUNTER — OFFICE VISIT (OUTPATIENT)
Dept: PHYSICAL THERAPY | Facility: MEDICAL CENTER | Age: 71
End: 2021-05-03
Payer: MEDICARE

## 2021-05-03 DIAGNOSIS — M54.50 CHRONIC BILATERAL LOW BACK PAIN WITHOUT SCIATICA: Primary | ICD-10-CM

## 2021-05-03 DIAGNOSIS — G89.29 CHRONIC BILATERAL LOW BACK PAIN WITHOUT SCIATICA: Primary | ICD-10-CM

## 2021-05-03 PROCEDURE — 97140 MANUAL THERAPY 1/> REGIONS: CPT | Performed by: PHYSICAL THERAPIST

## 2021-05-03 NOTE — PROGRESS NOTES
Daily Note     Today's date: 5/3/2021  Patient name: Cece Peralta  : 1950  MRN: [id number]  Referring provider: Clifford Rose DO  Dx:   Encounter Diagnosis     ICD-10-CM    1  Chronic bilateral low back pain without sciatica  M54 5     G89 29                   Subjective: Sp repots that he felt relief after last session  Objective: See treatment diary below      Assessment: Tolerated treatment well  Patient irritability of lumbar spine improved post manuals last visit  Plan: Continue per plan of care        Precautions: Hx MI      Manuals 1/13 1/18 1/20 1/27 1/29 2/26 3/3 4/30 5/3    Prone gastroc/hamstring MFR  AF AF  AF AF AF AF AF AF    L hip MFR      AF AF AF AF                              Neuro Re-Ed                                                                                                        Ther Ex             Gastroc stretch 30 sec  X 3 30 sec   3  30 sec  X 3  30 sec  X 3  30 sec  X 3   30 sec  X 3  30 sec  X3     Hamstring stretch 30 sec  X 3 30 sec  X 3  30 sec  X 3 30 sec  X 3  30 sec  X 3 30 sec   X 3  30 sec  X 3  30 sec X 3      LAQ    30 30        LTR      20 20  30    Bridges      3X10 3x10 3X10 3X10                                           Ther Activity                                       Gait Training                                       Modalities

## 2021-05-05 ENCOUNTER — OFFICE VISIT (OUTPATIENT)
Dept: PHYSICAL THERAPY | Facility: MEDICAL CENTER | Age: 71
End: 2021-05-05
Payer: MEDICARE

## 2021-05-05 DIAGNOSIS — M54.50 CHRONIC BILATERAL LOW BACK PAIN WITHOUT SCIATICA: Primary | ICD-10-CM

## 2021-05-05 DIAGNOSIS — G89.29 CHRONIC BILATERAL LOW BACK PAIN WITHOUT SCIATICA: Primary | ICD-10-CM

## 2021-05-05 PROCEDURE — 97140 MANUAL THERAPY 1/> REGIONS: CPT | Performed by: PHYSICAL THERAPIST

## 2021-05-05 NOTE — PROGRESS NOTES
Daily Note     Today's date: 2021  Patient name: Vincenzo Zaidi  : 1950  MRN: [id number]  Referring provider: Parrish Yanez DO  Dx:   Encounter Diagnosis     ICD-10-CM    1  Chronic bilateral low back pain without sciatica  M54 5     G89 29                   Subjective: Ammon Will reports that he is doing better in the morning  Has injections scheduled for tomorrow     farooq: See treatment diary below    Assessment: Tolerated treatment well  Patient exhibited good technique with therapeutic exercises and would benefit from continued PT      Plan: Continue per plan of care        Precautions: Hx MI      Manuals 1/13 1/18 1/20 1/27 1/29 2/26 3/3 4/30 5/5    Prone gastroc/hamstring MFR  AF AF  AF AF AF AF AF AF    L hip MFR      AF AF AF AF                              Neuro Re-Ed                                                                                                        Ther Ex             Gastroc stretch 30 sec  X 3 30 sec   3  30 sec  X 3  30 sec  X 3  30 sec  X 3   30 sec  X 3  30 sec  X3     Hamstring stretch 30 sec  X 3 30 sec  X 3  30 sec  X 3 30 sec  X 3  30 sec  X 3 30 sec   X 3  30 sec  X 3  30 sec X 3      LAQ    30 30        LTR      20 20  30    Bridges      3X10 3x10 3X10 3X10                                           Ther Activity                                       Gait Training                                       Modalities

## 2021-05-07 ENCOUNTER — APPOINTMENT (OUTPATIENT)
Dept: PHYSICAL THERAPY | Facility: MEDICAL CENTER | Age: 71
End: 2021-05-07
Payer: MEDICARE

## 2021-05-26 ENCOUNTER — OFFICE VISIT (OUTPATIENT)
Dept: PHYSICAL THERAPY | Facility: MEDICAL CENTER | Age: 71
End: 2021-05-26
Payer: MEDICARE

## 2021-05-26 DIAGNOSIS — G89.29 CHRONIC BILATERAL LOW BACK PAIN WITHOUT SCIATICA: Primary | ICD-10-CM

## 2021-05-26 DIAGNOSIS — M54.50 CHRONIC BILATERAL LOW BACK PAIN WITHOUT SCIATICA: Primary | ICD-10-CM

## 2021-05-26 PROCEDURE — 97161 PT EVAL LOW COMPLEX 20 MIN: CPT | Performed by: PHYSICAL THERAPIST

## 2021-05-26 NOTE — LETTER
2021    Kallie Lefort, PA-C [address] [id number]    Patient: Richie Perez   YOB: 1950   Date of Visit: 2021     Encounter Diagnosis     ICD-10-CM    1  Chronic bilateral low back pain without sciatica  M54 5     G89 29        Dear Dr Venkat Ray: Thank you for your recent referral of Richie Perez  Please review the attached evaluation summary from Sp's recent visit  Please verify that you agree with the plan of care by signing the attached order  If you have any questions or concerns, please do not hesitate to call  I sincerely appreciate the opportunity to share in the care of one of your patients and hope to have another opportunity to work with you in the near future  Sincerely,    Jose Manuel Randolph, PT      Referring Provider:      I certify that I have read the below Plan of Care and certify the need for these services furnished under this plan of treatment while under my care  Kallie Lefort, PA-C [address] [id number]  Via Fax: [phone]          PT Evaluation     Today's date: 2021  Patient name: Richie Perez  : 1950  MRN: [id number]  Referring provider: YESSENIA Griffin  Dx:   Encounter Diagnosis     ICD-10-CM    1  Chronic bilateral low back pain without sciatica  M54 5     G89 29                   Assessment  Assessment details: Ricihe Perez is a 72 y o  male was evaluated on 2021  for Chronic bilateral low back pain without sciatica  (primary encounter diagnosis)  Richie Perez has the above listed impairments resulting in functional deficits and negative impact to quality of life  Patient is appropriate for skilled PT intervention to promote maximal return to function and patient specific goals        Patient agrees with outlined treatment plan and all questions were answered to their satisfaction  Impairments: abnormal muscle firing, abnormal muscle tone, abnormal or restricted ROM, impaired physical strength, lacks appropriate home exercise program and pain with function  Understanding of Dx/Px/POC: good   Prognosis: good    Goals  Patient will successfully transition to home exercise program   Patient will be able to manage symptoms independently  Christen Case will report no limitation in walking  Sp will maintain lumbar function and limited pain throughout recovery of hip     Plan  Patient would benefit from: skilled PT  Referral necessary: No  Planned modality interventions: thermotherapy: hydrocollator packs  Planned therapy interventions: home exercise program, manual therapy, neuromuscular re-education, patient education, functional ROM exercises, strengthening, stretching, joint mobilization, graded activity, graded exercise, therapeutic exercise, body mechanics training, motor coordination training and activity modification  Frequency: 3x week  Duration in weeks: 12  Treatment plan discussed with: patient        Subjective Evaluation    History of Present Illness  Mechanism of injury: Xenia Pinedo is 72 y o  male presenting to therapy s/p L JABIER dislocation and closed reduction  He was bending forward and dislocated his hip, was having months of subluxations in the past   Currently doing well, but having soreness and pain with walking  Also has ongoing chronic low back pain made worse by recent hip injury      Pain  Current pain rating: 3  At best pain ratin  At worst pain ratin  Quality: dull ache, tight, pulling and discomfort    Patient Goals  Patient goals for therapy: decreased pain, return to sport/leisure activities, independence with ADLs/IADLs, increased strength and increased motion          Objective     Lumbar Screen  Lumbar range of motion within normal limits with the following exceptions:Myofascial pain in paraspinals, hypomobility to lumbar spine Passive Range of Motion   Left Hip   Flexion: 80 degrees   Abduction: 20 degrees   External rotation (90/90): 20 degrees     Right Hip   Normal passive range of motion    Strength/Myotome Testing     Left Hip   Planes of Motion   Flexion: 3+  Extension: 3+  Abduction: 3+  Adduction: 3+  External rotation: 3+    Right Hip   Planes of Motion   Flexion: 4  Extension: 4  Abduction: 4  Adduction: 4  External rotation: 4             Precautions: L Hip dislocation       Manuals                                                                 Neuro Re-Ed                                                                                                        Ther Ex                                                                                                                     Ther Activity                                       Gait Training                                       Modalities

## 2021-05-28 ENCOUNTER — TRANSCRIBE ORDERS (OUTPATIENT)
Dept: PHYSICAL THERAPY | Facility: MEDICAL CENTER | Age: 71
End: 2021-05-28

## 2021-05-28 ENCOUNTER — OFFICE VISIT (OUTPATIENT)
Dept: PHYSICAL THERAPY | Facility: MEDICAL CENTER | Age: 71
End: 2021-05-28
Payer: MEDICARE

## 2021-05-28 DIAGNOSIS — G89.29 CHRONIC BILATERAL LOW BACK PAIN WITHOUT SCIATICA: Primary | ICD-10-CM

## 2021-05-28 DIAGNOSIS — M54.50 CHRONIC BILATERAL LOW BACK PAIN WITHOUT SCIATICA: Primary | ICD-10-CM

## 2021-05-28 PROCEDURE — 97140 MANUAL THERAPY 1/> REGIONS: CPT | Performed by: PHYSICAL THERAPIST

## 2021-05-28 NOTE — PROGRESS NOTES
PT Evaluation     Today's date: 2021  Patient name: Marie Regan  : 1950  MRN: [id number]  Referring provider: YESSENIA Issa  Dx:   Encounter Diagnosis     ICD-10-CM    1  Chronic bilateral low back pain without sciatica  M54 5     G89 29                   Assessment  Assessment details: Marie Regan is a 72 y o  male was evaluated on 2021  for Chronic bilateral low back pain without sciatica  (primary encounter diagnosis)  Marie Regan has the above listed impairments resulting in functional deficits and negative impact to quality of life  Patient is appropriate for skilled PT intervention to promote maximal return to function and patient specific goals  Patient agrees with outlined treatment plan and all questions were answered to their satisfaction  Impairments: abnormal muscle firing, abnormal muscle tone, abnormal or restricted ROM, impaired physical strength, lacks appropriate home exercise program and pain with function  Understanding of Dx/Px/POC: good   Prognosis: good    Goals  Patient will successfully transition to home exercise program   Patient will be able to manage symptoms independently      Elaine Livingston will report no limitation in walking  Sp will maintain lumbar function and limited pain throughout recovery of hip     Plan  Patient would benefit from: skilled PT  Referral necessary: No  Planned modality interventions: thermotherapy: hydrocollator packs  Planned therapy interventions: home exercise program, manual therapy, neuromuscular re-education, patient education, functional ROM exercises, strengthening, stretching, joint mobilization, graded activity, graded exercise, therapeutic exercise, body mechanics training, motor coordination training and activity modification  Frequency: 3x week  Duration in weeks: 12  Treatment plan discussed with: patient        Subjective Evaluation    History of Present Illness  Mechanism of injury: Elainepaula Livingston Leonor Walker is 72 y o  male presenting to therapy s/p L JABIER dislocation and closed reduction  He was bending forward and dislocated his hip, was having months of subluxations in the past   Currently doing well, but having soreness and pain with walking  Also has ongoing chronic low back pain made worse by recent hip injury      Pain  Current pain rating: 3  At best pain ratin  At worst pain ratin  Quality: dull ache, tight, pulling and discomfort    Patient Goals  Patient goals for therapy: decreased pain, return to sport/leisure activities, independence with ADLs/IADLs, increased strength and increased motion          Objective     Lumbar Screen  Lumbar range of motion within normal limits with the following exceptions:Myofascial pain in paraspinals, hypomobility to lumbar spine     Passive Range of Motion   Left Hip   Flexion: 80 degrees   Abduction: 20 degrees   External rotation (90/90): 20 degrees     Right Hip   Normal passive range of motion    Strength/Myotome Testing     Left Hip   Planes of Motion   Flexion: 3+  Extension: 3+  Abduction: 3+  Adduction: 3+  External rotation: 3+    Right Hip   Planes of Motion   Flexion: 4  Extension: 4  Abduction: 4  Adduction: 4  External rotation: 4             Precautions: L Hip dislocation       Manuals                                                                 Neuro Re-Ed                                                                                                        Ther Ex                                                                                                                     Ther Activity                                       Gait Training                                       Modalities

## 2021-05-30 NOTE — PROGRESS NOTES
Daily Note     Today's date: 2021  Patient name: Richie Perez  : 1950  MRN: [id number]  Referring provider: YESSENIA Griffin  Dx:   Encounter Diagnosis     ICD-10-CM    1  Chronic bilateral low back pain without sciatica  M54 5     G89 29                   Subjective:  Sp reports hip is still sore, but improving       Objective: See treatment diary below      Assessment: Tolerated treatment well  Patient exhibited good technique with therapeutic exercises and would benefit from continued PT      Plan: Continue per plan of care        Precautions: L Hip dislocation       Manuals             LUmbar mob AF            Hip pROM AF                                      Neuro Re-Ed                                                                                                        Ther Ex                                                                                                                     Ther Activity                                       Gait Training                                       Modalities

## 2021-06-02 ENCOUNTER — OFFICE VISIT (OUTPATIENT)
Dept: PHYSICAL THERAPY | Facility: MEDICAL CENTER | Age: 71
End: 2021-06-02
Payer: MEDICARE

## 2021-06-02 DIAGNOSIS — M54.50 CHRONIC BILATERAL LOW BACK PAIN WITHOUT SCIATICA: Primary | ICD-10-CM

## 2021-06-02 DIAGNOSIS — G89.29 CHRONIC BILATERAL LOW BACK PAIN WITHOUT SCIATICA: Primary | ICD-10-CM

## 2021-06-02 PROCEDURE — 97140 MANUAL THERAPY 1/> REGIONS: CPT | Performed by: PHYSICAL THERAPIST

## 2021-06-02 NOTE — PROGRESS NOTES
Daily Note     Today's date: 2021  Patient name: Ashley Narayan  : 1950  MRN: [id number]  Referring provider: Brandon Emmanuel DO  Dx:   Encounter Diagnosis     ICD-10-CM    1  Chronic bilateral low back pain without sciatica  M54 5     G89 29                   Subjective: Maciel Cooney reports that he is doing well, hip remains very sore       Objective: See treatment diary below      Assessment: Tolerated treatment well  Patient exhibited good technique with therapeutic exercises and would benefit from continued PT      Plan: Continue per plan of care        Precautions: L Hip dislocation       Manuals             LUmbar mob AF            Hip pROM AF                                      Neuro Re-Ed                                                                                                        Ther Ex             Glute set 30            Bridge 30            Supine clamshell Black   3x10                                                                             Ther Activity                                       Gait Training                                       Modalities

## 2021-06-04 ENCOUNTER — OFFICE VISIT (OUTPATIENT)
Dept: PHYSICAL THERAPY | Facility: MEDICAL CENTER | Age: 71
End: 2021-06-04
Payer: MEDICARE

## 2021-06-04 DIAGNOSIS — G89.29 CHRONIC BILATERAL LOW BACK PAIN WITHOUT SCIATICA: Primary | ICD-10-CM

## 2021-06-04 DIAGNOSIS — M54.50 CHRONIC BILATERAL LOW BACK PAIN WITHOUT SCIATICA: Primary | ICD-10-CM

## 2021-06-04 PROCEDURE — 97140 MANUAL THERAPY 1/> REGIONS: CPT | Performed by: PHYSICAL THERAPIST

## 2021-06-07 ENCOUNTER — OFFICE VISIT (OUTPATIENT)
Dept: PHYSICAL THERAPY | Facility: MEDICAL CENTER | Age: 71
End: 2021-06-07
Payer: MEDICARE

## 2021-06-07 DIAGNOSIS — M54.50 CHRONIC BILATERAL LOW BACK PAIN WITHOUT SCIATICA: Primary | ICD-10-CM

## 2021-06-07 DIAGNOSIS — G89.29 CHRONIC BILATERAL LOW BACK PAIN WITHOUT SCIATICA: Primary | ICD-10-CM

## 2021-06-07 PROCEDURE — 97140 MANUAL THERAPY 1/> REGIONS: CPT | Performed by: PHYSICAL THERAPIST

## 2021-06-07 NOTE — PROGRESS NOTES
Daily Note     Today's date: 2021  Patient name: Mk Jeronimo  : 1950  MRN: [id number]  Referring provider: Kale Sanchez DO  Dx:   Encounter Diagnosis     ICD-10-CM    1  Chronic bilateral low back pain without sciatica  M54 5     G89 29                   Subjective: Toya Ingram reports he is doing well       Objective: See treatment diary below      Assessment: Tolerated treatment well  Patient exhibited good technique with therapeutic exercises and would benefit from continued PT      Plan: Continue per plan of care        Precautions: L Hip dislocation       Manuals             LUmbar mob AF            Hip pROM AF                                      Neuro Re-Ed                                                                                                        Ther Ex             Glute set 30            Bridge 30            Supine clamshell Black   3x10                                                                             Ther Activity                                       Gait Training                                       Modalities

## 2021-06-07 NOTE — PROGRESS NOTES
Daily Note     Today's date: 2021  Patient name: Paulina Keene  : 1950  MRN: [id number]  Referring provider: Claude Pineda DO  Dx:   Encounter Diagnosis     ICD-10-CM    1  Chronic bilateral low back pain without sciatica  M54 5     G89 29                   Subjective: Maliha Otero reports that he is sore today generally in back and hip  Objective: See treatment diary below      Assessment: Tolerated treatment well  Patient exhibited good technique with therapeutic exercises and would benefit from continued PT  Tenderness to glute medius improved post MFR       Plan: Continue per plan of care        Precautions: L Hip dislocation       Manuals             LUmbar mob AF            Hip pROM AF                                      Neuro Re-Ed                                                                                                        Ther Ex             Glute set 30            Bridge 30            Supine clamshell Black   3x10                                                                             Ther Activity                                       Gait Training                                       Modalities

## 2021-06-09 ENCOUNTER — OFFICE VISIT (OUTPATIENT)
Dept: PHYSICAL THERAPY | Facility: MEDICAL CENTER | Age: 71
End: 2021-06-09
Payer: MEDICARE

## 2021-06-09 DIAGNOSIS — M54.50 CHRONIC BILATERAL LOW BACK PAIN WITHOUT SCIATICA: Primary | ICD-10-CM

## 2021-06-09 DIAGNOSIS — G89.29 CHRONIC BILATERAL LOW BACK PAIN WITHOUT SCIATICA: Primary | ICD-10-CM

## 2021-06-09 PROCEDURE — 97140 MANUAL THERAPY 1/> REGIONS: CPT | Performed by: PHYSICAL THERAPIST

## 2021-06-11 ENCOUNTER — OFFICE VISIT (OUTPATIENT)
Dept: PHYSICAL THERAPY | Facility: MEDICAL CENTER | Age: 71
End: 2021-06-11
Payer: MEDICARE

## 2021-06-11 DIAGNOSIS — G89.29 CHRONIC BILATERAL LOW BACK PAIN WITHOUT SCIATICA: Primary | ICD-10-CM

## 2021-06-11 DIAGNOSIS — M54.50 CHRONIC BILATERAL LOW BACK PAIN WITHOUT SCIATICA: Primary | ICD-10-CM

## 2021-06-11 PROCEDURE — 97140 MANUAL THERAPY 1/> REGIONS: CPT | Performed by: PHYSICAL THERAPIST

## 2021-06-11 NOTE — PROGRESS NOTES
Daily Note     Today's date: 6/10/2021  Patient name: Alex Salinas  : 1950  MRN: [id number]  Referring provider: Oscar Guerra DO  Dx:   Encounter Diagnosis     ICD-10-CM    1  Chronic bilateral low back pain without sciatica  M54 5     G89 29                   Subjective:   Maira Mukherjee reports no new changes         Objective: See treatment diary below      Assessment: Tolerated treatment well  Patient with imrpoved lumbar rotation post mobilizations  Increased hip soft tissue restriction today      Plan: Continue per plan of care        Precautions: L Hip dislocation       Manuals            LUmbar mob AF AF           Hip pROM AF AF                                     Neuro Re-Ed                                                                                                        Ther Ex             Glute set 30            Bridge 30 30           Supine clamshell Black   3x10                                                                             Ther Activity                                       Gait Training                                       Modalities

## 2021-06-14 ENCOUNTER — OFFICE VISIT (OUTPATIENT)
Dept: PHYSICAL THERAPY | Facility: MEDICAL CENTER | Age: 71
End: 2021-06-14
Payer: MEDICARE

## 2021-06-14 DIAGNOSIS — G89.29 CHRONIC BILATERAL LOW BACK PAIN WITHOUT SCIATICA: Primary | ICD-10-CM

## 2021-06-14 DIAGNOSIS — M54.50 CHRONIC BILATERAL LOW BACK PAIN WITHOUT SCIATICA: Primary | ICD-10-CM

## 2021-06-14 PROCEDURE — 97140 MANUAL THERAPY 1/> REGIONS: CPT | Performed by: PHYSICAL THERAPIST

## 2021-06-14 NOTE — PROGRESS NOTES
Daily Note     Today's date: 2021  Patient name: Richie Perez  : 1950  MRN: [id number]  Referring provider: Radha Napier DO  Dx:   Encounter Diagnosis     ICD-10-CM    1  Chronic bilateral low back pain without sciatica  M54 5     G89 29                   Subjective: Chencho Vale reports that he is doing well, continues to feel improvement from manuals to his low back and hip      Objective: See treatment diary below      Assessment: Tolerated treatment well  Patient exhibited good technique with therapeutic exercises and would benefit from continued PT      Plan: Continue per plan of care        Precautions: L Hip dislocation       Manuals            LUmbar mob AF AF           Hip pROM AF AF                                     Neuro Re-Ed                                                                                                        Ther Ex             Glute set 30            Bridge 30 30           Supine clamshell Black   3x10                                                                             Ther Activity                                       Gait Training                                       Modalities

## 2021-06-14 NOTE — PROGRESS NOTES
Daily Note     Today's date: 2021  Patient name: Adrienne Mitchell  : 1950  MRN: [id number]  Referring provider: Andre Holman DO  Dx:   Encounter Diagnosis     ICD-10-CM    1  Chronic bilateral low back pain without sciatica  M54 5     G89 29                   Subjective: Natanael Laird reports that he is doing well, remains with local hip soreness       Objective: See treatment diary below      Assessment: Tolerated treatment well  Patient exhibited good technique with therapeutic exercises and would benefit from continued PT      Plan: Continue per plan of care        Precautions: L Hip dislocation       Manuals            LUmbar mob AF AF           Hip pROM AF AF                                     Neuro Re-Ed                                                                                                        Ther Ex             Glute set 30            Bridge 30 30           Supine clamshell Black   3x10                                                                             Ther Activity                                       Gait Training                                       Modalities

## 2021-06-16 ENCOUNTER — OFFICE VISIT (OUTPATIENT)
Dept: PHYSICAL THERAPY | Facility: MEDICAL CENTER | Age: 71
End: 2021-06-16
Payer: MEDICARE

## 2021-06-16 DIAGNOSIS — G89.29 CHRONIC BILATERAL LOW BACK PAIN WITHOUT SCIATICA: Primary | ICD-10-CM

## 2021-06-16 DIAGNOSIS — M54.50 CHRONIC BILATERAL LOW BACK PAIN WITHOUT SCIATICA: Primary | ICD-10-CM

## 2021-06-16 PROCEDURE — 97140 MANUAL THERAPY 1/> REGIONS: CPT | Performed by: PHYSICAL THERAPIST

## 2021-06-16 NOTE — PROGRESS NOTES
Daily Note     Today's date: 2021  Patient name: Kary Max  : 1950  MRN: [id number]  Referring provider: Jenn Ko DO  Dx:   Encounter Diagnosis     ICD-10-CM    1  Chronic bilateral low back pain without sciatica  M54 5     G89 29                   Subjective: Ganga Nam reports that he is doing well, especially in morning when getting up       Objective: See treatment diary below      Assessment: Tolerated treatment well  Patient exhibited good technique with therapeutic exercises and would benefit from continued PT      Plan: Continue per plan of care        Precautions: L Hip dislocation       Manuals            LUmbar mob AF AF           Hip pROM AF AF                                     Neuro Re-Ed                                                                                                        Ther Ex             Glute set 30            Bridge 30 30           Supine clamshell Black   3x10                                                                             Ther Activity                                       Gait Training                                       Modalities

## 2021-06-17 DIAGNOSIS — F51.01 PRIMARY INSOMNIA: ICD-10-CM

## 2021-06-17 RX ORDER — TRAZODONE HYDROCHLORIDE 50 MG/1
TABLET ORAL
Qty: 90 TABLET | Refills: 1 | Status: SHIPPED | OUTPATIENT
Start: 2021-06-17 | End: 2021-12-15 | Stop reason: SDUPTHER

## 2021-06-18 ENCOUNTER — OFFICE VISIT (OUTPATIENT)
Dept: PHYSICAL THERAPY | Facility: MEDICAL CENTER | Age: 71
End: 2021-06-18
Payer: MEDICARE

## 2021-06-18 DIAGNOSIS — G89.29 CHRONIC BILATERAL LOW BACK PAIN WITHOUT SCIATICA: Primary | ICD-10-CM

## 2021-06-18 DIAGNOSIS — M54.50 CHRONIC BILATERAL LOW BACK PAIN WITHOUT SCIATICA: Primary | ICD-10-CM

## 2021-06-18 PROCEDURE — 97140 MANUAL THERAPY 1/> REGIONS: CPT | Performed by: PHYSICAL THERAPIST

## 2021-06-20 NOTE — PROGRESS NOTES
Daily Note     Today's date: 2021  Patient name: Vincenzo Zaidi  : 1950  MRN: [id number]  Referring provider: Parrish Yanez DO  Dx:   Encounter Diagnosis     ICD-10-CM    1  Chronic bilateral low back pain without sciatica  M54 5     G89 29                   Subjective: Ammon Will reports that he is doing well, especially in morning when getting up       Objective: See treatment diary below      Assessment: Tolerated treatment well  Patient exhibited good technique with therapeutic exercises and would benefit from continued PT      Plan: Continue per plan of care        Precautions: L Hip dislocation       Manuals            LUmbar mob AF AF           Hip pROM AF AF                                     Neuro Re-Ed                                                                                                        Ther Ex             Glute set 30            Bridge 30 30           Supine clamshell Black   3x10                                                                             Ther Activity                                       Gait Training                                       Modalities Patient with one or more new problems requiring additional work-up/treatment.

## 2021-06-21 ENCOUNTER — OFFICE VISIT (OUTPATIENT)
Dept: PHYSICAL THERAPY | Facility: MEDICAL CENTER | Age: 71
End: 2021-06-21
Payer: MEDICARE

## 2021-06-21 DIAGNOSIS — M54.50 CHRONIC BILATERAL LOW BACK PAIN WITHOUT SCIATICA: Primary | ICD-10-CM

## 2021-06-21 DIAGNOSIS — G89.29 CHRONIC BILATERAL LOW BACK PAIN WITHOUT SCIATICA: Primary | ICD-10-CM

## 2021-06-21 PROCEDURE — 97140 MANUAL THERAPY 1/> REGIONS: CPT | Performed by: PHYSICAL THERAPIST

## 2021-06-21 NOTE — PROGRESS NOTES
Daily Note     Today's date: 2021  Patient name: Scarlet Byers  : 1950  MRN: [id number]  Referring provider: Sammie Le DO  Dx:   Encounter Diagnosis     ICD-10-CM    1  Chronic bilateral low back pain without sciatica  M54 5     G89 29                   Subjective: Camelia Amin reports that he is doing well, feeling better in the morning when waking up with less stiffness       Objective: See treatment diary below      Assessment: Tolerated treatment well  Patient exhibited good technique with therapeutic exercises and would benefit from continued PT   Good response to lumbar and hip soft tissue mobilizations     Plan: Continue per plan of care        Precautions: L Hip dislocation       Manuals            LUmbar mob AF AF           Hip pROM AF AF                                     Neuro Re-Ed                                                                                                        Ther Ex             Glute set 30            Bridge 30 30           Supine clamshell Black   3x10                                                                             Ther Activity                                       Gait Training                                       Modalities

## 2021-06-23 ENCOUNTER — OFFICE VISIT (OUTPATIENT)
Dept: PHYSICAL THERAPY | Facility: MEDICAL CENTER | Age: 71
End: 2021-06-23
Payer: MEDICARE

## 2021-06-23 DIAGNOSIS — M54.50 CHRONIC BILATERAL LOW BACK PAIN WITHOUT SCIATICA: Primary | ICD-10-CM

## 2021-06-23 DIAGNOSIS — G89.29 CHRONIC BILATERAL LOW BACK PAIN WITHOUT SCIATICA: Primary | ICD-10-CM

## 2021-06-23 PROCEDURE — 97140 MANUAL THERAPY 1/> REGIONS: CPT | Performed by: PHYSICAL THERAPIST

## 2021-06-23 NOTE — PROGRESS NOTES
Daily Note     Today's date: 2021  Patient name: Will Bernal  : 1950  MRN: [id number]  Referring provider: Rubi Rivera DO  Dx:   Encounter Diagnosis     ICD-10-CM    1  Chronic bilateral low back pain without sciatica  M54 5     G89 29                   Subjective: Jennifer Mena reports he is doing well, no new issues       Objective: See treatment diary below      Assessment: Tolerated treatment well  Patient exhibited good technique with therapeutic exercises and would benefit from continued PT      Plan: Continue per plan of care        Precautions: L Hip dislocation       Manuals            LUmbar mob AF AF           Hip pROM AF AF                                     Neuro Re-Ed                                                                                                        Ther Ex             Glute set 30            Bridge 30 30           Supine clamshell Black   3x10                                                                             Ther Activity                                       Gait Training                                       Modalities

## 2021-06-24 DIAGNOSIS — K21.9 GASTROESOPHAGEAL REFLUX DISEASE: ICD-10-CM

## 2021-06-24 RX ORDER — OMEPRAZOLE 20 MG/1
CAPSULE, DELAYED RELEASE ORAL
Qty: 90 CAPSULE | Refills: 1 | Status: SHIPPED | OUTPATIENT
Start: 2021-06-24 | End: 2021-12-28 | Stop reason: SDUPTHER

## 2021-06-25 ENCOUNTER — OFFICE VISIT (OUTPATIENT)
Dept: PHYSICAL THERAPY | Facility: MEDICAL CENTER | Age: 71
End: 2021-06-25
Payer: MEDICARE

## 2021-06-25 DIAGNOSIS — M54.50 CHRONIC BILATERAL LOW BACK PAIN WITHOUT SCIATICA: Primary | ICD-10-CM

## 2021-06-25 DIAGNOSIS — G89.29 CHRONIC BILATERAL LOW BACK PAIN WITHOUT SCIATICA: Primary | ICD-10-CM

## 2021-06-25 PROCEDURE — 97140 MANUAL THERAPY 1/> REGIONS: CPT | Performed by: PHYSICAL THERAPIST

## 2021-06-28 DIAGNOSIS — R35.1 NOCTURIA: ICD-10-CM

## 2021-06-28 RX ORDER — OXYBUTYNIN CHLORIDE 5 MG/1
5 TABLET ORAL 2 TIMES DAILY
Qty: 180 TABLET | Refills: 5 | Status: SHIPPED | OUTPATIENT
Start: 2021-06-28

## 2021-06-28 NOTE — PROGRESS NOTES
Daily Note     Today's date: 2021  Patient name: Kristan Marinelli  : 1950  MRN: [id number]  Referring provider: Annika Villa DO  Dx:   Encounter Diagnosis     ICD-10-CM    1  Chronic bilateral low back pain without sciatica  M54 5     G89 29                   Subjective: Harper Condon reports doing well, feeling much better when getting up in the morning       Objective: See treatment diary below      Assessment: Tolerated treatment well  Patient exhibited good technique with therapeutic exercises and would benefit from continued PT      Plan: Continue per plan of care        Precautions: L Hip dislocation       Manuals           LUmbar mob AF AF AF          Hip pROM AF AF AF                                    Neuro Re-Ed                                                                                                        Ther Ex             Glute set 30            Bridge 30 30           Supine clamshell Black   3x10                                                                             Ther Activity                                       Gait Training                                       Modalities

## 2021-07-01 DIAGNOSIS — N40.1 BENIGN PROSTATIC HYPERPLASIA WITH LOWER URINARY TRACT SYMPTOMS, SYMPTOM DETAILS UNSPECIFIED: ICD-10-CM

## 2021-07-01 RX ORDER — TAMSULOSIN HYDROCHLORIDE 0.4 MG/1
CAPSULE ORAL
Qty: 60 CAPSULE | Refills: 3 | Status: SHIPPED | OUTPATIENT
Start: 2021-07-01 | End: 2021-11-10 | Stop reason: SDUPTHER

## 2021-08-01 DIAGNOSIS — E78.5 HYPERLIPIDEMIA: ICD-10-CM

## 2021-08-02 RX ORDER — ATORVASTATIN CALCIUM 80 MG/1
TABLET, FILM COATED ORAL
Qty: 90 TABLET | Refills: 3 | Status: SHIPPED | OUTPATIENT
Start: 2021-08-02 | End: 2022-08-03

## 2021-08-03 ENCOUNTER — OFFICE VISIT (OUTPATIENT)
Dept: CARDIOLOGY CLINIC | Facility: CLINIC | Age: 71
End: 2021-08-03
Payer: MEDICARE

## 2021-08-03 VITALS
SYSTOLIC BLOOD PRESSURE: 138 MMHG | OXYGEN SATURATION: 96 % | DIASTOLIC BLOOD PRESSURE: 70 MMHG | HEIGHT: 70 IN | HEART RATE: 58 BPM | BODY MASS INDEX: 36.22 KG/M2 | WEIGHT: 253 LBS

## 2021-08-03 DIAGNOSIS — I10 ESSENTIAL HYPERTENSION: ICD-10-CM

## 2021-08-03 DIAGNOSIS — I25.10 CORONARY ARTERY DISEASE INVOLVING NATIVE CORONARY ARTERY OF NATIVE HEART WITHOUT ANGINA PECTORIS: Primary | ICD-10-CM

## 2021-08-03 DIAGNOSIS — Z01.810 PRE-OPERATIVE CARDIOVASCULAR EXAMINATION: ICD-10-CM

## 2021-08-03 DIAGNOSIS — I45.2 BIFASCICULAR BLOCK: ICD-10-CM

## 2021-08-03 PROCEDURE — 93000 ELECTROCARDIOGRAM COMPLETE: CPT | Performed by: INTERNAL MEDICINE

## 2021-08-03 PROCEDURE — 99214 OFFICE O/P EST MOD 30 MIN: CPT | Performed by: INTERNAL MEDICINE

## 2021-08-03 RX ORDER — GUAIFENESIN 600 MG
1200 TABLET, EXTENDED RELEASE 12 HR ORAL EVERY 12 HOURS SCHEDULED
COMMUNITY

## 2021-08-03 RX ORDER — LORATADINE 10 MG/1
10 TABLET ORAL DAILY
COMMUNITY

## 2021-08-03 NOTE — PROGRESS NOTES
Cardiology Follow Up    Mallory Clifford  1950  [id number]  [facility] [place]  [address]  [phone]    1  Coronary artery disease involving native coronary artery of native heart without angina pectoris  POCT ECG   2  Essential hypertension  POCT ECG   3  Bifascicular block  POCT ECG   4  Pre-operative cardiovascular examination  POCT ECG       Discussion/Summary:    1  CAD: prior NSTEMI 6/2019  PCI to the 2nd OM, other disease as noted  Will plan to continue DAPT, but this can be interrupted for his surgery as noted below  Most recent ischemic evaluation 8/2020 no ischemia, prior inferior infarct  Not able to exercise as much because of orthopedic issues, has gained weight  BP is stable  2  HTN: BP is controlled with 90 mg nifedipine, 100mg losartan  Beta blocker previously stopped due to bradycarida, bifascicular block  3  Bifascicular block:  HR today is 58 BPM  Off beta blocker  No symptoms, will monitor  4  HL:  On high intensity atorvastatin  Most recent direct LDL was 60 in 4/2021  5  Preop eval: no contraindication to proceeding  If possible to continue 81mg aspirin, would prefer this  Plavix can be held 5-7 days prior to the surgery per the surgeons' preference (I typically recommend 5 days)  If aspirin needs to be held due to bleed risk this is acceptable  Resume DAPT post operatively when safe, unless he is going to be on full anticoagulation post operatively, in which case I would resume only aspirin, not plavix  Previous History:  41-year-old gentleman  He has coronary disease identified in June of 2019  He had an NSTEMI  EF was preserved  He had a stent to the 2nd OM, 60% 1st diagonal, 60% circ, 50% mid RCA  He is on aspirin and Plavix  He has a baseline left anterior fascicular block, RBBB  Sinus bradycardia, but no symptoms    He has hypertension as well, tends to be worse with anxiety but of late has been better controlled  Interval History:    Returns for regular follow-up  Overall, he is feeling pretty well  Unfortunately, he had dislocation of his hip, he will be having revision of a hip at [facility] for special surgery later this month  Comes for preoperative evaluation  He continues to have very sharp, occasional, atypical chest pains which were evaluated last year with a  An exercise nuclear stress test without any evidence of ischemia  Denies any shortness of breath  No dizziness or lightheadedness  No changes in medications  Has not been checking his blood pressure as much at home because it tends to make him nervous, but in the office, his blood pressure has been controlled  He is going to meet with preoperative evaluation at [facility] about 1 week prior to his surgery  He has not received any instructions regarding the antiplatelet medications, and I have not received any communication from the office, so far, either  Problem List     Anxiety    BPH with obstruction/lower urinary tract symptoms    Elevated glucose    Erectile dysfunction of non-organic origin    GERD without esophagitis    Hyperlipidemia    Essential hypertension    Obesity (BMI 30 0-34 9)    Obstructive sleep apnea    Osteoarthritis    Psoriasis    Bifascicular block    Calculus of kidney    Primary insomnia    GERD (gastroesophageal reflux disease)    Low back pain    Alcohol use    Dyslipidemia    Coronary artery disease involving native coronary artery of native heart without angina pectoris        Past Medical History:   Diagnosis Date    CAD (coronary artery disease)     s/p stent x1 on 6/19/19    Closed fracture of distal end of fibula with routine healing     unspecified fracture morphology, unspecified laterality, subsequent encounter;  Last Assessed:12/27/16    GERD (gastroesophageal reflux disease)     Heart attack ([facility] [address] [facility]     Hypertension     Kidney stones     Nocturia     Psoriasis     Sleep apnea      Social History     Tobacco Use    Smoking status: Never Smoker    Smokeless tobacco: Never Used   Substance Use Topics    Alcohol use: Yes     Comment: 1 drink daily    Drug use: No      Family History   Problem Relation Age of Onset    Arthritis Mother     Hypertension Mother     Irritable bowel syndrome Mother     Osteoporosis Mother     Arthritis Other      Past Surgical History:   Procedure Laterality Date    APPENDECTOMY      CARPAL TUNNEL RELEASE      ROTATOR CUFF REPAIR      SHOULDER ARTHROSCOPY Left     Last ASsessed:10/24/16    TOTAL HIP ARTHROPLASTY      TRIGGER FINGER RELEASE         Current Outpatient Medications:     Acetaminophen 325 MG CAPS, Take 650 mg by mouth every 6 (six) hours, Disp: , Rfl:     aspirin (ECOTRIN LOW STRENGTH) 81 mg EC tablet, Take 1 tablet (81 mg total) by mouth daily, Disp: 30 tablet, Rfl: 0    atorvastatin (LIPITOR) 80 mg tablet, take 1 tablet by mouth once daily WITH DINNER, Disp: 90 tablet, Rfl: 3    clopidogrel (PLAVIX) 75 mg tablet, take 1 tablet by mouth once daily, Disp: 90 tablet, Rfl: 3    fluticasone (FLONASE) 50 mcg/act nasal spray, 2 sprays into each nostril 2 (two) times a day as needed for rhinitis, Disp: , Rfl:     guaiFENesin (MUCINEX) 600 mg 12 hr tablet, Take 1,200 mg by mouth every 12 (twelve) hours, Disp: , Rfl:     loratadine (CLARITIN) 10 mg tablet, Take 10 mg by mouth daily, Disp: , Rfl:     losartan (COZAAR) 100 MG tablet, Take 1 tablet (100 mg total) by mouth daily, Disp: 90 tablet, Rfl: 3    NIFEdipine (ADALAT CC) 90 mg 24 hr tablet, Take 1 tablet (90 mg total) by mouth daily, Disp: 90 tablet, Rfl: 3    omeprazole (PriLOSEC) 20 mg delayed release capsule, take 1 capsule by mouth once daily, Disp: 90 capsule, Rfl: 1    oxybutynin (DITROPAN) 5 mg tablet, Take 1 tablet (5 mg total) by mouth 2 (two) times a day Take 1 to 2 tablets by mouth once daily before bed , Disp: 180 tablet, Rfl: 5    tadalafil (CIALIS) 20 MG tablet, Take 1 tablet (20 mg total) by mouth daily as needed for erectile dysfunction, Disp: 30 tablet, Rfl: 0    tamsulosin (FLOMAX) 0 4 mg, take 1 tablet by mouth twice a day, Disp: 60 capsule, Rfl: 3    TIZANIDINE HCL PO, Take 4 mg by mouth as needed , Disp: , Rfl:     traMADol (ULTRAM) 50 mg tablet, Take 100 mg by mouth as needed , Disp: , Rfl: 0    traZODone (DESYREL) 50 mg tablet, TAKE 1 TABLET BY MOUTH DAILY AT BEDTIME, Disp: 90 tablet, Rfl: 1    betamethasone, augmented, (DIPROLENE-AF) 0 05 % cream, apply twice daily to rash x 2 weeks  then Saturday and Sunday only, Disp: , Rfl: 0    calcipotriene (DOVONEX) 0 005 % cream, apply to affected area twice a day ON MONDAY THROUGH FRIDAY, Disp: , Rfl: 0    metoprolol succinate (TOPROL-XL) 25 mg 24 hr tablet, take 1/2 tablet by mouth once daily (Patient not taking: Reported on 8/3/2021), Disp: 45 tablet, Rfl: 11    nitroglycerin (NITROSTAT) 0 4 mg SL tablet, Place 1 tablet (0 4 mg total) under the tongue every 5 (five) minutes as needed for chest pain (Patient not taking: Reported on 8/3/2021), Disp: 25 tablet, Rfl: 3  Allergies   Allergen Reactions    Latex Hives    Other     Pollen Extract     Shellfish-Derived Products - Food Allergy      [employer]     Vitals:    08/03/21 1356   BP: 138/70   BP Location: Right arm   Patient Position: Sitting   Cuff Size: Large   Pulse: 58   SpO2: 96%   Weight: 115 kg (253 lb)   Height: 5' 10" (1 778 m)     Vitals:    08/03/21 1356   Weight: 115 kg (253 lb)      Height: 5' 10" (177 8 cm)   Body mass index is 36 3 kg/m²      Physical Exam:  GEN: Kristian Mcmanus appears well, alert and oriented x 3, pleasant and cooperative   HEENT: pupils equal, round, and reactive to light; extraocular muscles intact  NECK: supple, no carotid bruits   HEART: regular rhythm, normal S1 and S2, no murmurs, clicks, gallops or rubs   LUNGS: clear to auscultation bilaterally; no wheezes, rales, or rhonchi   ABDOMEN: normal bowel sounds, soft, no tenderness, no distention  EXTREMITIES: peripheral pulses normal; no clubbing, cyanosis, or edema  NEURO: no focal findings   SKIN: normal without suspicious lesions on exposed skin    ROS:  Positive for headaches  Except as noted in HPI, is otherwise reviewed in detail and a 12 point review of systems is negative  ROS reviewed and is unchanged    Labs:  Lab Results   Component Value Date    K 4 3 07/07/2020     07/07/2020    CREATININE 0 92 07/07/2020    BUN 20 07/07/2020    CO2 28 07/07/2020    ALT 27 07/05/2020    AST 14 07/05/2020    INR 0 98 06/18/2019    GLUF 108 (H) 06/19/2019    HGBA1C 5 2 06/19/2019    WBC 7 82 07/07/2020    HGB 16 5 07/07/2020    HCT 49 6 (H) 07/07/2020     07/07/2020     No results found for: CHOL  Lab Results   Component Value Date    LDLCALC 44 04/09/2021    LDLCALC 50 01/03/2020    LDLCALC 142 (H) 06/19/2019     Lab Results   Component Value Date    HDL 66 04/09/2021    HDL 53 01/03/2020    HDL 50 06/19/2019     Lab Results   Component Value Date    TRIG 50 04/09/2021    TRIG 43 01/03/2020    TRIG 95 06/19/2019     Testing:  Stress Test 8/5/20:  SUMMARY:  -  Stress results: Duration of exercise was 7 min and 30 sec  Maximal work rate was 9 3 METs  Maximal heart rate during stress was 127 bpm ( 84 % of maximal predicted heart rate)  Target heart rate was not achieved  There was normal  resting blood pressure with a hypertensive response to stress  The systolic blood pressure increased by 90 mmHg during peak stress  There was no chest pain during stress  -  ECG conclusions: The stress ECG was negative for ischemia and normal   -  Perfusion imaging: The left ventricle was mildly dilated  There was a moderate-sized, severe, fixed myocardial perfusion defect of the basal to mid inferior wall  -  Gated SPECT: The calculated left ventricular ejection fraction was 54 %   Left ventricular ejection fraction appeared to be at the lower limits of normal  There was severely reduced myocardial thickening and motion of the basal inferior  wall of the left ventricle      IMPRESSIONS: Abnormal study after maximal exercise  There was a small to medium sized infarct in the mid to basal inferior region  Overall left ventricular systolic function was preserved, but there were regional wall motion abnormalities  Diagnostic sensitivity was limited by submaximal stress  Patient achieved 84% of maximum age predicted heart rate  The patient had a hypertensive response to exercise, increasing the systolic blood pressure by 90 mmHg from baseline  Echo 7/7/20:  LEFT VENTRICLE:  Systolic function was normal  Ejection fraction was estimated to be 60 %  There were no regional wall motion abnormalities  Doppler parameters were consistent with abnormal left ventricular relaxation (grade 1 diastolic dysfunction)      MITRAL VALVE:  There was mild annular calcification  There was mild to moderate regurgitation      AORTIC VALVE:  There was mild regurgitation      TRICUSPID VALVE:  There was trace regurgitation  Cardiac Cath 6/2019  CORONARY CIRCULATION:  1st diagonal: There was a tubular 60 % stenosis  2nd obtuse marginal: There was a 99 % stenosis  There was ROSIE grade 2 flow through the vessel (partial perfusion)  This lesion is a likely culprit for the patient's recent myocardial infarction  An intervention was performed  Left AV groove artery: There was a discrete 60 % stenosis  Mid RCA: There was a 50 % stenosis      1ST LESION INTERVENTIONS:  A successful balloon angioplasty with stent procedure was performed on the 99 % lesion in the 2nd obtuse marginal  Following intervention there was an excellent angiographic appearance with a 0 % residual stenosis  A Xience [place] Rx 2 75 x 18mm drug-eluting stent was placed across the lesion and deployed at a maximum inflation pressure of 16 pam      EKG:  Sinus bradycardia, 58 BPM  RBBB, LAFB

## 2021-08-03 NOTE — LETTER
August 3, 2021     Holly Garcia MD  [address]  [address]    Patient: Nancy Regalado   YOB: 1950   Date of Visit: 8/3/2021       Dear Dr Yudi Santana: Thank you for referring Aan Valenzuela to me for evaluation  Below are my notes for this consultation  If you have questions, please do not hesitate to call me  I look forward to following your patient along with you  Sincerely,        Melisa Bravo MD        CC: MD Melisa Lockwood MD  8/3/2021  2:36 PM  Sign when Signing Visit                                             Cardiology Follow Up    Nancy Regalado  1950  [id number]  [facility] [address]  [phone]  [phone]    7  Coronary artery disease involving native coronary artery of native heart without angina pectoris  POCT ECG   2  Essential hypertension  POCT ECG   3  Bifascicular block  POCT ECG   4  Pre-operative cardiovascular examination  POCT ECG       Discussion/Summary:    1  CAD: prior NSTEMI 6/2019  PCI to the 2nd OM, other disease as noted  Will plan to continue DAPT, but this can be interrupted for his surgery as noted below  Most recent ischemic evaluation 8/2020 no ischemia, prior inferior infarct  Not able to exercise as much because of orthopedic issues, has gained weight  BP is stable  2  HTN: BP is controlled with 90 mg nifedipine, 100mg losartan  Beta blocker previously stopped due to bradycarida, bifascicular block  3  Bifascicular block:  HR today is 58 BPM  Off beta blocker  No symptoms, will monitor  4  HL:  On high intensity atorvastatin  Most recent direct LDL was 60 in 4/2021  5  Preop eval: no contraindication to proceeding  If possible to continue 81mg aspirin, would prefer this  Plavix can be held 5-7 days prior to the surgery per the surgeons' preference (I typically recommend 5 days)    If aspirin needs to be held due to bleed risk this is acceptable  Resume DAPT post operatively when safe, unless he is going to be on full anticoagulation post operatively, in which case I would resume only aspirin, not plavix  Previous History:  71-year-old gentleman  He has coronary disease identified in June of 2019  He had an NSTEMI  EF was preserved  He had a stent to the 2nd OM, 60% 1st diagonal, 60% circ, 50% mid RCA  He is on aspirin and Plavix  He has a baseline left anterior fascicular block, RBBB  Sinus bradycardia, but no symptoms  He has hypertension as well, tends to be worse with anxiety but of late has been better controlled  Interval History:    Returns for regular follow-up  Overall, he is feeling pretty well  Unfortunately, he had dislocation of his hip, he will be having revision of a hip at [facility] for special surgery later this month  Comes for preoperative evaluation  He continues to have very sharp, occasional, atypical chest pains which were evaluated last year with a  An exercise nuclear stress test without any evidence of ischemia  Denies any shortness of breath  No dizziness or lightheadedness  No changes in medications  Has not been checking his blood pressure as much at home because it tends to make him nervous, but in the office, his blood pressure has been controlled  He is going to meet with preoperative evaluation at [facility] about 1 week prior to his surgery  He has not received any instructions regarding the antiplatelet medications, and I have not received any communication from the office, so far, either      Problem List     Anxiety    BPH with obstruction/lower urinary tract symptoms    Elevated glucose    Erectile dysfunction of non-organic origin    GERD without esophagitis    Hyperlipidemia    Essential hypertension    Obesity (BMI 30 0-34 9)    Obstructive sleep apnea    Osteoarthritis    Psoriasis    Bifascicular block    Calculus of kidney    Primary insomnia GERD (gastroesophageal reflux disease)    Low back pain    Alcohol use    Dyslipidemia    Coronary artery disease involving native coronary artery of native heart without angina pectoris        Past Medical History:   Diagnosis Date    CAD (coronary artery disease)     s/p stent x1 on 6/19/19    Closed fracture of distal end of fibula with routine healing     unspecified fracture morphology, unspecified laterality, subsequent encounter;  Last Assessed:12/27/16    GERD (gastroesophageal reflux disease)     Heart attack ([facility] [address] [facility]     Hypertension     Kidney stones     Nocturia     Psoriasis     Sleep apnea      Social History     Tobacco Use    Smoking status: Never Smoker    Smokeless tobacco: Never Used   Substance Use Topics    Alcohol use: Yes     Comment: 1 drink daily    Drug use: No      Family History   Problem Relation Age of Onset    Arthritis Mother     Hypertension Mother     Irritable bowel syndrome Mother     Osteoporosis Mother     Arthritis Other      Past Surgical History:   Procedure Laterality Date    APPENDECTOMY      CARPAL TUNNEL RELEASE      ROTATOR CUFF REPAIR      SHOULDER ARTHROSCOPY Left     Last ASsessed:10/24/16    TOTAL HIP ARTHROPLASTY      TRIGGER FINGER RELEASE         Current Outpatient Medications:     Acetaminophen 325 MG CAPS, Take 650 mg by mouth every 6 (six) hours, Disp: , Rfl:     aspirin (ECOTRIN LOW STRENGTH) 81 mg EC tablet, Take 1 tablet (81 mg total) by mouth daily, Disp: 30 tablet, Rfl: 0    atorvastatin (LIPITOR) 80 mg tablet, take 1 tablet by mouth once daily WITH DINNER, Disp: 90 tablet, Rfl: 3    clopidogrel (PLAVIX) 75 mg tablet, take 1 tablet by mouth once daily, Disp: 90 tablet, Rfl: 3    fluticasone (FLONASE) 50 mcg/act nasal spray, 2 sprays into each nostril 2 (two) times a day as needed for rhinitis, Disp: , Rfl:     guaiFENesin (MUCINEX) 600 mg 12 hr tablet, Take 1,200 mg by mouth every 12 (twelve) hours, Disp: , Rfl:     loratadine (CLARITIN) 10 mg tablet, Take 10 mg by mouth daily, Disp: , Rfl:     losartan (COZAAR) 100 MG tablet, Take 1 tablet (100 mg total) by mouth daily, Disp: 90 tablet, Rfl: 3    NIFEdipine (ADALAT CC) 90 mg 24 hr tablet, Take 1 tablet (90 mg total) by mouth daily, Disp: 90 tablet, Rfl: 3    omeprazole (PriLOSEC) 20 mg delayed release capsule, take 1 capsule by mouth once daily, Disp: 90 capsule, Rfl: 1    oxybutynin (DITROPAN) 5 mg tablet, Take 1 tablet (5 mg total) by mouth 2 (two) times a day Take 1 to 2 tablets by mouth once daily before bed , Disp: 180 tablet, Rfl: 5    tadalafil (CIALIS) 20 MG tablet, Take 1 tablet (20 mg total) by mouth daily as needed for erectile dysfunction, Disp: 30 tablet, Rfl: 0    tamsulosin (FLOMAX) 0 4 mg, take 1 tablet by mouth twice a day, Disp: 60 capsule, Rfl: 3    TIZANIDINE HCL PO, Take 4 mg by mouth as needed , Disp: , Rfl:     traMADol (ULTRAM) 50 mg tablet, Take 100 mg by mouth as needed , Disp: , Rfl: 0    traZODone (DESYREL) 50 mg tablet, TAKE 1 TABLET BY MOUTH DAILY AT BEDTIME, Disp: 90 tablet, Rfl: 1    betamethasone, augmented, (DIPROLENE-AF) 0 05 % cream, apply twice daily to rash x 2 weeks  then Saturday and Sunday only, Disp: , Rfl: 0    calcipotriene (DOVONEX) 0 005 % cream, apply to affected area twice a day ON MONDAY THROUGH FRIDAY, Disp: , Rfl: 0    metoprolol succinate (TOPROL-XL) 25 mg 24 hr tablet, take 1/2 tablet by mouth once daily (Patient not taking: Reported on 8/3/2021), Disp: 45 tablet, Rfl: 11    nitroglycerin (NITROSTAT) 0 4 mg SL tablet, Place 1 tablet (0 4 mg total) under the tongue every 5 (five) minutes as needed for chest pain (Patient not taking: Reported on 8/3/2021), Disp: 25 tablet, Rfl: 3  Allergies   Allergen Reactions    Latex Hives    Other     Pollen Extract     Shellfish-Derived Products - Food Allergy      [employer]     Vitals:    08/03/21 1356   BP: 138/70   BP Location: Right arm   Patient Position: Sitting Cuff Size: Large   Pulse: 58   SpO2: 96%   Weight: 115 kg (253 lb)   Height: 5' 10" (1 778 m)     Vitals:    08/03/21 1356   Weight: 115 kg (253 lb)      Height: 5' 10" (177 8 cm)   Body mass index is 36 3 kg/m²  Physical Exam:  GEN: Bernarda Sit appears well, alert and oriented x 3, pleasant and cooperative   HEENT: pupils equal, round, and reactive to light; extraocular muscles intact  NECK: supple, no carotid bruits   HEART: regular rhythm, normal S1 and S2, no murmurs, clicks, gallops or rubs   LUNGS: clear to auscultation bilaterally; no wheezes, rales, or rhonchi   ABDOMEN: normal bowel sounds, soft, no tenderness, no distention  EXTREMITIES: peripheral pulses normal; no clubbing, cyanosis, or edema  NEURO: no focal findings   SKIN: normal without suspicious lesions on exposed skin    ROS:  Positive for headaches  Except as noted in HPI, is otherwise reviewed in detail and a 12 point review of systems is negative  ROS reviewed and is unchanged    Labs:  Lab Results   Component Value Date    K 4 3 07/07/2020     07/07/2020    CREATININE 0 92 07/07/2020    BUN 20 07/07/2020    CO2 28 07/07/2020    ALT 27 07/05/2020    AST 14 07/05/2020    INR 0 98 06/18/2019    GLUF 108 (H) 06/19/2019    HGBA1C 5 2 06/19/2019    WBC 7 82 07/07/2020    HGB 16 5 07/07/2020    HCT 49 6 (H) 07/07/2020     07/07/2020     No results found for: CHOL  Lab Results   Component Value Date    LDLCALC 44 04/09/2021    LDLCALC 50 01/03/2020    LDLCALC 142 (H) 06/19/2019     Lab Results   Component Value Date    HDL 66 04/09/2021    HDL 53 01/03/2020    HDL 50 06/19/2019     Lab Results   Component Value Date    TRIG 50 04/09/2021    TRIG 43 01/03/2020    TRIG 95 06/19/2019     Testing:  Stress Test 8/5/20:  SUMMARY:  -  Stress results: Duration of exercise was 7 min and 30 sec  Maximal work rate was 9 3 METs  Maximal heart rate during stress was 127 bpm ( 84 % of maximal predicted heart rate)   Target heart rate was not achieved  There was normal  resting blood pressure with a hypertensive response to stress  The systolic blood pressure increased by 90 mmHg during peak stress  There was no chest pain during stress  -  ECG conclusions: The stress ECG was negative for ischemia and normal   -  Perfusion imaging: The left ventricle was mildly dilated  There was a moderate-sized, severe, fixed myocardial perfusion defect of the basal to mid inferior wall  -  Gated SPECT: The calculated left ventricular ejection fraction was 54 %  Left ventricular ejection fraction appeared to be at the lower limits of normal  There was severely reduced myocardial thickening and motion of the basal inferior  wall of the left ventricle      IMPRESSIONS: Abnormal study after maximal exercise  There was a small to medium sized infarct in the mid to basal inferior region  Overall left ventricular systolic function was preserved, but there were regional wall motion abnormalities  Diagnostic sensitivity was limited by submaximal stress  Patient achieved 84% of maximum age predicted heart rate  The patient had a hypertensive response to exercise, increasing the systolic blood pressure by 90 mmHg from baseline  Echo 7/7/20:  LEFT VENTRICLE:  Systolic function was normal  Ejection fraction was estimated to be 60 %  There were no regional wall motion abnormalities  Doppler parameters were consistent with abnormal left ventricular relaxation (grade 1 diastolic dysfunction)      MITRAL VALVE:  There was mild annular calcification  There was mild to moderate regurgitation      AORTIC VALVE:  There was mild regurgitation      TRICUSPID VALVE:  There was trace regurgitation  Cardiac Cath 6/2019  CORONARY CIRCULATION:  1st diagonal: There was a tubular 60 % stenosis  2nd obtuse marginal: There was a 99 % stenosis  There was ROSIE grade 2 flow through the vessel (partial perfusion)   This lesion is a likely culprit for the patient's recent myocardial infarction  An intervention was performed  Left AV groove artery: There was a discrete 60 % stenosis  Mid RCA: There was a 50 % stenosis      1ST LESION INTERVENTIONS:  A successful balloon angioplasty with stent procedure was performed on the 99 % lesion in the 2nd obtuse marginal  Following intervention there was an excellent angiographic appearance with a 0 % residual stenosis  A Xience [address] Rx 2 75 x 18mm drug-eluting stent was placed across the lesion and deployed at a maximum inflation pressure of 16 pam      EKG:  Sinus bradycardia, 58 BPM  RBBB, LAFB

## 2021-08-11 ENCOUNTER — CLINICAL SUPPORT (OUTPATIENT)
Dept: INTERNAL MEDICINE CLINIC | Facility: CLINIC | Age: 71
End: 2021-08-11

## 2021-08-11 DIAGNOSIS — Z01.818 PRE-OP TESTING: Primary | ICD-10-CM

## 2021-08-11 LAB — SARS-COV-2 RNA RESP QL NAA+PROBE: NEGATIVE

## 2021-08-11 PROCEDURE — U0005 INFEC AGEN DETEC AMPLI PROBE: HCPCS | Performed by: INTERNAL MEDICINE

## 2021-08-11 PROCEDURE — U0003 INFECTIOUS AGENT DETECTION BY NUCLEIC ACID (DNA OR RNA); SEVERE ACUTE RESPIRATORY SYNDROME CORONAVIRUS 2 (SARS-COV-2) (CORONAVIRUS DISEASE [COVID-19]), AMPLIFIED PROBE TECHNIQUE, MAKING USE OF HIGH THROUGHPUT TECHNOLOGIES AS DESCRIBED BY CMS-2020-01-R: HCPCS | Performed by: INTERNAL MEDICINE

## 2021-08-28 DIAGNOSIS — I25.10 CAD (CORONARY ARTERY DISEASE): ICD-10-CM

## 2021-08-30 RX ORDER — CLOPIDOGREL BISULFATE 75 MG/1
TABLET ORAL
Qty: 90 TABLET | Refills: 3 | Status: SHIPPED | OUTPATIENT
Start: 2021-08-30 | End: 2022-07-19

## 2021-09-16 ENCOUNTER — TELEPHONE (OUTPATIENT)
Dept: INTERNAL MEDICINE CLINIC | Facility: CLINIC | Age: 71
End: 2021-09-16

## 2021-09-16 DIAGNOSIS — I10 ESSENTIAL HYPERTENSION: ICD-10-CM

## 2021-09-16 DIAGNOSIS — R93.89 ABNORMAL CT OF THE CHEST: Primary | ICD-10-CM

## 2021-09-16 RX ORDER — NIFEDIPINE 90 MG/1
TABLET, FILM COATED, EXTENDED RELEASE ORAL
Qty: 90 TABLET | Refills: 3 | Status: SHIPPED | OUTPATIENT
Start: 2021-09-16

## 2021-09-16 NOTE — TELEPHONE ENCOUNTER
Called pt to set up a follow up ct scan that was suggested by the radiologist  Pt currently in [place] for a hip check up, he will call our office on his way home to coordinate the visit

## 2021-09-24 ENCOUNTER — TELEPHONE (OUTPATIENT)
Dept: INTERNAL MEDICINE CLINIC | Facility: CLINIC | Age: 71
End: 2021-09-24

## 2021-09-24 NOTE — TELEPHONE ENCOUNTER
Patient called requesting a parking placcard form due to recent hip surgery   Pt will be setting up his ct scan today of the chest that was needed for a follow up

## 2021-10-08 ENCOUNTER — HOSPITAL ENCOUNTER (OUTPATIENT)
Dept: CT IMAGING | Facility: HOSPITAL | Age: 71
Discharge: HOME | End: 2021-10-08
Payer: MEDICARE

## 2021-10-08 DIAGNOSIS — R93.89 ABNORMAL CT OF THE CHEST: ICD-10-CM

## 2021-10-08 PROCEDURE — 71260 CT THORAX DX C+: CPT

## 2021-10-08 PROCEDURE — G1004 CDSM NDSC: HCPCS

## 2021-10-08 RX ADMIN — IOHEXOL 85 ML: 350 INJECTION, SOLUTION INTRAVENOUS at 17:58

## 2021-10-14 ENCOUNTER — TELEPHONE (OUTPATIENT)
Dept: CARDIOLOGY CLINIC | Facility: CLINIC | Age: 71
End: 2021-10-14

## 2021-10-15 ENCOUNTER — TELEPHONE (OUTPATIENT)
Dept: INTERNAL MEDICINE CLINIC | Facility: CLINIC | Age: 71
End: 2021-10-15

## 2021-10-18 ENCOUNTER — OFFICE VISIT (OUTPATIENT)
Dept: INTERNAL MEDICINE CLINIC | Facility: CLINIC | Age: 71
End: 2021-10-18
Payer: MEDICARE

## 2021-10-18 ENCOUNTER — EVALUATION (OUTPATIENT)
Dept: PHYSICAL THERAPY | Facility: MEDICAL CENTER | Age: 71
End: 2021-10-18
Payer: MEDICARE

## 2021-10-18 VITALS
HEART RATE: 68 BPM | WEIGHT: 250 LBS | RESPIRATION RATE: 16 BRPM | DIASTOLIC BLOOD PRESSURE: 80 MMHG | HEIGHT: 70 IN | SYSTOLIC BLOOD PRESSURE: 124 MMHG | BODY MASS INDEX: 35.79 KG/M2 | TEMPERATURE: 96.6 F

## 2021-10-18 DIAGNOSIS — Z12.5 ENCOUNTER FOR SCREENING FOR MALIGNANT NEOPLASM OF PROSTATE: ICD-10-CM

## 2021-10-18 DIAGNOSIS — N40.1 BPH WITH OBSTRUCTION/LOWER URINARY TRACT SYMPTOMS: ICD-10-CM

## 2021-10-18 DIAGNOSIS — E66.01 MORBID OBESITY (HCC): ICD-10-CM

## 2021-10-18 DIAGNOSIS — G47.33 OBSTRUCTIVE SLEEP APNEA: ICD-10-CM

## 2021-10-18 DIAGNOSIS — Z96.649 S/P REVISION OF TOTAL HIP: Primary | ICD-10-CM

## 2021-10-18 DIAGNOSIS — I10 ESSENTIAL HYPERTENSION: Primary | ICD-10-CM

## 2021-10-18 DIAGNOSIS — N13.8 BPH WITH OBSTRUCTION/LOWER URINARY TRACT SYMPTOMS: ICD-10-CM

## 2021-10-18 DIAGNOSIS — Z00.00 MEDICARE ANNUAL WELLNESS VISIT, SUBSEQUENT: ICD-10-CM

## 2021-10-18 DIAGNOSIS — E78.5 DYSLIPIDEMIA: ICD-10-CM

## 2021-10-18 DIAGNOSIS — R79.89 OTHER SPECIFIED ABNORMAL FINDINGS OF BLOOD CHEMISTRY: ICD-10-CM

## 2021-10-18 DIAGNOSIS — Z79.899 OTHER LONG TERM (CURRENT) DRUG THERAPY: ICD-10-CM

## 2021-10-18 DIAGNOSIS — Z23 FLU VACCINE NEED: ICD-10-CM

## 2021-10-18 PROCEDURE — 90662 IIV NO PRSV INCREASED AG IM: CPT | Performed by: INTERNAL MEDICINE

## 2021-10-18 PROCEDURE — 99214 OFFICE O/P EST MOD 30 MIN: CPT | Performed by: INTERNAL MEDICINE

## 2021-10-18 PROCEDURE — G0438 PPPS, INITIAL VISIT: HCPCS | Performed by: INTERNAL MEDICINE

## 2021-10-18 PROCEDURE — G0008 ADMIN INFLUENZA VIRUS VAC: HCPCS | Performed by: INTERNAL MEDICINE

## 2021-10-18 PROCEDURE — 1123F ACP DISCUSS/DSCN MKR DOCD: CPT | Performed by: INTERNAL MEDICINE

## 2021-10-18 PROCEDURE — 97161 PT EVAL LOW COMPLEX 20 MIN: CPT | Performed by: PHYSICAL THERAPIST

## 2021-10-20 ENCOUNTER — OFFICE VISIT (OUTPATIENT)
Dept: PHYSICAL THERAPY | Facility: MEDICAL CENTER | Age: 71
End: 2021-10-20
Payer: MEDICARE

## 2021-10-20 ENCOUNTER — APPOINTMENT (OUTPATIENT)
Dept: LAB | Facility: MEDICAL CENTER | Age: 71
End: 2021-10-20
Payer: MEDICARE

## 2021-10-20 DIAGNOSIS — R79.89 OTHER SPECIFIED ABNORMAL FINDINGS OF BLOOD CHEMISTRY: ICD-10-CM

## 2021-10-20 DIAGNOSIS — N40.1 BPH WITH OBSTRUCTION/LOWER URINARY TRACT SYMPTOMS: ICD-10-CM

## 2021-10-20 DIAGNOSIS — E78.5 DYSLIPIDEMIA: ICD-10-CM

## 2021-10-20 DIAGNOSIS — E66.01 MORBID OBESITY (HCC): ICD-10-CM

## 2021-10-20 DIAGNOSIS — N13.8 BPH WITH OBSTRUCTION/LOWER URINARY TRACT SYMPTOMS: ICD-10-CM

## 2021-10-20 DIAGNOSIS — Z96.649 S/P REVISION OF TOTAL HIP: Primary | ICD-10-CM

## 2021-10-20 DIAGNOSIS — Z12.5 ENCOUNTER FOR SCREENING FOR MALIGNANT NEOPLASM OF PROSTATE: ICD-10-CM

## 2021-10-20 DIAGNOSIS — Z79.899 OTHER LONG TERM (CURRENT) DRUG THERAPY: ICD-10-CM

## 2021-10-20 LAB
ALBUMIN SERPL BCP-MCNC: 3.9 G/DL (ref 3.5–5)
ALP SERPL-CCNC: 68 U/L (ref 46–116)
ALT SERPL W P-5'-P-CCNC: 28 U/L (ref 12–78)
ANION GAP SERPL CALCULATED.3IONS-SCNC: 5 MMOL/L (ref 4–13)
AST SERPL W P-5'-P-CCNC: 21 U/L (ref 5–45)
BASOPHILS # BLD AUTO: 0.02 THOUSANDS/ΜL (ref 0–0.1)
BASOPHILS NFR BLD AUTO: 0 % (ref 0–1)
BILIRUB SERPL-MCNC: 0.78 MG/DL (ref 0.2–1)
BILIRUB UR QL STRIP: NEGATIVE
BUN SERPL-MCNC: 25 MG/DL (ref 5–25)
CALCIUM SERPL-MCNC: 10 MG/DL (ref 8.3–10.1)
CHLORIDE SERPL-SCNC: 107 MMOL/L (ref 100–108)
CLARITY UR: CLEAR
CO2 SERPL-SCNC: 27 MMOL/L (ref 21–32)
COLOR UR: YELLOW
CREAT SERPL-MCNC: 1.2 MG/DL (ref 0.6–1.3)
EOSINOPHIL # BLD AUTO: 0.14 THOUSAND/ΜL (ref 0–0.61)
EOSINOPHIL NFR BLD AUTO: 2 % (ref 0–6)
ERYTHROCYTE [DISTWIDTH] IN BLOOD BY AUTOMATED COUNT: 13.6 % (ref 11.6–15.1)
FERRITIN SERPL-MCNC: 41 NG/ML (ref 8–388)
GFR SERPL CREATININE-BSD FRML MDRD: 61 ML/MIN/1.73SQ M
GLUCOSE P FAST SERPL-MCNC: 108 MG/DL (ref 65–99)
GLUCOSE UR STRIP-MCNC: NEGATIVE MG/DL
HCT VFR BLD AUTO: 42.1 % (ref 36.5–49.3)
HGB BLD-MCNC: 14 G/DL (ref 12–17)
HGB UR QL STRIP.AUTO: NEGATIVE
IMM GRANULOCYTES # BLD AUTO: 0.02 THOUSAND/UL (ref 0–0.2)
IMM GRANULOCYTES NFR BLD AUTO: 0 % (ref 0–2)
KETONES UR STRIP-MCNC: NEGATIVE MG/DL
LEUKOCYTE ESTERASE UR QL STRIP: NEGATIVE
LYMPHOCYTES # BLD AUTO: 1.65 THOUSANDS/ΜL (ref 0.6–4.47)
LYMPHOCYTES NFR BLD AUTO: 28 % (ref 14–44)
MCH RBC QN AUTO: 31.7 PG (ref 26.8–34.3)
MCHC RBC AUTO-ENTMCNC: 33.3 G/DL (ref 31.4–37.4)
MCV RBC AUTO: 95 FL (ref 82–98)
MONOCYTES # BLD AUTO: 0.84 THOUSAND/ΜL (ref 0.17–1.22)
MONOCYTES NFR BLD AUTO: 14 % (ref 4–12)
NEUTROPHILS # BLD AUTO: 3.18 THOUSANDS/ΜL (ref 1.85–7.62)
NEUTS SEG NFR BLD AUTO: 56 % (ref 43–75)
NITRITE UR QL STRIP: NEGATIVE
NRBC BLD AUTO-RTO: 0 /100 WBCS
PH UR STRIP.AUTO: 6 [PH]
PLATELET # BLD AUTO: 232 THOUSANDS/UL (ref 149–390)
PMV BLD AUTO: 9.8 FL (ref 8.9–12.7)
POTASSIUM SERPL-SCNC: 4.8 MMOL/L (ref 3.5–5.3)
PROT SERPL-MCNC: 7.6 G/DL (ref 6.4–8.2)
PROT UR STRIP-MCNC: NEGATIVE MG/DL
PSA SERPL-MCNC: 0.4 NG/ML (ref 0–4)
RBC # BLD AUTO: 4.42 MILLION/UL (ref 3.88–5.62)
SODIUM SERPL-SCNC: 139 MMOL/L (ref 136–145)
SP GR UR STRIP.AUTO: 1.02 (ref 1–1.03)
TSH SERPL DL<=0.05 MIU/L-ACNC: 1.39 UIU/ML (ref 0.36–3.74)
UROBILINOGEN UR QL STRIP.AUTO: 1 E.U./DL
WBC # BLD AUTO: 5.85 THOUSAND/UL (ref 4.31–10.16)

## 2021-10-20 PROCEDURE — 82728 ASSAY OF FERRITIN: CPT

## 2021-10-20 PROCEDURE — G0103 PSA SCREENING: HCPCS

## 2021-10-20 PROCEDURE — 97140 MANUAL THERAPY 1/> REGIONS: CPT | Performed by: PHYSICAL THERAPIST

## 2021-10-20 PROCEDURE — 81003 URINALYSIS AUTO W/O SCOPE: CPT

## 2021-10-20 PROCEDURE — 85025 COMPLETE CBC W/AUTO DIFF WBC: CPT

## 2021-10-20 PROCEDURE — 97110 THERAPEUTIC EXERCISES: CPT | Performed by: PHYSICAL THERAPIST

## 2021-10-20 PROCEDURE — 84443 ASSAY THYROID STIM HORMONE: CPT

## 2021-10-20 PROCEDURE — 36415 COLL VENOUS BLD VENIPUNCTURE: CPT

## 2021-10-20 PROCEDURE — 80053 COMPREHEN METABOLIC PANEL: CPT

## 2021-10-22 ENCOUNTER — TELEPHONE (OUTPATIENT)
Dept: INTERNAL MEDICINE CLINIC | Facility: CLINIC | Age: 71
End: 2021-10-22

## 2021-10-25 ENCOUNTER — APPOINTMENT (OUTPATIENT)
Dept: PHYSICAL THERAPY | Facility: MEDICAL CENTER | Age: 71
End: 2021-10-25
Payer: MEDICARE

## 2021-10-27 ENCOUNTER — OFFICE VISIT (OUTPATIENT)
Dept: PHYSICAL THERAPY | Facility: MEDICAL CENTER | Age: 71
End: 2021-10-27
Payer: MEDICARE

## 2021-10-27 DIAGNOSIS — Z96.649 S/P REVISION OF TOTAL HIP: Primary | ICD-10-CM

## 2021-10-27 PROCEDURE — 97140 MANUAL THERAPY 1/> REGIONS: CPT | Performed by: PHYSICAL THERAPIST

## 2021-11-01 ENCOUNTER — OFFICE VISIT (OUTPATIENT)
Dept: PHYSICAL THERAPY | Facility: MEDICAL CENTER | Age: 71
End: 2021-11-01
Payer: MEDICARE

## 2021-11-01 DIAGNOSIS — Z96.649 S/P REVISION OF TOTAL HIP: Primary | ICD-10-CM

## 2021-11-01 PROCEDURE — 97140 MANUAL THERAPY 1/> REGIONS: CPT | Performed by: PHYSICAL THERAPIST

## 2021-11-03 ENCOUNTER — OFFICE VISIT (OUTPATIENT)
Dept: PHYSICAL THERAPY | Facility: MEDICAL CENTER | Age: 71
End: 2021-11-03
Payer: MEDICARE

## 2021-11-03 DIAGNOSIS — Z96.649 S/P REVISION OF TOTAL HIP: Primary | ICD-10-CM

## 2021-11-03 PROCEDURE — 97110 THERAPEUTIC EXERCISES: CPT | Performed by: PHYSICAL THERAPIST

## 2021-11-03 PROCEDURE — 97140 MANUAL THERAPY 1/> REGIONS: CPT | Performed by: PHYSICAL THERAPIST

## 2021-11-08 ENCOUNTER — OFFICE VISIT (OUTPATIENT)
Dept: PHYSICAL THERAPY | Facility: MEDICAL CENTER | Age: 71
End: 2021-11-08
Payer: MEDICARE

## 2021-11-08 DIAGNOSIS — Z96.649 S/P REVISION OF TOTAL HIP: Primary | ICD-10-CM

## 2021-11-08 PROCEDURE — 97110 THERAPEUTIC EXERCISES: CPT | Performed by: PHYSICAL THERAPIST

## 2021-11-08 PROCEDURE — 97140 MANUAL THERAPY 1/> REGIONS: CPT | Performed by: PHYSICAL THERAPIST

## 2021-11-10 ENCOUNTER — OFFICE VISIT (OUTPATIENT)
Dept: PHYSICAL THERAPY | Facility: MEDICAL CENTER | Age: 71
End: 2021-11-10
Payer: MEDICARE

## 2021-11-10 DIAGNOSIS — Z96.649 S/P REVISION OF TOTAL HIP: Primary | ICD-10-CM

## 2021-11-10 DIAGNOSIS — N40.1 BENIGN PROSTATIC HYPERPLASIA WITH LOWER URINARY TRACT SYMPTOMS, SYMPTOM DETAILS UNSPECIFIED: ICD-10-CM

## 2021-11-10 PROCEDURE — 97140 MANUAL THERAPY 1/> REGIONS: CPT | Performed by: PHYSICAL THERAPIST

## 2021-11-10 PROCEDURE — 97110 THERAPEUTIC EXERCISES: CPT | Performed by: PHYSICAL THERAPIST

## 2021-11-10 RX ORDER — TAMSULOSIN HYDROCHLORIDE 0.4 MG/1
0.4 CAPSULE ORAL 2 TIMES DAILY
Qty: 60 CAPSULE | Refills: 3 | Status: SHIPPED | OUTPATIENT
Start: 2021-11-10

## 2021-11-17 ENCOUNTER — OFFICE VISIT (OUTPATIENT)
Dept: PHYSICAL THERAPY | Facility: MEDICAL CENTER | Age: 71
End: 2021-11-17
Payer: MEDICARE

## 2021-11-17 DIAGNOSIS — Z96.649 S/P REVISION OF TOTAL HIP: Primary | ICD-10-CM

## 2021-11-17 PROCEDURE — 97140 MANUAL THERAPY 1/> REGIONS: CPT | Performed by: PHYSICAL THERAPIST

## 2021-11-17 PROCEDURE — 97110 THERAPEUTIC EXERCISES: CPT | Performed by: PHYSICAL THERAPIST

## 2021-11-19 ENCOUNTER — OFFICE VISIT (OUTPATIENT)
Dept: PHYSICAL THERAPY | Facility: MEDICAL CENTER | Age: 71
End: 2021-11-19
Payer: MEDICARE

## 2021-11-19 DIAGNOSIS — Z96.649 S/P REVISION OF TOTAL HIP: Primary | ICD-10-CM

## 2021-11-19 PROCEDURE — 97110 THERAPEUTIC EXERCISES: CPT | Performed by: PHYSICAL THERAPIST

## 2021-11-19 PROCEDURE — 97140 MANUAL THERAPY 1/> REGIONS: CPT | Performed by: PHYSICAL THERAPIST

## 2021-11-22 ENCOUNTER — OFFICE VISIT (OUTPATIENT)
Dept: PHYSICAL THERAPY | Facility: MEDICAL CENTER | Age: 71
End: 2021-11-22
Payer: MEDICARE

## 2021-11-22 DIAGNOSIS — Z96.649 S/P REVISION OF TOTAL HIP: Primary | ICD-10-CM

## 2021-11-22 PROCEDURE — 97140 MANUAL THERAPY 1/> REGIONS: CPT | Performed by: PHYSICAL THERAPIST

## 2021-11-24 ENCOUNTER — APPOINTMENT (OUTPATIENT)
Dept: PHYSICAL THERAPY | Facility: MEDICAL CENTER | Age: 71
End: 2021-11-24
Payer: MEDICARE

## 2021-12-15 ENCOUNTER — TELEPHONE (OUTPATIENT)
Dept: PULMONOLOGY | Facility: CLINIC | Age: 71
End: 2021-12-15

## 2021-12-15 DIAGNOSIS — F51.01 PRIMARY INSOMNIA: ICD-10-CM

## 2021-12-15 RX ORDER — TRAZODONE HYDROCHLORIDE 50 MG/1
50 TABLET ORAL
Qty: 90 TABLET | Refills: 1 | Status: SHIPPED | OUTPATIENT
Start: 2021-12-15

## 2021-12-28 DIAGNOSIS — K21.9 GASTROESOPHAGEAL REFLUX DISEASE: ICD-10-CM

## 2021-12-28 RX ORDER — OMEPRAZOLE 20 MG/1
20 CAPSULE, DELAYED RELEASE ORAL DAILY
Qty: 90 CAPSULE | Refills: 0 | Status: SHIPPED | OUTPATIENT
Start: 2021-12-28

## 2022-02-09 ENCOUNTER — OFFICE VISIT (OUTPATIENT)
Dept: CARDIOLOGY CLINIC | Facility: CLINIC | Age: 72
End: 2022-02-09
Payer: MEDICARE

## 2022-02-09 VITALS
WEIGHT: 261 LBS | DIASTOLIC BLOOD PRESSURE: 76 MMHG | HEIGHT: 70 IN | HEART RATE: 64 BPM | OXYGEN SATURATION: 95 % | SYSTOLIC BLOOD PRESSURE: 136 MMHG | BODY MASS INDEX: 37.37 KG/M2

## 2022-02-09 DIAGNOSIS — I25.10 CORONARY ARTERY DISEASE INVOLVING NATIVE CORONARY ARTERY OF NATIVE HEART WITHOUT ANGINA PECTORIS: ICD-10-CM

## 2022-02-09 DIAGNOSIS — I10 ESSENTIAL HYPERTENSION: Primary | ICD-10-CM

## 2022-02-09 DIAGNOSIS — I45.2 BIFASCICULAR BLOCK: ICD-10-CM

## 2022-02-09 PROCEDURE — 99214 OFFICE O/P EST MOD 30 MIN: CPT | Performed by: INTERNAL MEDICINE

## 2022-02-09 NOTE — PROGRESS NOTES
Cardiology Follow Up    Mk Jeronimo  1950  [id number]  [facility]  [address]  [phone]    1  Essential hypertension     2  Bifascicular block     3  Coronary artery disease involving native coronary artery of native heart without angina pectoris         Discussion/Summary:    1  CAD: prior NSTEMI 6/2019  PCI to the 2nd OM, other disease as noted  Since he is doing well with it, will continue DAPT for now, but likely discontinue and taper down to aspirin alone next visit  Increase aerobic exercise  Ischemic eval in 8/2020 was favorable  2  HTN: BP is controlled with 90 mg nifedipine, 100mg losartan  Beta blocker previously stopped due to bradycarida, bifascicular block  Also with concerns for ED, so no plans to start beta blocker  3  Bifascicular block:  Has been stable  No evidence of bradycardia or higher degree AV block    4  HL:  On high intensity atorvastatin  Most recent direct LDL was 60 in 4/2021  Repeat to be done in March, can be ordered by new PCP who I imagine will order labs, otherwise I can order in future  Follow up 6 months    Previous History:  69-year-old gentleman  He has coronary disease identified in June of 2019  He had an NSTEMI  EF was preserved  He had a stent to the 2nd OM, 60% 1st diagonal, 60% circ, 50% mid RCA  He is on aspirin and Plavix  He has a baseline left anterior fascicular block, RBBB  Sinus bradycardia, but no symptoms  He has hypertension as well, tends to be worse with anxiety but of late has been better controlled  Interval History:  Underwent surgery for hip shortly after I last saw him  Because of COVID, stress issues at home w health of His mother, he has gained weight  He is feeling more fatigued and short of breath with this  He denies any chest pain  Blood pressure medications have remained the same  Some issues with erectile dysfunction  He does not use Cialis regularly  Had blood work done in October  Lipid panel was last April  He is going to establish with a new primary care physician because his prior retired  Appt is scheduled for March  Problem List     Anxiety    BPH with obstruction/lower urinary tract symptoms    Elevated glucose    Erectile dysfunction of non-organic origin    GERD without esophagitis    Hyperlipidemia    Essential hypertension    Obesity (BMI 30 0-34 9)    Obstructive sleep apnea    Osteoarthritis    Psoriasis    Bifascicular block    Calculus of kidney    Primary insomnia    GERD (gastroesophageal reflux disease)    Low back pain    Alcohol use    Dyslipidemia    Coronary artery disease involving native coronary artery of native heart without angina pectoris        Past Medical History:   Diagnosis Date    CAD (coronary artery disease)     s/p stent x1 on 6/19/19    Closed fracture of distal end of fibula with routine healing     unspecified fracture morphology, unspecified laterality, subsequent encounter;  Last Assessed:12/27/16    GERD (gastroesophageal reflux disease)     Heart attack ([facility] [address] )     Hypertension     Kidney stones     Nocturia     Psoriasis     Sleep apnea      Social History     Tobacco Use    Smoking status: Never Smoker    Smokeless tobacco: Never Used   Substance Use Topics    Alcohol use: Yes     Comment: 1 drink daily    Drug use: No      Family History   Problem Relation Age of Onset    Arthritis Mother     Hypertension Mother     Irritable bowel syndrome Mother     Osteoporosis Mother     Arthritis Other      Past Surgical History:   Procedure Laterality Date    APPENDECTOMY      CARPAL TUNNEL RELEASE      ROTATOR CUFF REPAIR      SHOULDER ARTHROSCOPY Left     Last ASsessed:10/24/16    TOTAL HIP ARTHROPLASTY      TRIGGER FINGER RELEASE         Current Outpatient Medications:     Acetaminophen 325 MG CAPS, Take 650 mg by mouth as needed  , Disp: , Rfl:     aspirin (ECOTRIN LOW STRENGTH) 81 mg EC tablet, Take 1 tablet (81 mg total) by mouth daily, Disp: 30 tablet, Rfl: 0    atorvastatin (LIPITOR) 80 mg tablet, take 1 tablet by mouth once daily WITH DINNER, Disp: 90 tablet, Rfl: 3    clopidogrel (PLAVIX) 75 mg tablet, take 1 tablet by mouth once daily, Disp: 90 tablet, Rfl: 3    fluticasone (FLONASE) 50 mcg/act nasal spray, 2 sprays into each nostril 2 (two) times a day as needed for rhinitis, Disp: , Rfl:     guaiFENesin (MUCINEX) 600 mg 12 hr tablet, Take 1,200 mg by mouth every 12 (twelve) hours, Disp: , Rfl:     losartan (COZAAR) 100 MG tablet, Take 1 tablet (100 mg total) by mouth daily, Disp: 90 tablet, Rfl: 3    NIFEdipine (ADALAT CC) 90 mg 24 hr tablet, TAKE 1 TABLET BY MOUTH DAILY, Disp: 90 tablet, Rfl: 3    nitroglycerin (NITROSTAT) 0 4 mg SL tablet, Place 1 tablet (0 4 mg total) under the tongue every 5 (five) minutes as needed for chest pain, Disp: 25 tablet, Rfl: 3    omeprazole (PriLOSEC) 20 mg delayed release capsule, Take 1 capsule (20 mg total) by mouth daily, Disp: 90 capsule, Rfl: 0    oxybutynin (DITROPAN) 5 mg tablet, Take 1 tablet (5 mg total) by mouth 2 (two) times a day Take 1 to 2 tablets by mouth once daily before bed , Disp: 180 tablet, Rfl: 5    tadalafil (CIALIS) 20 MG tablet, Take 1 tablet (20 mg total) by mouth daily as needed for erectile dysfunction, Disp: 30 tablet, Rfl: 0    tamsulosin (FLOMAX) 0 4 mg, Take 1 capsule (0 4 mg total) by mouth 2 (two) times a day, Disp: 60 capsule, Rfl: 3    TIZANIDINE HCL PO, Take 4 mg by mouth as needed , Disp: , Rfl:     traMADol (ULTRAM) 50 mg tablet, Take 100 mg by mouth as needed , Disp: , Rfl: 0    traZODone (DESYREL) 50 mg tablet, Take 1 tablet (50 mg total) by mouth daily at bedtime, Disp: 90 tablet, Rfl: 1    betamethasone, augmented, (DIPROLENE-AF) 0 05 % cream, apply twice daily to rash x 2 weeks  then Saturday and Sunday only (Patient not taking: Reported on 2/9/2022), Disp: , Rfl: 0    calcipotriene (DOVONEX) 0 005 % cream, apply to affected area twice a day ON MONDAY THROUGH FRIDAY, Disp: , Rfl: 0    loratadine (CLARITIN) 10 mg tablet, Take 10 mg by mouth daily (Patient not taking: Reported on 2/9/2022 ), Disp: , Rfl:   Allergies   Allergen Reactions    Latex Hives    Other     Pollen Extract     Shellfish-Derived Products - Food Allergy      [employer]     Vitals:    02/09/22 1252   BP: 136/76   BP Location: Left arm   Patient Position: Sitting   Cuff Size: Large   Pulse: 64   SpO2: 95%   Weight: 118 kg (261 lb)   Height: 5' 10" (1 778 m)     Vitals:    02/09/22 1252   Weight: 118 kg (261 lb)      Height: 5' 10" (177 8 cm)   Body mass index is 37 45 kg/m²  Physical Exam:  GEN: Vashti Ortega appears well, alert and oriented x 3, pleasant and cooperative   HEENT: pupils equal, round, and reactive to light; extraocular muscles intact  NECK: supple, no carotid bruits   HEART: regular rhythm, normal S1 and S2, no murmurs, clicks, gallops or rubs   LUNGS: clear to auscultation bilaterally; no wheezes, rales, or rhonchi   ABDOMEN: normal bowel sounds, soft, no tenderness, no distention  EXTREMITIES: peripheral pulses normal; no clubbing, cyanosis, or edema  NEURO: no focal findings   SKIN: normal without suspicious lesions on exposed skin    ROS:  Positive for headaches  Except as noted in HPI, is otherwise reviewed in detail and a 12 point review of systems is negative    ROS reviewed and is unchanged    Labs:  Lab Results   Component Value Date    K 4 8 10/20/2021     10/20/2021    CREATININE 1 20 10/20/2021    BUN 25 10/20/2021    CO2 27 10/20/2021    ALT 28 10/20/2021    AST 21 10/20/2021    INR 0 98 06/18/2019    GLUF 108 (H) 10/20/2021    HGBA1C 5 2 06/19/2019    WBC 5 85 10/20/2021    HGB 14 0 10/20/2021    HCT 42 1 10/20/2021     10/20/2021     No results found for: CHOL  Lab Results   Component Value Date LDLCALC 44 04/09/2021    LDLCALC 50 01/03/2020    LDLCALC 142 (H) 06/19/2019     Lab Results   Component Value Date    HDL 66 04/09/2021    HDL 53 01/03/2020    HDL 50 06/19/2019     Lab Results   Component Value Date    TRIG 50 04/09/2021    TRIG 43 01/03/2020    TRIG 95 06/19/2019     Testing:  Stress Test 8/5/20:  SUMMARY:  -  Stress results: Duration of exercise was 7 min and 30 sec  Maximal work rate was 9 3 METs  Maximal heart rate during stress was 127 bpm ( 84 % of maximal predicted heart rate)  Target heart rate was not achieved  There was normal  resting blood pressure with a hypertensive response to stress  The systolic blood pressure increased by 90 mmHg during peak stress  There was no chest pain during stress  -  ECG conclusions: The stress ECG was negative for ischemia and normal   -  Perfusion imaging: The left ventricle was mildly dilated  There was a moderate-sized, severe, fixed myocardial perfusion defect of the basal to mid inferior wall  -  Gated SPECT: The calculated left ventricular ejection fraction was 54 %  Left ventricular ejection fraction appeared to be at the lower limits of normal  There was severely reduced myocardial thickening and motion of the basal inferior  wall of the left ventricle      IMPRESSIONS: Abnormal study after maximal exercise  There was a small to medium sized infarct in the mid to basal inferior region  Overall left ventricular systolic function was preserved, but there were regional wall motion abnormalities  Diagnostic sensitivity was limited by submaximal stress  Patient achieved 84% of maximum age predicted heart rate  The patient had a hypertensive response to exercise, increasing the systolic blood pressure by 90 mmHg from baseline  Echo 7/7/20:  LEFT VENTRICLE:  Systolic function was normal  Ejection fraction was estimated to be 60 %  There were no regional wall motion abnormalities    Doppler parameters were consistent with abnormal left ventricular relaxation (grade 1 diastolic dysfunction)      MITRAL VALVE:  There was mild annular calcification  There was mild to moderate regurgitation      AORTIC VALVE:  There was mild regurgitation      TRICUSPID VALVE:  There was trace regurgitation  Cardiac Cath 6/2019  CORONARY CIRCULATION:  1st diagonal: There was a tubular 60 % stenosis  2nd obtuse marginal: There was a 99 % stenosis  There was ROSIE grade 2 flow through the vessel (partial perfusion)  This lesion is a likely culprit for the patient's recent myocardial infarction  An intervention was performed  Left AV groove artery: There was a discrete 60 % stenosis  Mid RCA: There was a 50 % stenosis      1ST LESION INTERVENTIONS:  A successful balloon angioplasty with stent procedure was performed on the 99 % lesion in the 2nd obtuse marginal  Following intervention there was an excellent angiographic appearance with a 0 % residual stenosis  A [employer] [email or web address] Rx 2 75 x 18mm drug-eluting stent was placed across the lesion and deployed at a maximum inflation pressure of 16 pam

## 2022-03-09 DIAGNOSIS — I10 ESSENTIAL HYPERTENSION: ICD-10-CM

## 2022-03-09 RX ORDER — LOSARTAN POTASSIUM 100 MG/1
TABLET ORAL
Qty: 90 TABLET | Refills: 3 | Status: SHIPPED | OUTPATIENT
Start: 2022-03-09

## 2022-03-17 NOTE — TELEPHONE ENCOUNTER
Dima Perdue Links, hope you are well  Especially in these crazy times  I believe the attached patient follows with you  I got a refill request for Losartan which I think he is supposed to be on this based on your progress note, but wanted to forward to you in case this was discontinued at some point & I missed it  Thanks!   Ever Huddle
no

## 2022-03-23 ENCOUNTER — TELEPHONE (OUTPATIENT)
Dept: CARDIOLOGY CLINIC | Facility: CLINIC | Age: 72
End: 2022-03-23

## 2022-03-23 NOTE — TELEPHONE ENCOUNTER
Received a fax form [facility] asking for a 7 day Plavix hold for an upcoming Rhizotomy   Please advise    Fax# [phone]

## 2022-07-18 ENCOUNTER — EVALUATION (OUTPATIENT)
Dept: PHYSICAL THERAPY | Facility: MEDICAL CENTER | Age: 72
End: 2022-07-18
Payer: MEDICARE

## 2022-07-18 DIAGNOSIS — M54.50 CHRONIC BILATERAL LOW BACK PAIN WITHOUT SCIATICA: Primary | ICD-10-CM

## 2022-07-18 DIAGNOSIS — G89.29 CHRONIC BILATERAL LOW BACK PAIN WITHOUT SCIATICA: Primary | ICD-10-CM

## 2022-07-18 PROCEDURE — 97161 PT EVAL LOW COMPLEX 20 MIN: CPT | Performed by: PHYSICAL THERAPIST

## 2022-07-18 NOTE — LETTER
2022    Wicho Hayes MD  [address] [facility]  [address]    Patient: Bekah Shin   YOB: 1950   Date of Visit: 2022     Encounter Diagnosis     ICD-10-CM    1  Chronic bilateral low back pain without sciatica  M54 50     G89 29        Dear Dr Sondra Smith: Thank you for your recent referral of Bekah Shin  Please review the attached evaluation summary from Sp's recent visit  Please verify that you agree with the plan of care by signing the attached order  If you have any questions or concerns, please do not hesitate to call  I sincerely appreciate the opportunity to share in the care of one of your patients and hope to have another opportunity to work with you in the near future  Sincerely,    Brina Whitfield, PT      Referring Provider:      I certify that I have read the below Plan of Care and certify the need for these services furnished under this plan of treatment while under my care  Wicho Hayes MD  [address] [facility]  [address] [place] [address]  Via Fax: [phone]          PT Evaluation     Today's date: 2022  Patient name: Bekah Shin  : 1950  MRN: [id number]  Referring provider: Aaron Young MD  Dx:   Encounter Diagnosis     ICD-10-CM    1  Chronic bilateral low back pain without sciatica  M54 50     G89 29                   Assessment  Assessment details: Bekah Shin is a 80 y o  male was evaluated on 2022  for Chronic bilateral low back pain without sciatica  (primary encounter diagnosis)  Bekah Shin has the above listed impairments resulting in functional deficits and negative impact to quality of life  Patient is appropriate for skilled PT intervention to promote maximal return to function and patient specific goals  Patient agrees with outlined treatment plan and all questions were answered to their satisfaction  Impairments: abnormal muscle firing, abnormal muscle tone, abnormal or restricted ROM, impaired physical strength, lacks appropriate home exercise program and pain with function  Understanding of Dx/Px/POC: good   Prognosis: good    Goals  Patient will successfully transition to home exercise program   Patient will be able to manage symptoms independently  Sammie Salmon will receive over 50% reported reduction in low back pain when waking in the morning and when performing daily activity  Plan  Patient would benefit from: skilled PT  Referral necessary: No  Planned modality interventions: thermotherapy: hydrocollator packs  Planned therapy interventions: home exercise program, manual therapy, neuromuscular re-education, patient education, functional ROM exercises, strengthening, stretching, joint mobilization, graded activity, graded exercise, therapeutic exercise, body mechanics training, motor coordination training and activity modification  Frequency: 3x week  Duration in weeks: 12  Treatment plan discussed with: patient        Subjective Evaluation    History of Present Illness  Mechanism of injury: Sammie Salmon is a 80 y o  male presenting to therapy with complaints of acute on chronic back pain  He has a regular schedule of lumbar trigger point injections with diminishing effect along with a recent rhizotomy  Those interventions continue to be short lasting and he has also begun massages to manage his pain  Pain worst in morning, utilizes tramadol for pain relief along with muscle relaxers  He is here to initiate therapy which has been successful in the past for partial relief of his ongoing back pain       Pain  Current pain ratin  At best pain ratin  At worst pain rating: 10  Quality: dull ache, tight, pressure, sharp, discomfort and pulling    Patient Goals  Patient goals for therapy: decreased pain, increased motion, return to sport/leisure activities, independence with ADLs/IADLs and increased strength          Objective   Red Flag Screening:  History Cancer (-)  Bowl/Bladder dysfunction (-)  Night pain (-)  Unexplained weight loss (-)     Dermatomes: WNL   Myotomes:   WNL    Reflexes:   Patellar R 2+, L 2+  Achilles R 2+, L 2+      Lumbar:  AROM:   Flexion Mod restriction   Extension Max restriction  Side bending Mod restriction  Rotation Mod restriction     Repeated Motion Testing: Not tested    Active Motion Observations: guarded motion with muscular spasm       PAIVM: Painful and mobility restrictions present throughout lumbar and thoracic spine along with muscular spasm to paraspinals     Special Tests:   Crossed SLR (-), SLR (-) Prone instability (-)   Neural Dynamic Testing: Tibial Bias (-), Peroneal Bias (-)   Slump Testing (-) with and without axial compression                                  Precautions: None      Manuals 7/18            Thoracolumbar PA AF             MFR to lumbar  paraspinals AF                                       Neuro Re-Ed                                                                                                        Ther Ex                                                                                                                     Ther Activity                                       Gait Training                                       Modalities

## 2022-07-18 NOTE — PROGRESS NOTES
PT Evaluation     Today's date: 2022  Patient name: Vashti Ortega  : 1950  MRN: [id number]  Referring provider: Serene Alvarez MD  Dx:   Encounter Diagnosis     ICD-10-CM    1  Chronic bilateral low back pain without sciatica  M54 50     G89 29                   Assessment  Assessment details: Vashti Ortega is a 80 y o  male was evaluated on 2022  for Chronic bilateral low back pain without sciatica  (primary encounter diagnosis)  Vashti Ortega has the above listed impairments resulting in functional deficits and negative impact to quality of life  Patient is appropriate for skilled PT intervention to promote maximal return to function and patient specific goals  Patient agrees with outlined treatment plan and all questions were answered to their satisfaction  Impairments: abnormal muscle firing, abnormal muscle tone, abnormal or restricted ROM, impaired physical strength, lacks appropriate home exercise program and pain with function  Understanding of Dx/Px/POC: good   Prognosis: good    Goals  Patient will successfully transition to home exercise program   Patient will be able to manage symptoms independently  Debi Higuera will receive over 50% reported reduction in low back pain when waking in the morning and when performing daily activity        Plan  Patient would benefit from: skilled PT  Referral necessary: No  Planned modality interventions: thermotherapy: hydrocollator packs  Planned therapy interventions: home exercise program, manual therapy, neuromuscular re-education, patient education, functional ROM exercises, strengthening, stretching, joint mobilization, graded activity, graded exercise, therapeutic exercise, body mechanics training, motor coordination training and activity modification  Frequency: 3x week  Duration in weeks: 12  Treatment plan discussed with: patient        Subjective Evaluation    History of Present Illness  Mechanism of injury: Debi Higuera is a 80 y o  male presenting to therapy with complaints of acute on chronic back pain  He has a regular schedule of lumbar trigger point injections with diminishing effect along with a recent rhizotomy  Those interventions continue to be short lasting and he has also begun massages to manage his pain  Pain worst in morning, utilizes tramadol for pain relief along with muscle relaxers  He is here to initiate therapy which has been successful in the past for partial relief of his ongoing back pain  Pain  Current pain ratin  At best pain ratin  At worst pain rating: 10  Quality: dull ache, tight, pressure, sharp, discomfort and pulling    Patient Goals  Patient goals for therapy: decreased pain, increased motion, return to sport/leisure activities, independence with ADLs/IADLs and increased strength          Objective   Red Flag Screening:  History Cancer (-)  Bowl/Bladder dysfunction (-)  Night pain (-)  Unexplained weight loss (-)     Dermatomes: WNL   Myotomes:   WNL    Reflexes:   Patellar R 2+, L 2+  Achilles R 2+, L 2+      Lumbar:  AROM:   Flexion Mod restriction   Extension Max restriction  Side bending Mod restriction  Rotation Mod restriction     Repeated Motion Testing: Not tested    Active Motion Observations: guarded motion with muscular spasm       PAIVM: Painful and mobility restrictions present throughout lumbar and thoracic spine along with muscular spasm to paraspinals     Special Tests:   Crossed SLR (-), SLR (-) Prone instability (-)   Neural Dynamic Testing: Tibial Bias (-), Peroneal Bias (-)   Slump Testing (-) with and without axial compression                                  Precautions: None      Manuals             Thoracolumbar PA AF             MFR to lumbar  paraspinals AF                                       Neuro Re-Ed                                                                                                        Ther Ex Ther Activity                                       Gait Training                                       Modalities

## 2022-07-19 DIAGNOSIS — I25.10 CAD (CORONARY ARTERY DISEASE): ICD-10-CM

## 2022-07-19 RX ORDER — CLOPIDOGREL BISULFATE 75 MG/1
TABLET ORAL
Qty: 90 TABLET | Refills: 3 | Status: SHIPPED | OUTPATIENT
Start: 2022-07-19

## 2022-07-20 ENCOUNTER — OFFICE VISIT (OUTPATIENT)
Dept: PHYSICAL THERAPY | Facility: MEDICAL CENTER | Age: 72
End: 2022-07-20
Payer: MEDICARE

## 2022-07-20 DIAGNOSIS — M54.50 CHRONIC BILATERAL LOW BACK PAIN WITHOUT SCIATICA: Primary | ICD-10-CM

## 2022-07-20 DIAGNOSIS — G89.29 CHRONIC BILATERAL LOW BACK PAIN WITHOUT SCIATICA: Primary | ICD-10-CM

## 2022-07-20 PROCEDURE — 97140 MANUAL THERAPY 1/> REGIONS: CPT | Performed by: PHYSICAL THERAPIST

## 2022-07-20 NOTE — PROGRESS NOTES
Daily Note     Today's date: 2022  Patient name: Cece Peralta  : 1950  MRN: [id number]  Referring provider: Ricky Hills MD  Dx:   Encounter Diagnosis     ICD-10-CM    1  Chronic bilateral low back pain without sciatica  M54 50     G89 29                   Subjective: Sp reports doing well, feeling better       Objective: See treatment diary below      Assessment: Tolerated treatment well  Patient would benefit from continued PT      Plan: Continue per plan of care        Precautions: None      Manuals             Thoracolumbar PA AF             MFR to lumbar  paraspinals AF                                       Neuro Re-Ed                                                                                                        Ther Ex                                                                                                                     Ther Activity                                       Gait Training                                       Modalities

## 2022-07-22 ENCOUNTER — OFFICE VISIT (OUTPATIENT)
Dept: PHYSICAL THERAPY | Facility: MEDICAL CENTER | Age: 72
End: 2022-07-22
Payer: MEDICARE

## 2022-07-22 DIAGNOSIS — M54.50 CHRONIC BILATERAL LOW BACK PAIN WITHOUT SCIATICA: Primary | ICD-10-CM

## 2022-07-22 DIAGNOSIS — G89.29 CHRONIC BILATERAL LOW BACK PAIN WITHOUT SCIATICA: Primary | ICD-10-CM

## 2022-07-22 PROCEDURE — 97140 MANUAL THERAPY 1/> REGIONS: CPT | Performed by: PHYSICAL THERAPIST

## 2022-07-22 NOTE — PROGRESS NOTES
Daily Note     Today's date: 2022  Patient name: Raghavendra Chow  : 1950  MRN: [id number]  Referring provider: Luisa Dhillon MD  Dx:   Encounter Diagnosis     ICD-10-CM    1  Chronic bilateral low back pain without sciatica  M54 50     G89 29                   Subjective: Sp reports doing well, feeling better       Objective: See treatment diary below      Assessment: Tolerated treatment well  Patient would benefit from continued PT      Plan: Continue per plan of care        Precautions: None      Manuals             Thoracolumbar PA AF             MFR to lumbar  paraspinals AF                                       Neuro Re-Ed                                                                                                        Ther Ex                                                                                                                     Ther Activity                                       Gait Training                                       Modalities

## 2022-07-25 ENCOUNTER — OFFICE VISIT (OUTPATIENT)
Dept: PHYSICAL THERAPY | Facility: MEDICAL CENTER | Age: 72
End: 2022-07-25
Payer: MEDICARE

## 2022-07-25 DIAGNOSIS — M54.50 CHRONIC BILATERAL LOW BACK PAIN WITHOUT SCIATICA: Primary | ICD-10-CM

## 2022-07-25 DIAGNOSIS — G89.29 CHRONIC BILATERAL LOW BACK PAIN WITHOUT SCIATICA: Primary | ICD-10-CM

## 2022-07-25 PROCEDURE — 97140 MANUAL THERAPY 1/> REGIONS: CPT | Performed by: PHYSICAL THERAPIST

## 2022-07-25 NOTE — PROGRESS NOTES
Daily Note     Today's date: 2022  Patient name: Kristan Marinelli  : 1950  MRN: [id number]  Referring provider: Roxanna Fajardo MD  Dx:   Encounter Diagnosis     ICD-10-CM    1  Chronic bilateral low back pain without sciatica  M54 50     G89 29                   Subjective: Sp reports doing well, feeling better       Objective: See treatment diary below      Assessment: Tolerated treatment well  Patient would benefit from continued PT      Plan: Continue per plan of care        Precautions: None      Manuals             Thoracolumbar PA AF             MFR to lumbar  paraspinals AF                                       Neuro Re-Ed                                                                                                        Ther Ex                                                                                                                     Ther Activity                                       Gait Training                                       Modalities

## 2022-07-27 ENCOUNTER — APPOINTMENT (OUTPATIENT)
Dept: PHYSICAL THERAPY | Facility: MEDICAL CENTER | Age: 72
End: 2022-07-27
Payer: MEDICARE

## 2022-07-28 ENCOUNTER — APPOINTMENT (OUTPATIENT)
Dept: LAB | Facility: MEDICAL CENTER | Age: 72
End: 2022-07-28
Payer: MEDICARE

## 2022-07-28 DIAGNOSIS — I10 ESSENTIAL HYPERTENSION, MALIGNANT: ICD-10-CM

## 2022-07-28 LAB
ANION GAP SERPL CALCULATED.3IONS-SCNC: 5 MMOL/L (ref 4–13)
BUN SERPL-MCNC: 20 MG/DL (ref 5–25)
CALCIUM SERPL-MCNC: 9.7 MG/DL (ref 8.3–10.1)
CHLORIDE SERPL-SCNC: 107 MMOL/L (ref 96–108)
CO2 SERPL-SCNC: 28 MMOL/L (ref 21–32)
CREAT SERPL-MCNC: 1.03 MG/DL (ref 0.6–1.3)
GFR SERPL CREATININE-BSD FRML MDRD: 73 ML/MIN/1.73SQ M
GLUCOSE SERPL-MCNC: 101 MG/DL (ref 65–140)
POTASSIUM SERPL-SCNC: 3.6 MMOL/L (ref 3.5–5.3)
SODIUM SERPL-SCNC: 140 MMOL/L (ref 135–147)

## 2022-07-28 PROCEDURE — 36415 COLL VENOUS BLD VENIPUNCTURE: CPT

## 2022-07-28 PROCEDURE — 80048 BASIC METABOLIC PNL TOTAL CA: CPT

## 2022-07-29 ENCOUNTER — OFFICE VISIT (OUTPATIENT)
Dept: PHYSICAL THERAPY | Facility: MEDICAL CENTER | Age: 72
End: 2022-07-29
Payer: MEDICARE

## 2022-07-29 DIAGNOSIS — M54.50 CHRONIC BILATERAL LOW BACK PAIN WITHOUT SCIATICA: Primary | ICD-10-CM

## 2022-07-29 DIAGNOSIS — G89.29 CHRONIC BILATERAL LOW BACK PAIN WITHOUT SCIATICA: Primary | ICD-10-CM

## 2022-07-29 PROCEDURE — 97140 MANUAL THERAPY 1/> REGIONS: CPT | Performed by: PHYSICAL THERAPIST

## 2022-07-29 NOTE — PROGRESS NOTES
Daily Note     Today's date: 2022  Patient name: Abdirahman Gibbs  : 1950  MRN: [id number]  Referring provider: Kelly Howe MD  Dx:   Encounter Diagnosis     ICD-10-CM    1  Chronic bilateral low back pain without sciatica  M54 50     G89 29                   Subjective: Sp reports doing well, feeling better       Objective: See treatment diary below      Assessment: Tolerated treatment well  Patient would benefit from continued PT      Plan: Continue per plan of care        Precautions: None      Manuals             Thoracolumbar PA AF             MFR to lumbar  paraspinals AF                                       Neuro Re-Ed                                                                                                        Ther Ex                                                                                                                     Ther Activity                                       Gait Training                                       Modalities

## 2022-08-01 ENCOUNTER — OFFICE VISIT (OUTPATIENT)
Dept: PHYSICAL THERAPY | Facility: MEDICAL CENTER | Age: 72
End: 2022-08-01
Payer: MEDICARE

## 2022-08-01 DIAGNOSIS — M54.50 CHRONIC BILATERAL LOW BACK PAIN WITHOUT SCIATICA: Primary | ICD-10-CM

## 2022-08-01 DIAGNOSIS — G89.29 CHRONIC BILATERAL LOW BACK PAIN WITHOUT SCIATICA: Primary | ICD-10-CM

## 2022-08-01 PROCEDURE — 97140 MANUAL THERAPY 1/> REGIONS: CPT | Performed by: PHYSICAL THERAPIST

## 2022-08-01 NOTE — PROGRESS NOTES
Daily Note     Today's date: 2022  Patient name: Cece Peralta  : 1950  MRN: [id number]  Referring provider: Ricky Hills MD  Dx:   Encounter Diagnosis     ICD-10-CM    1  Chronic bilateral low back pain without sciatica  M54 50     G89 29                   Subjective: Sp reports having some more sharp pains in his upper back today, feeling stiff overall       Objective: See treatment diary below      Assessment: Tolerated treatment well  Patient would benefit from continued PT  Finds benefit post lumbar and thoracic mobilizations with reduced pain during upright walking       Plan: Continue per plan of care        Precautions: None      Manuals             Thoracolumbar PA AF             MFR to lumbar  paraspinals AF                                       Neuro Re-Ed                                                                                                        Ther Ex                                                                                                                     Ther Activity                                       Gait Training                                       Modalities

## 2022-08-02 DIAGNOSIS — E78.5 HYPERLIPIDEMIA: ICD-10-CM

## 2022-08-03 ENCOUNTER — OFFICE VISIT (OUTPATIENT)
Dept: PHYSICAL THERAPY | Facility: MEDICAL CENTER | Age: 72
End: 2022-08-03
Payer: MEDICARE

## 2022-08-03 DIAGNOSIS — G89.29 CHRONIC BILATERAL LOW BACK PAIN WITHOUT SCIATICA: Primary | ICD-10-CM

## 2022-08-03 DIAGNOSIS — M54.50 CHRONIC BILATERAL LOW BACK PAIN WITHOUT SCIATICA: Primary | ICD-10-CM

## 2022-08-03 PROCEDURE — 97140 MANUAL THERAPY 1/> REGIONS: CPT | Performed by: PHYSICAL THERAPIST

## 2022-08-03 RX ORDER — ATORVASTATIN CALCIUM 80 MG/1
TABLET, FILM COATED ORAL
Qty: 90 TABLET | Refills: 3 | Status: SHIPPED | OUTPATIENT
Start: 2022-08-03

## 2022-08-03 NOTE — PROGRESS NOTES
Daily Note     Today's date: 8/3/2022  Patient name: Yovani Preslucero  : 1950  MRN: [id number]  Referring provider: Cheyanne Benoit MD  Dx:   Encounter Diagnosis     ICD-10-CM    1  Chronic bilateral low back pain without sciatica  M54 50     G89 29                   Subjective: Sp reports having some more sharp pains in his upper back today, feeling stiff overall       Objective: See treatment diary below      Assessment: Tolerated treatment well  Patient would benefit from continued PT  Finds benefit post lumbar and thoracic mobilizations with reduced pain during upright walking       Plan: Continue per plan of care        Precautions: None      Manuals 8/3            Thoracolumbar PA AF             MFR to lumbar  paraspinals AF                                       Neuro Re-Ed                                                                                                        Ther Ex                                                                                                                     Ther Activity                                       Gait Training                                       Modalities

## 2022-09-03 DIAGNOSIS — I10 ESSENTIAL HYPERTENSION: ICD-10-CM

## 2022-09-08 RX ORDER — NIFEDIPINE 90 MG/1
TABLET, FILM COATED, EXTENDED RELEASE ORAL
Qty: 90 TABLET | Refills: 3 | Status: SHIPPED | OUTPATIENT
Start: 2022-09-08

## 2022-09-26 ENCOUNTER — OFFICE VISIT (OUTPATIENT)
Dept: PHYSICAL THERAPY | Facility: MEDICAL CENTER | Age: 72
End: 2022-09-26
Payer: MEDICARE

## 2022-09-26 DIAGNOSIS — G89.29 CHRONIC BILATERAL LOW BACK PAIN WITHOUT SCIATICA: Primary | ICD-10-CM

## 2022-09-26 DIAGNOSIS — M54.50 CHRONIC BILATERAL LOW BACK PAIN WITHOUT SCIATICA: Primary | ICD-10-CM

## 2022-09-26 PROCEDURE — 97161 PT EVAL LOW COMPLEX 20 MIN: CPT | Performed by: PHYSICAL THERAPIST

## 2022-09-28 ENCOUNTER — OFFICE VISIT (OUTPATIENT)
Dept: PHYSICAL THERAPY | Facility: MEDICAL CENTER | Age: 72
End: 2022-09-28
Payer: MEDICARE

## 2022-09-28 DIAGNOSIS — G89.29 CHRONIC BILATERAL LOW BACK PAIN WITHOUT SCIATICA: Primary | ICD-10-CM

## 2022-09-28 DIAGNOSIS — M54.50 CHRONIC BILATERAL LOW BACK PAIN WITHOUT SCIATICA: Primary | ICD-10-CM

## 2022-09-28 PROCEDURE — 97140 MANUAL THERAPY 1/> REGIONS: CPT | Performed by: PHYSICAL THERAPIST

## 2022-09-28 NOTE — PROGRESS NOTES
PT Evaluation     Today's date: 2022  Patient name: Cece Peralta  : 1950  MRN: [id number]  Referring provider: Maggi Husain PT  Dx:   Encounter Diagnosis     ICD-10-CM    1  Chronic bilateral low back pain without sciatica  M54 50     G89 29                   Assessment  Assessment details: Cece Peralta is a 80 y o  male was evaluated on 2022  for Chronic bilateral low back pain without sciatica  (primary encounter diagnosis)  Cece Peralta has the above listed impairments resulting in functional deficits and negative impact to quality of life  Patient is appropriate for skilled PT intervention to promote maximal return to function and patient specific goals  Patient agrees with outlined treatment plan and all questions were answered to their satisfaction  Impairments: abnormal muscle firing, abnormal muscle tone, abnormal or restricted ROM, impaired physical strength, lacks appropriate home exercise program and pain with function  Understanding of Dx/Px/POC: good   Prognosis: good    Goals  Patient will successfully transition to home exercise program   Patient will be able to manage symptoms independently  Kat Bucio will receive over 50% reported reduction in low back pain when waking in the morning and when performing daily activity        Plan  Patient would benefit from: skilled PT  Referral necessary: No  Planned modality interventions: thermotherapy: hydrocollator packs  Planned therapy interventions: home exercise program, manual therapy, neuromuscular re-education, patient education, functional ROM exercises, strengthening, stretching, joint mobilization, graded activity, graded exercise, therapeutic exercise, body mechanics training, motor coordination training and activity modification  Frequency: 3x week  Duration in weeks: 12  Treatment plan discussed with: patient        Subjective Evaluation    History of Present Illness  Mechanism of injury: Kat Bucio is a 80 y o  male presenting to therapy with complaints of acute on chronic back pain  He has been going through trials of MBB to lumbar spine and is awaiting his final procedure given the positive effect from prior trials      Pain worst in morning, utilizes tramadol for pain relief along with muscle relaxers  He is here to initiate therapy which has been successful in the past for partial relief of his ongoing back pain  Pain  Current pain ratin  At best pain ratin  At worst pain rating: 10  Quality: dull ache, tight, pressure, sharp, discomfort and pulling    Patient Goals  Patient goals for therapy: decreased pain, increased motion, return to sport/leisure activities, independence with ADLs/IADLs and increased strength          Objective   Red Flag Screening:  History Cancer (-)  Bowl/Bladder dysfunction (-)  Night pain (-)  Unexplained weight loss (-)     Dermatomes: WNL   Myotomes:   WNL    Reflexes:   Patellar R 2+, L 2+  Achilles R 2+, L 2+      Lumbar:  AROM:   Flexion Mod restriction   Extension Max restriction  Side bending Mod restriction  Rotation Mod restriction     Repeated Motion Testing: Not tested    Active Motion Observations: guarded motion with muscular spasm       PAIVM: Painful and mobility restrictions present throughout lumbar and thoracic spine along with muscular spasm to paraspinals     Special Tests:   Crossed SLR (-), SLR (-) Prone instability (-)   Neural Dynamic Testing: Tibial Bias (-), Peroneal Bias (-)   Slump Testing (-) with and without axial compression                                  Precautions: None      Manuals             Thoracolumbar PA AF             MFR to lumbar  paraspinals AF                                       Neuro Re-Ed                                                                                                        Ther Ex Ther Activity                                       Gait Training                                       Modalities

## 2022-09-28 NOTE — PROGRESS NOTES
Daily Note     Today's date: 2022  Patient name: Marie Regan  : 1950  MRN: [id number]  Referring provider: Connor Jefferson, TERESE  Dx:   Encounter Diagnosis     ICD-10-CM    1  Chronic bilateral low back pain without sciatica  M54 50     G89 29                   Subjective: Sp reports soreness in his back due to moving furniture at his mothers house       Objective: See treatment diary below      Assessment: Tolerated treatment well  Patient would benefit from continued PT      Plan: Continue per plan of care        Precautions: None      Manuals             Thoracolumbar PA AF             MFR to lumbar  paraspinals AF                                       Neuro Re-Ed                                                                                                        Ther Ex                                                                                                                     Ther Activity                                       Gait Training                                       Modalities

## 2022-09-30 ENCOUNTER — APPOINTMENT (OUTPATIENT)
Dept: PHYSICAL THERAPY | Facility: MEDICAL CENTER | Age: 72
End: 2022-09-30
Payer: MEDICARE

## 2022-09-30 DIAGNOSIS — G89.29 CHRONIC BILATERAL LOW BACK PAIN WITHOUT SCIATICA: Primary | ICD-10-CM

## 2022-09-30 DIAGNOSIS — M54.50 CHRONIC BILATERAL LOW BACK PAIN WITHOUT SCIATICA: Primary | ICD-10-CM

## 2022-09-30 PROCEDURE — 97140 MANUAL THERAPY 1/> REGIONS: CPT | Performed by: PHYSICAL THERAPIST

## 2022-10-03 ENCOUNTER — OFFICE VISIT (OUTPATIENT)
Dept: PHYSICAL THERAPY | Facility: MEDICAL CENTER | Age: 72
End: 2022-10-03
Payer: MEDICARE

## 2022-10-03 DIAGNOSIS — M54.50 CHRONIC BILATERAL LOW BACK PAIN WITHOUT SCIATICA: Primary | ICD-10-CM

## 2022-10-03 DIAGNOSIS — G89.29 CHRONIC BILATERAL LOW BACK PAIN WITHOUT SCIATICA: Primary | ICD-10-CM

## 2022-10-03 PROCEDURE — 97140 MANUAL THERAPY 1/> REGIONS: CPT | Performed by: PHYSICAL THERAPIST

## 2022-10-03 NOTE — PROGRESS NOTES
Daily Note     Today's date: 10/3/2022  Patient name: Bekah Shin  : 1950  MRN: [id number]  Referring provider: Raynard Burkitt, PT  Dx:   Encounter Diagnosis     ICD-10-CM    1  Chronic bilateral low back pain without sciatica  M54 50     G89 29                   Subjective: Sp reports soreness in his back due to moving furniture at his mothers house     Objective: See treatment diary below      Assessment: Tolerated treatment well  Patient would benefit from continued PT  To address ongoing chronic back issues exacerbated by recent moving     Plan: Continue per plan of care        Precautions: None      Manuals 10/3            Thoracolumbar PA AF             MFR to lumbar  paraspinals AF                                       Neuro Re-Ed                                                                                                        Ther Ex                                                                                                                     Ther Activity                                       Gait Training                                       Modalities

## 2022-10-03 NOTE — PROGRESS NOTES
Daily Note     Today's date: 10/3/2022  Patient name: Mk Jeronimo  : 1950  MRN: [id number]  Referring provider: Ryland Harp, PT  Dx:   Encounter Diagnosis     ICD-10-CM    1  Chronic bilateral low back pain without sciatica  M54 50     G89 29                   Subjective: Sp reports soreness in his back due to moving furniture at his mothers house     Objective: See treatment diary below      Assessment: Tolerated treatment well  Patient would benefit from continued PT  To address ongoing chronic back issues exacerbated by recent moving     Plan: Continue per plan of care        Precautions: None      Manuals             Thoracolumbar PA AF             MFR to lumbar  paraspinals AF                                       Neuro Re-Ed                                                                                                        Ther Ex                                                                                                                     Ther Activity                                       Gait Training                                       Modalities

## 2022-10-05 ENCOUNTER — OFFICE VISIT (OUTPATIENT)
Dept: PHYSICAL THERAPY | Facility: MEDICAL CENTER | Age: 72
End: 2022-10-05
Payer: MEDICARE

## 2022-10-05 DIAGNOSIS — M54.50 CHRONIC BILATERAL LOW BACK PAIN WITHOUT SCIATICA: Primary | ICD-10-CM

## 2022-10-05 DIAGNOSIS — G89.29 CHRONIC BILATERAL LOW BACK PAIN WITHOUT SCIATICA: Primary | ICD-10-CM

## 2022-10-05 PROCEDURE — 97140 MANUAL THERAPY 1/> REGIONS: CPT | Performed by: PHYSICAL THERAPIST

## 2022-10-05 NOTE — PROGRESS NOTES
Daily Note     Today's date: 10/5/2022  Patient name: Estee Clancy  : 1950  MRN: [id number]  Referring provider: Ruben Ashford PT  Dx:   Encounter Diagnosis     ICD-10-CM    1  Chronic bilateral low back pain without sciatica  M54 50     G89 29                   Subjective: Sp reports soreness in his back due to moving furniture at his mothers house     Objective: See treatment diary below      Assessment: Tolerated treatment well  Patient would benefit from continued PT  To address ongoing chronic back issues exacerbated by recent moving     Plan: Continue per plan of care        Precautions: None      Manuals 10/5            Thoracolumbar PA AF             MFR to lumbar  paraspinals AF                                       Neuro Re-Ed                                                                                                        Ther Ex                                                                                                                     Ther Activity                                       Gait Training                                       Modalities

## 2022-10-06 ENCOUNTER — HOSPITAL ENCOUNTER (EMERGENCY)
Facility: HOSPITAL | Age: 72
Discharge: HOME | End: 2022-10-06
Attending: EMERGENCY MEDICINE
Payer: MEDICARE

## 2022-10-06 ENCOUNTER — APPOINTMENT (OUTPATIENT)
Dept: RADIOLOGY | Facility: HOSPITAL | Age: 72
End: 2022-10-06
Payer: MEDICARE

## 2022-10-06 ENCOUNTER — APPOINTMENT (EMERGENCY)
Dept: CT IMAGING | Facility: HOSPITAL | Age: 72
End: 2022-10-06
Payer: MEDICARE

## 2022-10-06 VITALS
TEMPERATURE: 97.3 F | RESPIRATION RATE: 18 BRPM | HEART RATE: 79 BPM | SYSTOLIC BLOOD PRESSURE: 141 MMHG | DIASTOLIC BLOOD PRESSURE: 64 MMHG | OXYGEN SATURATION: 95 %

## 2022-10-06 DIAGNOSIS — S82.899A ANKLE FRACTURE: Primary | ICD-10-CM

## 2022-10-06 DIAGNOSIS — S92.902A FOOT FRACTURE, LEFT: ICD-10-CM

## 2022-10-06 PROCEDURE — 99284 EMERGENCY DEPT VISIT MOD MDM: CPT | Performed by: EMERGENCY MEDICINE

## 2022-10-06 PROCEDURE — 73630 X-RAY EXAM OF FOOT: CPT

## 2022-10-06 PROCEDURE — G1004 CDSM NDSC: HCPCS

## 2022-10-06 PROCEDURE — 99284 EMERGENCY DEPT VISIT MOD MDM: CPT

## 2022-10-06 PROCEDURE — 70450 CT HEAD/BRAIN W/O DYE: CPT

## 2022-10-06 PROCEDURE — 73610 X-RAY EXAM OF ANKLE: CPT

## 2022-10-06 RX ORDER — HYDROCODONE BITARTRATE AND ACETAMINOPHEN 5; 325 MG/1; MG/1
1 TABLET ORAL ONCE
Status: COMPLETED | OUTPATIENT
Start: 2022-10-06 | End: 2022-10-06

## 2022-10-06 RX ADMIN — HYDROCODONE BITARTRATE AND ACETAMINOPHEN 1 TABLET: 5; 325 TABLET ORAL at 16:53

## 2022-10-06 NOTE — ED PROVIDER NOTES
History  Chief Complaint   Patient presents with    Ankle Injury     Patient reports that he twisted his ankle and fell down a few steps  Denies hitting his head or LOC  + thinners     10year-old male presents for evaluation of left ankle injury  Patient is on the stairs when he twisted his left ankle causing the fall down several stairs  He denies striking his head, loss of consciousness  Patient complains of sharp severe pain in his left ankle in distal portion of his left foot  It started suddenly, is constant, nonradiating, worse with movement or touching the area  This occurred at at 2:00 p m  Today  Patient denies other traumatic injuries  Patient is on tizanidine and tramadol as per pain management for chronic back pain which is unchanged today  History provided by:  Patient  Ankle Injury  Associated symptoms: no abdominal pain, no chest pain, no congestion, no cough, no diarrhea, no ear pain, no fatigue, no fever, no headaches, no myalgias, no nausea, no rash, no rhinorrhea, no shortness of breath, no sore throat, no vomiting and no wheezing        Prior to Admission Medications   Prescriptions Last Dose Informant Patient Reported? Taking?    Acetaminophen 325 MG CAPS  Self Yes No   Sig: Take 650 mg by mouth as needed     NIFEdipine (ADALAT CC) 90 mg 24 hr tablet   No No   Sig: take 1 tablet by mouth once daily   TIZANIDINE HCL PO  Self Yes No   Sig: Take 4 mg by mouth as needed    aspirin (ECOTRIN LOW STRENGTH) 81 mg EC tablet  Self No No   Sig: Take 1 tablet (81 mg total) by mouth daily   atorvastatin (LIPITOR) 80 mg tablet   No No   Sig: take 1 tablet by mouth once daily with dinner   betamethasone, augmented, (DIPROLENE-AF) 0 05 % cream  Self Yes No   Sig: apply twice daily to rash x 2 weeks  then Saturday and Sunday only   Patient not taking: Reported on 2/9/2022   calcipotriene (DOVONEX) 0 005 % cream  Self Yes No   Sig: apply to affected area twice a day [facility] clopidogrel (PLAVIX) 75 mg tablet   No No   Sig: take 1 tablet by mouth once daily   fluticasone (FLONASE) 50 mcg/act nasal spray  Self Yes No   Si sprays into each nostril 2 (two) times a day as needed for rhinitis   guaiFENesin (MUCINEX) 600 mg 12 hr tablet  Self Yes No   Sig: Take 1,200 mg by mouth every 12 (twelve) hours   loratadine (CLARITIN) 10 mg tablet  Self Yes No   Sig: Take 10 mg by mouth daily   Patient not taking: Reported on 2022    losartan (COZAAR) 100 MG tablet   No No   Sig: take 1 tablet by mouth once daily   nitroglycerin (NITROSTAT) 0 4 mg SL tablet  Self No No   Sig: Place 1 tablet (0 4 mg total) under the tongue every 5 (five) minutes as needed for chest pain   omeprazole (PriLOSEC) 20 mg delayed release capsule   No No   Sig: Take 1 capsule (20 mg total) by mouth daily   oxybutynin (DITROPAN) 5 mg tablet  Self No No   Sig: Take 1 tablet (5 mg total) by mouth 2 (two) times a day Take 1 to 2 tablets by mouth once daily before bed    tadalafil (CIALIS) 20 MG tablet  Self No No   Sig: Take 1 tablet (20 mg total) by mouth daily as needed for erectile dysfunction   tamsulosin (FLOMAX) 0 4 mg   No No   Sig: Take 1 capsule (0 4 mg total) by mouth 2 (two) times a day   traMADol (ULTRAM) 50 mg tablet  Self Yes No   Sig: Take 100 mg by mouth as needed    traZODone (DESYREL) 50 mg tablet   No No   Sig: Take 1 tablet (50 mg total) by mouth daily at bedtime      Facility-Administered Medications: None       Past Medical History:   Diagnosis Date    CAD (coronary artery disease)     s/p stent x1 on 19    Closed fracture of distal end of fibula with routine healing     unspecified fracture morphology, unspecified laterality, subsequent encounter;  Last Assessed:16    GERD (gastroesophageal reflux disease)     Heart attack ([facility] [address] )     Hypertension     Kidney stones     Nocturia     Psoriasis     Sleep apnea        Past Surgical History:   Procedure Laterality Date    APPENDECTOMY  CARPAL TUNNEL RELEASE      ROTATOR CUFF REPAIR      SHOULDER ARTHROSCOPY Left     Last ASsessed:10/24/16    TOTAL HIP ARTHROPLASTY      TRIGGER FINGER RELEASE         Family History   Problem Relation Age of Onset    Arthritis Mother     Hypertension Mother     Irritable bowel syndrome Mother     Osteoporosis Mother     Arthritis Other      I have reviewed and agree with the history as documented  E-Cigarette/Vaping     E-Cigarette/Vaping Substances     Social History     Tobacco Use    Smoking status: Never Smoker    Smokeless tobacco: Never Used   Substance Use Topics    Alcohol use: Yes     Comment: 1 drink daily    Drug use: No       Review of Systems   Constitutional: Negative for chills, diaphoresis, fatigue and fever  HENT: Negative for congestion, ear pain, rhinorrhea, sinus pressure, sneezing, sore throat and trouble swallowing  Eyes: Negative for pain and visual disturbance  Respiratory: Negative for cough, chest tightness, shortness of breath, wheezing and stridor  Cardiovascular: Negative for chest pain, palpitations and leg swelling  Gastrointestinal: Negative for abdominal pain, blood in stool, constipation, diarrhea, nausea and vomiting  Endocrine: Negative for polydipsia, polyphagia and polyuria  Genitourinary: Negative for decreased urine volume, dysuria, flank pain, frequency, hematuria and urgency  Musculoskeletal: Positive for back pain  Negative for arthralgias and myalgias  Skin: Negative for color change and rash  Neurological: Negative for dizziness, seizures, light-headedness, numbness and headaches  Hematological: Negative for adenopathy  Psychiatric/Behavioral: The patient is not nervous/anxious  Physical Exam  Physical Exam  Vitals and nursing note reviewed  Constitutional:       Appearance: He is well-developed  Eyes:      Comments: Patient has painless full range of motion in both her eyes  Normal visual fields  No hyphema noted  Neck:      Trachea: No tracheal deviation  Comments: Patient is nontender palpation over her cervical, thoracic, lumbar spines  There is no step-offs, no deformities  Cardiovascular:      Comments: No JVD noted  Heart sounds are normal  Regular rate rate and rhythm  Symmetric strong distal pulses  Abdominal:      Comments: No external signs of trauma noted on the abdomen/pelvis  Patient is nontender palpation of the abdomen  There is no rebound, guarding, CVA tenderness  Abdomen is nondistended  Pelvis stable, nttp  Musculoskeletal:      Comments: Right upper extremity has full range of motion without pain  There is no tenderness palpation noted  Patient has no external signs of trauma  Patient is neurovascularly intact in this extremity  Left upper extremity has full range of motion without pain  There is no tenderness palpation noted  Patient has no external signs of trauma  Patient is neurovascularly intact in this extremity  Right lower extremity has full range of motion without pain  There is no tenderness palpation noted  Patient has no external signs of trauma  Patient is neurovascularly intact in this extremity  Left Lower  extremity has full range of motion without pain  There is no tenderness palpation noted except L ankle/foot  Patient has no external signs of trauma  Patient is neurovascularly intact in this extremity  Skin:     Comments: No external signs of trauma  Neurological:      Comments: Alert and oriented ×3  Normal mental status exam  Normal cranial nerves II through XII  Normal sensation and strength throughout   Normal cerebellar exam  GCS 15    Psychiatric:         Behavior: Behavior normal          Judgment: Judgment normal          Vital Signs  ED Triage Vitals [10/06/22 1534]   Temperature Pulse Respirations Blood Pressure SpO2   (!) 97 3 °F (36 3 °C) 79 18 141/64 95 %      Temp Source Heart Rate Source Patient Position - Orthostatic VS BP Location FiO2 (%)   Oral Monitor Sitting Right arm --      Pain Score       8           Vitals:    10/06/22 1534   BP: 141/64   Pulse: 79   Patient Position - Orthostatic VS: Sitting         Visual Acuity      ED Medications  Medications   HYDROcodone-acetaminophen (NORCO) 5-325 mg per tablet 1 tablet (1 tablet Oral Given 10/6/22 1653)       Diagnostic Studies  Results Reviewed     None                 CT head without contrast   Final Result by Donte Nicholas DO (10/06 1658)      No acute intracranial abnormality  Workstation performed: [id number]         XR foot 3+ vw right   ED Interpretation by Madalyn Kothari MD (10/06 1657)   Primary reviewed spiral fracture distal portion 5th metatarsal      XR ankle 3+ views LEFT   ED Interpretation by Madalyn Kothari MD (10/06 1657)   Primary reviewed: avulsion fracture distal tibia                 Procedures  Procedures         ED Course  ED Course as of 10/06/22 1833   Thu Oct 06, 2022   1741 Nvi s/p splint placement                               SBIRT 20yo+    Flowsheet Row Most Recent Value   SBIRT (23 yo +)    In order to provide better care to our patients, we are screening all of our patients for alcohol and drug use  Would it be okay to ask you these screening questions? No Filed at: 10/06/2022 1631                    MDM  Number of Diagnoses or Management Options  Ankle fracture  Foot fracture, left  Diagnosis management comments: Mechanical fall with left foot/ankle pain  CT of head was done throat acute CNS pathology as patient is on blood thinners  This was negative  Patient does have a ankle and foot fracture  Patient is involved with pain management has signed a pain contract and is currently taking tramadol and tizanidine at home  Patient became this please and I informed him that I cannot write him for a stronger narcotic issues already involved in a pain management plan has pain medication at home for this    Will discharge home with outpatient follow-up with orthopedics  Patient refused additional pain medications  Disposition  Final diagnoses: Ankle fracture   Foot fracture, left     Time reflects when diagnosis was documented in both MDM as applicable and the Disposition within this note     Time User Action Codes Description Comment    10/6/2022  5:42 PM Cindy Rodriguez Add [X94 733I] Ankle fracture     10/6/2022  5:42 PM Cindy Rodriguez Add [E37 [id number]] Foot fracture, left       ED Disposition     ED Disposition   Discharge    Condition   Stable    Date/Time   Thu Oct 6, 2022  5:42 PM    Comment   Scarlet Zeeshan discharge to home/self care                 Follow-up Information     Follow up With Specialties Details Why Contact Info Additional [address] [facility] Orthopedic Surgery Schedule an appointment as soon as possible for a visit in 2 days  [address] [facility]  [address] [facility] [address]  [address] [facility], [address] [facility], [address] [facility], [address] [place] [address], [id number] [phone]          Discharge Medication List as of 10/6/2022  5:43 PM      CONTINUE these medications which have NOT CHANGED    Details   Acetaminophen 325 MG CAPS Take 650 mg by mouth as needed  , Historical Med      aspirin (ECOTRIN LOW STRENGTH) 81 mg EC tablet Take 1 tablet (81 mg total) by mouth daily, Starting Fri 6/21/2019, Print      atorvastatin (LIPITOR) 80 mg tablet take 1 tablet by mouth once daily with dinner, Normal      betamethasone, augmented, (DIPROLENE-AF) 0 05 % cream apply twice daily to rash x 2 weeks  then Saturday and Sunday only, Historical Med      calcipotriene (DOVONEX) 0 005 % cream apply to affected area twice a day Jaron, Historical Med      clopidogrel (PLAVIX) 75 mg tablet take 1 tablet by mouth once daily, Normal      fluticasone (FLONASE) 50 mcg/act nasal spray 2 sprays into each nostril 2 (two) times a day as needed for rhinitis, Historical Med      guaiFENesin (MUCINEX) 600 mg 12 hr tablet Take 1,200 mg by mouth every 12 (twelve) hours, Historical Med      loratadine (CLARITIN) 10 mg tablet Take 10 mg by mouth daily, Historical Med      losartan (COZAAR) 100 MG tablet take 1 tablet by mouth once daily, Normal      NIFEdipine (ADALAT CC) 90 mg 24 hr tablet take 1 tablet by mouth once daily, Normal      nitroglycerin (NITROSTAT) 0 4 mg SL tablet Place 1 tablet (0 4 mg total) under the tongue every 5 (five) minutes as needed for chest pain, Starting Wed 10/21/2020, Normal      omeprazole (PriLOSEC) 20 mg delayed release capsule Take 1 capsule (20 mg total) by mouth daily, Starting Tue 12/28/2021, Normal      oxybutynin (DITROPAN) 5 mg tablet Take 1 tablet (5 mg total) by mouth 2 (two) times a day Take 1 to 2 tablets by mouth once daily before bed , Starting Mon 6/28/2021, Normal      tadalafil (CIALIS) 20 MG tablet Take 1 tablet (20 mg total) by mouth daily as needed for erectile dysfunction, Starting Mon 2/8/2021, Normal      tamsulosin (FLOMAX) 0 4 mg Take 1 capsule (0 4 mg total) by mouth 2 (two) times a day, Starting Wed 11/10/2021, Normal      TIZANIDINE HCL PO Take 4 mg by mouth as needed , Historical Med      traMADol (ULTRAM) 50 mg tablet Take 100 mg by mouth as needed , Starting Mon 6/3/2019, Historical Med      traZODone (DESYREL) 50 mg tablet Take 1 tablet (50 mg total) by mouth daily at bedtime, Starting Wed 12/15/2021, Normal             No discharge procedures on file      PDMP Review     None          ED Provider  Electronically Signed by           Martha Suarez MD  10/06/22 6250

## 2022-10-07 ENCOUNTER — APPOINTMENT (OUTPATIENT)
Dept: PHYSICAL THERAPY | Facility: MEDICAL CENTER | Age: 72
End: 2022-10-07

## 2023-02-09 ENCOUNTER — OFFICE VISIT (OUTPATIENT)
Dept: CARDIOLOGY CLINIC | Facility: CLINIC | Age: 73
End: 2023-02-09

## 2023-02-09 VITALS
HEART RATE: 58 BPM | SYSTOLIC BLOOD PRESSURE: 120 MMHG | DIASTOLIC BLOOD PRESSURE: 74 MMHG | BODY MASS INDEX: 39.03 KG/M2 | WEIGHT: 272 LBS

## 2023-02-09 DIAGNOSIS — E66.01 MORBID OBESITY (HCC): ICD-10-CM

## 2023-02-09 DIAGNOSIS — I10 ESSENTIAL HYPERTENSION: Primary | ICD-10-CM

## 2023-02-09 DIAGNOSIS — I45.2 BIFASCICULAR BLOCK: ICD-10-CM

## 2023-02-09 DIAGNOSIS — I25.10 CORONARY ARTERY DISEASE INVOLVING NATIVE CORONARY ARTERY OF NATIVE HEART WITHOUT ANGINA PECTORIS: ICD-10-CM

## 2023-02-09 DIAGNOSIS — R07.9 CHEST PAIN, UNSPECIFIED TYPE: ICD-10-CM

## 2023-02-09 NOTE — PROGRESS NOTES
Cardiology Follow Up    Jessica Umana  1950  [id number]  [facility]  [address]  [phone]    1  Essential hypertension  TSH, 3rd generation with Free T4 reflex      2  Bifascicular block  POCT ECG      3  Coronary artery disease involving native coronary artery of native heart without angina pectoris  TSH, 3rd generation with Free T4 reflex    NM myocardial perfusion spect (rx stress and/or rest)      4  Chest pain, unspecified type  Comprehensive metabolic panel    CBC and differential    TSH, 3rd generation with Free T4 reflex    LDL cholesterol, direct    Lipid panel    NM myocardial perfusion spect (rx stress and/or rest)      5  Morbid obesity ([facility] [address] )            Discussion/Summary:    1  CAD: prior NSTEMI 6/2019  PCI to the 2nd OM, other disease as noted  Still on dual antiplatelet therapy  Last visit, I indicated we would taper down  However, he is having symptoms which are concerning for angina  I have ordered a pharmacologic nuclear stress test  He cannot exercise due to his orthopedic limitations including his recent foot injury  He perceives some improvement from nitroglycerin, but unclear if he is necessarily experiencing angina  Can consider the use of Imdur if stress test is not significantly abnormal  Indicated if there is a major abnormality, would proceed with catheterization he did have residual disease on the catheterization in the past     He understands that if there is PCI, any potential orthopedic plans would be delayed    2  HTN: Some adjustments to his antihypertensives had been made by his PCP  He is doing well with the current regimen  3  Bifascicular block: Stable on ECG  No symptoms to suggest any higher degree AV block  Not on any beta-blocker or chetan blocking agents  4  HL: Was felt that some of his myalgias were related to atorvastatin   He has Dear Edgardo Alanis saw me at [facility] on Sep 7, 2017.  Please refer to the visit note at your convenience and feel free to contact me should you have any questions.  Sincerely,  Aung Randle DO, [facility]   changed to Crestor  Lipid panel remains controlled  He has some cramps but these are in a different distribution now  He is comfortable continuing his current therapy at this time  Previous History:  73-year-old gentleman  He has coronary disease identified in June of 2019  He had an NSTEMI  EF was preserved  He had a stent to the 2nd OM, 60% 1st diagonal, 60% circ, 50% mid RCA  He is on aspirin and Plavix  He has a baseline left anterior fascicular block, RBBB  Sinus bradycardia, but no symptoms  He has hypertension as well, tends to be worse with anxiety but of late has been better controlled  Interval History:    Returns for follow-up  For the last month or so, he has been feeling symptoms of chest pain  3 different descriptions of pain some squeezing, some is sharp, some is pushing  He has taken nitroglycerin for this  Sometimes takes 5 or 6 because that was the instructions on the bottle to take every 5 minutes until the symptoms improved  Not necessarily related to exertion  He has had some falls  He broke his foot  Diet is not good he is under a lot of stress so he is eating more  Says he probably drinks too much, as well  Weight is up over 10 pounds since I last saw him  No recent blood work  Is also having hip pain  He has a appointment with his orthopedic surgeon at [facility] for surgery upcoming in March      Problem List     Anxiety    BPH with obstruction/lower urinary tract symptoms    Elevated glucose    Erectile dysfunction of non-organic origin    GERD without esophagitis    Hyperlipidemia    Essential hypertension    Obesity (BMI 30 0-34 9)    Obstructive sleep apnea    Osteoarthritis    Psoriasis    Bifascicular block    Calculus of kidney    Primary insomnia    GERD (gastroesophageal reflux disease)    Low back pain    Alcohol use    Dyslipidemia    Coronary artery disease involving native coronary artery of native heart without angina pectoris        Past Medical History:   Diagnosis Date   • CAD (coronary artery disease)     s/p stent x1 on 6/19/19   • Closed fracture of distal end of fibula with routine healing     unspecified fracture morphology, unspecified laterality, subsequent encounter;  Last Assessed:12/27/16   • GERD (gastroesophageal reflux disease)    • Heart attack ([facility] [address] )    • Hypertension    • Kidney stones    • Nocturia    • Psoriasis    • Sleep apnea      Social History     Tobacco Use   • Smoking status: Never   • Smokeless tobacco: Never   Substance Use Topics   • Alcohol use: Yes     Comment: 1 drink daily   • Drug use: No      Family History   Problem Relation Age of Onset   • Arthritis Mother    • Hypertension Mother    • Irritable bowel syndrome Mother    • Osteoporosis Mother    • Arthritis Other      Past Surgical History:   Procedure Laterality Date   • APPENDECTOMY     • CARPAL TUNNEL RELEASE     • ROTATOR CUFF REPAIR     • SHOULDER ARTHROSCOPY Left     Last ASsessed:10/24/16   • TOTAL HIP ARTHROPLASTY     • TRIGGER FINGER RELEASE         Current Outpatient Medications:   •  aspirin (ECOTRIN LOW STRENGTH) 81 mg EC tablet, Take 1 tablet (81 mg total) by mouth daily, Disp: 30 tablet, Rfl: 0  •  clopidogrel (PLAVIX) 75 mg tablet, take 1 tablet by mouth once daily, Disp: 90 tablet, Rfl: 3  •  fluticasone (FLONASE) 50 mcg/act nasal spray, 2 sprays into each nostril 2 (two) times a day as needed for rhinitis, Disp: , Rfl:   •  furosemide (LASIX) 20 mg tablet, furosemide 20 mg tablet  take 1 tablet by mouth every morning if needed for EDEMA, Disp: , Rfl:   •  hydrochlorothiazide (HYDRODIURIL) 12 5 mg tablet, hydrochlorothiazide 12 5 mg tablet, Disp: , Rfl:   •  HYDROcodone Bit-Homatrop MBr (HYCODAN) 5-1 5 mg/5 mL syrup, Hydromet 5 mg-1 5 mg/5 mL oral syrup, Disp: , Rfl:   •  losartan (COZAAR) 100 MG tablet, take 1 tablet by mouth once daily, Disp: 90 tablet, Rfl: 3  •  methocarbamol (ROBAXIN) 750 mg tablet, methocarbamol 750 mg tablet  take 1 tablet by mouth three times a day if needed for muscle spasm, Disp: , Rfl:   •  NIFEdipine ER (ADALAT CC) 30 MG 24 hr tablet, Take 30 mg by mouth 2 (two) times a day, Disp: , Rfl:   •  nitroglycerin (NITROSTAT) 0 4 mg SL tablet, Place 1 tablet (0 4 mg total) under the tongue every 5 (five) minutes as needed for chest pain, Disp: 25 tablet, Rfl: 3  •  omeprazole (PriLOSEC) 20 mg delayed release capsule, Take 1 capsule (20 mg total) by mouth daily, Disp: 90 capsule, Rfl: 0  •  oxybutynin (DITROPAN) 5 mg tablet, Take 1 tablet (5 mg total) by mouth 2 (two) times a day Take 1 to 2 tablets by mouth once daily before bed , Disp: 180 tablet, Rfl: 5  •  oxyCODONE-acetaminophen (PERCOCET) 5-325 mg per tablet, take 1 tablet by mouth every 8 hours if needed for MODERATE PAIN (PAIN SCORE 4-6), Disp: , Rfl:   •  rosuvastatin (CRESTOR) 20 MG tablet, Take 1 tablet by mouth daily, Disp: , Rfl:   •  tamsulosin (FLOMAX) 0 4 mg, Take 1 capsule (0 4 mg total) by mouth 2 (two) times a day, Disp: 60 capsule, Rfl: 3  •  TIZANIDINE HCL PO, Take 4 mg by mouth as needed , Disp: , Rfl:   •  traMADol (ULTRAM) 50 mg tablet, Take 100 mg by mouth as needed , Disp: , Rfl: 0  •  traZODone (DESYREL) 50 mg tablet, Take 1 tablet (50 mg total) by mouth daily at bedtime, Disp: 90 tablet, Rfl: 1  •  valsartan (DIOVAN) 160 mg tablet, Take 160 mg by mouth 2 (two) times a day, Disp: , Rfl:   •  Acetaminophen 325 MG CAPS, Take 650 mg by mouth as needed  , Disp: , Rfl:   •  betamethasone, augmented, (DIPROLENE-AF) 0 05 % cream, apply twice daily to rash x 2 weeks  then Saturday and Sunday only (Patient not taking: Reported on 2/9/2022), Disp: , Rfl: 0  •  calcipotriene (DOVONEX) 0 005 % cream, apply to affected area twice a day ON MONDAY THROUGH FRIDAY, Disp: , Rfl: 0  •  clonazePAM (KlonoPIN) 0 5 mg tablet, clonazepam 0 5 mg tablet  take 1 tablet by mouth at bedtime or every 12 hours if needed for anxiety and PANIC, Disp: , Rfl:   •  guaiFENesin (MUCINEX) 600 mg 12 hr tablet, Take 1,200 mg by mouth every 12 (twelve) hours, Disp: , Rfl:   •  loratadine (CLARITIN) 10 mg tablet, Take 10 mg by mouth daily (Patient not taking: Reported on 2/9/2022), Disp: , Rfl:   •  methocarbamol (ROBAXIN) 750 mg tablet, Take 750 mg by mouth 3 (three) times a day as needed, Disp: , Rfl:   •  tadalafil (CIALIS) 20 MG tablet, Take 1 tablet (20 mg total) by mouth daily as needed for erectile dysfunction, Disp: 30 tablet, Rfl: 0  Allergies   Allergen Reactions   • Latex Hives   • Other    • Pollen Extract    • Shellfish-Derived Products - Food Allergy      [employer]     Vitals:    02/09/23 1344   BP: 120/74   BP Location: Right arm   Patient Position: Sitting   Cuff Size: Large   Pulse: 58   Weight: 123 kg (272 lb)     Vitals:    02/09/23 1344   Weight: 123 kg (272 lb)          Body mass index is 39 03 kg/m²  Physical Exam:  GEN: Freddy Every appears well, alert and oriented x 3, pleasant and cooperative   HEENT: pupils equal, round, and reactive to light; extraocular muscles intact  NECK: supple, no carotid bruits   HEART: regular rhythm, normal S1 and S2, no murmurs, clicks, gallops or rubs   LUNGS: clear to auscultation bilaterally; no wheezes, rales, or rhonchi   ABDOMEN: normal bowel sounds, soft, no tenderness, no distention  EXTREMITIES: peripheral pulses normal; no clubbing, cyanosis, or edema  NEURO: no focal findings   SKIN: normal without suspicious lesions on exposed skin    ROS:  Chest pain, stress, anxiety  Hip pain, back pain  Broke his foot  Except as noted in HPI, is otherwise reviewed in detail and a 12 point review of systems is negative    ROS reviewed and is unchanged    Labs:  Lab Results   Component Value Date    K 3 6 07/28/2022     07/28/2022    CREATININE 1 03 07/28/2022    BUN 20 07/28/2022    CO2 28 07/28/2022    ALT 28 10/20/2021    AST 21 10/20/2021    INR 0 98 06/18/2019    GLUF 108 (H) 10/20/2021    HGBA1C 5 2 06/19/2019    WBC 5 85 10/20/2021 HGB 14 0 10/20/2021    HCT 42 1 10/20/2021     10/20/2021     No results found for: CHOL  Lab Results   Component Value Date    LDLCALC 44 04/09/2021    LDLCALC 50 01/03/2020    LDLCALC 142 (H) 06/19/2019     Lab Results   Component Value Date    HDL 66 04/09/2021    HDL 53 01/03/2020    HDL 50 06/19/2019     Lab Results   Component Value Date    TRIG 50 04/09/2021    TRIG 43 01/03/2020    TRIG 95 06/19/2019     Testing:  Stress Test 8/5/20:  SUMMARY:  -  Stress results: Duration of exercise was 7 min and 30 sec  Maximal work rate was 9 3 METs  Maximal heart rate during stress was 127 bpm ( 84 % of maximal predicted heart rate)  Target heart rate was not achieved  There was normal  resting blood pressure with a hypertensive response to stress  The systolic blood pressure increased by 90 mmHg during peak stress  There was no chest pain during stress  -  ECG conclusions: The stress ECG was negative for ischemia and normal   -  Perfusion imaging: The left ventricle was mildly dilated  There was a moderate-sized, severe, fixed myocardial perfusion defect of the basal to mid inferior wall  -  Gated SPECT: The calculated left ventricular ejection fraction was 54 %  Left ventricular ejection fraction appeared to be at the lower limits of normal  There was severely reduced myocardial thickening and motion of the basal inferior  wall of the left ventricle      IMPRESSIONS: Abnormal study after maximal exercise  There was a small to medium sized infarct in the mid to basal inferior region  Overall left ventricular systolic function was preserved, but there were regional wall motion abnormalities  Diagnostic sensitivity was limited by submaximal stress  Patient achieved 84% of maximum age predicted heart rate  The patient had a hypertensive response to exercise, increasing the systolic blood pressure by 90 mmHg from baseline      Echo 7/7/20:  LEFT VENTRICLE:  Systolic function was normal  Ejection fraction was estimated to be 60 %  There were no regional wall motion abnormalities  Doppler parameters were consistent with abnormal left ventricular relaxation (grade 1 diastolic dysfunction)      MITRAL VALVE:  There was mild annular calcification  There was mild to moderate regurgitation      AORTIC VALVE:  There was mild regurgitation      TRICUSPID VALVE:  There was trace regurgitation  Cardiac Cath 6/2019  CORONARY CIRCULATION:  1st diagonal: There was a tubular 60 % stenosis  2nd obtuse marginal: There was a 99 % stenosis  There was ROSIE grade 2 flow through the vessel (partial perfusion)  This lesion is a likely culprit for the patient's recent myocardial infarction  An intervention was performed  Left AV groove artery: There was a discrete 60 % stenosis  Mid RCA: There was a 50 % stenosis      1ST LESION INTERVENTIONS:  A successful balloon angioplasty with stent procedure was performed on the 99 % lesion in the 2nd obtuse marginal  Following intervention there was an excellent angiographic appearance with a 0 % residual stenosis  A [address] Rx 2 75 x 18mm drug-eluting stent was placed across the lesion and deployed at a maximum inflation pressure of 16 pam  EKG:  Sinus  58 bpm  Right bundle branch block  Left anterior fascicular block

## 2023-02-11 ENCOUNTER — APPOINTMENT (OUTPATIENT)
Dept: LAB | Facility: MEDICAL CENTER | Age: 73
End: 2023-02-11

## 2023-02-11 DIAGNOSIS — R07.9 CHEST PAIN, UNSPECIFIED TYPE: ICD-10-CM

## 2023-02-11 DIAGNOSIS — I25.10 CORONARY ARTERY DISEASE INVOLVING NATIVE CORONARY ARTERY OF NATIVE HEART WITHOUT ANGINA PECTORIS: ICD-10-CM

## 2023-02-11 DIAGNOSIS — I10 ESSENTIAL HYPERTENSION: ICD-10-CM

## 2023-02-11 LAB
ALBUMIN SERPL BCP-MCNC: 4 G/DL (ref 3.5–5)
ALP SERPL-CCNC: 46 U/L (ref 46–116)
ALT SERPL W P-5'-P-CCNC: 30 U/L (ref 12–78)
ANION GAP SERPL CALCULATED.3IONS-SCNC: 2 MMOL/L (ref 4–13)
AST SERPL W P-5'-P-CCNC: 19 U/L (ref 5–45)
BASOPHILS # BLD AUTO: 0.02 THOUSANDS/ÂΜL (ref 0–0.1)
BASOPHILS NFR BLD AUTO: 0 % (ref 0–1)
BILIRUB SERPL-MCNC: 0.68 MG/DL (ref 0.2–1)
BUN SERPL-MCNC: 16 MG/DL (ref 5–25)
CALCIUM SERPL-MCNC: 9.7 MG/DL (ref 8.3–10.1)
CHLORIDE SERPL-SCNC: 107 MMOL/L (ref 96–108)
CHOLEST SERPL-MCNC: 123 MG/DL
CO2 SERPL-SCNC: 30 MMOL/L (ref 21–32)
CREAT SERPL-MCNC: 0.98 MG/DL (ref 0.6–1.3)
EOSINOPHIL # BLD AUTO: 0.18 THOUSAND/ÂΜL (ref 0–0.61)
EOSINOPHIL NFR BLD AUTO: 3 % (ref 0–6)
ERYTHROCYTE [DISTWIDTH] IN BLOOD BY AUTOMATED COUNT: 12.8 % (ref 11.6–15.1)
GFR SERPL CREATININE-BSD FRML MDRD: 77 ML/MIN/1.73SQ M
GLUCOSE P FAST SERPL-MCNC: 118 MG/DL (ref 65–99)
HCT VFR BLD AUTO: 44.4 % (ref 36.5–49.3)
HDLC SERPL-MCNC: 52 MG/DL
HGB BLD-MCNC: 14.8 G/DL (ref 12–17)
IMM GRANULOCYTES # BLD AUTO: 0.02 THOUSAND/UL (ref 0–0.2)
IMM GRANULOCYTES NFR BLD AUTO: 0 % (ref 0–2)
LDLC SERPL CALC-MCNC: 53 MG/DL (ref 0–100)
LDLC SERPL DIRECT ASSAY-MCNC: 63 MG/DL (ref 0–100)
LYMPHOCYTES # BLD AUTO: 1.92 THOUSANDS/ÂΜL (ref 0.6–4.47)
LYMPHOCYTES NFR BLD AUTO: 33 % (ref 14–44)
MCH RBC QN AUTO: 32.3 PG (ref 26.8–34.3)
MCHC RBC AUTO-ENTMCNC: 33.3 G/DL (ref 31.4–37.4)
MCV RBC AUTO: 97 FL (ref 82–98)
MONOCYTES # BLD AUTO: 0.71 THOUSAND/ÂΜL (ref 0.17–1.22)
MONOCYTES NFR BLD AUTO: 12 % (ref 4–12)
NEUTROPHILS # BLD AUTO: 2.93 THOUSANDS/ÂΜL (ref 1.85–7.62)
NEUTS SEG NFR BLD AUTO: 52 % (ref 43–75)
NONHDLC SERPL-MCNC: 71 MG/DL
NRBC BLD AUTO-RTO: 0 /100 WBCS
PLATELET # BLD AUTO: 236 THOUSANDS/UL (ref 149–390)
PMV BLD AUTO: 9.8 FL (ref 8.9–12.7)
POTASSIUM SERPL-SCNC: 4.5 MMOL/L (ref 3.5–5.3)
PROT SERPL-MCNC: 7.3 G/DL (ref 6.4–8.4)
RBC # BLD AUTO: 4.58 MILLION/UL (ref 3.88–5.62)
SODIUM SERPL-SCNC: 139 MMOL/L (ref 135–147)
TRIGL SERPL-MCNC: 92 MG/DL
TSH SERPL DL<=0.05 MIU/L-ACNC: 1.62 UIU/ML (ref 0.45–4.5)
WBC # BLD AUTO: 5.78 THOUSAND/UL (ref 4.31–10.16)

## 2023-02-24 ENCOUNTER — HOSPITAL ENCOUNTER (OUTPATIENT)
Dept: NUCLEAR MEDICINE | Facility: HOSPITAL | Age: 73
Discharge: HOME | End: 2023-02-24
Attending: INTERNAL MEDICINE

## 2023-02-24 ENCOUNTER — HOSPITAL ENCOUNTER (OUTPATIENT)
Dept: NON INVASIVE DIAGNOSTICS | Facility: HOSPITAL | Age: 73
Discharge: HOME | End: 2023-02-24
Attending: INTERNAL MEDICINE

## 2023-02-24 DIAGNOSIS — R07.9 CHEST PAIN, UNSPECIFIED TYPE: ICD-10-CM

## 2023-02-24 DIAGNOSIS — I25.10 CORONARY ARTERY DISEASE INVOLVING NATIVE CORONARY ARTERY OF NATIVE HEART WITHOUT ANGINA PECTORIS: ICD-10-CM

## 2023-02-24 LAB
CHEST PAIN STATEMENT: NORMAL
MAX DIASTOLIC BP: 58 MMHG
MAX HEART RATE: 92 BPM
MAX HR PERCENT: 61 %
MAX HR: 92 BPM
MAX PREDICTED HEART RATE: 149 BPM
MAX. SYSTOLIC BP: 122 MMHG
PROTOCOL NAME: NORMAL
RATE PRESSURE PRODUCT: NORMAL
REASON FOR TERMINATION: NORMAL
SL CV REST NUCLEAR ISOTOPE DOSE: 15.5 MCI
SL CV STRESS NUCLEAR ISOTOPE DOSE: 45.5 MCI
SL CV STRESS RECOVERY BP: NORMAL MMHG
SL CV STRESS RECOVERY HR: 75 BPM
SL CV STRESS RECOVERY O2 SAT: 92 %
STRESS ANGINA INDEX: 0
STRESS BASELINE BP: NORMAL MMHG
STRESS BASELINE HR: 68 BPM
STRESS DUKE TREADMILL SCORE: 3
STRESS O2 SAT REST: 93 %
STRESS PEAK HR: 92 BPM
STRESS POST ESTIMATED WORKLOAD: 1.5 METS
STRESS POST EXERCISE DUR MIN: 3 MIN
STRESS POST O2 SAT PEAK: 99 %
STRESS POST PEAK BP: 120 MMHG
STRESS ST DEPRESSION: 0 MM
STRESS/REST PERFUSION RATIO: 1.15
TARGET HR FORMULA: NORMAL
TEST INDICATION: NORMAL
TIME IN EXERCISE PHASE: NORMAL

## 2023-02-24 RX ADMIN — REGADENOSON 0.4 MG: 0.08 INJECTION, SOLUTION INTRAVENOUS at 11:55

## 2023-04-07 ENCOUNTER — EVALUATION (OUTPATIENT)
Dept: PHYSICAL THERAPY | Facility: MEDICAL CENTER | Age: 73
End: 2023-04-07

## 2023-04-07 DIAGNOSIS — G89.29 CHRONIC LOW BACK PAIN, UNSPECIFIED BACK PAIN LATERALITY, UNSPECIFIED WHETHER SCIATICA PRESENT: Primary | ICD-10-CM

## 2023-04-07 DIAGNOSIS — M54.50 CHRONIC LOW BACK PAIN, UNSPECIFIED BACK PAIN LATERALITY, UNSPECIFIED WHETHER SCIATICA PRESENT: Primary | ICD-10-CM

## 2023-04-07 NOTE — LETTER
2023    Momo Nieves, [address]    Patient: Newton Shah   YOB: 1950   Date of Visit: 2023     Encounter Diagnosis     ICD-10-CM    1  Chronic low back pain, unspecified back pain laterality, unspecified whether sciatica present  M54 50 Ambulatory Referral to Physical Therapy    G89 29           Dear Dr Christina Kebede: Thank you for your recent referral of Newton Shah  Please review the attached evaluation summary from Sp's recent visit  Please verify that you agree with the plan of care by signing the attached order  If you have any questions or concerns, please do not hesitate to call  I sincerely appreciate the opportunity to share in the care of one of your patients and hope to have another opportunity to work with you in the near future  Sincerely,    Rossana Main, PT      Referring Provider:      I certify that I have read the below Plan of Care and certify the need for these services furnished under this plan of treatment while under my care  Momo Nieves MD  [address] [place] [address]  Via Fax: [phone]          PT Evaluation     Today's date: 2023  Patient name: Newton Shah  : 1950  MRN: [id number]  Referring provider: Eder James MD  Dx:   Encounter Diagnosis     ICD-10-CM    1  Chronic low back pain, unspecified back pain laterality, unspecified whether sciatica present  M54 50 Ambulatory Referral to Physical Therapy    G89 29                      Assessment  Assessment details: Newton Shah is a 71 y o  male was evaluated on 2023  for Chronic low back pain, unspecified back pain laterality, unspecified whether sciatica present  (primary encounter diagnosis)  Newton Shah has the above listed impairments resulting in functional deficits and negative impact to quality of life    Patient is appropriate for skilled PT intervention to promote maximal return to function and patient specific goals  Patient agrees with outlined treatment plan and all questions were answered to their satisfaction  Impairments: abnormal muscle firing, abnormal muscle tone, abnormal or restricted ROM, impaired physical strength, lacks appropriate home exercise program and pain with function  Understanding of Dx/Px/POC: good   Prognosis: good    Goals  Patient will successfully transition to home exercise program   Patient will be able to manage symptoms independently  Minerva Call will receive over 50% reported reduction in low back pain when waking in the morning and when performing daily activity  Plan  Patient would benefit from: skilled PT  Referral necessary: No  Planned modality interventions: thermotherapy: hydrocollator packs  Planned therapy interventions: home exercise program, manual therapy, neuromuscular re-education, patient education, functional ROM exercises, strengthening, stretching, joint mobilization, graded activity, graded exercise, therapeutic exercise, body mechanics training, motor coordination training and activity modification  Frequency: 3x week  Duration in weeks: 12  Treatment plan discussed with: patient        Subjective Evaluation    History of Present Illness  Mechanism of injury: Minerva Call is a 80 y o  male presenting to therapy with complaints of acute on chronic back pain  He has a regular schedule of lumbar trigger point injections with diminishing effect along with a recent rhizotomy  Those interventions continue to be short lasting and he has also begun massages to manage his pain  Pain worst in morning, utilizes tramadol for pain relief along with muscle relaxers  He is here to initiate therapy which has been successful in the past for partial relief of his ongoing back pain       Pain  Current pain ratin  At best pain ratin  At worst pain rating: 10  Quality: dull ache, tight, pressure, sharp, discomfort and pulling    Patient Goals  Patient goals for therapy: decreased pain, increased motion, return to sport/leisure activities, independence with ADLs/IADLs and increased strength          Objective   Red Flag Screening:  History Cancer (-)  Bowl/Bladder dysfunction (-)  Night pain (-)  Unexplained weight loss (-)     Dermatomes: WNL   Myotomes:   WNL    Reflexes:   Patellar R 2+, L 2+  Achilles R 2+, L 2+      Lumbar:  AROM:   Flexion Mod restriction   Extension Max restriction  Side bending Mod restriction  Rotation Mod restriction     Repeated Motion Testing: Not tested    Active Motion Observations: guarded motion with muscular spasm       PAIVM: Painful and mobility restrictions present throughout lumbar and thoracic spine along with muscular spasm to paraspinals     Special Tests:   Crossed SLR (-), SLR (-) Prone instability (-)   Neural Dynamic Testing: Tibial Bias (-), Peroneal Bias (-)   Slump Testing (-) with and without axial compression                                 Precautions: None      Manuals 7/18            Thoracolumbar PA AF             MFR to lumbar  paraspinals AF                                       Neuro Re-Ed                                                                                                        Ther Ex                                                                                                                     Ther Activity                                       Gait Training                                       Modalities

## 2023-04-07 NOTE — PROGRESS NOTES
PT Evaluation     Today's date: 2023  Patient name: Judi Smallwood  : 1950  MRN: [id number]  Referring provider: Wendy Jenkins MD  Dx:   Encounter Diagnosis     ICD-10-CM    1  Chronic low back pain, unspecified back pain laterality, unspecified whether sciatica present  M54 50 Ambulatory Referral to Physical Therapy    G89 29                      Assessment  Assessment details: Judi Smallwood is a 71 y o  male was evaluated on 2023  for Chronic low back pain, unspecified back pain laterality, unspecified whether sciatica present  (primary encounter diagnosis)  uJdi Smallwood has the above listed impairments resulting in functional deficits and negative impact to quality of life  Patient is appropriate for skilled PT intervention to promote maximal return to function and patient specific goals  Patient agrees with outlined treatment plan and all questions were answered to their satisfaction  Impairments: abnormal muscle firing, abnormal muscle tone, abnormal or restricted ROM, impaired physical strength, lacks appropriate home exercise program and pain with function  Understanding of Dx/Px/POC: good   Prognosis: good    Goals  Patient will successfully transition to home exercise program   Patient will be able to manage symptoms independently  Lilian Valdes will receive over 50% reported reduction in low back pain when waking in the morning and when performing daily activity        Plan  Patient would benefit from: skilled PT  Referral necessary: No  Planned modality interventions: thermotherapy: hydrocollator packs  Planned therapy interventions: home exercise program, manual therapy, neuromuscular re-education, patient education, functional ROM exercises, strengthening, stretching, joint mobilization, graded activity, graded exercise, therapeutic exercise, body mechanics training, motor coordination training and activity modification  Frequency: 3x week  Duration in weeks: 12  Treatment plan discussed with: patient        Subjective Evaluation    History of Present Illness  Mechanism of injury: Tawnya Quinn is a 80 y o  male presenting to therapy with complaints of acute on chronic back pain  He has a regular schedule of lumbar trigger point injections with diminishing effect along with a recent rhizotomy  Those interventions continue to be short lasting and he has also begun massages to manage his pain  Pain worst in morning, utilizes tramadol for pain relief along with muscle relaxers  He is here to initiate therapy which has been successful in the past for partial relief of his ongoing back pain  Pain  Current pain ratin  At best pain ratin  At worst pain rating: 10  Quality: dull ache, tight, pressure, sharp, discomfort and pulling    Patient Goals  Patient goals for therapy: decreased pain, increased motion, return to sport/leisure activities, independence with ADLs/IADLs and increased strength          Objective   Red Flag Screening:  History Cancer (-)  Bowl/Bladder dysfunction (-)  Night pain (-)  Unexplained weight loss (-)     Dermatomes: WNL   Myotomes:   WNL    Reflexes:   Patellar R 2+, L 2+  Achilles R 2+, L 2+      Lumbar:  AROM:   Flexion Mod restriction   Extension Max restriction  Side bending Mod restriction  Rotation Mod restriction     Repeated Motion Testing: Not tested    Active Motion Observations: guarded motion with muscular spasm       PAIVM: Painful and mobility restrictions present throughout lumbar and thoracic spine along with muscular spasm to paraspinals     Special Tests:   Crossed SLR (-), SLR (-) Prone instability (-)   Neural Dynamic Testing: Tibial Bias (-), Peroneal Bias (-)   Slump Testing (-) with and without axial compression                                  Precautions: None      Manuals             Thoracolumbar PA AF             MFR to lumbar  paraspinals AF                                       Neuro Re-Ed Ther Ex                                                                                                                     Ther Activity                                       Gait Training                                       Modalities

## 2023-04-24 ENCOUNTER — OFFICE VISIT (OUTPATIENT)
Dept: PHYSICAL THERAPY | Facility: MEDICAL CENTER | Age: 73
End: 2023-04-24

## 2023-04-24 DIAGNOSIS — G89.29 CHRONIC LOW BACK PAIN, UNSPECIFIED BACK PAIN LATERALITY, UNSPECIFIED WHETHER SCIATICA PRESENT: Primary | ICD-10-CM

## 2023-04-24 DIAGNOSIS — M54.50 CHRONIC LOW BACK PAIN, UNSPECIFIED BACK PAIN LATERALITY, UNSPECIFIED WHETHER SCIATICA PRESENT: Primary | ICD-10-CM

## 2023-04-24 NOTE — PROGRESS NOTES
Daily Note     Today's date: 2023  Patient name: Alejandro Mcadams  : 1950  MRN: [id number]  Referring provider: Solomon Skelton MD  Dx:   Encounter Diagnosis     ICD-10-CM    1  Chronic low back pain, unspecified back pain laterality, unspecified whether sciatica present  M54 50     G89 29                      Subjective: Umer Mike reports that he is doing well, felt better after last session      Objective: See treatment diary below      Assessment: Tolerated treatment well  Patient would benefit from continued PT to address myofascial and joint restrictions in lower back, pain levels reduce post MFR       Plan: Continue per plan of care        Precautions: None      Manuals             Thoracolumbar PA AF             MFR to lumbar  paraspinals AF                                       Neuro Re-Ed                                                                                                        Ther Ex                                                                                                                     Ther Activity                                       Gait Training                                       Modalities

## 2023-04-26 ENCOUNTER — OFFICE VISIT (OUTPATIENT)
Dept: PHYSICAL THERAPY | Facility: MEDICAL CENTER | Age: 73
End: 2023-04-26

## 2023-04-26 DIAGNOSIS — G89.29 CHRONIC LOW BACK PAIN, UNSPECIFIED BACK PAIN LATERALITY, UNSPECIFIED WHETHER SCIATICA PRESENT: Primary | ICD-10-CM

## 2023-04-26 DIAGNOSIS — M54.50 CHRONIC LOW BACK PAIN, UNSPECIFIED BACK PAIN LATERALITY, UNSPECIFIED WHETHER SCIATICA PRESENT: Primary | ICD-10-CM

## 2023-04-26 NOTE — PROGRESS NOTES
Daily Note     Today's date: 2023  Patient name: Ryland Fournier  : 1950  MRN: [id number]  Referring provider: Emma Richmond MD  Dx:   Encounter Diagnosis     ICD-10-CM    1  Chronic low back pain, unspecified back pain laterality, unspecified whether sciatica present  M54 50     G89 29           Start Time: 1530  Stop Time: 1545  Total time in clinic (min): 15 minutes    Subjective: Johanne Narayan reports that he is doing well, felt better after last session      Objective: See treatment diary below      Assessment: Tolerated treatment well  Patient would benefit from continued PT to address myofascial and joint restrictions in lower back, pain levels reduce post MFR       Plan: Continue per plan of care        Precautions: None      Manuals             Thoracolumbar PA AF             MFR to lumbar  paraspinals AF                                       Neuro Re-Ed                                                                                                        Ther Ex                                                                                                                     Ther Activity                                       Gait Training                                       Modalities

## 2023-05-01 ENCOUNTER — OFFICE VISIT (OUTPATIENT)
Dept: PHYSICAL THERAPY | Facility: MEDICAL CENTER | Age: 73
End: 2023-05-01

## 2023-05-01 DIAGNOSIS — G89.29 CHRONIC LOW BACK PAIN, UNSPECIFIED BACK PAIN LATERALITY, UNSPECIFIED WHETHER SCIATICA PRESENT: Primary | ICD-10-CM

## 2023-05-01 DIAGNOSIS — M54.50 CHRONIC LOW BACK PAIN, UNSPECIFIED BACK PAIN LATERALITY, UNSPECIFIED WHETHER SCIATICA PRESENT: Primary | ICD-10-CM

## 2023-05-03 ENCOUNTER — OFFICE VISIT (OUTPATIENT)
Dept: PHYSICAL THERAPY | Facility: MEDICAL CENTER | Age: 73
End: 2023-05-03

## 2023-05-03 DIAGNOSIS — G89.29 CHRONIC LOW BACK PAIN, UNSPECIFIED BACK PAIN LATERALITY, UNSPECIFIED WHETHER SCIATICA PRESENT: Primary | ICD-10-CM

## 2023-05-03 DIAGNOSIS — M54.50 CHRONIC LOW BACK PAIN, UNSPECIFIED BACK PAIN LATERALITY, UNSPECIFIED WHETHER SCIATICA PRESENT: Primary | ICD-10-CM

## 2023-05-03 NOTE — PROGRESS NOTES
Daily Note     Today's date: 5/3/2023  Patient name: Nury Aviles  : 1950  MRN: [id number]  Referring provider: Jayne Osgood, MD  Dx:   Encounter Diagnosis     ICD-10-CM    1  Chronic low back pain, unspecified back pain laterality, unspecified whether sciatica present  M54 50     G89 29                      Subjective: Sp reports that he is doing well, felt better after last session but does feel more sore due to planting tomato plants recently     Objective: See treatment diary below      Assessment: Tolerated treatment well  Patient would benefit from continued PT to address myofascial and joint restrictions in lower back, pain levels reduce post MFR       Plan: Continue per plan of care        Precautions: None      Manuals             Thoracolumbar PA AF             MFR to lumbar  paraspinals AF                                       Neuro Re-Ed                                                                                                        Ther Ex                                                                                                                     Ther Activity                                       Gait Training                                       Modalities

## 2023-05-03 NOTE — PROGRESS NOTES
Daily Note     Today's date: 5/3/2023  Patient name: Judit Rao  : 1950  MRN: [id number]  Referring provider: Hernan Saenz MD  Dx:   Encounter Diagnosis     ICD-10-CM    1  Chronic low back pain, unspecified back pain laterality, unspecified whether sciatica present  M54 50     G89 29                      Subjective: Leroy Sosa reports that he is doing fair today, did a lot of lifting and carrying  Objective: See treatment diary below      Assessment: Tolerated treatment well  Patient would benefit from continued PT to address myofascial and joint restrictions in lower back, pain levels reduce post MFR       Plan: Continue per plan of care        Precautions: None      Manuals             Thoracolumbar PA AF             MFR to lumbar  paraspinals AF                                       Neuro Re-Ed                                                                                                        Ther Ex                                                                                                                     Ther Activity                                       Gait Training                                       Modalities

## 2023-05-05 ENCOUNTER — APPOINTMENT (OUTPATIENT)
Dept: PHYSICAL THERAPY | Facility: MEDICAL CENTER | Age: 73
End: 2023-05-05
Payer: MEDICARE

## 2023-05-08 ENCOUNTER — APPOINTMENT (OUTPATIENT)
Dept: PHYSICAL THERAPY | Facility: MEDICAL CENTER | Age: 73
End: 2023-05-08
Payer: MEDICARE

## 2023-05-10 ENCOUNTER — APPOINTMENT (OUTPATIENT)
Dept: PHYSICAL THERAPY | Facility: MEDICAL CENTER | Age: 73
End: 2023-05-10
Payer: MEDICARE

## 2023-05-12 ENCOUNTER — APPOINTMENT (OUTPATIENT)
Dept: PHYSICAL THERAPY | Facility: MEDICAL CENTER | Age: 73
End: 2023-05-12
Payer: MEDICARE

## 2023-08-03 DIAGNOSIS — I25.10 CAD (CORONARY ARTERY DISEASE): ICD-10-CM

## 2023-08-03 RX ORDER — CLOPIDOGREL BISULFATE 75 MG/1
TABLET ORAL
Qty: 90 TABLET | Refills: 3 | Status: SHIPPED | OUTPATIENT
Start: 2023-08-03

## 2023-09-19 ENCOUNTER — OFFICE VISIT (OUTPATIENT)
Dept: BARIATRICS | Facility: CLINIC | Age: 73
End: 2023-09-19
Payer: MEDICARE

## 2023-09-19 VITALS
HEART RATE: 57 BPM | BODY MASS INDEX: 40.79 KG/M2 | HEIGHT: 69 IN | RESPIRATION RATE: 17 BRPM | WEIGHT: 275.4 LBS | SYSTOLIC BLOOD PRESSURE: 118 MMHG | DIASTOLIC BLOOD PRESSURE: 69 MMHG

## 2023-09-19 DIAGNOSIS — E78.2 MIXED HYPERLIPIDEMIA: ICD-10-CM

## 2023-09-19 DIAGNOSIS — I10 ESSENTIAL HYPERTENSION: ICD-10-CM

## 2023-09-19 DIAGNOSIS — G47.33 OBSTRUCTIVE SLEEP APNEA: Primary | ICD-10-CM

## 2023-09-19 DIAGNOSIS — E66.01 MORBID OBESITY (HCC): ICD-10-CM

## 2023-09-19 PROCEDURE — 99204 OFFICE O/P NEW MOD 45 MIN: CPT | Performed by: PHYSICIAN ASSISTANT

## 2023-09-19 RX ORDER — CHLORAL HYDRATE 500 MG
1000 CAPSULE ORAL DAILY
COMMUNITY

## 2023-09-19 RX ORDER — FEXOFENADINE HCL AND PSEUDOEPHEDRINE HCI 180; 240 MG/1; MG/1
TABLET, EXTENDED RELEASE ORAL
COMMUNITY

## 2023-09-19 NOTE — PROGRESS NOTES
Assessment/Plan:    Obstructive sleep apnea  -encouraged continued use of CPAP machine  -may improve with 20-30% weight loss      Essential hypertension  On losartan and nifedipine  -should improve with weight loss, dietary, and lifestyle changes      Hyperlipidemia  On crestor  -should improve with weight loss, dietary, and lifestyle changes      Morbid obesity ([address])  -Discussed options of HealthyCORE-Intensive Lifestyle Intervention Program, Very Low Calorie Diet-VLCD and Conservative Program and the role of weight loss medications. Explained the importance of making lifestyle changes if utilizing medication to aid in weight loss  -Initial weight loss goal of 5-10% weight loss for improved health  -Screening labs and records reviewed from prior      -Patient is interested in pursuing healthycore    Goals:  -Discussed trying to increase water intake during the day and drink 1 vodka at night instead of 2  -to start back at the gym prior to first healthycore visit    He maybe interested in medication options such as wegovy for weight loss. Discussed medication is typically used long term due to likely weight regain. He will consider it. He is unsure about coverage though and we discussed availability. Side effects discussed also. Patient denies personal and family history of MCT and MEN2 tumors. Patient denies personal history of pancreatitis. Side effects discussed but not limited to diarrhea, bloating, constipation, GI upset, pancreatitis. Initial Weight:275lb  Goal Weight:225lb            Follow up in approximately 2 weeks with Non-Surgical Dietician. Diagnoses and all orders for this visit:    Obstructive sleep apnea    Essential hypertension    Mixed hyperlipidemia    Morbid obesity ([address])  -     Semaglutide-Weight Management (WEGOVY) 0.25 MG/0.5ML;  Inject 0.5 mL (0.25 mg total) under the skin once a week    Other orders  -     fexofenadine-pseudoephedrine (Allegra-D Allergy & Congestion) 180-240 MG per 24 hr tablet  -     HYDROcodone-acetaminophen (ZOLVIT)  mg/15 mL solution; 10 mg  -     Omega-3 Fatty Acids (fish oil) 1,000 mg; Take 1,000 mg by mouth daily          Subjective:   Chief Complaint   Patient presents with   • Consult     MWM Consult; Gw-225lb;waist- 49.5in; Pt has Sleep Apnea       Patient ID: Tray Valderrama  is a 71 y.o. male with excess weight/obesity here to pursue weight management. Past Medical History:   Diagnosis Date   • CAD (coronary artery disease)     s/p stent x1 on 6/19/19   • Closed fracture of distal end of fibula with routine healing     unspecified fracture morphology, unspecified laterality, subsequent encounter; Last Assessed:12/27/16   • GERD (gastroesophageal reflux disease)    • Heart attack ([address])    • Hypertension    • Kidney stones    • Nocturia    • Psoriasis    • Sleep apnea        HPI: Here for MWM consult    Prior to covid was going to the gym daily and lost 80-90lb. He had gradually started to gain weight at that time He was doing intermittent fasting w/ cofeee w/ cram and sugar in the AM .  Dinner was vodka and mostly fish. He had hip replacement on the left and that decreased his exercise and then the pandmemic happened. He has been having LBP and right hip pain. His mom passed away with increased stress. Obesity/Excess Weight:  Severity: class III with BARB, hypertension, GERD, HLD  Onset:  Whole life    Modifiers: Diet and Exercise  Contributing factors: Poor Food Choices and Insufficient Physical Activity  Associated symptoms: comorbid conditions and increased joint pain      Goals:  Hydration:coffee  Alcohol: 2 drinks of vodka a night  Exercise:rejoined but has not gone yet. Occupation:  Sleep:  Dining out/takeout:    He tries to fast.  Partner works till 6 though. He enjoys grilling and fish.   He was avoiding beef, veal, lamb and pork prior    Diet Recall:  Coffee  tries to fast-will sometimes do sandwich or leftover meat if eating good  D:Roasted veggies, salad, fish or chicken  S:    Colonoscopy-Completed      The following portions of the patient's history were reviewed and updated as appropriate:   He  has a past medical history of CAD (coronary artery disease), Closed fracture of distal end of fibula with routine healing, GERD (gastroesophageal reflux disease), Heart attack ([address]), Hypertension, Kidney stones, Nocturia, Psoriasis, and Sleep apnea. He   Patient Active Problem List    Diagnosis Date Noted   • Sacroiliitis ([address]) 02/08/2021   • Hepatic cirrhosis ([address]) 02/08/2021   • Morbid obesity ([address]) 02/08/2021   • Left leg pain 07/05/2020   • Coronary artery disease involving native coronary artery of native heart without angina pectoris 01/03/2020   • Dyslipidemia 06/25/2019   • Hypertensive urgency 06/18/2019   • Low back pain 06/18/2019   • Alcohol use 06/18/2019   • GERD (gastroesophageal reflux disease)    • Primary insomnia 05/21/2018   • Calculus of kidney 05/17/2018   • Obesity (BMI 30.0-34.9) 08/08/2017   • BPH with obstruction/lower urinary tract symptoms 09/28/2015   • Psoriasis 09/28/2015   • Bifascicular block 02/09/2015   • Obstructive sleep apnea 04/17/2013   • Elevated glucose 03/28/2013   • Erectile dysfunction of non-organic origin 03/14/2013   • Osteoarthritis 01/29/2013   • Anxiety 08/27/2012   • Essential hypertension 08/27/2012   • Hyperlipidemia 08/24/2012   • GERD without esophagitis 08/14/2012     He  has a past surgical history that includes Rotator cuff repair; Total hip arthroplasty; Appendectomy; Carpal tunnel release; Trigger finger release; and Shoulder arthroscopy (Left). His family history includes Arthritis in his mother and other; Hypertension in his mother; Irritable bowel syndrome in his mother; Osteoporosis in his mother. He  reports that he has never smoked. He has never used smokeless tobacco. He reports current alcohol use. He reports that he does not use drugs.   Current Outpatient Medications Medication Sig Dispense Refill   • aspirin (ECOTRIN LOW STRENGTH) 81 mg EC tablet Take 1 tablet (81 mg total) by mouth daily 30 tablet 0   • clopidogrel (PLAVIX) 75 mg tablet take 1 tablet by mouth once daily 90 tablet 3   • fexofenadine-pseudoephedrine (Allegra-D Allergy & Congestion) 180-240 MG per 24 hr tablet      • fluticasone (FLONASE) 50 mcg/act nasal spray 2 sprays into each nostril 2 (two) times a day as needed for rhinitis     • hydrochlorothiazide (HYDRODIURIL) 12.5 mg tablet hydrochlorothiazide 12.5 mg tablet     • HYDROcodone-acetaminophen (ZOLVIT)  mg/15 mL solution 10 mg     • loratadine (CLARITIN) 10 mg tablet Take 10 mg by mouth daily     • losartan (COZAAR) 100 MG tablet take 1 tablet by mouth once daily 90 tablet 3   • methocarbamol (ROBAXIN) 750 mg tablet methocarbamol 750 mg tablet   take 1 tablet by mouth three times a day if needed for muscle spasm     • NIFEdipine ER (ADALAT CC) 30 MG 24 hr tablet Take 30 mg by mouth 2 (two) times a day     • nitroglycerin (NITROSTAT) 0.4 mg SL tablet Place 1 tablet (0.4 mg total) under the tongue every 5 (five) minutes as needed for chest pain 25 tablet 3   • Omega-3 Fatty Acids (fish oil) 1,000 mg Take 1,000 mg by mouth daily     • omeprazole (PriLOSEC) 20 mg delayed release capsule Take 1 capsule (20 mg total) by mouth daily 90 capsule 0   • oxyCODONE-acetaminophen (PERCOCET) 5-325 mg per tablet take 1 tablet by mouth every 8 hours if needed for MODERATE PAIN (PAIN SCORE 4-6)     • rosuvastatin (CRESTOR) 20 MG tablet Take 1 tablet by mouth daily     • Semaglutide-Weight Management (WEGOVY) 0.25 MG/0.5ML Inject 0.5 mL (0.25 mg total) under the skin once a week 2 mL 0   • tamsulosin (FLOMAX) 0.4 mg Take 1 capsule (0.4 mg total) by mouth 2 (two) times a day 60 capsule 3   • traZODone (DESYREL) 50 mg tablet Take 1 tablet (50 mg total) by mouth daily at bedtime 90 tablet 1   • valsartan (DIOVAN) 160 mg tablet Take 160 mg by mouth 2 (two) times a day No current facility-administered medications for this visit.      Current Outpatient Medications on File Prior to Visit   Medication Sig   • aspirin (ECOTRIN LOW STRENGTH) 81 mg EC tablet Take 1 tablet (81 mg total) by mouth daily   • clopidogrel (PLAVIX) 75 mg tablet take 1 tablet by mouth once daily   • fexofenadine-pseudoephedrine (Allegra-D Allergy & Congestion) 180-240 MG per 24 hr tablet    • fluticasone (FLONASE) 50 mcg/act nasal spray 2 sprays into each nostril 2 (two) times a day as needed for rhinitis   • hydrochlorothiazide (HYDRODIURIL) 12.5 mg tablet hydrochlorothiazide 12.5 mg tablet   • HYDROcodone-acetaminophen (ZOLVIT)  mg/15 mL solution 10 mg   • loratadine (CLARITIN) 10 mg tablet Take 10 mg by mouth daily   • losartan (COZAAR) 100 MG tablet take 1 tablet by mouth once daily   • methocarbamol (ROBAXIN) 750 mg tablet methocarbamol 750 mg tablet   take 1 tablet by mouth three times a day if needed for muscle spasm   • NIFEdipine ER (ADALAT CC) 30 MG 24 hr tablet Take 30 mg by mouth 2 (two) times a day   • nitroglycerin (NITROSTAT) 0.4 mg SL tablet Place 1 tablet (0.4 mg total) under the tongue every 5 (five) minutes as needed for chest pain   • Omega-3 Fatty Acids (fish oil) 1,000 mg Take 1,000 mg by mouth daily   • omeprazole (PriLOSEC) 20 mg delayed release capsule Take 1 capsule (20 mg total) by mouth daily   • oxyCODONE-acetaminophen (PERCOCET) 5-325 mg per tablet take 1 tablet by mouth every 8 hours if needed for MODERATE PAIN (PAIN SCORE 4-6)   • rosuvastatin (CRESTOR) 20 MG tablet Take 1 tablet by mouth daily   • tamsulosin (FLOMAX) 0.4 mg Take 1 capsule (0.4 mg total) by mouth 2 (two) times a day   • traZODone (DESYREL) 50 mg tablet Take 1 tablet (50 mg total) by mouth daily at bedtime   • valsartan (DIOVAN) 160 mg tablet Take 160 mg by mouth 2 (two) times a day   • [DISCONTINUED] Acetaminophen 325 MG CAPS Take 650 mg by mouth as needed     • [DISCONTINUED] betamethasone, augmented, (DIPROLENE-AF) 0.05 % cream apply twice daily to rash x 2 weeks  then Saturday and Sunday only   • [DISCONTINUED] calcipotriene (DOVONEX) 0.005 % cream apply to affected area twice a day ON MONDAY THROUGH FRIDAY   • [DISCONTINUED] clonazePAM (KlonoPIN) 0.5 mg tablet clonazepam 0.5 mg tablet   take 1 tablet by mouth at bedtime or every 12 hours if needed for anxiety and PANIC   • [DISCONTINUED] furosemide (LASIX) 20 mg tablet furosemide 20 mg tablet   take 1 tablet by mouth every morning if needed for EDEMA   • [DISCONTINUED] guaiFENesin (MUCINEX) 600 mg 12 hr tablet Take 1,200 mg by mouth every 12 (twelve) hours   • [DISCONTINUED] HYDROcodone Bit-Homatrop MBr (HYCODAN) 5-1.5 mg/5 mL syrup Hydromet 5 mg-1.5 mg/5 mL oral syrup   • [DISCONTINUED] methocarbamol (ROBAXIN) 750 mg tablet Take 750 mg by mouth 3 (three) times a day as needed   • [DISCONTINUED] oxybutynin (DITROPAN) 5 mg tablet Take 1 tablet (5 mg total) by mouth 2 (two) times a day Take 1 to 2 tablets by mouth once daily before bed. • [DISCONTINUED] tadalafil (CIALIS) 20 MG tablet Take 1 tablet (20 mg total) by mouth daily as needed for erectile dysfunction   • [DISCONTINUED] TIZANIDINE HCL PO Take 4 mg by mouth as needed    • [DISCONTINUED] traMADol (ULTRAM) 50 mg tablet Take 100 mg by mouth as needed      No current facility-administered medications on file prior to visit. He is allergic to latex, other, pollen extract, and shellfish-derived products - food allergy. .    Review of Systems   Constitutional: Negative for fatigue. Respiratory: Negative for shortness of breath. Cardiovascular: Negative for chest pain and palpitations. Gastrointestinal: Negative for abdominal pain, constipation and diarrhea. Endocrine: Negative for cold intolerance and heat intolerance. Genitourinary: Positive for frequency. Negative for difficulty urinating. Musculoskeletal: Positive for arthralgias and back pain. Skin: Negative for rash.    Neurological: Negative for headaches. Psychiatric/Behavioral: Negative for dysphoric mood. The patient is not nervous/anxious. Objective:    /69   Pulse 57   Resp 17   Ht 5' 9" (1.753 m)   Wt 125 kg (275 lb 6.4 oz)   BMI 40.67 kg/m²     Physical Exam  Vitals and nursing note reviewed. Constitutional:       General: He is not in acute distress. Appearance: He is well-developed. He is obese. HENT:      Head: Normocephalic and atraumatic. Eyes:      Conjunctiva/sclera: Conjunctivae normal.   Neck:      Thyroid: No thyromegaly. Pulmonary:      Effort: Pulmonary effort is normal. No respiratory distress. Skin:     Findings: No rash (visible). Neurological:      Mental Status: He is alert and oriented to person, place, and time.    Psychiatric:         Behavior: Behavior normal.

## 2023-09-19 NOTE — ASSESSMENT & PLAN NOTE
-Discussed options of HealthyCORE-Intensive Lifestyle Intervention Program, Very Low Calorie Diet-VLCD and Conservative Program and the role of weight loss medications. Explained the importance of making lifestyle changes if utilizing medication to aid in weight loss  -Initial weight loss goal of 5-10% weight loss for improved health  -Screening labs and records reviewed from prior      -Patient is interested in pursuing healthycore    Goals:  -Discussed trying to increase water intake during the day and drink 1 vodka at night instead of 2  -to start back at the gym prior to first healthycore visit    He maybe interested in medication options such as wegovy for weight loss. Discussed medication is typically used long term due to likely weight regain. He will consider it. He is unsure about coverage though and we discussed availability. Side effects discussed also. Patient denies personal and family history of MCT and MEN2 tumors. Patient denies personal history of pancreatitis. Side effects discussed but not limited to diarrhea, bloating, constipation, GI upset, pancreatitis.     Initial Weight:275lb  Goal Weight:225lb

## 2023-09-19 NOTE — PATIENT INSTRUCTIONS
Semaglutide (By injection)   Semaglutide (fmn-q-LORT-tide)  Treats type 2 diabetes. Lowers the risk of heart attack, stroke, or death in patients with type 2 diabetes and heart or blood vessel disease. Also used to help lose weight and keep the weight off in patients with obesity caused by certain conditions. Brand Name(s): Ozempic 0.25 MG or 0.5 MG Doses, Ozempic 1 MG Doses, Ozempic 2 MG Doses, Wegovy   There may be other brand names for this medicine. When This Medicine Should Not Be Used: This medicine is not right for everyone. Do not use it if you had an allergic reaction to semaglutide, or if you have multiple endocrine neoplasia syndrome type 2 (MEN 2) or if you or anyone in your family has had medullary thyroid cancer. How to Use This Medicine:   Injectable  Your doctor will prescribe your exact dose and tell you how often it should be given. This medicine is given as a shot under your skin. It is given into your stomach, thigh, or upper arm. You may be taught how to give your medicine at home. Make sure you understand all instructions before giving yourself an injection. Do not use more medicine or use it more often than your doctor tells you to. If you use insulin in addition to this medicine, do not mix them into the same syringe. You may give the shots in the same area (including your stomach), but do not give the shots right next to each other. Check the liquid in the pen. It should be clear and colorless. Do not use it if it is cloudy, discolored, or has particles in it. You will be shown the body areas where this shot can be given. Use a different body area each time you give yourself a shot. Keep track of where you give each shot to make sure you rotate body areas. Use a new needle and syringe each time you inject your medicine. Never share medicine pens with others under any circumstances. Sharing needles or pens can result in transmission of infection.   This medicine should come with a Medication Guide. Ask your pharmacist for a copy if you do not have one. Missed dose:   Ozempic®: If you miss a dose of this medicine, use it as soon as possible within 5 days after the missed dose. If you miss a dose for more than 5 days, skip the missed dose and go back to your regular dosing schedule. Wegovy™: If you miss a dose, and the next scheduled dose is more than 2 days away, use it as soon as possible. If you miss a dos, and the next scheduled dose is less than 2 days away, skip the missed dose and go back to your regular dosing schedule. If you miss a dose of this medicine for more than 2 weeks, use it on the next scheduled dose. Ask your doctor about how to restart your treatment. Store your new, unused medicine pen in its original carton in the refrigerator. Do not freeze. You may store the opened Ozempic® pen in the refrigerator or at room temperature for 56 days or the opened [facility] pen in the refrigerator or at room temperature for 28 days. Throw away the pen after you use it for 56 days for Ozempic® or 28 days for [facility], even if it still has medicine in it. Drugs and Foods to Avoid:   Ask your doctor or pharmacist before using any other medicine, including over-the-counter medicines, vitamins, and herbal products. Some medicines may affect how semaglutide works. Tell your doctor if you are using other diabetes medicine (including glimepiride, glipizide, glyburide, or insulin) or any oral medicine. Warnings While Using This Medicine:   Tell your doctor if you are pregnant or planning to become pregnant. Do not use this medicine for at least 2 months before you plan to become pregnant. Tell your doctor if you are breastfeeding, or if you have kidney disease, pancreas problems, diabetes, digestion problems, or a history of diabetic retinopathy or depression.   This medicine may cause the following problems:  Increased risk of thyroid tumor  Pancreatitis (swelling of the pancreas)  Gallbladder problems, including cholelithiasis, cholecystitis  Low blood sugar (when used with other diabetes medicine)  Kidney problems  Eye or vision problems, including diabetic retinopathy  Increased heart rate  Increased risk for depression or thoughts of suicide  Your doctor will do lab tests at regular visits to check on the effects of this medicine. Keep all appointments. Keep all medicine out of the reach of children. Never share your medicine with anyone. Possible Side Effects While Using This Medicine:   Call your doctor right away if you notice any of these side effects: Allergic reaction: Itching or hives, swelling in your face or hands, swelling or tingling in your mouth or throat, chest tightness, trouble breathing  Blurred vision or any other change in vision  Change in how much or how often you urinate, lower back or side pain, blood in your urine  Shaking, trembling, sweating, fast or pounding heartbeat, lightheadedness, hunger, confusion  Sudden and severe stomach pain, nausea, vomiting, fever, passing gas, stomach upset or bloating, yellow eyes or skin  Unusual moods or behaviors, depression, thoughts of hurting yourself or others  If you notice these less serious side effects, talk with your doctor:   Constipation, diarrhea  Headache, dizziness  Redness, itching, bump, swelling, or any changes in your skin where the shot was given  Tiredness  If you notice other side effects that you think are caused by this medicine, tell your doctor. Call your doctor for medical advice about side effects. You may report side effects to FDA at 5-206-FDA-8695  © [employer] [facility] 2022 Information is for End User's use only and may not be sold, redistributed or otherwise used for commercial purposes. The above information is an  only. It is not intended as medical advice for individual conditions or treatments.  Talk to your doctor, nurse or pharmacist before following any medical regimen to see if it is safe and effective for you.

## 2023-09-20 ENCOUNTER — TELEPHONE (OUTPATIENT)
Dept: BARIATRICS | Facility: CLINIC | Age: 73
End: 2023-09-20

## 2023-09-20 NOTE — TELEPHONE ENCOUNTER
----- Message from Fei Cháveznhreyes Cummings sent at 9/20/2023  6:55 AM EDT -----  Regarding: FW: wegovy    ----- Message -----  From: Taurus Jackson PA-C  Sent: 9/19/2023   4:46 PM EDT  To: #  Subject: wegovy                                           I put in wegovy for him. Usually with medicare it is not covered but epic is coming up with PA. Can we try to get PA and if it is covered let me know.   thanks

## 2023-10-23 NOTE — PROGRESS NOTES
Weight Management Medical Nutrition Assessment  Camilla Encarnacion presented for the [employer] with the [facility].  Today's weight is 277.4#. Patient reports he lost approximately 90# on his own intermittent fasting. He states fasting works well for him. Currently inconsistent. Does not fast daily. RD explained the benefits of interval eating especially when exercising. Recently joined [employer] and met with  to review workout plan. Plan for 30 minutes of cardio and stretching. Craves sweets. Per dietary recall patient consumes excess calories from portion sizes with dinner and alcoholic beverages. This is likely a result of fasting during the day. RD encouraged patient to decrease protein portion size with dinner as current protein intake is straining on kidneys. Patient agreeable to add evening snack with protein to ensure fullness at end of the day. Reviewed portion size goals. Discussed snack before and after exercising. Hydration inadequate. Recommend patient increase water intake to meet hydration goals. We developed and reviewed a low calorie meal plan. Unable to complete body composition due to machine error. Will complete at f/u visit.      Patient seen by Medical Provider in past 6 months:  yes  Requested to schedule appointment with Medical Provider: No      Anthropometric Measurements  Start Weight (#): 277.4#  Current Weight (#): 277.4#  TBW % Change from start weight: n/a  Ideal Body Weight (#): 160#  Goal Weight (#): 225#  Highest:  Lowest:    Weight Loss History  Previous weight loss attempts: Exercise  Self Created Diets (Portion Control, Healthy Food Choices, etc.)  Intermittent Fasting    Food and Nutrition Related History  Wake up: 8AM   Bed Time: 10PM     (Currently fasting since 6PM yesterday- does not fast daily)  Food Recall  Breakfast: coffee with 2 tsp sugar and light cream    Snack: skip  Lunch: skip  Snack: skip  Dinner: 6PM chilli OR 12-16oz protein, vegetables, salad, and 2 vodka  Snack: skip    Beverages: 20oz water, 1 cup coffee with sugar and cream, alcohol (2 vodka with flavored seltzer)  Volume of beverage intake: approximately 44oz overall, 20oz water    Weekends: Same (tries to fast)  Cravings: sweets  Trouble area of day: did not eat yet today    Frequency of Eating out: irregularly  Food restrictions: not at this time  Cooking: self or partner  Food Shopping: self or partner    Physical Activity Intake  Activity: Cardio (member at [employer])  Frequency: none currently but plans to go after this session (hopes to exercise 5 days weekly)  Physical limitations/barriers to exercise: back pain, right hip pain (meets with pain management)    Estimated Needs  Energy  SECA: BMR: n/a      X 1.3 -1000 =  [employer] Needs: BMR : 1,993   1-2# loss weekly sedentary:  6,529-0,476             1-2# loss weekly lightly active: 1,740-2,240  Maintenance calories for sedentary activity level: 2,391  Protein: 87-109g      (1.2-1.5g/kg IBW)  Fluid:  85oz     (35mL/kg IBW)    Nutrition Diagnosis  Yes;     Overweight/obesity  related to Food and nutrition related knowledge deficit as evidenced by  BMI more than normative standard for age and sex (obesity-grade III 36+)       Nutrition Intervention    Nutrition Prescription  Calories: 1,700 calories (+200 calories on active days)  Protein: 87-109g  Fluid: 85oz    Meal Plan ([employer])  Breakfast: 300/20-30  Snack:  Lunch: 600/30  Snack:  Dinner: 600/30  Snack: 200/5-10    Nutrition Education:    Healthy Core Manual  Calorie controlled menu  Lean protein food choices  Healthy snack options  Food journaling tips      Nutrition Counseling:  Strategies: meal planning, portion sizes, healthy snack choices, hydration, fiber intake, protein intake, exercise, food journal      Monitoring and Evaluation:  Evaluation criteria:  Energy Intake  Meet protein needs  Maintain adequate hydration  Monitor weekly weight  Meal planning/preparation  Food journal   Decreased portions at mealtimes and snacks  Physical activity     Barriers to learning:none  Readiness to change: Preparation:  (Getting ready to change)   Comprehension: good  Expected Compliance: good

## 2023-10-30 ENCOUNTER — OFFICE VISIT (OUTPATIENT)
Dept: BARIATRICS | Facility: CLINIC | Age: 73
End: 2023-10-30

## 2023-10-30 VITALS — HEIGHT: 69 IN | BODY MASS INDEX: 41.09 KG/M2 | WEIGHT: 277.4 LBS

## 2023-10-30 DIAGNOSIS — R63.5 ABNORMAL WEIGHT GAIN: Primary | ICD-10-CM

## 2023-10-30 PROCEDURE — WMPRO12

## 2023-10-30 PROCEDURE — RECHECK

## 2023-11-07 NOTE — PROGRESS NOTES
Weight Management Medical Nutrition Assessment  José Miguel Muir presented for month 1 f/u with the [facility]. Today's weight is 275.9#. He lost 1.5# x 2 weeks (0.6%TBW). Completed a body composition using SECA scale. Unable to review results due to limited time. Will review at f/u. Patient aware. He is exercising more often at [employer]. He hopes to go 5 days per week. He will sometimes eat lunch but not daily. Fasting some days. RD explained importance of meals during the day vs saving most calories for evening. Reports alcohol consumption varies from 1-2 drinks. His significant other works late during the week and he will order take out or cook for himself. Patient agreeable to incorporate more meals and snacks mid-day.     Patient seen by Medical Provider in past 6 months:  yes  Requested to schedule appointment with Medical Provider: No      Anthropometric Measurements  Start Weight (#): 277.4#  Current Weight (#): 275.9#  TBW % Change from start weight: 0.6%  Ideal Body Weight (#): 160#  Goal Weight (#): 225#  Highest:  Lowest:    Weight Loss History  Previous weight loss attempts: Exercise  Self Created Diets (Portion Control, Healthy Food Choices, etc.)  Intermittent Fasting    Food and Nutrition Related History  Wake up: 8AM   Bed Time: 10PM     Food Recall  Breakfast: coffee with 2 tsp sugar and light cream    Snack: protein bar  Lunch: skip OR pre-made tuna sandwich   Snack: skip  Dinner: 6PM salad, protein, unmeasured dressing and usually 1-2 vodka  Snack: skip    Beverages: 20oz water, 1 cup coffee with 2 tsp sugar and cream, alcohol (2 vodka with flavored seltzer)  Volume of beverage intake: approximately 44oz overall, 20oz water    Weekends: Same (tries to fast)  Cravings: sweets  Trouble area of day: did not eat yet today    Frequency of Eating out: irregularly  Food restrictions: not at this time  Cooking: self or partner  Food Shopping: self or partner    Physical Activity Intake  Activity: Cardio (member at [employer])  Frequency: none currently but plans to go after this session (hopes to exercise 5 days weekly)  Physical limitations/barriers to exercise: back pain, right hip pain (meets with pain management)    Estimated Needs  Energy  SECA: BMR: n/a      X 1.3 -1000 =  [employer] Needs: BMR : 1,993   1-2# loss weekly sedentary:  8,601-1,917             1-2# loss weekly lightly active: 1,740-2,240  Maintenance calories for sedentary activity level: 2,391  Protein: 87-109g      (1.2-1.5g/kg IBW)  Fluid:  85oz     (35mL/kg IBW)    Nutrition Diagnosis  Yes;     Overweight/obesity  related to Food and nutrition related knowledge deficit as evidenced by  BMI more than normative standard for age and sex (obesity-grade III 36+)       Nutrition Intervention    Nutrition Prescription  Calories: 1,700 calories (+200 calories on active days)  Protein: 87-109g  Fluid: 85oz    Meal Plan ([employer])  Breakfast: 300/20-30  Snack:  Lunch: 600/30  Snack:  Dinner: 600/30  Snack: 200/5-10    Nutrition Education:    Healthy Core Manual  Calorie controlled menu  Lean protein food choices  Healthy snack options  Food journaling tips      Nutrition Counseling:  Strategies: meal planning, portion sizes, healthy snack choices, hydration, fiber intake, protein intake, exercise, food journal      Monitoring and Evaluation:  Evaluation criteria:  Energy Intake  Meet protein needs  Maintain adequate hydration  Monitor weekly weight  Meal planning/preparation  Food journal   Decreased portions at mealtimes and snacks  Physical activity     Barriers to learning:none  Readiness to change: Action:  (Changing behavior)  Comprehension: good  Expected Compliance: good

## 2023-11-15 ENCOUNTER — TELEPHONE (OUTPATIENT)
Dept: BARIATRICS | Facility: CLINIC | Age: 73
End: 2023-11-15

## 2023-11-15 NOTE — TELEPHONE ENCOUNTER
----- Message from [address] [place], [place] [address]SHELBY sent at 11/15/2023  1:00 PM EST -----  Regarding: wegovy  Can you let him know his insurance will not cover the wegovy. They wont cover weight loss meds.  They mailed me a letter to my house which I just got this last week

## 2023-11-16 ENCOUNTER — OFFICE VISIT (OUTPATIENT)
Dept: BARIATRICS | Facility: CLINIC | Age: 73
End: 2023-11-16

## 2023-11-16 VITALS — WEIGHT: 275.9 LBS | HEIGHT: 69 IN | BODY MASS INDEX: 40.87 KG/M2

## 2023-11-16 DIAGNOSIS — R63.5 ABNORMAL WEIGHT GAIN: Primary | ICD-10-CM

## 2023-11-16 PROCEDURE — RECHECK

## 2023-11-21 ENCOUNTER — CLINICAL SUPPORT (OUTPATIENT)
Dept: BARIATRICS | Facility: CLINIC | Age: 73
End: 2023-11-21

## 2023-11-21 VITALS — HEIGHT: 69 IN | BODY MASS INDEX: 40.23 KG/M2 | WEIGHT: 271.6 LBS

## 2023-11-21 DIAGNOSIS — R63.5 ABNORMAL WEIGHT GAIN: Primary | ICD-10-CM

## 2023-11-21 PROCEDURE — RECHECK

## 2023-12-27 ENCOUNTER — OFFICE VISIT (OUTPATIENT)
Dept: CARDIOLOGY CLINIC | Facility: CLINIC | Age: 73
End: 2023-12-27
Payer: MEDICARE

## 2023-12-27 VITALS
WEIGHT: 270 LBS | BODY MASS INDEX: 39.99 KG/M2 | HEIGHT: 69 IN | SYSTOLIC BLOOD PRESSURE: 132 MMHG | HEART RATE: 52 BPM | DIASTOLIC BLOOD PRESSURE: 70 MMHG | OXYGEN SATURATION: 94 %

## 2023-12-27 DIAGNOSIS — R73.01 IMPAIRED FASTING GLUCOSE: Primary | ICD-10-CM

## 2023-12-27 DIAGNOSIS — I25.10 CORONARY ARTERY DISEASE INVOLVING NATIVE CORONARY ARTERY OF NATIVE HEART WITHOUT ANGINA PECTORIS: ICD-10-CM

## 2023-12-27 DIAGNOSIS — I10 ESSENTIAL HYPERTENSION: ICD-10-CM

## 2023-12-27 DIAGNOSIS — I45.2 BIFASCICULAR BLOCK: ICD-10-CM

## 2023-12-27 PROCEDURE — 99214 OFFICE O/P EST MOD 30 MIN: CPT | Performed by: INTERNAL MEDICINE

## 2023-12-27 NOTE — PROGRESS NOTES
Cardiology Follow Up    Richard M Goldberg  1950  [id number]  [facility] [place] [address] [facility] [address] [phone]    1. Impaired fasting glucose  Hemoglobin A1C      2. Coronary artery disease involving native coronary artery of native heart without angina pectoris        3. Bifascicular block        4. Essential hypertension            Discussion/Summary:    1. CAD: prior NSTEMI 6/2019. PCI to the 2nd OM, other disease as noted. We did a stress test last year. His symptoms seem more related to anxiety than to angina. He can discontinue Plavix and continue aspirin alone since he has been on dual antiplatelet therapy for quite some time, now.    2. HTN: Blood pressure remains controlled currently with his regimen.    3. Bifascicular block: Seen on ECG. No symptoms of dizziness, lightheadedness.    4. HL: We reviewed his myalgias previously. His lipid panel is well-controlled. He continues to have some muscle aches. Blood work is to be done. Would recommend continuing Crestor, as his lipid panel is currently controlled. Repeat lipid panel to be done.    5. Last blood work showed impaired fasting glucose: Check hemoglobin A1c with the upcoming blood work. He tried to get wegovy, but this was not covered by his current insurance. He is going to likely need to change insurances.    Previous History:  73-year-old gentleman.  He has coronary disease identified in June of 2019. He had an NSTEMI.  EF was preserved.  He had a stent to the 2nd OM, 60% 1st diagonal, 60% circ, 50% mid RCA.    He is on aspirin and Plavix.    He has a baseline left anterior fascicular block, RBBB. Sinus bradycardia, but no symptoms.  He has hypertension as well, tends to be worse with anxiety but of late has been better controlled.    Interval History:    Last year, he complained of some symptoms of chest pain but stress test was unremarkable. It seems that  this was related to anxiety.    He has back pain and issues there. He is getting back to the gym and denies any major symptoms or limitations. His heart rate gets up to the 130s with the elliptical.    Lots of stress with wife and family. Tends to worsen his anxiety overall.    Blood pressure has been controlled.    Problem List       Anxiety    BPH with obstruction/lower urinary tract symptoms    Elevated glucose    Erectile dysfunction of non-organic origin    GERD without esophagitis    Hyperlipidemia    Essential hypertension    Obesity (BMI 30.0-34.9)    Obstructive sleep apnea    Osteoarthritis    Psoriasis    Bifascicular block    Calculus of kidney    Primary insomnia    GERD (gastroesophageal reflux disease)    Low back pain    Alcohol use    Dyslipidemia    Coronary artery disease involving native coronary artery of native heart without angina pectoris          Past Medical History:   Diagnosis Date   • CAD (coronary artery disease)     s/p stent x1 on 6/19/19   • Closed fracture of distal end of fibula with routine healing     unspecified fracture morphology, unspecified laterality, subsequent encounter; Last Assessed:12/27/16   • GERD (gastroesophageal reflux disease)    • Heart attack (HCC)    • Hypertension    • Kidney stones    • Nocturia    • Psoriasis    • Sleep apnea      Social History     Tobacco Use   • Smoking status: Never   • Smokeless tobacco: Never   Substance Use Topics   • Alcohol use: Yes     Comment: 1 drink daily   • Drug use: No      Family History   Problem Relation Age of Onset   • Arthritis Mother    • Hypertension Mother    • Irritable bowel syndrome Mother    • Osteoporosis Mother    • Arthritis Other      Past Surgical History:   Procedure Laterality Date   • APPENDECTOMY     • CARPAL TUNNEL RELEASE     • ROTATOR CUFF REPAIR     • SHOULDER ARTHROSCOPY Left     Last ASsessed:10/24/16   • TOTAL HIP ARTHROPLASTY     • TRIGGER FINGER RELEASE         Current Outpatient Medications:    •  aspirin (ECOTRIN LOW STRENGTH) 81 mg EC tablet, Take 1 tablet (81 mg total) by mouth daily, Disp: 30 tablet, Rfl: 0  •  fexofenadine-pseudoephedrine (Allegra-D Allergy & Congestion) 180-240 MG per 24 hr tablet, , Disp: , Rfl:   •  fluticasone (FLONASE) 50 mcg/act nasal spray, 2 sprays into each nostril 2 (two) times a day as needed for rhinitis, Disp: , Rfl:   •  hydrochlorothiazide (HYDRODIURIL) 12.5 mg tablet, hydrochlorothiazide 12.5 mg tablet, Disp: , Rfl:   •  HYDROcodone-acetaminophen (ZOLVIT)  mg/15 mL solution, 10 mg, Disp: , Rfl:   •  loratadine (CLARITIN) 10 mg tablet, Take 10 mg by mouth daily, Disp: , Rfl:   •  methocarbamol (ROBAXIN) 750 mg tablet, methocarbamol 750 mg tablet  take 1 tablet by mouth three times a day if needed for muscle spasm, Disp: , Rfl:   •  NIFEdipine ER (ADALAT CC) 30 MG 24 hr tablet, Take 30 mg by mouth 2 (two) times a day, Disp: , Rfl:   •  nitroglycerin (NITROSTAT) 0.4 mg SL tablet, Place 1 tablet (0.4 mg total) under the tongue every 5 (five) minutes as needed for chest pain, Disp: 25 tablet, Rfl: 3  •  Omega-3 Fatty Acids (fish oil) 1,000 mg, Take 1,000 mg by mouth daily, Disp: , Rfl:   •  omeprazole (PriLOSEC) 20 mg delayed release capsule, Take 1 capsule (20 mg total) by mouth daily, Disp: 90 capsule, Rfl: 0  •  oxyCODONE-acetaminophen (PERCOCET) 5-325 mg per tablet, take 1 tablet by mouth every 8 hours if needed for MODERATE PAIN (PAIN SCORE 4-6), Disp: , Rfl:   •  rosuvastatin (CRESTOR) 20 MG tablet, Take 1 tablet by mouth daily, Disp: , Rfl:   •  tamsulosin (FLOMAX) 0.4 mg, Take 1 capsule (0.4 mg total) by mouth 2 (two) times a day, Disp: 60 capsule, Rfl: 3  •  traZODone (DESYREL) 50 mg tablet, Take 1 tablet (50 mg total) by mouth daily at bedtime, Disp: 90 tablet, Rfl: 1  •  valsartan (DIOVAN) 160 mg tablet, Take 160 mg by mouth 2 (two) times a day, Disp: , Rfl:   Allergies   Allergen Reactions   • Latex Hives   • Other    • Shellfish-Derived Products - Food  "Allergy      [place]ab     Vitals:    12/27/23 1138   BP: 132/70   BP Location: Left arm   Patient Position: Sitting   Cuff Size: Large   Pulse: (!) 52   SpO2: 94%   Weight: 122 kg (270 lb)   Height: 5' 9\" (1.753 m)     Vitals:    12/27/23 1138   Weight: 122 kg (270 lb)      Height: 5' 9\" (175.3 cm)   Body mass index is 39.87 kg/m².    Physical Exam:  GEN: Richard M Goldberg appears well, alert and oriented x 3, pleasant and cooperative   HEENT: pupils equal, round, and reactive to light; extraocular muscles intact  NECK: supple, no carotid bruits   HEART: regular rhythm, normal S1 and S2, no murmurs, clicks, gallops or rubs   LUNGS: clear to auscultation bilaterally; no wheezes, rales, or rhonchi   ABDOMEN: normal bowel sounds, soft, no tenderness, no distention  EXTREMITIES: peripheral pulses normal; no clubbing, cyanosis, or edema  NEURO: no focal findings   SKIN: normal without suspicious lesions on exposed skin    ROS:  Chest pain, stress, anxiety. Hip pain, back pain  Except as noted in HPI, is otherwise reviewed in detail and a 12 point review of systems is negative.  ROS reviewed and is unchanged    Labs:  Lab Results   Component Value Date    K 4.5 02/11/2023     02/11/2023    CREATININE 0.98 02/11/2023    BUN 16 02/11/2023    CO2 30 02/11/2023    ALT 30 02/11/2023    AST 19 02/11/2023    INR 0.98 06/18/2019    GLUF 118 (H) 02/11/2023    HGBA1C 5.2 06/19/2019    WBC 5.78 02/11/2023    HGB 14.8 02/11/2023    HCT 44.4 02/11/2023     02/11/2023     No results found for: \"CHOL\"  Lab Results   Component Value Date    LDLCALC 53 02/11/2023    LDLCALC 44 04/09/2021    LDLCALC 50 01/03/2020     Lab Results   Component Value Date    HDL 52 02/11/2023    HDL 66 04/09/2021    HDL 53 01/03/2020     Lab Results   Component Value Date    TRIG 92 02/11/2023    TRIG 50 04/09/2021    TRIG 43 01/03/2020     Testing:  Stress 2/24/23  Stress ECG: No ST deviation is noted. The ECG was not diagnostic due to " pharmacological (vasodilator) stress.  •  Stress Function: Left ventricular function post-stress is normal.  •  Perfusion: There are no perfusion defects.  •  Stress Combined Conclusion: Left ventricular perfusion is normal.       Stress Test 8/5/20:  SUMMARY:  -  Stress results: Duration of exercise was 7 min and 30 sec. Maximal work rate was 9.3 METs. Maximal heart rate during stress was 127 bpm ( 84 % of maximal predicted heart rate). Target heart rate was not achieved. There was normal  resting blood pressure with a hypertensive response to stress. The systolic blood pressure increased by 90 mmHg during peak stress. There was no chest pain during stress.  -  ECG conclusions: The stress ECG was negative for ischemia and normal.  -  Perfusion imaging: The left ventricle was mildly dilated. There was a moderate-sized, severe, fixed myocardial perfusion defect of the basal to mid inferior wall.  -  Gated SPECT: The calculated left ventricular ejection fraction was 54 %. Left ventricular ejection fraction appeared to be at the lower limits of normal. There was severely reduced myocardial thickening and motion of the basal inferior  wall of the left ventricle.     IMPRESSIONS: Abnormal study after maximal exercise. There was a small to medium sized infarct in the mid to basal inferior region. Overall left ventricular systolic function was preserved, but there were regional wall motion abnormalities.  Diagnostic sensitivity was limited by submaximal stress. Patient achieved 84% of maximum age predicted heart rate. The patient had a hypertensive response to exercise, increasing the systolic blood pressure by 90 mmHg from baseline.    Echo 7/7/20:  LEFT VENTRICLE:  Systolic function was normal. Ejection fraction was estimated to be 60 %.  There were no regional wall motion abnormalities.  Doppler parameters were consistent with abnormal left ventricular relaxation (grade 1 diastolic dysfunction).     MITRAL VALVE:  There  was mild annular calcification.  There was mild to moderate regurgitation.     AORTIC VALVE:  There was mild regurgitation.     TRICUSPID VALVE:  There was trace regurgitation.    Cardiac Cath 6/2019  CORONARY CIRCULATION:  1st diagonal: There was a tubular 60 % stenosis.  2nd obtuse marginal: There was a 99 % stenosis. There was ROSIE grade 2 flow through the vessel (partial perfusion). This lesion is a likely culprit for the patient's recent myocardial infarction. An intervention was performed.  Left AV groove artery: There was a discrete 60 % stenosis.  Mid RCA: There was a 50 % stenosis.     1ST LESION INTERVENTIONS:  A successful balloon angioplasty with stent procedure was performed on the 99 % lesion in the 2nd obtuse marginal. Following intervention there was an excellent angiographic appearance with a 0 % residual stenosis.  A Xience Mamie Rx 2.75 x 18mm drug-eluting stent was placed across the lesion and deployed at a maximum inflation pressure of 16 pam.

## 2024-02-09 ENCOUNTER — OFFICE VISIT (OUTPATIENT)
Dept: PHYSICAL THERAPY | Facility: MEDICAL CENTER | Age: 74
End: 2024-02-09
Payer: MEDICARE

## 2024-02-09 DIAGNOSIS — M54.50 CHRONIC BILATERAL LOW BACK PAIN WITHOUT SCIATICA: Primary | ICD-10-CM

## 2024-02-09 DIAGNOSIS — G89.29 CHRONIC BILATERAL LOW BACK PAIN WITHOUT SCIATICA: Primary | ICD-10-CM

## 2024-02-09 PROCEDURE — 97161 PT EVAL LOW COMPLEX 20 MIN: CPT | Performed by: PHYSICAL THERAPIST

## 2024-02-09 NOTE — PROGRESS NOTES
PT Evaluation     Today's date: 2024  Patient name: Richard M Goldberg  : 1950  MRN: [id number]  Referring provider: Daniel Montero,*  Dx:   Encounter Diagnosis     ICD-10-CM    1. Chronic bilateral low back pain without sciatica  M54.50     G89.29                      Assessment/Plan    Subjective Evaluation    History of Present Illness  Mechanism of injury: Richard M Goldberg is a 73 y.o. male presenting to therapy with complaints of chronic back pain.  He has long history of back pain, multiple injections and trigger point injections with only temporary relief.  He manages through massage.   Currently he notes that his back is more flared up than usual, very stiff in the morning making movement difficult.  He was more active in the gym in the past and would like to return  Patient Goals  Patient goals for therapy: decreased pain, increased motion, return to sport/leisure activities, independence with ADLs/IADLs and increased strength    Pain  Current pain ratin  At best pain rating: 3  At worst pain ratin  Quality: dull ache, discomfort, pressure, tight and squeezing          Objective  Red Flag Screening:  History Cancer (-)  Bowl/Bladder dysfunction (-)  Night pain (-)  Unexplained weight loss (-)     Dermatomes:  WNL   Myotomes:  WNL    Reflexes:   Patellar R 2+, L 2+  Achilles R 2+, L 2+      Lumbar:  AROM:   Flexion Mod  Extension Mod  Side bending Mod   Rotation Mod    Repeated Motion Testing: not tested    Active Motion Observations: unremarkable       PAIVM: Hypomobility with notable muscle spasm     Special Tests:   Crossed SLR (-), SLR (-) Prone instability (-)   Neural Dynamic Testing: Tibial Bias (-), Peroneal Bias (-)   Slump Testing (-) with and without axial compression       Hip   WFL, notable trigger points throughout   Special Tests: OMARI (-), FADIR (-)                         Precautions: None      Manuals             MFR lumbar paraspoinals                                                      Neuro Re-Ed                                                                                                        Ther Ex                                                                                                                     Ther Activity                                       Gait Training                                       Modalities

## 2024-02-09 NOTE — LETTER
2024    Roland Valle DO  [address]  [address]  [facility] [address]    Patient: Richard M Goldberg   YOB: 1950   Date of Visit: 2024     Encounter Diagnosis     ICD-10-CM    1. Chronic bilateral low back pain without sciatica  M54.50     G89.29           Dear Dr. Valle:    Thank you for your recent referral of Richard M Goldberg. Please review the attached evaluation summary from Sp's recent visit.     Please verify that you agree with the plan of care by signing the attached order.     If you have any questions or concerns, please do not hesitate to call.     I sincerely appreciate the opportunity to share in the care of one of your patients and hope to have another opportunity to work with you in the near future.       Sincerely,    Leon Pollack, PT      Referring Provider:      I certify that I have read the below Plan of Care and certify the need for these services furnished under this plan of treatment while under my care.                    Roland Valle DO  [address]  [address]  [facility] [address]  Via Fax: [phone]          PT Evaluation     Today's date: 2024  Patient name: Richard M Goldberg  : 1950  MRN: [id number]  Referring provider: Daniel Montero,*  Dx:   Encounter Diagnosis     ICD-10-CM    1. Chronic bilateral low back pain without sciatica  M54.50     G89.29                      Assessment/Plan    Subjective Evaluation    History of Present Illness  Mechanism of injury: Richard M Goldberg is a 73 y.o. male presenting to therapy with complaints of chronic back pain.  He has long history of back pain, multiple injections and trigger point injections with only temporary relief.  He manages through massage.   Currently he notes that his back is more flared up than usual, very stiff in the morning making movement difficult.  He was more active in the gym in the past and would like to return  Patient Goals  Patient  goals for therapy: decreased pain, increased motion, return to sport/leisure activities, independence with ADLs/IADLs and increased strength    Pain  Current pain ratin  At best pain rating: 3  At worst pain ratin  Quality: dull ache, discomfort, pressure, tight and squeezing          Objective  Red Flag Screening:  History Cancer (-)  Bowl/Bladder dysfunction (-)  Night pain (-)  Unexplained weight loss (-)     Dermatomes:  WNL   Myotomes:  WNL    Reflexes:   Patellar R 2+, L 2+  Achilles R 2+, L 2+      Lumbar:  AROM:   Flexion Mod  Extension Mod  Side bending Mod   Rotation Mod    Repeated Motion Testing: not tested    Active Motion Observations: unremarkable       PAIVM: Hypomobility with notable muscle spasm     Special Tests:   Crossed SLR (-), SLR (-) Prone instability (-)   Neural Dynamic Testing: Tibial Bias (-), Peroneal Bias (-)   Slump Testing (-) with and without axial compression       Hip   WFL, notable trigger points throughout   Special Tests: OMARI (-), FADIR (-)                         Precautions: None      Manuals             MFR lumbar paraspoinals                                                     Neuro Re-Ed                                                                                                        Ther Ex                                                                                                                     Ther Activity                                       Gait Training                                       Modalities

## 2024-02-12 ENCOUNTER — APPOINTMENT (OUTPATIENT)
Dept: PHYSICAL THERAPY | Facility: MEDICAL CENTER | Age: 74
End: 2024-02-12
Payer: MEDICARE

## 2024-02-14 ENCOUNTER — OFFICE VISIT (OUTPATIENT)
Dept: PHYSICAL THERAPY | Facility: MEDICAL CENTER | Age: 74
End: 2024-02-14
Payer: MEDICARE

## 2024-02-14 DIAGNOSIS — G89.29 CHRONIC BILATERAL LOW BACK PAIN WITHOUT SCIATICA: Primary | ICD-10-CM

## 2024-02-14 DIAGNOSIS — M54.50 CHRONIC BILATERAL LOW BACK PAIN WITHOUT SCIATICA: Primary | ICD-10-CM

## 2024-02-14 PROCEDURE — 97110 THERAPEUTIC EXERCISES: CPT | Performed by: PHYSICAL THERAPIST

## 2024-02-14 PROCEDURE — 97140 MANUAL THERAPY 1/> REGIONS: CPT | Performed by: PHYSICAL THERAPIST

## 2024-02-14 NOTE — PROGRESS NOTES
Daily Note     Today's date: 2024  Patient name: Richard M Goldberg  : 1950  MRN: [id number]  Referring provider: Daniel Montero,*  Dx:   Encounter Diagnosis     ICD-10-CM    1. Chronic bilateral low back pain without sciatica  M54.50     G89.29                      Subjective: Sp reports doing okay, knees and hips hurting in addition to back      Objective: See treatment diary below      Assessment: Tolerated treatment fair. Patient exhibited good technique with therapeutic exercises and would benefit from continued PT      Plan: Continue per plan of care.      Precautions: None      Manuals             MFR lumbar paraspoinals  AF                                                   Neuro Re-Ed                                                                                                        Ther Ex             LTR 30            Bridges 30                                                                                          Ther Activity                                       Gait Training                                       Modalities

## 2024-02-19 ENCOUNTER — OFFICE VISIT (OUTPATIENT)
Dept: PHYSICAL THERAPY | Facility: MEDICAL CENTER | Age: 74
End: 2024-02-19
Payer: MEDICARE

## 2024-02-19 DIAGNOSIS — M54.50 CHRONIC BILATERAL LOW BACK PAIN WITHOUT SCIATICA: Primary | ICD-10-CM

## 2024-02-19 DIAGNOSIS — G89.29 CHRONIC BILATERAL LOW BACK PAIN WITHOUT SCIATICA: Primary | ICD-10-CM

## 2024-02-19 PROCEDURE — 97110 THERAPEUTIC EXERCISES: CPT | Performed by: PHYSICAL THERAPIST

## 2024-02-19 NOTE — PROGRESS NOTES
Daily Note     Today's date: 2024  Patient name: Richard M Goldberg  : 1950  MRN: [id number]  Referring provider: Daniel Montero,*  Dx:   Encounter Diagnosis     ICD-10-CM    1. Chronic bilateral low back pain without sciatica  M54.50     G89.29                      Subjective: Sp reports doing okay, knees and hips hurting in addition to back      Objective: See treatment diary below      Assessment: Tolerated treatment fair. Patient exhibited good technique with therapeutic exercises and would benefit from continued PT      Plan: Continue per plan of care.      Precautions: None      Manuals             MFR lumbar paraspoinals  AF                                                   Neuro Re-Ed                                                                                                        Ther Ex             LTR 30            Bridges 30                                                                                          Ther Activity                                       Gait Training                                       Modalities

## 2024-02-20 ENCOUNTER — OFFICE VISIT (OUTPATIENT)
Dept: OBGYN CLINIC | Facility: MEDICAL CENTER | Age: 74
End: 2024-02-20
Payer: MEDICARE

## 2024-02-20 ENCOUNTER — APPOINTMENT (OUTPATIENT)
Dept: LAB | Facility: MEDICAL CENTER | Age: 74
End: 2024-02-20
Payer: MEDICARE

## 2024-02-20 ENCOUNTER — APPOINTMENT (OUTPATIENT)
Dept: RADIOLOGY | Facility: MEDICAL CENTER | Age: 74
End: 2024-02-20
Payer: MEDICARE

## 2024-02-20 VITALS
WEIGHT: 269 LBS | HEIGHT: 69 IN | BODY MASS INDEX: 39.84 KG/M2 | SYSTOLIC BLOOD PRESSURE: 132 MMHG | DIASTOLIC BLOOD PRESSURE: 74 MMHG | HEART RATE: 78 BPM

## 2024-02-20 DIAGNOSIS — Z92.25 PERSONAL HISTORY OF IMMUNOSUPPRESSION THERAPY: ICD-10-CM

## 2024-02-20 DIAGNOSIS — M25.50 MULTIPLE JOINT PAIN: ICD-10-CM

## 2024-02-20 DIAGNOSIS — M25.562 LEFT KNEE PAIN, UNSPECIFIED CHRONICITY: ICD-10-CM

## 2024-02-20 DIAGNOSIS — Z01.89 ENCOUNTER FOR LOWER EXTREMITY COMPARISON IMAGING STUDY: ICD-10-CM

## 2024-02-20 DIAGNOSIS — M17.12 PRIMARY OSTEOARTHRITIS OF LEFT KNEE: Primary | ICD-10-CM

## 2024-02-20 DIAGNOSIS — M70.50 PES ANSERINE BURSITIS: ICD-10-CM

## 2024-02-20 LAB
CRP SERPL QL: 1.1 MG/L
ERYTHROCYTE [SEDIMENTATION RATE] IN BLOOD: 25 MM/HOUR (ref 0–19)
EST. AVERAGE GLUCOSE BLD GHB EST-MCNC: 120 MG/DL
HBA1C MFR BLD: 5.8 %
RHEUMATOID FACT SER QL LA: NEGATIVE

## 2024-02-20 PROCEDURE — 86225 DNA ANTIBODY NATIVE: CPT

## 2024-02-20 PROCEDURE — 86200 CCP ANTIBODY: CPT

## 2024-02-20 PROCEDURE — 86235 NUCLEAR ANTIGEN ANTIBODY: CPT

## 2024-02-20 PROCEDURE — 99204 OFFICE O/P NEW MOD 45 MIN: CPT | Performed by: ORTHOPAEDIC SURGERY

## 2024-02-20 PROCEDURE — 85652 RBC SED RATE AUTOMATED: CPT

## 2024-02-20 PROCEDURE — 73564 X-RAY EXAM KNEE 4 OR MORE: CPT

## 2024-02-20 PROCEDURE — 73560 X-RAY EXAM OF KNEE 1 OR 2: CPT

## 2024-02-20 PROCEDURE — 86430 RHEUMATOID FACTOR TEST QUAL: CPT

## 2024-02-20 PROCEDURE — 36415 COLL VENOUS BLD VENIPUNCTURE: CPT

## 2024-02-20 PROCEDURE — 20610 DRAIN/INJ JOINT/BURSA W/O US: CPT | Performed by: ORTHOPAEDIC SURGERY

## 2024-02-20 PROCEDURE — 86039 ANTINUCLEAR ANTIBODIES (ANA): CPT

## 2024-02-20 PROCEDURE — 86140 C-REACTIVE PROTEIN: CPT

## 2024-02-20 PROCEDURE — 86038 ANTINUCLEAR ANTIBODIES: CPT

## 2024-02-20 RX ORDER — LIDOCAINE HYDROCHLORIDE 10 MG/ML
3 INJECTION, SOLUTION INFILTRATION; PERINEURAL
Status: COMPLETED | OUTPATIENT
Start: 2024-02-20 | End: 2024-02-20

## 2024-02-20 RX ORDER — TRIAMCINOLONE ACETONIDE 40 MG/ML
40 INJECTION, SUSPENSION INTRA-ARTICULAR; INTRAMUSCULAR
Status: COMPLETED | OUTPATIENT
Start: 2024-02-20 | End: 2024-02-20

## 2024-02-20 RX ADMIN — TRIAMCINOLONE ACETONIDE 40 MG: 40 INJECTION, SUSPENSION INTRA-ARTICULAR; INTRAMUSCULAR at 09:00

## 2024-02-20 RX ADMIN — LIDOCAINE HYDROCHLORIDE 3 ML: 10 INJECTION, SOLUTION INFILTRATION; PERINEURAL at 09:00

## 2024-02-20 NOTE — PROGRESS NOTES
Assessment/Plan     1. Primary osteoarthritis of left knee    2. Pes anserine bursitis    3. Encounter for lower extremity comparison imaging study    4. Multiple joint pain    5. Personal history of immunosuppression therapy      Orders Placed This Encounter   Procedures    Large joint arthrocentesis: L knee    XR knee 4+ vw left injury    XR knee 1 or 2 vw right    MAC Screen w/ Reflex to Titer/Pattern    C-reactive protein    Cyclic citrul peptide antibody, IgG    RF Screen w/ Reflex to Titer    Sedimentation rate, automated    HLA-B27 antigen    Ambulatory Referral to Physical Therapy       The patient has moderate left knee arthritis with pes anserine bursitis, possible meniscal tear.  We discussed treatment options as well as risks and benefits of treatment options.   Discussed with patient that CSI to left knee can be warranted to help determine if pain is stemming from knee or left hip as patient has surgical history of L JABIER.   Left knee CSI was offered and administered at today's visit. Patient tolerated this well. Post injection instructions were provided. Patient should monitor relief over the next couple weeks.   Can take Tylenol 1,000mg by mouth every 8 hours as needed for pain.  Do not exceed 3,000mg per day. Patient take percocet for back pain, advised patient to make sure he does not exceed 3,000 mg.   Heat/ice as needed.   Rheumatologic labs were ordered at todays visit.   Advised patient to talk with PCP about daily cramping.   Patient will follow up in 6 weeks for reevaluation and if patient does not get relief, patient can call for a possible MRI of the left knee.     Return in about 6 weeks (around 4/2/2024) for Recheck.    I answered all of the patient's questions during the visit and provided education of the patient's condition during the visit.  The patient verbalized understanding of the information given and agrees with the plan.  This note was dictated using Xunlei software.  It may  contain errors including improperly dictated words.  Please contact physician directly for any questions.    History of Present Illness   Chief complaint:   Chief Complaint   Patient presents with    Left Knee - Pain       HPI: Richard M Goldberg is a 73 y.o. male that c/o left knee pain.    Length of time knee pain has been present: 1 month  Any falls or trauma associated with onset of pain: doing a hamstring stretch at gym.   Location of pain: anteriormedial and posterior aspect   Intermittent or constant: constant  Description of pain: sharp  Aggravating factors: weightbearing activities   Instability or locking: reports mild instability but no locking  Pain medication that has been tried: goes to pain management for back, takes oxycodone and methocarbonal  Topical mediation that has been tried: yes  Has heat/ice/elevation been tried: yes  Can NSAIDs be taken?  If not why?: no due to having a stent   Has PT or home exercises been tried?: no  Has bracing been tried? OTC or rx?  no  Have injections been tried?  Steroid/visco?: no  Any history of surgery on that knee?:  no       ROS:    See HPI for musculoskeletal review.   All other systems reviewed are negative     Historical Information   Past Medical History:   Diagnosis Date    CAD (coronary artery disease)     s/p stent x1 on 6/19/19    Closed fracture of distal end of fibula with routine healing     unspecified fracture morphology, unspecified laterality, subsequent encounter; Last Assessed:12/27/16    GERD (gastroesophageal reflux disease)     Heart attack (HCC)     Hypertension     Kidney stones     Nocturia     Psoriasis     Sleep apnea      Past Surgical History:   Procedure Laterality Date    APPENDECTOMY      CARPAL TUNNEL RELEASE      ROTATOR CUFF REPAIR      SHOULDER ARTHROSCOPY Left     Last ASsessed:10/24/16    TOTAL HIP ARTHROPLASTY      TRIGGER FINGER RELEASE       Social History   Social History     Substance and Sexual Activity   Alcohol Use Yes     Comment: 1 drink daily     Social History     Substance and Sexual Activity   Drug Use No     Social History     Tobacco Use   Smoking Status Never   Smokeless Tobacco Never     Family History:   Family History   Problem Relation Age of Onset    Arthritis Mother     Hypertension Mother     Irritable bowel syndrome Mother     Osteoporosis Mother     Arthritis Other        Current Outpatient Medications on File Prior to Visit   Medication Sig Dispense Refill    aspirin (ECOTRIN LOW STRENGTH) 81 mg EC tablet Take 1 tablet (81 mg total) by mouth daily 30 tablet 0    fexofenadine-pseudoephedrine (Allegra-D Allergy & Congestion) 180-240 MG per 24 hr tablet       fluticasone (FLONASE) 50 mcg/act nasal spray 2 sprays into each nostril 2 (two) times a day as needed for rhinitis      hydrochlorothiazide (HYDRODIURIL) 12.5 mg tablet hydrochlorothiazide 12.5 mg tablet      HYDROcodone-acetaminophen (ZOLVIT)  mg/15 mL solution 10 mg      loratadine (CLARITIN) 10 mg tablet Take 10 mg by mouth daily      methocarbamol (ROBAXIN) 750 mg tablet methocarbamol 750 mg tablet   take 1 tablet by mouth three times a day if needed for muscle spasm      NIFEdipine ER (ADALAT CC) 30 MG 24 hr tablet Take 30 mg by mouth 2 (two) times a day      nitroglycerin (NITROSTAT) 0.4 mg SL tablet Place 1 tablet (0.4 mg total) under the tongue every 5 (five) minutes as needed for chest pain 25 tablet 3    Omega-3 Fatty Acids (fish oil) 1,000 mg Take 1,000 mg by mouth daily      omeprazole (PriLOSEC) 20 mg delayed release capsule Take 1 capsule (20 mg total) by mouth daily 90 capsule 0    oxyCODONE-acetaminophen (PERCOCET) 5-325 mg per tablet take 1 tablet by mouth every 8 hours if needed for MODERATE PAIN (PAIN SCORE 4-6)      rosuvastatin (CRESTOR) 20 MG tablet Take 1 tablet by mouth daily      tamsulosin (FLOMAX) 0.4 mg Take 1 capsule (0.4 mg total) by mouth 2 (two) times a day 60 capsule 3    traZODone (DESYREL) 50 mg tablet Take 1 tablet (50  mg total) by mouth daily at bedtime 90 tablet 1    valsartan (DIOVAN) 160 mg tablet Take 160 mg by mouth 2 (two) times a day       No current facility-administered medications on file prior to visit.     Allergies   Allergen Reactions    Latex Hives    Other     Shellfish-Derived Products - Food Allergy      [place] Blue Crab       Current Outpatient Medications on File Prior to Visit   Medication Sig Dispense Refill    aspirin (ECOTRIN LOW STRENGTH) 81 mg EC tablet Take 1 tablet (81 mg total) by mouth daily 30 tablet 0    fexofenadine-pseudoephedrine (Allegra-D Allergy & Congestion) 180-240 MG per 24 hr tablet       fluticasone (FLONASE) 50 mcg/act nasal spray 2 sprays into each nostril 2 (two) times a day as needed for rhinitis      hydrochlorothiazide (HYDRODIURIL) 12.5 mg tablet hydrochlorothiazide 12.5 mg tablet      HYDROcodone-acetaminophen (ZOLVIT)  mg/15 mL solution 10 mg      loratadine (CLARITIN) 10 mg tablet Take 10 mg by mouth daily      methocarbamol (ROBAXIN) 750 mg tablet methocarbamol 750 mg tablet   take 1 tablet by mouth three times a day if needed for muscle spasm      NIFEdipine ER (ADALAT CC) 30 MG 24 hr tablet Take 30 mg by mouth 2 (two) times a day      nitroglycerin (NITROSTAT) 0.4 mg SL tablet Place 1 tablet (0.4 mg total) under the tongue every 5 (five) minutes as needed for chest pain 25 tablet 3    Omega-3 Fatty Acids (fish oil) 1,000 mg Take 1,000 mg by mouth daily      omeprazole (PriLOSEC) 20 mg delayed release capsule Take 1 capsule (20 mg total) by mouth daily 90 capsule 0    oxyCODONE-acetaminophen (PERCOCET) 5-325 mg per tablet take 1 tablet by mouth every 8 hours if needed for MODERATE PAIN (PAIN SCORE 4-6)      rosuvastatin (CRESTOR) 20 MG tablet Take 1 tablet by mouth daily      tamsulosin (FLOMAX) 0.4 mg Take 1 capsule (0.4 mg total) by mouth 2 (two) times a day 60 capsule 3    traZODone (DESYREL) 50 mg tablet Take 1 tablet (50 mg total) by mouth daily at bedtime 90  "tablet 1    valsartan (DIOVAN) 160 mg tablet Take 160 mg by mouth 2 (two) times a day       No current facility-administered medications on file prior to visit.       Objective   Vitals: Blood pressure 132/74, pulse 78, height 5' 9\" (1.753 m), weight 122 kg (269 lb).,Body mass index is 39.72 kg/m².    PE:  AAOx 3  WDWN  Hearing intact, no drainage from eyes  Regular rate  no audible wheezing  no abdominal distension  LE compartments soft, skin intact    leftknee:    Appearance:  no swelling   No ecchymosis  no obvious joint deformity   No effusion  Palpation/Tenderness:  +TTP over medial joint line  No TTP over lateral joint line   No TTP over patella  No TTP over patellar tendon  + TTP over pes anserine bursa  Active Range of Motion:  AROM: 5-115  PROM: 0-120  Special Tests:  Medial Ld's Test:  Positive  Lateral Ld's Test:  Negative  Apley's compression test:  Negative  Lachman's Test:  negative  Anterior Drawer Test:  Negative  Patellar grind:  Negative  Valgus Stress Test:  negative  Varus Stress Test:  negative     No ipsilateral hip pain with ROM    leftLE:    Sensation grossly intact  Palpable 2+ pulse  AT/GS/EHL intact    Imaging Studies: I have personally reviewed pertinent films in PACS  XR leftknee:  moderate osteoarthritis of left knee.     Large joint arthrocentesis: L knee  Universal Protocol:  Consent: Verbal consent obtained.  Risks and benefits: risks, benefits and alternatives were discussed  Consent given by: patient  Patient understanding: patient states understanding of the procedure being performed  Site marked: the operative site was marked  Patient identity confirmed: verbally with patient  Supporting Documentation  Indications: pain   Procedure Details  Location: knee - L knee  Needle size: 22 G  Ultrasound guidance: no  Approach: anterolateral  Medications administered: 40 mg triamcinolone acetonide 40 mg/mL; 3 mL lidocaine 1 %    Patient tolerance: patient tolerated the " procedure well with no immediate complications  Dressing:  Sterile dressing applied            Scribe Attestation      I,:  Shukri Sánchez am acting as a scribe while in the presence of the attending physician.:       I,:  Bri Villatoro DO personally performed the services described in this documentation    as scribed in my presence.:

## 2024-02-21 ENCOUNTER — OFFICE VISIT (OUTPATIENT)
Dept: PHYSICAL THERAPY | Facility: MEDICAL CENTER | Age: 74
End: 2024-02-21
Payer: MEDICARE

## 2024-02-21 DIAGNOSIS — G89.29 CHRONIC BILATERAL LOW BACK PAIN WITHOUT SCIATICA: Primary | ICD-10-CM

## 2024-02-21 DIAGNOSIS — M54.50 CHRONIC BILATERAL LOW BACK PAIN WITHOUT SCIATICA: Primary | ICD-10-CM

## 2024-02-21 LAB — ANA SER QL IA: POSITIVE

## 2024-02-21 PROCEDURE — 97110 THERAPEUTIC EXERCISES: CPT | Performed by: PHYSICAL THERAPIST

## 2024-02-21 PROCEDURE — 97140 MANUAL THERAPY 1/> REGIONS: CPT | Performed by: PHYSICAL THERAPIST

## 2024-02-23 ENCOUNTER — TELEPHONE (OUTPATIENT)
Dept: OBGYN CLINIC | Facility: HOSPITAL | Age: 74
End: 2024-02-23

## 2024-02-23 DIAGNOSIS — M25.50 MULTIPLE JOINT PAIN: ICD-10-CM

## 2024-02-23 DIAGNOSIS — M17.12 ARTHRITIS OF LEFT KNEE: ICD-10-CM

## 2024-02-23 DIAGNOSIS — R76.8 ANA POSITIVE: ICD-10-CM

## 2024-02-23 DIAGNOSIS — G89.29 CHRONIC PAIN OF LEFT KNEE: Primary | ICD-10-CM

## 2024-02-23 DIAGNOSIS — M25.562 CHRONIC PAIN OF LEFT KNEE: Primary | ICD-10-CM

## 2024-02-23 LAB
ANA HOMOGEN SER QL IF: NORMAL
ANA HOMOGEN TITR SER: NORMAL {TITER}
CCP AB SER IA-ACNC: 1.5
DSDNA AB SER-ACNC: <4 IU/ML
ENA RNP AB SER IA-ACNC: POSITIVE
ENA SM AB SER IA-ACNC: NEGATIVE
ENA SM+RNP IGG SER-ACNC: NEGATIVE
ENA SS-A AB SER IA-ACNC: NEGATIVE
ENA SS-B AB SER IA-ACNC: NEGATIVE

## 2024-02-23 NOTE — TELEPHONE ENCOUNTER
Shauna Mazariegos is calling to return Dr. Villatoro's call from earlier today.       Callback: [phone]

## 2024-02-23 NOTE — PROGRESS NOTES
I spoke with Mr. Goldberg.  I reviewed the results of his blood tests.  I am recommending he follow up with rheumatology.  Referral provided.  He let me know he got some but not complete relief with the steroid injection.  We agreed an MRI of his L knee is the next best step.  Order was placed.  We will call him to help him set up the study as well as a follow up to discuss the results.  He verbalized understanding and agrees with the plan.

## 2024-02-23 NOTE — PROGRESS NOTES
Daily Note     Today's date: 2024  Patient name: Richard M Goldberg  : 1950  MRN: [id number]  Referring provider: Daniel Montero,*  Dx:   Encounter Diagnosis     ICD-10-CM    1. Chronic bilateral low back pain without sciatica  M54.50     G89.29                      Subjective: Sp reports doing okay, knees and hips hurting in addition to back      Objective: See treatment diary below      Assessment: Tolerated treatment fair. Patient exhibited good technique with therapeutic exercises and would benefit from continued PT      Plan: Continue per plan of care.      Precautions: None      Manuals             MFR lumbar paraspoinals  AF                                                   Neuro Re-Ed                                                                                                        Ther Ex             LTR 30            Bridges 30                                                                                          Ther Activity                                       Gait Training                                       Modalities

## 2024-02-25 ENCOUNTER — HOSPITAL ENCOUNTER (OUTPATIENT)
Dept: RADIOLOGY | Age: 74
Discharge: HOME | End: 2024-02-25
Attending: ORTHOPAEDIC SURGERY
Payer: MEDICARE

## 2024-02-25 DIAGNOSIS — G89.29 CHRONIC PAIN OF LEFT KNEE: ICD-10-CM

## 2024-02-25 DIAGNOSIS — M25.562 CHRONIC PAIN OF LEFT KNEE: ICD-10-CM

## 2024-02-25 DIAGNOSIS — M17.12 ARTHRITIS OF LEFT KNEE: ICD-10-CM

## 2024-02-25 PROCEDURE — 73721 MRI JNT OF LWR EXTRE W/O DYE: CPT

## 2024-02-26 ENCOUNTER — OFFICE VISIT (OUTPATIENT)
Dept: PHYSICAL THERAPY | Facility: MEDICAL CENTER | Age: 74
End: 2024-02-26
Payer: MEDICARE

## 2024-02-26 ENCOUNTER — TELEPHONE (OUTPATIENT)
Age: 74
End: 2024-02-26

## 2024-02-26 DIAGNOSIS — M54.50 CHRONIC BILATERAL LOW BACK PAIN WITHOUT SCIATICA: Primary | ICD-10-CM

## 2024-02-26 DIAGNOSIS — G89.29 CHRONIC BILATERAL LOW BACK PAIN WITHOUT SCIATICA: Primary | ICD-10-CM

## 2024-02-26 LAB — HLA-B27 QL NAA+PROBE: NEGATIVE

## 2024-02-26 PROCEDURE — 97140 MANUAL THERAPY 1/> REGIONS: CPT | Performed by: PHYSICAL THERAPIST

## 2024-02-26 PROCEDURE — 97110 THERAPEUTIC EXERCISES: CPT | Performed by: PHYSICAL THERAPIST

## 2024-02-26 NOTE — PROGRESS NOTES
Daily Note     Today's date: 2024  Patient name: Richard M Goldberg  : 1950  MRN: [id number]  Referring provider: Daniel Montero,*  Dx:   Encounter Diagnosis     ICD-10-CM    1. Chronic bilateral low back pain without sciatica  M54.50     G89.29                      Subjective: Sp reports doing okay, knees and hips hurting in addition to back      Objective: See treatment diary below      Assessment: Tolerated treatment fair. Patient exhibited good technique with therapeutic exercises and would benefit from continued PT      Plan: Continue per plan of care.      Precautions: None      Manuals             MFR lumbar paraspoinals  AF                                                   Neuro Re-Ed                                                                                                        Ther Ex             LTR 30            Bridges 30                                                                                          Ther Activity                                       Gait Training                                       Modalities

## 2024-02-26 NOTE — TELEPHONE ENCOUNTER
Caller: Patient    Doctor: Shauna    Reason for call: Patient was told to set up appointment w/ rheumatology.  Patient states he has been in touch with Rheumatology, but they require a referral.  Patient would like to know if Dr Villatoro would write a referral to Rheum for him.    He is requesting the referral for provider Mary Naranjo.  Patient would appreciate a call back to let him know when this is done.    Call back#: [phone]

## 2024-02-27 NOTE — TELEPHONE ENCOUNTER
Dr Shauna Bernard already placed the rheum referral after speaking with patient on 2/23. Can you please let him know and see if he requires anything else? It looks like he was able to schedule an evaluation with his preferred rheum provider. Thanks!

## 2024-02-28 ENCOUNTER — OFFICE VISIT (OUTPATIENT)
Dept: PHYSICAL THERAPY | Facility: MEDICAL CENTER | Age: 74
End: 2024-02-28
Payer: MEDICARE

## 2024-02-28 ENCOUNTER — TELEPHONE (OUTPATIENT)
Age: 74
End: 2024-02-28

## 2024-02-28 DIAGNOSIS — M54.50 CHRONIC BILATERAL LOW BACK PAIN WITHOUT SCIATICA: Primary | ICD-10-CM

## 2024-02-28 DIAGNOSIS — G89.29 CHRONIC BILATERAL LOW BACK PAIN WITHOUT SCIATICA: Primary | ICD-10-CM

## 2024-02-28 PROCEDURE — 97140 MANUAL THERAPY 1/> REGIONS: CPT | Performed by: PHYSICAL THERAPIST

## 2024-02-28 PROCEDURE — 97110 THERAPEUTIC EXERCISES: CPT | Performed by: PHYSICAL THERAPIST

## 2024-02-28 NOTE — TELEPHONE ENCOUNTER
Called & spoke to patient this morning. He was not sure on his end if everything was okay for his Rheumatology appt. I called & spoke to Chana from  Rheum. She confirmed my thought process. Referral is good. Patient is scheduled. Everything is fine.

## 2024-02-28 NOTE — TELEPHONE ENCOUNTER
Marco Antonio calls from  ortho to make sure everything is ok with pts referral. Pt looks like he is scheduled for June everything seems to be ok with referral.

## 2024-02-28 NOTE — PROGRESS NOTES
Daily Note     Today's date: 2024  Patient name: Richard M Goldberg  : 1950  MRN: [id number]  Referring provider: Daniel Montero,*  Dx:   Encounter Diagnosis     ICD-10-CM    1. Chronic bilateral low back pain without sciatica  M54.50     G89.29                      Subjective: Sp reports doing okay, knees and hips hurting in addition to back      Objective: See treatment diary below      Assessment: Tolerated treatment fair. Patient exhibited good technique with therapeutic exercises and would benefit from continued PT      Plan: Continue per plan of care.      Precautions: None      Manuals             MFR lumbar paraspoinals  AF                                                   Neuro Re-Ed                                                                                                        Ther Ex             LTR 30            Bridges 30                                                                                          Ther Activity                                       Gait Training                                       Modalities

## 2024-02-29 ENCOUNTER — TELEPHONE (OUTPATIENT)
Dept: OBGYN CLINIC | Facility: MEDICAL CENTER | Age: 74
End: 2024-02-29

## 2024-02-29 NOTE — TELEPHONE ENCOUNTER
Caller: Shola     Doctor: Dr Villatoro     Reason for call: The patient had an MRI on 2/25 and he is able to view results in my chart. He would like to speak to someone regarding the results. He does not have a follow up until 3/15. I did not see any sooner appointments. Thank you     Call back#: [phone]

## 2024-02-29 NOTE — TELEPHONE ENCOUNTER
Dima Bernard, can you please call patient and offer him appt for tomorrow to review MRI? We would like to have a discussion with patient about options for him moving forward and show him the images. It looks like we could make time at 10:00 am or 11:15 am. Please let me know if this works. Thanks!

## 2024-03-01 ENCOUNTER — OFFICE VISIT (OUTPATIENT)
Dept: PHYSICAL THERAPY | Facility: MEDICAL CENTER | Age: 74
End: 2024-03-01
Payer: MEDICARE

## 2024-03-01 ENCOUNTER — OFFICE VISIT (OUTPATIENT)
Dept: OBGYN CLINIC | Facility: MEDICAL CENTER | Age: 74
End: 2024-03-01
Payer: MEDICARE

## 2024-03-01 VITALS
HEIGHT: 69 IN | SYSTOLIC BLOOD PRESSURE: 120 MMHG | WEIGHT: 264 LBS | BODY MASS INDEX: 39.1 KG/M2 | DIASTOLIC BLOOD PRESSURE: 73 MMHG | HEART RATE: 65 BPM

## 2024-03-01 DIAGNOSIS — M70.50 PES ANSERINE BURSITIS: ICD-10-CM

## 2024-03-01 DIAGNOSIS — G89.29 CHRONIC BILATERAL LOW BACK PAIN WITHOUT SCIATICA: Primary | ICD-10-CM

## 2024-03-01 DIAGNOSIS — M25.562 CHRONIC PAIN OF LEFT KNEE: ICD-10-CM

## 2024-03-01 DIAGNOSIS — M17.12 PRIMARY OSTEOARTHRITIS OF LEFT KNEE: Primary | ICD-10-CM

## 2024-03-01 DIAGNOSIS — R76.8 ANA POSITIVE: ICD-10-CM

## 2024-03-01 DIAGNOSIS — S83.242D OTHER TEAR OF MEDIAL MENISCUS OF LEFT KNEE AS CURRENT INJURY, SUBSEQUENT ENCOUNTER: ICD-10-CM

## 2024-03-01 DIAGNOSIS — G89.29 CHRONIC PAIN OF LEFT KNEE: ICD-10-CM

## 2024-03-01 DIAGNOSIS — M54.50 CHRONIC BILATERAL LOW BACK PAIN WITHOUT SCIATICA: Primary | ICD-10-CM

## 2024-03-01 PROCEDURE — 97140 MANUAL THERAPY 1/> REGIONS: CPT | Performed by: PHYSICAL THERAPIST

## 2024-03-01 PROCEDURE — 97110 THERAPEUTIC EXERCISES: CPT | Performed by: PHYSICAL THERAPIST

## 2024-03-01 PROCEDURE — 99214 OFFICE O/P EST MOD 30 MIN: CPT | Performed by: ORTHOPAEDIC SURGERY

## 2024-03-01 NOTE — PROGRESS NOTES
Assessment/Plan:  1. Primary osteoarthritis of left knee    2. Other tear of medial meniscus of left knee as current injury, subsequent encounter    3. MAC positive    4. Chronic pain of left knee    5. Pes anserine bursitis      Orders Placed This Encounter   Procedures    Ambulatory Referral to Physical Therapy    Injection Procedure Prior Authorization     Patient has moderate left knee osteoarthritis with Baker's cyst and meniscus tear.  Patient is also MAC positive.  MRI of left knee was reviewed with patient today demonstrating osteoarthritis, Baker's cyst, and posterior horn medial meniscus tear  Rheumatologic labs were reviewed with the patient still at today's visit demonstrating positive MAC.  I discussed with patient that he should follow-up with rheumatology as planned.  Physical therapy referral was given to patient today's visit.  Viscosupplementation of Euflexxa for the left knee was ordered at today's visit which can be administered after 4/20/2024.  Patient rates his pain at 9/10 at today's visit as he had no symptomatic relief with steroid injection administered 2/20/2024.  Continue current pain regimen with oxycodone, methocarbamol, Tylenol.  No NSAIDs due to having a stent.  Continue to take Tylenol 1,000mg by mouth every 8 hours as needed for pain.  Do not exceed 3,000mg per day. Patient take percocet for back pain, advised patient to make sure he does not exceed 3,000 mg.  Knee sleeve was given to patient at today's visit.  I discussed with patient that if he has swelling below his knee sleeve he may consider compression socks to help with swelling.  I discussed with patient he can consider referral to sports medicine to discuss possible left knee arthroscopy.  Patient may follow-up anytime after 4/20/2024 to have first left knee Euflexxa injection administered.      Return in about 8 weeks (around 4/26/2024) for Recheck, Euflexxa injection left knee.    I answered all of the patient's  questions during the visit and provided education of the patient's condition during the visit.  The patient verbalized understanding of the information given and agrees with the plan.  This note was dictated using [employer] software.  It may contain errors including improperly dictated words.  Please contact physician directly for any questions.    Subjective   Chief Complaint:   Chief Complaint   Patient presents with    Left Knee - Follow-up       HPI  Richard M Goldberg is a 73 y.o. male who presents for follow up for moderate left knee arthritis with Pez anserine bursitis, possible meniscus tear.  Patient was last seen 2/20/2024 where patient received left knee steroid injection and rheumatologic labs were ordered.  Since last visit patient called the office to have MRI of left knee ordered. Patient states he only received 1 day of relief of CSI and all weightbearing activities aggravates his pain.  Has pain management doctor for low back and he states he has been taking oxycodone, methocarbamol, and Tylenol.  Patient dates he was able to obtain rheumatologic labs and obtain MRI of the left knee.  Patient is here for MRI and rheumatologic lab review.        Review of Systems  ROS:    See [facility] for musculoskeletal review.   All other systems reviewed are negative     History:  Past Medical History:   Diagnosis Date    CAD (coronary artery disease)     s/p stent x1 on 6/19/19    Closed fracture of distal end of fibula with routine healing     unspecified fracture morphology, unspecified laterality, subsequent encounter; Last Assessed:12/27/16    GERD (gastroesophageal reflux disease)     Heart attack (HCC)     Hypertension     Kidney stones     Nocturia     Psoriasis     Sleep apnea      Past Surgical History:   Procedure Laterality Date    APPENDECTOMY      CARPAL TUNNEL RELEASE      ROTATOR CUFF REPAIR      SHOULDER ARTHROSCOPY Left     Last ASsessed:10/24/16    TOTAL HIP ARTHROPLASTY      TRIGGER FINGER RELEASE        Social History   Social History     Substance and Sexual Activity   Alcohol Use Yes    Comment: 1 drink daily     Social History     Substance and Sexual Activity   Drug Use No     Social History     Tobacco Use   Smoking Status Never   Smokeless Tobacco Never     Family History:   Family History   Problem Relation Age of Onset    Arthritis Mother     Hypertension Mother     Irritable bowel syndrome Mother     Osteoporosis Mother     Arthritis Other        Current Outpatient Medications on File Prior to Visit   Medication Sig Dispense Refill    aspirin (ECOTRIN LOW STRENGTH) 81 mg EC tablet Take 1 tablet (81 mg total) by mouth daily 30 tablet 0    fexofenadine-pseudoephedrine (Allegra-D Allergy & Congestion) 180-240 MG per 24 hr tablet       fluticasone (FLONASE) 50 mcg/act nasal spray 2 sprays into each nostril 2 (two) times a day as needed for rhinitis      hydrochlorothiazide (HYDRODIURIL) 12.5 mg tablet hydrochlorothiazide 12.5 mg tablet      HYDROcodone-acetaminophen (ZOLVIT)  mg/15 mL solution 10 mg      loratadine (CLARITIN) 10 mg tablet Take 10 mg by mouth daily      methocarbamol (ROBAXIN) 750 mg tablet methocarbamol 750 mg tablet   take 1 tablet by mouth three times a day if needed for muscle spasm      NIFEdipine ER (ADALAT CC) 30 MG 24 hr tablet Take 30 mg by mouth 2 (two) times a day      nitroglycerin (NITROSTAT) 0.4 mg SL tablet Place 1 tablet (0.4 mg total) under the tongue every 5 (five) minutes as needed for chest pain 25 tablet 3    Omega-3 Fatty Acids (fish oil) 1,000 mg Take 1,000 mg by mouth daily      omeprazole (PriLOSEC) 20 mg delayed release capsule Take 1 capsule (20 mg total) by mouth daily 90 capsule 0    oxyCODONE-acetaminophen (PERCOCET) 5-325 mg per tablet take 1 tablet by mouth every 8 hours if needed for MODERATE PAIN (PAIN SCORE 4-6)      rosuvastatin (CRESTOR) 20 MG tablet Take 1 tablet by mouth daily      tamsulosin (FLOMAX) 0.4 mg Take 1 capsule (0.4 mg total) by mouth  "2 (two) times a day 60 capsule 3    traZODone (DESYREL) 50 mg tablet Take 1 tablet (50 mg total) by mouth daily at bedtime 90 tablet 1    valsartan (DIOVAN) 160 mg tablet Take 160 mg by mouth 2 (two) times a day       No current facility-administered medications on file prior to visit.     Allergies   Allergen Reactions    Latex Hives    Other     Shellfish-Derived Products - Food Allergy      [facility]        Objective     /73   Pulse 65   Ht 5' 9\" (1.753 m)   Wt 120 kg (264 lb)   BMI 38.99 kg/m²      PE:  AAOx 3  WDWN  Hearing intact, no drainage from eyes  no audible wheezing  no abdominal distension  LE compartments soft, skin intact    Ortho Exam:  left Knee:   No erythema  no swelling  no effusion  no warmth  AROM: 0-110  Stable to varus/valgus stress    Imaging Studies: I have personally reviewed pertinent films in PACS  MRI left knee: moderate osteoarthritis of left knee.       Scribe Attestation      I,:  Shukri Sánchez am acting as a scribe while in the presence of the attending physician.:       I,:  Bri Villatoro DO personally performed the services described in this documentation    as scribed in my presence.:                    "

## 2024-03-01 NOTE — PROGRESS NOTES
Daily Note     Today's date: 3/1/2024  Patient name: Richard M Goldberg  : 1950  MRN: [id number]  Referring provider: Daniel Montero,*  Dx:   Encounter Diagnosis     ICD-10-CM    1. Chronic bilateral low back pain without sciatica  M54.50     G89.29                      Subjective: Sp reports doing okay, knees and hips hurting in addition to back      Objective: See treatment diary below      Assessment: Tolerated treatment fair. Patient exhibited good technique with therapeutic exercises and would benefit from continued PT      Plan: Continue per plan of care.      Precautions: None      Manuals             MFR lumbar paraspoinals  AF                                                   Neuro Re-Ed                                                                                                        Ther Ex             LTR 30            Bridges 30                                                                                          Ther Activity                                       Gait Training                                       Modalities

## 2024-03-04 ENCOUNTER — OFFICE VISIT (OUTPATIENT)
Dept: PHYSICAL THERAPY | Facility: MEDICAL CENTER | Age: 74
End: 2024-03-04
Payer: MEDICARE

## 2024-03-04 DIAGNOSIS — M54.50 CHRONIC BILATERAL LOW BACK PAIN WITHOUT SCIATICA: Primary | ICD-10-CM

## 2024-03-04 DIAGNOSIS — G89.29 CHRONIC BILATERAL LOW BACK PAIN WITHOUT SCIATICA: Primary | ICD-10-CM

## 2024-03-04 PROCEDURE — 97140 MANUAL THERAPY 1/> REGIONS: CPT | Performed by: PHYSICAL THERAPIST

## 2024-03-04 PROCEDURE — 97110 THERAPEUTIC EXERCISES: CPT | Performed by: PHYSICAL THERAPIST

## 2024-03-06 ENCOUNTER — OFFICE VISIT (OUTPATIENT)
Dept: PHYSICAL THERAPY | Facility: MEDICAL CENTER | Age: 74
End: 2024-03-06
Payer: MEDICARE

## 2024-03-06 DIAGNOSIS — M54.50 CHRONIC BILATERAL LOW BACK PAIN WITHOUT SCIATICA: Primary | ICD-10-CM

## 2024-03-06 DIAGNOSIS — G89.29 CHRONIC BILATERAL LOW BACK PAIN WITHOUT SCIATICA: Primary | ICD-10-CM

## 2024-03-06 PROCEDURE — 97140 MANUAL THERAPY 1/> REGIONS: CPT | Performed by: PHYSICAL THERAPIST

## 2024-03-06 PROCEDURE — 97110 THERAPEUTIC EXERCISES: CPT | Performed by: PHYSICAL THERAPIST

## 2024-03-06 NOTE — PROGRESS NOTES
Daily Note     Today's date: 3/6/2024  Patient name: Richard M Goldberg  : 1950  MRN: [id number]  Referring provider: Daniel Montero,*  Dx:   Encounter Diagnosis     ICD-10-CM    1. Chronic bilateral low back pain without sciatica  M54.50     G89.29                      Subjective: Sp reports doing okay, knees and hips hurting in addition to back      Objective: See treatment diary below      Assessment: Tolerated treatment fair. Patient exhibited good technique with therapeutic exercises and would benefit from continued PT      Plan: Continue per plan of care.      Precautions: None      Manuals             MFR lumbar paraspoinals  AF                                                   Neuro Re-Ed                                                                                                        Ther Ex             LTR 30            Bridges 30                                                                                          Ther Activity                                       Gait Training                                       Modalities

## 2024-03-06 NOTE — PROGRESS NOTES
Daily Note     Today's date: 3/6/2024  Patient name: Richard M Goldberg  : 1950  MRN: [id number]  Referring provider: Daniel Montero,*  Dx:   Encounter Diagnosis     ICD-10-CM    1. Chronic bilateral low back pain without sciatica  M54.50     G89.29                      Subjective: Sp reports doing okay, knees and hips hurting in addition to back      Objective: See treatment diary below      Assessment: Tolerated treatment fair. Patient exhibited good technique with therapeutic exercises and would benefit from continued PT      Plan: Continue per plan of care.      Precautions: None      Manuals             MFR lumbar paraspoinals  AF                                                   Neuro Re-Ed                                                                                                        Ther Ex             LTR 30            Bridges 30                                                                                          Ther Activity                                       Gait Training                                       Modalities

## 2024-03-08 ENCOUNTER — OFFICE VISIT (OUTPATIENT)
Dept: PHYSICAL THERAPY | Facility: MEDICAL CENTER | Age: 74
End: 2024-03-08
Payer: MEDICARE

## 2024-03-08 DIAGNOSIS — M54.50 CHRONIC BILATERAL LOW BACK PAIN WITHOUT SCIATICA: Primary | ICD-10-CM

## 2024-03-08 DIAGNOSIS — G89.29 CHRONIC BILATERAL LOW BACK PAIN WITHOUT SCIATICA: Primary | ICD-10-CM

## 2024-03-08 PROCEDURE — 97110 THERAPEUTIC EXERCISES: CPT | Performed by: PHYSICAL THERAPIST

## 2024-03-08 PROCEDURE — 97140 MANUAL THERAPY 1/> REGIONS: CPT | Performed by: PHYSICAL THERAPIST

## 2024-03-08 NOTE — PROGRESS NOTES
Daily Note     Today's date: 3/8/2024  Patient name: Richard M Goldberg  : 1950  MRN: [id number]  Referring provider: Daniel Montero,*  Dx:   Encounter Diagnosis     ICD-10-CM    1. Chronic bilateral low back pain without sciatica  M54.50     G89.29                      Subjective: Sp reports doing good with regard to back, best he has been in months       Objective: See treatment diary below      Assessment: Tolerated treatment fair. Patient exhibited good technique with therapeutic exercises and would benefit from continued PT      Plan: Continue per plan of care.      Precautions: None      Manuals             MFR lumbar paraspoinals  AF                                                   Neuro Re-Ed                                                                                                        Ther Ex             LTR 30            Bridges 30                                                                                          Ther Activity                                       Gait Training                                       Modalities

## 2024-03-15 ENCOUNTER — APPOINTMENT (OUTPATIENT)
Dept: PHYSICAL THERAPY | Facility: MEDICAL CENTER | Age: 74
End: 2024-03-15
Payer: MEDICARE

## 2024-03-19 ENCOUNTER — OFFICE VISIT (OUTPATIENT)
Dept: PHYSICAL THERAPY | Facility: MEDICAL CENTER | Age: 74
End: 2024-03-19
Payer: MEDICARE

## 2024-03-19 DIAGNOSIS — G89.29 CHRONIC BILATERAL LOW BACK PAIN WITHOUT SCIATICA: Primary | ICD-10-CM

## 2024-03-19 DIAGNOSIS — M54.50 CHRONIC BILATERAL LOW BACK PAIN WITHOUT SCIATICA: Primary | ICD-10-CM

## 2024-03-19 PROCEDURE — 97110 THERAPEUTIC EXERCISES: CPT | Performed by: PHYSICAL THERAPIST

## 2024-03-19 PROCEDURE — 97140 MANUAL THERAPY 1/> REGIONS: CPT | Performed by: PHYSICAL THERAPIST

## 2024-03-19 NOTE — PROGRESS NOTES
Daily Note     Today's date: 3/19/2024  Patient name: Richard M Goldberg  : 1950  MRN: [id number]  Referring provider: Daniel Montero,*  Dx:   Encounter Diagnosis     ICD-10-CM    1. Chronic bilateral low back pain without sciatica  M54.50     G89.29                      Subjective: Sp reports doing good with regard to back, best he has been in months       Objective: See treatment diary below      Assessment: Tolerated treatment fair. Patient exhibited good technique with therapeutic exercises and would benefit from continued PT      Plan: Continue per plan of care.      Precautions: None      Manuals             MFR lumbar paraspoinals  AF                                                   Neuro Re-Ed                                                                                                        Ther Ex             LTR 30            Bridges 30                                                                                          Ther Activity                                       Gait Training                                       Modalities

## 2024-03-20 ENCOUNTER — EVALUATION (OUTPATIENT)
Dept: PHYSICAL THERAPY | Facility: MEDICAL CENTER | Age: 74
End: 2024-03-20
Payer: MEDICARE

## 2024-03-20 DIAGNOSIS — Z98.890 S/P CUBITAL TUNNEL RELEASE: ICD-10-CM

## 2024-03-20 DIAGNOSIS — Z47.89 ORTHOPEDIC AFTERCARE: Primary | ICD-10-CM

## 2024-03-20 DIAGNOSIS — Z98.890 S/P CARPAL TUNNEL RELEASE: ICD-10-CM

## 2024-03-20 PROCEDURE — 97110 THERAPEUTIC EXERCISES: CPT | Performed by: PHYSICAL THERAPIST

## 2024-03-20 PROCEDURE — 97161 PT EVAL LOW COMPLEX 20 MIN: CPT | Performed by: PHYSICAL THERAPIST

## 2024-03-20 NOTE — LETTER
2024    Sp Martinez MD  [address] [facility] [address]    Patient: Richard M Goldberg   YOB: 1950   Date of Visit: 3/20/2024     Encounter Diagnosis     ICD-10-CM    1. Orthopedic aftercare  Z47.89       2. S/P cubital tunnel release  Z98.890       3. S/P carpal tunnel release  Z98.890           Dear Dr. Martinez:    Thank you for your recent referral of Richard M Goldberg. Please review the attached evaluation summary from Sp's recent visit.     Please verify that you agree with the plan of care by signing the attached order.     If you have any questions or concerns, please do not hesitate to call.     I sincerely appreciate the opportunity to share in the care of one of your patients and hope to have another opportunity to work with you in the near future.       Sincerely,    Ashley Gutierrez, PT      Referring Provider:      I certify that I have read the below Plan of Care and certify the need for these services furnished under this plan of treatment while under my care.                    Sp Martinez MD  [address] [facility] [address]  Via Fax: [phone]          PT Evaluation     Today's date: 3/20/2024  Patient name: Richard M Goldberg  : 1950  MRN: [id number]  Referring provider: Sp Martinez MD  Dx:   Encounter Diagnosis     ICD-10-CM    1. Orthopedic aftercare  Z47.89       2. S/P cubital tunnel release  Z98.890       3. S/P carpal tunnel release  Z98.890           Start Time: 1500  Stop Time: 1535  Total time in clinic (min): 35 minutes    Assessment  Assessment details: Mr. Goldberg is a pleasant 73 y.o. RHD male who presents today for physical therapy evaluation 8 days s/p left cubital tunnel and carpal tunnel decompressions DOS 3/12/24. No further referral appears neccessary at this time based upon examination findings.  Patient presents with post surgical incisions that are healing appropriately, mild  swelling at the medial elbow, sensation intact and WNL, mild stiffness of the elbow and hand, and decreased strength. Patient would benefit from outpatient physical therapy services to address the observed impairments to restore patient to pre-surgical level of functioning. Patient instructed in refraining from soaking incisions, he may use let water run over in the shower. He should avoid any heavy activity and lifting with the left UE at this time and be cautious of resting and leaning on the left elbow. Prognosis is good given compliance with PT attendance and HEP performance. Please contact me with any questions. Thank you for the referral.   Impairments: abnormal or restricted ROM, activity intolerance, impaired physical strength and lacks appropriate home exercise program  Understanding of Dx/Px/POC: good   Prognosis: good    Goals  1. Patient will be independent with individualized HEP.  2. Patient will achieve AROM of left elbow, wrist, and hand WNL to  return to ADL performance at [facility].  3. Patient will be able to progress to strengthening in 4 weeks s/p surgery date.   4. Patient will achieve left UE strength WFL compared to contralateral side to assist with IADLs/home maintenance.  5. Patient will achieve left  strength WFL compared to contralateral side to assist with return to gym.   6. Patient will achieve score on FOTO by [facility].    Plan  Patient would benefit from: skilled physical therapy  Planned modality interventions: thermotherapy: hydrocollator packs  Planned therapy interventions: massage, manual therapy, nerve gliding, neuromuscular re-education, patient education, strengthening, stretching, therapeutic activities, therapeutic exercise, graded exercise, functional ROM exercises and home exercise program  Frequency: 1x week  Duration in weeks: 6  Plan of Care beginning date: 3/20/2024  Plan of Care expiration date: 4/26/2024  Treatment plan discussed with: patient      Subjective  Evaluation    History of Present Illness  Date of surgery: 3/12/2024  Mechanism of injury: surgery  Mechanism of injury: Mr. Goldberg is a 73 y.o. male who presents today 8 days s/p left cubital tunnel and carpal tunnel decompressions DOS 3/12/24. Patient elected for surgical intervention after unresolved symptoms for 1 year and significant increase in pain. Patient denies any complications at the time of surgery and was discharged home the same day. Patient reports all numbness and tingling has resolved. He is currently in a post-surgical dressing from wrist to elbow. Patient follows up with ortho on 3/22. Patient reports no concerns at this time. His goals are to return to his active lifestyle at the gym and maintaining his home.   Pain  Current pain ratin  At best pain ratin  At worst pain rating: 10  Pain location: medial elbow.  Quality: dull ache and sharp  Exacerbated by: trying to use the left hand or resting the elbow on a hard surface.    Social Support    Employment status: not working (retired)  Hand dominance: right    Treatments  No previous or current treatments        Objective     Observations     Left Elbow   Positive for edema and incision. Negative for drainage.     Left Wrist/Hand   Positive for incision. Negative for drainage.     Additional Observation Details  General bruising throughout medial elbow  Cubital tunnel incision (+) steristrips, closed and healing well and Palmar carpal tunnel incision (+) steristrips, closed and healing well  Radial dorsal wrist incision (+) steristrips, closed and healing well      Neurological Testing     Additional Neurological Details  Sensation intact and symmetrical bilaterally for median and ulnar nerve distributions  Able to activate palmar/dorsal interossei, lumbricals, FDS, FDP, FPL      Active Range of Motion     Left Elbow   Flexion: 130 degrees   Extension: 5 degrees   Forearm supination: WFL  Forearm pronation: WFL    Left Wrist   Wrist  flexion: 40 degrees   Wrist extension: 40 degrees     Left Thumb   Kapandji score: 8 degrees      Additional Active Range of Motion Details  Composite fist: able to touch fingers to palm, loose fist  Intrinsic minus WNL  Intrinsic plus WNL    Passive Range of Motion     Left Elbow   Flexion: 140 degrees   Extension: 0 degrees     Left Wrist   Wrist flexion: 50 degrees   Wrist extension: 50 degrees     Strength/Myotome Testing     Additional Strength Details  Formal strength testing not assessed today  Formal strength testing not assessed today      Flowsheet Rows      Flowsheet Row Most Recent Value   PT/OT G-Codes    Current Score 4   Projected Score 45               Precautions: post-surgical, cardiac   DOS: 3/12/24- s/p L cubital tunnel and carpal tunnel releases    HEP: AAROM wrist, AAROM elbow, TGE's  Manuals 3/20            Scar massage once steristrips gone             Compression/ tubigrip prn                                       Neuro Re-Ed                                                                                                        Ther Ex             Ball rolls for wrist x10            Table slides elbow x10            Supine wand chest press nv            Pball rolls at plinth nv            Pencil curls nv            Cone rotations nv            AROM wrist  nv                         Ther Activity                                       Gait Training                                       Modalities

## 2024-03-20 NOTE — PROGRESS NOTES
PT Evaluation     Today's date: 3/20/2024  Patient name: Richard M Goldberg  : 1950  MRN: [id number]  Referring provider: Sp Martinez MD  Dx:   Encounter Diagnosis     ICD-10-CM    1. Orthopedic aftercare  Z47.89       2. S/P cubital tunnel release  Z98.890       3. S/P carpal tunnel release  Z98.890           Start Time: 1500  Stop Time: 1535  Total time in clinic (min): 35 minutes    Assessment  Assessment details: Mr. Goldberg is a pleasant 73 y.o. RHD male who presents today for physical therapy evaluation 8 days s/p left cubital tunnel and carpal tunnel decompressions DOS 3/12/24. No further referral appears neccessary at this time based upon examination findings.  Patient presents with post surgical incisions that are healing appropriately, mild swelling at the medial elbow, sensation intact and WNL, mild stiffness of the elbow and hand, and decreased strength. Patient would benefit from outpatient physical therapy services to address the observed impairments to restore patient to pre-surgical level of functioning. Patient instructed in refraining from soaking incisions, he may use let water run over in the shower. He should avoid any heavy activity and lifting with the left UE at this time and be cautious of resting and leaning on the left elbow. Prognosis is good given compliance with PT attendance and HEP performance. Please contact me with any questions. Thank you for the referral.   Impairments: abnormal or restricted ROM, activity intolerance, impaired physical strength and lacks appropriate home exercise program  Understanding of Dx/Px/POC: good   Prognosis: good    Goals  1. Patient will be independent with individualized HEP.  2. Patient will achieve AROM of left elbow, wrist, and hand WNL to  return to ADL performance at [facility].  3. Patient will be able to progress to strengthening in 4 weeks s/p surgery date.   4. Patient will achieve left UE strength WFL compared to contralateral side  to assist with IADLs/home maintenance.  5. Patient will achieve left  strength WFL compared to contralateral side to assist with return to gym.   6. Patient will achieve score on FOTO by [facility].    Plan  Patient would benefit from: skilled physical therapy  Planned modality interventions: thermotherapy: hydrocollator packs  Planned therapy interventions: massage, manual therapy, nerve gliding, neuromuscular re-education, patient education, strengthening, stretching, therapeutic activities, therapeutic exercise, graded exercise, functional ROM exercises and home exercise program  Frequency: 1x week  Duration in weeks: 6  Plan of Care beginning date: 3/20/2024  Plan of Care expiration date: 2024  Treatment plan discussed with: patient      Subjective Evaluation    History of Present Illness  Date of surgery: 3/12/2024  Mechanism of injury: surgery  Mechanism of injury: Mr. Goldberg is a 73 y.o. male who presents today 8 days s/p left cubital tunnel and carpal tunnel decompressions DOS 3/12/24. Patient elected for surgical intervention after unresolved symptoms for 1 year and significant increase in pain. Patient denies any complications at the time of surgery and was discharged home the same day. Patient reports all numbness and tingling has resolved. He is currently in a post-surgical dressing from wrist to elbow. Patient follows up with ortho on 3/22. Patient reports no concerns at this time. His goals are to return to his active lifestyle at the gym and maintaining his home.   Pain  Current pain ratin  At best pain ratin  At worst pain rating: 10  Pain location: medial elbow.  Quality: dull ache and sharp  Exacerbated by: trying to use the left hand or resting the elbow on a hard surface.    Social Support    Employment status: not working (retired)  Hand dominance: right    Treatments  No previous or current treatments        Objective     Observations     Left Elbow   Positive for edema and  incision. Negative for drainage.     Left Wrist/Hand   Positive for incision. Negative for drainage.     Additional Observation Details  General bruising throughout medial elbow  Cubital tunnel incision (+) steristrips, closed and healing well and Palmar carpal tunnel incision (+) steristrips, closed and healing well  Radial dorsal wrist incision (+) steristrips, closed and healing well      Neurological Testing     Additional Neurological Details  Sensation intact and symmetrical bilaterally for median and ulnar nerve distributions  Able to activate palmar/dorsal interossei, lumbricals, FDS, FDP, FPL      Active Range of Motion     Left Elbow   Flexion: 130 degrees   Extension: 5 degrees   Forearm supination: WFL  Forearm pronation: WFL    Left Wrist   Wrist flexion: 40 degrees   Wrist extension: 40 degrees     Left Thumb   Kapandji score: 8 degrees      Additional Active Range of Motion Details  Composite fist: able to touch fingers to palm, loose fist  Intrinsic minus WNL  Intrinsic plus WNL    Passive Range of Motion     Left Elbow   Flexion: 140 degrees   Extension: 0 degrees     Left Wrist   Wrist flexion: 50 degrees   Wrist extension: 50 degrees     Strength/Myotome Testing     Additional Strength Details  Formal strength testing not assessed today  Formal strength testing not assessed today      Flowsheet Rows      Flowsheet Row Most Recent Value   PT/OT G-Codes    Current Score 4   Projected Score 45               Precautions: post-surgical, cardiac   DOS: 3/12/24- s/p L cubital tunnel and carpal tunnel releases    HEP: AAROM wrist, AAROM elbow, TGE's  Manuals 3/20            Scar massage once steristrips gone             Compression/ tubigrip prn                                       Neuro Re-Ed                                                                                                        Ther Ex             Ball rolls for wrist x10            Table slides elbow x10            Supine wand chest  press nv            Pball rolls at [facility] nv            Pencil curls nv            [facility] rotations nv            AROM wrist  nv                         Ther Activity                                       Gait Training                                       Modalities

## 2024-03-22 ENCOUNTER — OFFICE VISIT (OUTPATIENT)
Dept: PHYSICAL THERAPY | Facility: MEDICAL CENTER | Age: 74
End: 2024-03-22
Payer: MEDICARE

## 2024-03-22 DIAGNOSIS — M54.50 CHRONIC BILATERAL LOW BACK PAIN WITHOUT SCIATICA: ICD-10-CM

## 2024-03-22 DIAGNOSIS — Z98.890 S/P CARPAL TUNNEL RELEASE: ICD-10-CM

## 2024-03-22 DIAGNOSIS — Z98.890 S/P CUBITAL TUNNEL RELEASE: ICD-10-CM

## 2024-03-22 DIAGNOSIS — G89.29 CHRONIC BILATERAL LOW BACK PAIN WITHOUT SCIATICA: ICD-10-CM

## 2024-03-22 DIAGNOSIS — Z47.89 ORTHOPEDIC AFTERCARE: Primary | ICD-10-CM

## 2024-03-22 PROCEDURE — 97110 THERAPEUTIC EXERCISES: CPT | Performed by: PHYSICAL THERAPIST

## 2024-03-22 NOTE — PROGRESS NOTES
Daily Note     Today's date: 3/22/2024  Patient name: Richard M Goldberg  : 1950  MRN: [id number]  Referring provider: Daniel Montero,*  Dx:   Encounter Diagnosis     ICD-10-CM    1. Orthopedic aftercare  Z47.89       2. Chronic bilateral low back pain without sciatica  M54.50     G89.29       3. S/P cubital tunnel release  Z98.890       4. S/P carpal tunnel release  Z98.890                      Subjective: Sp reports doing good with regard to back, best he has been in months       Objective: See treatment diary below      Assessment: Tolerated treatment fair. Patient exhibited good technique with therapeutic exercises and would benefit from continued PT      Plan: Continue per plan of care.      Precautions: None      Manuals             MFR lumbar paraspoinals  AF                                                   Neuro Re-Ed                                                                                                        Ther Ex             LTR 30            Bridges 30                                                                                          Ther Activity                                       Gait Training                                       Modalities

## 2024-03-25 ENCOUNTER — OFFICE VISIT (OUTPATIENT)
Dept: PHYSICAL THERAPY | Facility: MEDICAL CENTER | Age: 74
End: 2024-03-25
Payer: MEDICARE

## 2024-03-25 DIAGNOSIS — M54.50 CHRONIC BILATERAL LOW BACK PAIN WITHOUT SCIATICA: Primary | ICD-10-CM

## 2024-03-25 DIAGNOSIS — G89.29 CHRONIC BILATERAL LOW BACK PAIN WITHOUT SCIATICA: Primary | ICD-10-CM

## 2024-03-25 PROCEDURE — 97110 THERAPEUTIC EXERCISES: CPT | Performed by: PHYSICAL THERAPIST

## 2024-03-25 NOTE — PROGRESS NOTES
Daily Note     Today's date: 3/25/2024  Patient name: Richard M Goldberg  : 1950  MRN: [id number]  Referring provider: Daniel Montero,*  Dx:   Encounter Diagnosis     ICD-10-CM    1. Chronic bilateral low back pain without sciatica  M54.50     G89.29                      Subjective: Sp reports doing good with regard to back, best he has been in months       Objective: See treatment diary below      Assessment: Tolerated treatment fair. Patient exhibited good technique with therapeutic exercises and would benefit from continued PT      Plan: Continue per plan of care.      Precautions: None      Manuals             MFR lumbar paraspoinals  AF                                                   Neuro Re-Ed                                                                                                        Ther Ex             LTR 30            Bridges 30                                                                                          Ther Activity                                       Gait Training                                       Modalities

## 2024-03-27 ENCOUNTER — APPOINTMENT (OUTPATIENT)
Dept: PHYSICAL THERAPY | Facility: MEDICAL CENTER | Age: 74
End: 2024-03-27
Payer: MEDICARE

## 2024-03-27 ENCOUNTER — OFFICE VISIT (OUTPATIENT)
Dept: PHYSICAL THERAPY | Facility: MEDICAL CENTER | Age: 74
End: 2024-03-27
Payer: MEDICARE

## 2024-03-27 DIAGNOSIS — G89.29 CHRONIC BILATERAL LOW BACK PAIN WITHOUT SCIATICA: Primary | ICD-10-CM

## 2024-03-27 DIAGNOSIS — M54.50 CHRONIC BILATERAL LOW BACK PAIN WITHOUT SCIATICA: Primary | ICD-10-CM

## 2024-03-27 PROCEDURE — 97110 THERAPEUTIC EXERCISES: CPT | Performed by: PHYSICAL THERAPIST

## 2024-03-27 NOTE — PROGRESS NOTES
Daily Note     Today's date: 3/27/2024  Patient name: Richard M Goldberg  : 1950  MRN: [id number]  Referring provider: Daniel Montero,*  Dx:   Encounter Diagnosis     ICD-10-CM    1. Chronic bilateral low back pain without sciatica  M54.50     G89.29                      Subjective: Sp reports doing good with regard to back, best he has been in months       Objective: See treatment diary below      Assessment: Tolerated treatment fair. Patient exhibited good technique with therapeutic exercises and would benefit from continued PT      Plan: Continue per plan of care.      Precautions: None      Manuals             MFR lumbar paraspoinals  AF                                                   Neuro Re-Ed                                                                                                        Ther Ex             LTR 30            Bridges 30                                                                                          Ther Activity                                       Gait Training                                       Modalities

## 2024-03-28 ENCOUNTER — APPOINTMENT (OUTPATIENT)
Dept: LAB | Facility: MEDICAL CENTER | Age: 74
End: 2024-03-28
Payer: MEDICARE

## 2024-03-28 DIAGNOSIS — R25.2 MUSCLE CRAMPS: ICD-10-CM

## 2024-03-28 DIAGNOSIS — I10 ESSENTIAL HYPERTENSION, MALIGNANT: ICD-10-CM

## 2024-03-28 DIAGNOSIS — I25.119 ATHEROSCLEROSIS OF NATIVE CORONARY ARTERY WITH ANGINA PECTORIS, UNSPECIFIED WHETHER NATIVE OR TRANSPLANTED HEART (HCC): ICD-10-CM

## 2024-03-28 LAB
ALBUMIN SERPL BCP-MCNC: 4.3 G/DL (ref 3.5–5)
ALP SERPL-CCNC: 51 U/L (ref 34–104)
ALT SERPL W P-5'-P-CCNC: 25 U/L (ref 7–52)
ANION GAP SERPL CALCULATED.3IONS-SCNC: 9 MMOL/L (ref 4–13)
AST SERPL W P-5'-P-CCNC: 21 U/L (ref 13–39)
BACTERIA UR QL AUTO: ABNORMAL /HPF
BASOPHILS # BLD AUTO: 0.03 THOUSANDS/ÂΜL (ref 0–0.1)
BASOPHILS NFR BLD AUTO: 0 % (ref 0–1)
BILIRUB SERPL-MCNC: 0.76 MG/DL (ref 0.2–1)
BILIRUB UR QL STRIP: NEGATIVE
BUN SERPL-MCNC: 24 MG/DL (ref 5–25)
CALCIUM SERPL-MCNC: 9.5 MG/DL (ref 8.4–10.2)
CHLORIDE SERPL-SCNC: 100 MMOL/L (ref 96–108)
CHOLEST SERPL-MCNC: 131 MG/DL
CK SERPL-CCNC: 128 U/L (ref 39–308)
CLARITY UR: CLEAR
CO2 SERPL-SCNC: 29 MMOL/L (ref 21–32)
COLOR UR: ABNORMAL
CREAT SERPL-MCNC: 1.23 MG/DL (ref 0.6–1.3)
EOSINOPHIL # BLD AUTO: 0.23 THOUSAND/ÂΜL (ref 0–0.61)
EOSINOPHIL NFR BLD AUTO: 3 % (ref 0–6)
ERYTHROCYTE [DISTWIDTH] IN BLOOD BY AUTOMATED COUNT: 12.8 % (ref 11.6–15.1)
GFR SERPL CREATININE-BSD FRML MDRD: 58 ML/MIN/1.73SQ M
GLUCOSE P FAST SERPL-MCNC: 144 MG/DL (ref 65–99)
GLUCOSE UR STRIP-MCNC: NEGATIVE MG/DL
GRAN CASTS #/AREA URNS LPF: ABNORMAL /[LPF]
HCT VFR BLD AUTO: 45.7 % (ref 36.5–49.3)
HDLC SERPL-MCNC: 51 MG/DL
HGB BLD-MCNC: 15.4 G/DL (ref 12–17)
HGB UR QL STRIP.AUTO: NEGATIVE
HYALINE CASTS #/AREA URNS LPF: ABNORMAL /LPF
IMM GRANULOCYTES # BLD AUTO: 0.02 THOUSAND/UL (ref 0–0.2)
IMM GRANULOCYTES NFR BLD AUTO: 0 % (ref 0–2)
KETONES UR STRIP-MCNC: ABNORMAL MG/DL
LDLC SERPL CALC-MCNC: 62 MG/DL (ref 0–100)
LEUKOCYTE ESTERASE UR QL STRIP: NEGATIVE
LYMPHOCYTES # BLD AUTO: 1.87 THOUSANDS/ÂΜL (ref 0.6–4.47)
LYMPHOCYTES NFR BLD AUTO: 25 % (ref 14–44)
MAGNESIUM SERPL-MCNC: 2.3 MG/DL (ref 1.9–2.7)
MCH RBC QN AUTO: 32.9 PG (ref 26.8–34.3)
MCHC RBC AUTO-ENTMCNC: 33.7 G/DL (ref 31.4–37.4)
MCV RBC AUTO: 98 FL (ref 82–98)
MONOCYTES # BLD AUTO: 0.85 THOUSAND/ÂΜL (ref 0.17–1.22)
MONOCYTES NFR BLD AUTO: 11 % (ref 4–12)
MUCOUS THREADS UR QL AUTO: ABNORMAL
NEUTROPHILS # BLD AUTO: 4.43 THOUSANDS/ÂΜL (ref 1.85–7.62)
NEUTS SEG NFR BLD AUTO: 61 % (ref 43–75)
NITRITE UR QL STRIP: NEGATIVE
NON-SQ EPI CELLS URNS QL MICRO: ABNORMAL /HPF
NONHDLC SERPL-MCNC: 80 MG/DL
NRBC BLD AUTO-RTO: 0 /100 WBCS
PH UR STRIP.AUTO: 6 [PH]
PLATELET # BLD AUTO: 252 THOUSANDS/UL (ref 149–390)
PMV BLD AUTO: 9.4 FL (ref 8.9–12.7)
POTASSIUM SERPL-SCNC: 4.3 MMOL/L (ref 3.5–5.3)
PROT SERPL-MCNC: 7.2 G/DL (ref 6.4–8.4)
PROT UR STRIP-MCNC: ABNORMAL MG/DL
RBC # BLD AUTO: 4.68 MILLION/UL (ref 3.88–5.62)
RBC #/AREA URNS AUTO: ABNORMAL /HPF
RENAL EPI CELLS #/AREA URNS HPF: PRESENT /[HPF]
SODIUM SERPL-SCNC: 138 MMOL/L (ref 135–147)
SP GR UR STRIP.AUTO: 1.03 (ref 1–1.03)
TRIGL SERPL-MCNC: 92 MG/DL
UROBILINOGEN UR STRIP-ACNC: 3 MG/DL
WBC # BLD AUTO: 7.43 THOUSAND/UL (ref 4.31–10.16)
WBC #/AREA URNS AUTO: ABNORMAL /HPF

## 2024-03-28 PROCEDURE — 85025 COMPLETE CBC W/AUTO DIFF WBC: CPT

## 2024-03-28 PROCEDURE — 36415 COLL VENOUS BLD VENIPUNCTURE: CPT

## 2024-03-28 PROCEDURE — 81001 URINALYSIS AUTO W/SCOPE: CPT

## 2024-03-28 PROCEDURE — 80061 LIPID PANEL: CPT

## 2024-03-28 PROCEDURE — 80053 COMPREHEN METABOLIC PANEL: CPT

## 2024-03-28 PROCEDURE — 83735 ASSAY OF MAGNESIUM: CPT

## 2024-03-28 PROCEDURE — 82550 ASSAY OF CK (CPK): CPT

## 2024-03-29 ENCOUNTER — OFFICE VISIT (OUTPATIENT)
Dept: PHYSICAL THERAPY | Facility: MEDICAL CENTER | Age: 74
End: 2024-03-29
Payer: MEDICARE

## 2024-03-29 DIAGNOSIS — M54.50 CHRONIC BILATERAL LOW BACK PAIN WITHOUT SCIATICA: Primary | ICD-10-CM

## 2024-03-29 DIAGNOSIS — G89.29 CHRONIC BILATERAL LOW BACK PAIN WITHOUT SCIATICA: Primary | ICD-10-CM

## 2024-03-29 PROCEDURE — 97110 THERAPEUTIC EXERCISES: CPT | Performed by: PHYSICAL THERAPIST

## 2024-03-29 NOTE — PROGRESS NOTES
Daily Note     Today's date: 3/29/2024  Patient name: Richard M Goldberg  : 1950  MRN: [id number]  Referring provider: Daniel Montero,*  Dx:   Encounter Diagnosis     ICD-10-CM    1. Chronic bilateral low back pain without sciatica  M54.50     G89.29                      Subjective: Sp reports doing good with regard to back, best he has been in months       Objective: See treatment diary below      Assessment: Tolerated treatment fair. Patient exhibited good technique with therapeutic exercises and would benefit from continued PT      Plan: Continue per plan of care.      Precautions: None      Manuals             MFR lumbar paraspoinals  AF                                                   Neuro Re-Ed                                                                                                        Ther Ex             LTR 30            Bridges 30                                                                                          Ther Activity                                       Gait Training                                       Modalities

## 2024-04-01 ENCOUNTER — OFFICE VISIT (OUTPATIENT)
Dept: PHYSICAL THERAPY | Facility: MEDICAL CENTER | Age: 74
End: 2024-04-01
Payer: MEDICARE

## 2024-04-01 DIAGNOSIS — G89.29 CHRONIC BILATERAL LOW BACK PAIN WITHOUT SCIATICA: Primary | ICD-10-CM

## 2024-04-01 DIAGNOSIS — M54.50 CHRONIC BILATERAL LOW BACK PAIN WITHOUT SCIATICA: Primary | ICD-10-CM

## 2024-04-01 PROCEDURE — 97140 MANUAL THERAPY 1/> REGIONS: CPT | Performed by: PHYSICAL THERAPIST

## 2024-04-01 NOTE — PROGRESS NOTES
Daily Note     Today's date: 2024  Patient name: Richard M Goldberg  : 1950  MRN: [id number]  Referring provider: Daniel Montero,*  Dx:   Encounter Diagnosis     ICD-10-CM    1. Chronic bilateral low back pain without sciatica  M54.50     G89.29                      Subjective: Sp reports doing good with regard to back, best he has been in months       Objective: See treatment diary below      Assessment: Tolerated treatment fair. Patient exhibited good technique with therapeutic exercises and would benefit from continued PT      Plan: Continue per plan of care.      Precautions: None      Manuals             MFR lumbar paraspoinals  AF                                                   Neuro Re-Ed                                                                                                        Ther Ex             LTR 30            Bridges 30                                                                                          Ther Activity                                       Gait Training                                       Modalities

## 2024-04-03 ENCOUNTER — APPOINTMENT (OUTPATIENT)
Dept: PHYSICAL THERAPY | Facility: MEDICAL CENTER | Age: 74
End: 2024-04-03
Payer: MEDICARE

## 2024-04-05 ENCOUNTER — OFFICE VISIT (OUTPATIENT)
Dept: PHYSICAL THERAPY | Facility: MEDICAL CENTER | Age: 74
End: 2024-04-05
Payer: MEDICARE

## 2024-04-05 DIAGNOSIS — G89.29 CHRONIC BILATERAL LOW BACK PAIN WITHOUT SCIATICA: Primary | ICD-10-CM

## 2024-04-05 DIAGNOSIS — M54.50 CHRONIC BILATERAL LOW BACK PAIN WITHOUT SCIATICA: Primary | ICD-10-CM

## 2024-04-05 PROCEDURE — 97140 MANUAL THERAPY 1/> REGIONS: CPT | Performed by: PHYSICAL THERAPIST

## 2024-04-05 NOTE — PROGRESS NOTES
Daily Note     Today's date: 2024  Patient name: Richard M Goldberg  : 1950  MRN: [id number]  Referring provider: Daniel Montero,*  Dx:   Encounter Diagnosis     ICD-10-CM    1. Chronic bilateral low back pain without sciatica  M54.50     G89.29                      Subjective: Sp reports doing good with regard to back, best he has been in months       Objective: See treatment diary below      Assessment: Tolerated treatment fair. Patient exhibited good technique with therapeutic exercises and would benefit from continued PT      Plan: Continue per plan of care.      Precautions: None      Manuals             MFR lumbar paraspoinals  AF                                                   Neuro Re-Ed                                                                                                        Ther Ex             LTR 30            Bridges 30                                                                                          Ther Activity                                       Gait Training                                       Modalities

## 2024-04-08 ENCOUNTER — OFFICE VISIT (OUTPATIENT)
Dept: PHYSICAL THERAPY | Facility: MEDICAL CENTER | Age: 74
End: 2024-04-08
Payer: MEDICARE

## 2024-04-08 DIAGNOSIS — M54.50 CHRONIC BILATERAL LOW BACK PAIN WITHOUT SCIATICA: Primary | ICD-10-CM

## 2024-04-08 DIAGNOSIS — G89.29 CHRONIC BILATERAL LOW BACK PAIN WITHOUT SCIATICA: Primary | ICD-10-CM

## 2024-04-08 PROCEDURE — 97140 MANUAL THERAPY 1/> REGIONS: CPT | Performed by: PHYSICAL THERAPIST

## 2024-04-08 NOTE — PROGRESS NOTES
Daily Note     Today's date: 2024  Patient name: Richard M Goldberg  : 1950  MRN: [id number]  Referring provider: Daniel Montero,*  Dx:   Encounter Diagnosis     ICD-10-CM    1. Chronic bilateral low back pain without sciatica  M54.50     G89.29                      Subjective: Sp reports doing good with regard to back, best he has been in months       Objective: See treatment diary below      Assessment: Tolerated treatment fair. Patient exhibited good technique with therapeutic exercises and would benefit from continued PT      Plan: Continue per plan of care.      Precautions: None      Manuals             MFR lumbar paraspoinals  AF                                                   Neuro Re-Ed                                                                                                        Ther Ex             LTR 30            Bridges 30                                                                                          Ther Activity                                       Gait Training                                       Modalities

## 2024-04-10 ENCOUNTER — APPOINTMENT (OUTPATIENT)
Dept: PHYSICAL THERAPY | Facility: MEDICAL CENTER | Age: 74
End: 2024-04-10
Payer: MEDICARE

## 2024-04-10 ENCOUNTER — OFFICE VISIT (OUTPATIENT)
Dept: PHYSICAL THERAPY | Facility: MEDICAL CENTER | Age: 74
End: 2024-04-10
Payer: MEDICARE

## 2024-04-10 DIAGNOSIS — G89.29 CHRONIC BILATERAL LOW BACK PAIN WITHOUT SCIATICA: Primary | ICD-10-CM

## 2024-04-10 DIAGNOSIS — M54.50 CHRONIC BILATERAL LOW BACK PAIN WITHOUT SCIATICA: Primary | ICD-10-CM

## 2024-04-10 PROCEDURE — 97140 MANUAL THERAPY 1/> REGIONS: CPT | Performed by: PHYSICAL THERAPIST

## 2024-04-12 ENCOUNTER — OFFICE VISIT (OUTPATIENT)
Dept: PHYSICAL THERAPY | Facility: MEDICAL CENTER | Age: 74
End: 2024-04-12
Payer: MEDICARE

## 2024-04-12 DIAGNOSIS — G89.29 CHRONIC BILATERAL LOW BACK PAIN WITHOUT SCIATICA: Primary | ICD-10-CM

## 2024-04-12 DIAGNOSIS — M54.50 CHRONIC BILATERAL LOW BACK PAIN WITHOUT SCIATICA: Primary | ICD-10-CM

## 2024-04-12 PROCEDURE — 97140 MANUAL THERAPY 1/> REGIONS: CPT | Performed by: PHYSICAL THERAPIST

## 2024-04-12 PROCEDURE — 97110 THERAPEUTIC EXERCISES: CPT | Performed by: PHYSICAL THERAPIST

## 2024-04-12 NOTE — PROGRESS NOTES
Daily Note     Today's date: 2024  Patient name: Richard M Goldberg  : 1950  MRN: [id number]  Referring provider: Daniel Montero,*  Dx:   Encounter Diagnosis     ICD-10-CM    1. Chronic bilateral low back pain without sciatica  M54.50     G89.29                      Subjective: Sp reports doing good with regard to back, best he has been in months       Objective: See treatment diary below      Assessment: Tolerated treatment fair. Patient exhibited good technique with therapeutic exercises and would benefit from continued PT      Plan: Continue per plan of care.      Precautions: None      Manuals             MFR lumbar paraspoinals  AF                                                   Neuro Re-Ed                                                                                                        Ther Ex             LTR 30            Bridges 30                                                                                          Ther Activity                                       Gait Training                                       Modalities

## 2024-04-12 NOTE — PROGRESS NOTES
Daily Note     Today's date: 2024  Patient name: Richard M Goldberg  : 1950  MRN: [id number]  Referring provider: Daniel Montero,*  Dx:   Encounter Diagnosis     ICD-10-CM    1. Chronic bilateral low back pain without sciatica  M54.50     G89.29                      Subjective: Sp reports doing good with regard to back, best he has been in months       Objective: See treatment diary below      Assessment: Tolerated treatment fair. Patient exhibited good technique with therapeutic exercises and would benefit from continued PT      Plan: Continue per plan of care.      Precautions: None      Manuals             MFR lumbar paraspoinals  AF                                                   Neuro Re-Ed                                                                                                        Ther Ex             LTR 30            Bridges 30                                                                                          Ther Activity                                       Gait Training                                       Modalities

## 2024-04-15 ENCOUNTER — OFFICE VISIT (OUTPATIENT)
Dept: PHYSICAL THERAPY | Facility: MEDICAL CENTER | Age: 74
End: 2024-04-15
Payer: MEDICARE

## 2024-04-15 DIAGNOSIS — G89.29 CHRONIC BILATERAL LOW BACK PAIN WITHOUT SCIATICA: Primary | ICD-10-CM

## 2024-04-15 DIAGNOSIS — Z98.890 S/P CUBITAL TUNNEL RELEASE: ICD-10-CM

## 2024-04-15 DIAGNOSIS — Z98.890 S/P CARPAL TUNNEL RELEASE: ICD-10-CM

## 2024-04-15 DIAGNOSIS — Z47.89 ORTHOPEDIC AFTERCARE: ICD-10-CM

## 2024-04-15 DIAGNOSIS — M54.50 CHRONIC BILATERAL LOW BACK PAIN WITHOUT SCIATICA: Primary | ICD-10-CM

## 2024-04-15 PROCEDURE — 97110 THERAPEUTIC EXERCISES: CPT | Performed by: PHYSICAL THERAPIST

## 2024-04-15 PROCEDURE — 97140 MANUAL THERAPY 1/> REGIONS: CPT | Performed by: PHYSICAL THERAPIST

## 2024-04-15 NOTE — PROGRESS NOTES
Daily Note     Today's date: 4/15/2024  Patient name: Richard M Goldberg  : 1950  MRN: [id number]  Referring provider: Daniel Montero,*  Dx:   Encounter Diagnosis     ICD-10-CM    1. Chronic bilateral low back pain without sciatica  M54.50     G89.29       2. S/P cubital tunnel release  Z98.890       3. Orthopedic aftercare  Z47.89       4. S/P carpal tunnel release  Z98.890                      Subjective: Sp reports doing good with regard to back, best he has been in months       Objective: See treatment diary below      Assessment: Tolerated treatment fair. Patient exhibited good technique with therapeutic exercises and would benefit from continued PT      Plan: Continue per plan of care.      Precautions: None      Manuals             MFR lumbar paraspoinals  AF                                                   Neuro Re-Ed                                                                                                        Ther Ex             LTR 30            Bridges 30                                                                                          Ther Activity                                       Gait Training                                       Modalities

## 2024-04-17 ENCOUNTER — APPOINTMENT (OUTPATIENT)
Dept: PHYSICAL THERAPY | Facility: MEDICAL CENTER | Age: 74
End: 2024-04-17
Payer: MEDICARE

## 2024-04-17 ENCOUNTER — OFFICE VISIT (OUTPATIENT)
Dept: PHYSICAL THERAPY | Facility: MEDICAL CENTER | Age: 74
End: 2024-04-17
Payer: MEDICARE

## 2024-04-17 DIAGNOSIS — M54.50 CHRONIC BILATERAL LOW BACK PAIN WITHOUT SCIATICA: Primary | ICD-10-CM

## 2024-04-17 DIAGNOSIS — G89.29 CHRONIC BILATERAL LOW BACK PAIN WITHOUT SCIATICA: Primary | ICD-10-CM

## 2024-04-17 PROCEDURE — 97110 THERAPEUTIC EXERCISES: CPT | Performed by: PHYSICAL THERAPIST

## 2024-04-17 PROCEDURE — 97140 MANUAL THERAPY 1/> REGIONS: CPT | Performed by: PHYSICAL THERAPIST

## 2024-04-17 NOTE — PROGRESS NOTES
Daily Note     Today's date: 2024  Patient name: Richard M Goldberg  : 1950  MRN: [id number]  Referring provider: Daniel Montero,*  Dx:   Encounter Diagnosis     ICD-10-CM    1. Chronic bilateral low back pain without sciatica  M54.50     G89.29                      Subjective: Sp reports doing good with regard to back, best he has been in months       Objective: See treatment diary below      Assessment: Tolerated treatment fair. Patient exhibited good technique with therapeutic exercises and would benefit from continued PT      Plan: Continue per plan of care.      Precautions: None      Manuals             MFR lumbar paraspoinals  AF                                                   Neuro Re-Ed                                                                                                        Ther Ex             LTR 30            Bridges 30                                                                                          Ther Activity                                       Gait Training                                       Modalities

## 2024-04-22 ENCOUNTER — OFFICE VISIT (OUTPATIENT)
Dept: PHYSICAL THERAPY | Facility: MEDICAL CENTER | Age: 74
End: 2024-04-22
Payer: MEDICARE

## 2024-04-22 DIAGNOSIS — G89.29 CHRONIC BILATERAL LOW BACK PAIN WITHOUT SCIATICA: Primary | ICD-10-CM

## 2024-04-22 DIAGNOSIS — M54.50 CHRONIC BILATERAL LOW BACK PAIN WITHOUT SCIATICA: Primary | ICD-10-CM

## 2024-04-22 PROCEDURE — 97140 MANUAL THERAPY 1/> REGIONS: CPT | Performed by: PHYSICAL THERAPIST

## 2024-04-22 PROCEDURE — 97110 THERAPEUTIC EXERCISES: CPT | Performed by: PHYSICAL THERAPIST

## 2024-04-22 NOTE — PROGRESS NOTES
Daily Note     Today's date: 2024  Patient name: Richard M Goldberg  : 1950  MRN: [id number]  Referring provider: Daniel Montero,*  Dx:   Encounter Diagnosis     ICD-10-CM    1. Chronic bilateral low back pain without sciatica  M54.50     G89.29                      Subjective: Sp reports doing good with regard to back, best he has been in months       Objective: See treatment diary below      Assessment: Tolerated treatment fair. Patient exhibited good technique with therapeutic exercises and would benefit from continued PT      Plan: Continue per plan of care.      Precautions: None      Manuals             MFR lumbar paraspoinals  AF                                                   Neuro Re-Ed                                                                                                        Ther Ex             LTR 30            Bridges 30                                                                                          Ther Activity                                       Gait Training                                       Modalities

## 2024-04-24 ENCOUNTER — OFFICE VISIT (OUTPATIENT)
Dept: PHYSICAL THERAPY | Facility: MEDICAL CENTER | Age: 74
End: 2024-04-24
Payer: MEDICARE

## 2024-04-24 ENCOUNTER — APPOINTMENT (OUTPATIENT)
Dept: PHYSICAL THERAPY | Facility: MEDICAL CENTER | Age: 74
End: 2024-04-24
Payer: MEDICARE

## 2024-04-24 DIAGNOSIS — M54.50 CHRONIC BILATERAL LOW BACK PAIN WITHOUT SCIATICA: Primary | ICD-10-CM

## 2024-04-24 DIAGNOSIS — G89.29 CHRONIC BILATERAL LOW BACK PAIN WITHOUT SCIATICA: Primary | ICD-10-CM

## 2024-04-24 PROCEDURE — 97140 MANUAL THERAPY 1/> REGIONS: CPT | Performed by: PHYSICAL THERAPIST

## 2024-04-24 PROCEDURE — 97110 THERAPEUTIC EXERCISES: CPT | Performed by: PHYSICAL THERAPIST

## 2024-04-24 NOTE — PROGRESS NOTES
Daily Note     Today's date: 2024  Patient name: Richard M Goldberg  : 1950  MRN: [id number]  Referring provider: Daniel Montero,*  Dx:   Encounter Diagnosis     ICD-10-CM    1. Chronic bilateral low back pain without sciatica  M54.50     G89.29                      Subjective: Sp reports doing good with regard to back, best he has been in months       Objective: See treatment diary below      Assessment: Tolerated treatment fair.   Patient to be seeing pain management and receiving injections, hold on therapy at this time       Plan: Continue per plan of care.      Precautions: None      Manuals             MFR lumbar paraspoinals  AF                                                   Neuro Re-Ed                                                                                                        Ther Ex             LTR 30            Bridges 30                                                                                          Ther Activity                                       Gait Training                                       Modalities

## 2024-07-07 ENCOUNTER — NURSE TRIAGE (OUTPATIENT)
Dept: OTHER | Facility: OTHER | Age: 74
End: 2024-07-07

## 2024-07-07 NOTE — TELEPHONE ENCOUNTER
"Answer Assessment - Initial Assessment Questions  1. BLOOD PRESSURE: \"What is the blood pressure?\" \"Did you take at least two measurements 5 minutes apart?\"      2:32 pm 96/57 about an hr ago. BP right now is 115/72    2. ONSET: \"When did you take your blood pressure?\"      During the last week started feeling unwell    3. HOW: \"How did you obtain the blood pressure?\" (e.g., visiting nurse, automatic home BP monitor)      Home automatic BP monitor.     4. HISTORY: \"Do you have a history of low blood pressure?\" \"What is your blood pressure normally?\"      Hypertension    5. MEDICATIONS: \"Are you taking any medications for blood pressure?\" If Yes, ask: \"Have they been changed recently?\"      HCTZ      Nifedipine      Valsartan  Takes BID but so far has only taken AM doses.     6. PULSE RATE: \"Do you know what your pulse rate is?\"       100  7. OTHER SYMPTOMS: \"Have you been sick recently?\" \"Have you had a recent injury?\"      Lightheaded on and off - mainly with movement. Earlier at 2:32pm felt like he was going to pass out. Right now is ok.    Protocols used: Blood Pressure - Low-ADULT-    Pt states he has lost 40 lbs in the last 2 months from being on Mounjaro. In the last week noticed he was not feeling well with on and off lightheaded episodes. Concerned that due to weightloss he may need his BP meds readjusted.     Per Dr. Shanks- BP is now low due to rapid weight loss w/ Mounjaro in the last 2 months. As a result the same BP medications that were okay for pt before may now be too much for him. He should stop the HCTZ. If continues with low BP even after stopping HCTZ may also need to d/c his Nifedipine. Pt should monitor BP and keep a log. Set up follow up appt with Dr. Herr for BP management.     Pt is aware and verbalized understanding. Pt aware that if symptoms should get worse again today - then should go to the ED.     Pt would like to be seen with Dr. Herr if not tomorrow sometime this week. " Please reach out to pt to schedule.

## 2024-07-07 NOTE — TELEPHONE ENCOUNTER
"Regarding: BP 96/57  ----- Message from Bing PIERRE sent at 7/7/2024  2:56 PM EDT -----  PT stated \"I am having some BP issues. I took my BP and was 96/57. For the past few weeks I have been close to passing out. \"    "

## 2024-07-07 NOTE — TELEPHONE ENCOUNTER
Reason for Disposition  • [1] Systolic BP  AND [2] taking blood pressure medications AND [3] NOT dizzy, lightheaded or weak    Protocols used: Blood Pressure - Low-ADULT-AH

## 2024-07-08 ENCOUNTER — NURSE TRIAGE (OUTPATIENT)
Age: 74
End: 2024-07-08

## 2024-07-08 NOTE — TELEPHONE ENCOUNTER
"Hello,    The following message was sent via e-mail to the leadership team:     Please advise if you can help facilitate the following overbook request:    Patient Name: Richard M Goldberg    Patient MRN: [id number]    Call back #: [phone]    Insurance: Medicare    Department:Cardiology    Speciality: General Cardiology    Reason for overbook request: OTHER (PLEASE WRITE REASON IN COMMENT SECTION)    Comments (Write \"N/a\" if no comments): urgent follow up, pt request    Requested doctor and location: [facility]    Date of current appointment: July 10th with Debi SHIN      Thank you.      "

## 2024-07-08 NOTE — TELEPHONE ENCOUNTER
Hold all antihypertensives - HCTZ, valsartan, nifedipine.    Can be reassessed on Wed, for short term, low BP is more dangerous.    If his SBP goes > 150 in the meantime can call us back and use one valsartan daily.

## 2024-07-08 NOTE — TELEPHONE ENCOUNTER
"Spoke with patient regarding low blood pressures, dizziness, shortness of breath; states this has been going on for a few weeks now.     Spoke with after hours yesterday regarding same, advised to stop the HCTZ and if symptoms continue may need to stop the Nifedipine.    Patient states BP today 96/62, HR 64 after Nifedipine and Valsartan; experiences dizziness \"almost to the point of blacking out\" anytime he bends over and when changing positions, shortness of breath with exertion (talking, walking).    Patient has lost over 40lbs in the last 2mos on Mounjaro and may need BP med adjustment.    Advised ED/UC evaluation but refused at this time; would like to know what Dr Spring recommends first. Urgent follow up scheduled for Wednesday with SUSIE; offered nurse visit for BP check but wants to be seen.    Patient has hip replacement surgery scheduled for August 5th also.    Please advise.  "

## 2024-07-08 NOTE — TELEPHONE ENCOUNTER
"Reason for Disposition  • Fall in systolic BP > 20 mm Hg from normal and feeling dizzy, lightheaded, or weak    Answer Assessment - Initial Assessment Questions  1. BLOOD PRESSURE: \"What is the blood pressure?\" \"Did you take at least two measurements 5 minutes apart?\"      96/62  2. ONSET: \"When did you take your blood pressure?\"      After AM medications (Valsartan 160, Nifedipine ER 30)  3. HOW: \"How did you obtain the blood pressure?\" (e.g., visiting nurse, automatic home BP monitor)      Home machine  4. HISTORY: \"Do you have a history of low blood pressure?\" \"What is your blood pressure normally?\"      no  5. MEDICATIONS: \"Are you taking any medications for blood pressure?\" If Yes, ask: \"Have they been changed recently?\"      Nifedipine ER 30mg 2x/day, Valsartan 160mg daily, HCTZ 12.5mg daily)  6. PULSE RATE: \"Do you know what your pulse rate is?\"       64  7. OTHER SYMPTOMS: \"Have you been sick recently?\" \"Have you had a recent injury?\"      Dizzy, shortness of breath with exertion    Answer Assessment - Initial Assessment Questions  1. RESPIRATORY STATUS: \"Describe your breathing?\" (e.g., wheezing, shortness of breath, unable to speak, severe coughing)       Shortness of breath with activity  2. ONSET: \"When did this breathing problem begin?\"       Last few weeks  3. PATTERN \"Does the difficult breathing come and go, or has it been constant since it started?\"       With exertion  4. SEVERITY: \"How bad is your breathing?\" (e.g., mild, moderate, severe)     - MILD: No SOB at rest, mild SOB with walking, speaks normally in sentences, can lay down, no retractions, pulse < 100.     - MODERATE: SOB at rest, SOB with minimal exertion and prefers to sit, cannot lie down flat, speaks in phrases, mild retractions, audible wheezing, pulse 100-120.     - SEVERE: Very SOB at rest, speaks in single words, struggling to breathe, sitting hunched forward, retractions, pulse > 120       moderate  5. RECURRENT SYMPTOM: \"Have " "you had difficulty breathing before?\" If Yes, ask: \"When was the last time?\" and \"What happened that time?\"       denies  6. CARDIAC HISTORY: \"Do you have any history of heart disease?\" (e.g., heart attack, angina, bypass surgery, angioplasty)       NSTEMI, CAD, HTN  7. LUNG HISTORY: \"Do you have any history of lung disease?\"  (e.g., pulmonary embolus, asthma, emphysema)      denies  8. CAUSE: \"What do you think is causing the breathing problem?\"       Low BP  9. OTHER SYMPTOMS: \"Do you have any other symptoms? (e.g., dizziness, runny nose, cough, chest pain, fever)      Dizzy, low BP  11. TRAVEL: \"Have you traveled out of the country in the last month?\" (e.g., travel history, exposures)        denies    Answer Assessment - Initial Assessment Questions  1. DESCRIPTION: \"Describe how you are feeling.\"      Tired, weak, dizzy  2. SEVERITY: \"How bad is it?\"  \"Can you stand and walk?\"    - MILD - Feels weak or tired, but does not interfere with work, school or normal activities    - MODERATE - Able to stand and walk; weakness interferes with work, school, or normal activities    - SEVERE - Unable to stand or walk      Mild to moderate  3. ONSET:  \"When did the weakness begin?\"      Last few weeks  4. CAUSE: \"What do you think is causing the weakness?\"      BP, meds  5. MEDICINES: \"Have you recently started a new medicine or had a change in the amount of a medicine?\"      denies  6. OTHER SYMPTOMS: \"Do you have any other symptoms?\" (e.g., chest pain, fever, cough, SOB, vomiting, diarrhea, bleeding, other areas of pain)      Low BP, short of breath with exertion    Protocols used: Blood Pressure - Low-ADULT-OH, Breathing Difficulty-ADULT-OH, Weakness (Generalized) and Fatigue-ADULT-OH    "

## 2024-07-09 NOTE — PROGRESS NOTES
Cardiology Follow Up    Richard M Goldberg  1950  [id number]  [facility] [place]  [address] [facility]  [place] [address] [phone]    1. Other specified hypotension  rosuvastatin (CRESTOR) 20 MG tablet      2. Pre-syncope        3. Coronary artery disease involving native coronary artery of native heart without angina pectoris        4. Dyslipidemia        5. Obesity (BMI 30-39.9)            Interval History:   Mr Richard Goldberg called our office with a few week history of dizziness, shortness of breath with low BP, 96/66.   He had lost 40 pounds in 2 months taking Mounjaro.  He was advised to stop HCTZ.   Sp was scheduled to be seen in our office.      Sp presents to our office for a follow up visit.  He is accompanied by his wife.  Sp admits to fatigue, lightheadedness with getting up from a sitting position, bending over and standing up hernandez with pre syncopal symptoms.  Symptoms have  occurred over the past 4-5 weeks.  He has been taking Mounjaro for 2 months and has lost 40 pounds.  This past Sunday Sp states he almost passed out.  Home BP 98/56.  He called our office and stopped taking all his BP medications.  After stopping BP medications his home BP readings are  105/67 - 110/63.   Sp has not been eating many calories.  He admits to hydrating.  Sp informs me he takes a Percocet every morning for back and hip pain.  He stopped taking Crestor and his muscle aches have resolved.      Medical History   Primary Cardiologist Dr Spring  CAD  NSTEMI 6/2019, PCI to OM2  CAD 60% first diagonal branch, circumflex 60%, mid RCA 50%  Hypertension  IFG  Bifascicular block   Hx of left hip x 2     Patient Active Problem List   Diagnosis    Anxiety    BPH with obstruction/lower urinary tract symptoms    Elevated glucose    Erectile dysfunction of non-organic origin    GERD without esophagitis     Hyperlipidemia    Essential hypertension    Obesity (BMI 30.0-34.9)    Obstructive sleep apnea    Osteoarthritis    Psoriasis    Bifascicular block    Calculus of kidney    Primary insomnia    GERD (gastroesophageal reflux disease)    Hypertensive urgency    Low back pain    Alcohol use    Dyslipidemia    Coronary artery disease involving native coronary artery of native heart without angina pectoris    Left leg pain    Sacroiliitis (HCC)    Hepatic cirrhosis (HCC)    Morbid obesity (HCC)     Past Medical History:   Diagnosis Date    CAD (coronary artery disease)     s/p stent x1 on 6/19/19    Closed fracture of distal end of fibula with routine healing     unspecified fracture morphology, unspecified laterality, subsequent encounter; Last Assessed:12/27/16    GERD (gastroesophageal reflux disease)     Heart attack (HCC)     Hypertension     Kidney stones     Nocturia     Psoriasis     Sleep apnea      Social History     Socioeconomic History    Marital status:      Spouse name: Not on file    Number of children: Not on file    Years of education: Not on file    Highest education level: Not on file   Occupational History    Not on file   Tobacco Use    Smoking status: Never    Smokeless tobacco: Never   Substance and Sexual Activity    Alcohol use: Yes     Comment: 1 drink daily    Drug use: No    Sexual activity: Not on file   Other Topics Concern    Not on file   Social History Narrative    Not on file     Social Determinants of Health     Financial Resource Strain: Low Risk  (3/20/2024)    Received from [facility]    Overall Financial Resource Strain (CARDIA)     Difficulty of Paying Living Expenses: Not very hard   Food Insecurity: No Food Insecurity (3/20/2024)    Received from [facility]    Hunger Vital Sign     Worried About Running Out of Food in the Last Year: Never true     Ran Out of Food in the Last Year: Never true   Transportation Needs: No Transportation  Needs (3/20/2024)    Received from [facility]    PRAPARE - Transportation     Lack of Transportation (Medical): No     Lack of Transportation (Non-Medical): No   Physical Activity: Not on file   Stress: Stress Concern Present (3/20/2024)    Received from [facility]    [employer] - Occupational Stress Questionnaire     Feeling of Stress : To some extent   Social Connections: Feeling Socially Integrated (3/20/2024)    Received from [facility]    OASIS : Social Isolation     How often do you feel lonely or isolated from those around you?: Rarely   Intimate Partner Violence: Not At Risk (3/20/2024)    Received from [facility]    Humiliation, Afraid, Rape, and Kick questionnaire     Fear of Current or Ex-Partner: No     Emotionally Abused: No     Physically Abused: No     Sexually Abused: No   Housing Stability: Low Risk  (3/20/2024)    Received from [facility]    Housing Stability Vital Sign     Unable to Pay for Housing in the Last Year: No     Number of Places Lived in the Last Year: 1     Unstable Housing in the Last Year: No      Family History   Problem Relation Age of Onset    Arthritis Mother     Hypertension Mother     Irritable bowel syndrome Mother     Osteoporosis Mother     Arthritis Other      Past Surgical History:   Procedure Laterality Date    APPENDECTOMY      CARPAL TUNNEL RELEASE      ROTATOR CUFF REPAIR      SHOULDER ARTHROSCOPY Left     Last ASsessed:10/24/16    TOTAL HIP ARTHROPLASTY      TRIGGER FINGER RELEASE         Current Outpatient Medications:     aspirin (ECOTRIN LOW STRENGTH) 81 mg EC tablet, Take 1 tablet (81 mg total) by mouth daily, Disp: 30 tablet, Rfl: 0    fexofenadine-pseudoephedrine (Allegra-D Allergy & Congestion) 180-240 MG per 24 hr tablet, , Disp: , Rfl:     fluticasone (FLONASE) 50 mcg/act nasal spray, 2 sprays into each nostril 2 (two) times a day as  needed for rhinitis, Disp: , Rfl:     hydrochlorothiazide (HYDRODIURIL) 12.5 mg tablet, hydrochlorothiazide 12.5 mg tablet, Disp: , Rfl:     HYDROcodone-acetaminophen (ZOLVIT)  mg/15 mL solution, 10 mg, Disp: , Rfl:     loratadine (CLARITIN) 10 mg tablet, Take 10 mg by mouth daily, Disp: , Rfl:     methocarbamol (ROBAXIN) 750 mg tablet, methocarbamol 750 mg tablet  take 1 tablet by mouth three times a day if needed for muscle spasm, Disp: , Rfl:     NIFEdipine ER (ADALAT CC) 30 MG 24 hr tablet, Take 30 mg by mouth 2 (two) times a day, Disp: , Rfl:     nitroglycerin (NITROSTAT) 0.4 mg SL tablet, Place 1 tablet (0.4 mg total) under the tongue every 5 (five) minutes as needed for chest pain, Disp: 25 tablet, Rfl: 3    Omega-3 Fatty Acids (fish oil) 1,000 mg, Take 1,000 mg by mouth daily, Disp: , Rfl:     omeprazole (PriLOSEC) 20 mg delayed release capsule, Take 1 capsule (20 mg total) by mouth daily, Disp: 90 capsule, Rfl: 0    oxyCODONE-acetaminophen (PERCOCET) 5-325 mg per tablet, take 1 tablet by mouth every 8 hours if needed for MODERATE PAIN (PAIN SCORE 4-6), Disp: , Rfl:     rosuvastatin (CRESTOR) 20 MG tablet, Take 1 tablet by mouth daily, Disp: , Rfl:     tamsulosin (FLOMAX) 0.4 mg, Take 1 capsule (0.4 mg total) by mouth 2 (two) times a day, Disp: 60 capsule, Rfl: 3    traZODone (DESYREL) 50 mg tablet, Take 1 tablet (50 mg total) by mouth daily at bedtime, Disp: 90 tablet, Rfl: 1    valsartan (DIOVAN) 160 mg tablet, Take 160 mg by mouth 2 (two) times a day, Disp: , Rfl:   Allergies   Allergen Reactions    Latex Hives    Other     Shellfish-Derived Products - Food Allergy      [facility]       Labs:  No visits with results within 2 Month(s) from this visit.   Latest known visit with results is:   Appointment on 03/28/2024   Component Date Value    WBC 03/28/2024 7.43     RBC 03/28/2024 4.68     Hemoglobin 03/28/2024 15.4     Hematocrit 03/28/2024 45.7     MCV 03/28/2024 98     MCH 03/28/2024 32.9      MCHC 03/28/2024 33.7     RDW 03/28/2024 12.8     MPV 03/28/2024 9.4     Platelets 03/28/2024 252     nRBC 03/28/2024 0     Segmented % 03/28/2024 61     Immature Grans % 03/28/2024 0     Lymphocytes % 03/28/2024 25     Monocytes % 03/28/2024 11     Eosinophils Relative 03/28/2024 3     Basophils Relative 03/28/2024 0     Absolute Neutrophils 03/28/2024 4.43     Absolute Immature Grans 03/28/2024 0.02     Absolute Lymphocytes 03/28/2024 1.87     Absolute Monocytes 03/28/2024 0.85     Eosinophils Absolute 03/28/2024 0.23     Basophils Absolute 03/28/2024 0.03     Sodium 03/28/2024 138     Potassium 03/28/2024 4.3     Chloride 03/28/2024 100     CO2 03/28/2024 29     ANION GAP 03/28/2024 9     BUN 03/28/2024 24     Creatinine 03/28/2024 1.23     Glucose, Fasting 03/28/2024 144 (H)     Calcium 03/28/2024 9.5     AST 03/28/2024 21     ALT 03/28/2024 25     Alkaline Phosphatase 03/28/2024 51     Total Protein 03/28/2024 7.2     Albumin 03/28/2024 4.3     Total Bilirubin 03/28/2024 0.76     eGFR 03/28/2024 58     Cholesterol 03/28/2024 131     Triglycerides 03/28/2024 92     HDL, Direct 03/28/2024 51     LDL Calculated 03/28/2024 62     Non-HDL-Chol (CHOL-HDL) 03/28/2024 80     Color, UA 03/28/2024 Dark Yellow     Clarity, UA 03/28/2024 Clear     Specific Gravity, UA 03/28/2024 1.029     pH, UA 03/28/2024 6.0     Leukocytes, UA 03/28/2024 Negative     Nitrite, UA 03/28/2024 Negative     Protein, UA 03/28/2024 30 (1+) (A)     Glucose, UA 03/28/2024 Negative     Ketones, UA 03/28/2024 Trace (A)     Urobilinogen, UA 03/28/2024 3.0 (A)     Bilirubin, UA 03/28/2024 Negative     Occult Blood, UA 03/28/2024 Negative     RBC, UA 03/28/2024 None Seen     WBC, UA 03/28/2024 None Seen     Epithelial Cells 03/28/2024 None Seen     Bacteria, UA 03/28/2024 None Seen     MUCUS THREADS 03/28/2024 Moderate (A)     Hyaline Casts, UA 03/28/2024 Innumerable (A)     Granular Casts, UA 03/28/2024 0-3 (A)     Renal Epithelial Cells 03/28/2024  Present     Magnesium 03/28/2024 2.3     Total CK 03/28/2024 128      Imaging: No results found.    Review of Systems:  Review of Systems   Constitutional:  Positive for fatigue.   Musculoskeletal:  Positive for arthralgias and myalgias.        Hip pain    Neurological:  Positive for dizziness, weakness and light-headedness.   All other systems reviewed and are negative.      Physical Exam:  Physical Exam  Vitals reviewed.   Constitutional:       Appearance: He is obese.   Cardiovascular:      Rate and Rhythm: Normal rate and regular rhythm.      Pulses: Normal pulses.      Heart sounds: Normal heart sounds.   Pulmonary:      Effort: Pulmonary effort is normal.      Breath sounds: Normal breath sounds.   Musculoskeletal:         General: Normal range of motion.      Cervical back: Normal range of motion and neck supple.      Right lower leg: No edema.      Left lower leg: No edema.   Skin:     General: Skin is warm and dry.      Capillary Refill: Capillary refill takes less than 2 seconds.   Neurological:      General: No focal deficit present.      Mental Status: He is alert and oriented to person, place, and time.   Psychiatric:         Mood and Affect: Mood normal.         Behavior: Behavior normal.         Discussion/Summary:  # Hypotension with pre syncopal symptoms- most likely due to 40 pound weight loss in 2 months, Percocet daily for pain, Robaxin in pm for pain in conjunction with  antihypertensive medication.  Now off all antihypertensive medication. Negative orthostatic BP in office.   Continues with low BP  most likely due to fast weight loss,  Percocet and Robaxin.  I have advised stopping Percocet, hydrate and continue with home BP monitoring.  He will call his PCP to discuss Mounjaro dose.  Sp will follow up with Dr Spring next week for pre operative clearance for Hip surgery.    # CAD, hx of NSTEMI 6/2019, PCI with Xience Mamie FARHAN 99% stenosis of  OM2 with 0% residual stenosis.  Residual non  obstructive CAD;  60% stenosis  first diagonal branch, circumflex 60% stenosis, mid RCA 50% stenosis.  Denies CP, not taking ASA 81mg.  I have advised restarting ASA Sp will discuss with Dr Spring next week.  Off anti  hypertensive, not on AV chetan blocker, off statin myalgia improved continue off until next week.    # Dyslipidemia 3/28/24  TG 92 HDL 51 LDL 62 - off Crestor advised by our office to stop all medications.  According to Sp his myalgia is resolved off Crestor.  Continue off until discussed with Dr Spring next week.    # Obesity BMI 35.44 weight in the office to 40 pounds weight was 21 pounds goal weight to 25 pounds on Mounjaro manage by PCP.  40 pound weight loss in 2 months

## 2024-07-10 ENCOUNTER — OFFICE VISIT (OUTPATIENT)
Dept: CARDIOLOGY CLINIC | Facility: CLINIC | Age: 74
End: 2024-07-10
Payer: MEDICARE

## 2024-07-10 VITALS
DIASTOLIC BLOOD PRESSURE: 58 MMHG | BODY MASS INDEX: 35.55 KG/M2 | HEIGHT: 69 IN | SYSTOLIC BLOOD PRESSURE: 98 MMHG | HEART RATE: 60 BPM | WEIGHT: 240 LBS | OXYGEN SATURATION: 98 %

## 2024-07-10 DIAGNOSIS — E78.5 DYSLIPIDEMIA: ICD-10-CM

## 2024-07-10 DIAGNOSIS — E66.9 OBESITY (BMI 30-39.9): ICD-10-CM

## 2024-07-10 DIAGNOSIS — I25.10 CORONARY ARTERY DISEASE INVOLVING NATIVE CORONARY ARTERY OF NATIVE HEART WITHOUT ANGINA PECTORIS: ICD-10-CM

## 2024-07-10 DIAGNOSIS — R55 PRE-SYNCOPE: ICD-10-CM

## 2024-07-10 DIAGNOSIS — I95.89 OTHER SPECIFIED HYPOTENSION: Primary | ICD-10-CM

## 2024-07-10 PROCEDURE — 99215 OFFICE O/P EST HI 40 MIN: CPT | Performed by: NURSE PRACTITIONER

## 2024-07-10 RX ORDER — ROSUVASTATIN CALCIUM 20 MG/1
20 TABLET, COATED ORAL DAILY
Qty: 30 TABLET | Refills: 3 | Status: SHIPPED | OUTPATIENT
Start: 2024-07-10

## 2024-07-10 NOTE — PATIENT INSTRUCTIONS
Continue off Antihypertensive medications as well as Crestor.  Stop Percocet.  Home BP monitoring. Hydrate

## 2024-07-24 ENCOUNTER — OFFICE VISIT (OUTPATIENT)
Dept: CARDIOLOGY CLINIC | Facility: CLINIC | Age: 74
End: 2024-07-24
Payer: MEDICARE

## 2024-07-24 VITALS
HEIGHT: 69 IN | DIASTOLIC BLOOD PRESSURE: 72 MMHG | WEIGHT: 244 LBS | BODY MASS INDEX: 36.14 KG/M2 | TEMPERATURE: 97.4 F | HEART RATE: 54 BPM | OXYGEN SATURATION: 95 % | SYSTOLIC BLOOD PRESSURE: 126 MMHG

## 2024-07-24 DIAGNOSIS — E66.9 OBESITY (BMI 30-39.9): ICD-10-CM

## 2024-07-24 DIAGNOSIS — I95.89 OTHER SPECIFIED HYPOTENSION: ICD-10-CM

## 2024-07-24 DIAGNOSIS — I10 ESSENTIAL HYPERTENSION: ICD-10-CM

## 2024-07-24 DIAGNOSIS — I25.10 CORONARY ARTERY DISEASE INVOLVING NATIVE CORONARY ARTERY OF NATIVE HEART WITHOUT ANGINA PECTORIS: Primary | ICD-10-CM

## 2024-07-24 PROCEDURE — 99214 OFFICE O/P EST MOD 30 MIN: CPT | Performed by: INTERNAL MEDICINE

## 2024-07-24 PROCEDURE — 93000 ELECTROCARDIOGRAM COMPLETE: CPT | Performed by: INTERNAL MEDICINE

## 2024-07-24 RX ORDER — TIRZEPATIDE 5 MG/.5ML
5 INJECTION, SOLUTION SUBCUTANEOUS
COMMUNITY
Start: 2024-07-16

## 2024-07-24 RX ORDER — METFORMIN HYDROCHLORIDE 750 MG/1
750 TABLET, EXTENDED RELEASE ORAL
COMMUNITY

## 2024-07-24 RX ORDER — ROSUVASTATIN CALCIUM 20 MG/1
20 TABLET, COATED ORAL DAILY
Qty: 90 TABLET | Refills: 3 | Status: SHIPPED | OUTPATIENT
Start: 2024-07-24

## 2024-07-24 RX ORDER — FEXOFENADINE HCL 60 MG/1
60 TABLET, FILM COATED ORAL DAILY
COMMUNITY

## 2024-07-24 RX ORDER — AMOXICILLIN 875 MG/1
875 TABLET, COATED ORAL DAILY
COMMUNITY
Start: 2024-07-18

## 2024-07-24 RX ORDER — MIRABEGRON 25 MG/1
25 TABLET, FILM COATED, EXTENDED RELEASE ORAL DAILY
COMMUNITY

## 2024-07-24 NOTE — PROGRESS NOTES
Cardiology Follow Up    Richard M Goldberg  1950  [id number]  [facility] [place] [id number] [facility] [address] [phone]    1. Coronary artery disease involving native coronary artery of native heart without angina pectoris  POCT ECG      2. Essential hypertension  POCT ECG      3. Obesity (BMI 30-39.9)        4. Other specified hypotension  rosuvastatin (CRESTOR) 20 MG tablet          Discussion/Summary:    Low to intermediate risk for upcoming hip replacement. Recommend he stay off the antihypertensive medications that he was on before this time because he has likely had better blood pressure control with significant weight loss. I do not want him to restart anything before his upcoming surgery.    His EKG shows bifascicular block, unchanged from prior ECGs.        CAD: Previous ischemic evaluation was unremarkable. No testing necessary before his upcoming surgery. He was under the impression I told him to stop all of his antiplatelet medications. He is going to resume his aspirin after his hip replacement is done. Stay off of Plavix.  I reordered his Crestor. Should be 20 mg daily. He said there was an error with the pharmacy.    Hypertension: As above, remain off of antihypertensive medications at this time. If his blood pressure increases down the line, we can resume 80 mg of valsartan. He checks his blood pressure very regularly at home tends to get higher numbers in the morning, but he is under a lot of pain right now with his arthritis. Plan for surgery in the very near future.    Bifascicular block: This is stable compared to before. No concern for any higher degree AV block. His recent episodes of presyncope are in the setting of hypotension being on a few different antihypertensives. His symptoms are completely better now that he is off of the antihypertensives. His primary care physician did order a Holter monitor which she  can schedule if he would like.    Prior echocardiogram showed mild to moderate mitral regurgitation. He has an echo scheduled for tomorrow through his PCP. Will monitor for surveillance.    Elevated glucose: He is on metformin and now on Mounjaro. He has lost a lot of weight with this, as well.      Previous History:  73-year-old gentleman.  He has coronary disease identified in June of 2019. He had an NSTEMI.  EF was preserved.  He had a stent to the 2nd OM, 60% 1st diagonal, 60% circ, 50% mid RCA.    He was on DAPT, but stopped in 2023.    He has a baseline left anterior fascicular block, RBBB. Sinus bradycardia, but no symptoms.  He has hypertension as well, tends to be worse with anxiety but of late has been better controlled.    Hip pain, for hip replacement.  Elevated glu, started on Mounjaro and lost a good deal of weight.    Interval History:    Recently was started on Mounjaro. He lost a good amount of weight. Recent call to office that he was running low BPs and he was presyncopal.  Advised to hold BP meds. Follow up appt was made. He saw our NP recently. BP was a little better.  Saw PCP's office thereafter, and a Holter and an echo were ordered. The echo is scheduled for tomorrow.    Has hip replacement at [facility] scheduled for early Aug.      Problem List       Anxiety    BPH with obstruction/lower urinary tract symptoms    Elevated glucose    Erectile dysfunction of non-organic origin    GERD without esophagitis    Hyperlipidemia    Essential hypertension    Obesity (BMI 30.0-34.9)    Obstructive sleep apnea    Osteoarthritis    Psoriasis    Bifascicular block    Calculus of kidney    Primary insomnia    GERD (gastroesophageal reflux disease)    Low back pain    Alcohol use    Dyslipidemia    Coronary artery disease involving native coronary artery of native heart without angina pectoris          Past Medical History:   Diagnosis Date    CAD (coronary artery disease)     s/p stent x1 on 6/19/19    Closed  fracture of distal end of fibula with routine healing     unspecified fracture morphology, unspecified laterality, subsequent encounter; Last Assessed:12/27/16    GERD (gastroesophageal reflux disease)     Heart attack (HCC)     Hypertension     Kidney stones     Nocturia     Psoriasis     Sleep apnea      Social History     Tobacco Use    Smoking status: Never    Smokeless tobacco: Never   Substance Use Topics    Alcohol use: Yes     Comment: 1 drink daily    Drug use: No      Family History   Problem Relation Age of Onset    Arthritis Mother     Hypertension Mother     Irritable bowel syndrome Mother     Osteoporosis Mother     Arthritis Other      Past Surgical History:   Procedure Laterality Date    APPENDECTOMY      CARPAL TUNNEL RELEASE      ROTATOR CUFF REPAIR      SHOULDER ARTHROSCOPY Left     Last ASsessed:10/24/16    TOTAL HIP ARTHROPLASTY      TRIGGER FINGER RELEASE         Current Outpatient Medications:     amoxicillin (AMOXIL) 875 mg tablet, Take 875 mg by mouth in the morning, Disp: , Rfl:     fexofenadine (Allegra Allergy) 60 MG tablet, Take 60 mg by mouth daily, Disp: , Rfl:     fluticasone (FLONASE) 50 mcg/act nasal spray, 2 sprays into each nostril 2 (two) times a day as needed for rhinitis, Disp: , Rfl:     loratadine (CLARITIN) 10 mg tablet, Take 10 mg by mouth daily, Disp: , Rfl:     metFORMIN (GLUCOPHAGE-XR) 750 mg 24 hr tablet, Take 750 mg by mouth daily with breakfast, Disp: , Rfl:     methocarbamol (ROBAXIN) 750 mg tablet, methocarbamol 750 mg tablet  take 1 tablet by mouth three times a day if needed for muscle spasm, Disp: , Rfl:     Mirabegron ER 25 MG TB24, Take 25 mg by mouth in the morning, Disp: , Rfl:     Mounjaro 5 MG/0.5ML, Inject 5 mg under the skin every 7 days, Disp: , Rfl:     oxyCODONE-acetaminophen (PERCOCET) 5-325 mg per tablet, take 1 tablet by mouth every 8 hours if needed for MODERATE PAIN (PAIN SCORE 4-6), Disp: , Rfl:     rosuvastatin (CRESTOR) 20 MG tablet, Take 1  "tablet (20 mg total) by mouth daily, Disp: 90 tablet, Rfl: 3    tamsulosin (FLOMAX) 0.4 mg, Take 1 capsule (0.4 mg total) by mouth 2 (two) times a day, Disp: 60 capsule, Rfl: 3    traZODone (DESYREL) 50 mg tablet, Take 1 tablet (50 mg total) by mouth daily at bedtime, Disp: 90 tablet, Rfl: 1    aspirin (ECOTRIN LOW STRENGTH) 81 mg EC tablet, Take 1 tablet (81 mg total) by mouth daily (Patient not taking: Reported on 7/10/2024), Disp: 30 tablet, Rfl: 0    Omega-3 Fatty Acids (fish oil) 1,000 mg, Take 1,000 mg by mouth daily (Patient not taking: Reported on 7/24/2024), Disp: , Rfl:     omeprazole (PriLOSEC) 20 mg delayed release capsule, Take 1 capsule (20 mg total) by mouth daily (Patient not taking: Reported on 7/24/2024), Disp: 90 capsule, Rfl: 0  Allergies   Allergen Reactions    Latex Hives    Other     Shellfish-Derived Products - Food Allergy      [facility]     Vitals:    07/24/24 1017   BP: 126/72   BP Location: Left arm   Patient Position: Sitting   Cuff Size: Standard   Pulse: (!) 54   Temp: (!) 97.4 °F (36.3 °C)   TempSrc: Tympanic   SpO2: 95%   Weight: 111 kg (244 lb)   Height: 5' 9\" (1.753 m)     Vitals:    07/24/24 1017   Weight: 111 kg (244 lb)      Height: 5' 9\" (175.3 cm)   Body mass index is 36.03 kg/m².    Physical Exam:  GEN: Richard M Goldberg appears well, alert and oriented x 3, pleasant and cooperative   HEENT: pupils equal, round, and reactive to light; extraocular muscles intact  NECK: supple, no carotid bruits   HEART: regular rhythm, normal S1 and S2, no murmurs, clicks, gallops or rubs   LUNGS: clear to auscultation bilaterally; no wheezes, rales, or rhonchi   ABDOMEN: normal bowel sounds, soft, no tenderness, no distention  EXTREMITIES: Slow gait, painful, hip pain.  NEURO: no focal findings   SKIN: normal without suspicious lesions on exposed skin    ROS:  Chest pain, stress, anxiety. Hip pain, back pain  Except as noted in HPI, is otherwise reviewed in detail and a 12 point " "review of systems is negative.  ROS reviewed and is unchanged    Labs:  Lab Results   Component Value Date    K 4.6 07/10/2024    CL 99 (L) 07/10/2024    CREATININE 1.18 07/10/2024    BUN 29 (H) 07/10/2024    CO2 29 07/10/2024    ALT 20 07/10/2024    AST 17 07/10/2024    TSH 1.42 07/10/2024    INR 0.9 05/23/2021    GLUF 144 (H) 03/28/2024    HGBA1C 5.3 07/10/2024    WBC 7.43 03/28/2024    HGB 15.4 03/28/2024    HCT 45.7 03/28/2024     03/28/2024     No results found for: \"CHOL\"  Lab Results   Component Value Date    LDLCALC 62 03/28/2024    LDLCALC 53 02/11/2023    LDLCALC 44 04/09/2021     Lab Results   Component Value Date    HDL 51 03/28/2024    HDL 52 02/11/2023    HDL 66 04/09/2021     Lab Results   Component Value Date    TRIG 92 03/28/2024    TRIG 92 02/11/2023    TRIG 50 04/09/2021     Testing:  Stress 2/24/23  Stress ECG: No ST deviation is noted. The ECG was not diagnostic due to pharmacological (vasodilator) stress.    Stress Function: Left ventricular function post-stress is normal.    Perfusion: There are no perfusion defects.    Stress Combined Conclusion: Left ventricular perfusion is normal.       Stress Test 8/5/20:  SUMMARY:  -  Stress results: Duration of exercise was 7 min and 30 sec. Maximal work rate was 9.3 METs. Maximal heart rate during stress was 127 bpm ( 84 % of maximal predicted heart rate). Target heart rate was not achieved. There was normal  resting blood pressure with a hypertensive response to stress. The systolic blood pressure increased by 90 mmHg during peak stress. There was no chest pain during stress.  -  ECG conclusions: The stress ECG was negative for ischemia and normal.  -  Perfusion imaging: The left ventricle was mildly dilated. There was a moderate-sized, severe, fixed myocardial perfusion defect of the basal to mid inferior wall.  -  Gated SPECT: The calculated left ventricular ejection fraction was 54 %. Left ventricular ejection fraction appeared to be at the " lower limits of normal. There was severely reduced myocardial thickening and motion of the basal inferior  wall of the left ventricle.     IMPRESSIONS: Abnormal study after maximal exercise. There was a small to medium sized infarct in the mid to basal inferior region. Overall left ventricular systolic function was preserved, but there were regional wall motion abnormalities.  Diagnostic sensitivity was limited by submaximal stress. Patient achieved 84% of maximum age predicted heart rate. The patient had a hypertensive response to exercise, increasing the systolic blood pressure by 90 mmHg from baseline.    Echo 7/7/20:  LEFT VENTRICLE:  Systolic function was normal. Ejection fraction was estimated to be 60 %.  There were no regional wall motion abnormalities.  Doppler parameters were consistent with abnormal left ventricular relaxation (grade 1 diastolic dysfunction).     MITRAL VALVE:  There was mild annular calcification.  There was mild to moderate regurgitation.     AORTIC VALVE:  There was mild regurgitation.     TRICUSPID VALVE:  There was trace regurgitation.    Cardiac Cath 6/2019  CORONARY CIRCULATION:  1st diagonal: There was a tubular 60 % stenosis.  2nd obtuse marginal: There was a 99 % stenosis. There was ROSIE grade 2 flow through the vessel (partial perfusion). This lesion is a likely culprit for the patient's recent myocardial infarction. An intervention was performed.  Left AV groove artery: There was a discrete 60 % stenosis.  Mid RCA: There was a 50 % stenosis.     1ST LESION INTERVENTIONS:  A successful balloon angioplasty with stent procedure was performed on the 99 % lesion in the 2nd obtuse marginal. Following intervention there was an excellent angiographic appearance with a 0 % residual stenosis.  A Xience Mamie Rx 2.75 x 18mm drug-eluting stent was placed across the lesion and deployed at a maximum inflation pressure of 16 pam.      EKG:  Sinus bradycardia. 54 bpm. Right bundle branch  block. Left anterior hemiblock.

## 2024-07-30 ENCOUNTER — NURSE TRIAGE (OUTPATIENT)
Age: 74
End: 2024-07-30

## 2024-07-30 NOTE — TELEPHONE ENCOUNTER
"Spoke with Antonella from [facility] in [place], requesting cardiac clearance note and copies of test results when they are ready. Right hip surgery scheduled for August 5th.    Last office visit July 24th, EKG done; ECHO done at [facility].    Phone [phone]  Fax      [phone]    CC sent    Reason for Disposition   Information only question and nurse able to answer    Answer Assessment - Initial Assessment Questions  1. REASON FOR CALL or QUESTION: \"What is your reason for calling today?\" or \"How can I best help you?\" or \"What question do you have that I can help answer?\"      [facility] needs pre op clearance faxed to them.    Protocols used: Information Only Call - No Triage-ADULT-[place]    "

## 2024-07-31 ENCOUNTER — TELEPHONE (OUTPATIENT)
Dept: CARDIOLOGY CLINIC | Facility: CLINIC | Age: 74
End: 2024-07-31

## 2024-08-06 ENCOUNTER — TELEPHONE (OUTPATIENT)
Age: 74
End: 2024-08-06

## 2024-08-06 NOTE — TELEPHONE ENCOUNTER
Caller: Richard Goldberg    Doctor: Saw    Reason for call: Patient is currently in the hospital for a procedure and they want to to give him nsaid and when he is released give him mobic.  Patient stated that he told them that he thought he couldn't take these medications.  Patient also stated that the hospital wanted to know how long he should stay off of the aspirin?  The Hospital told the patient to call his cardiologist to find out.  Please call patient back.     Call back#: [phone]

## 2024-08-07 NOTE — TELEPHONE ENCOUNTER
Left message for patient.  Resume aspirin per surgeon's recommendations, if they are indifferent then resume now.    Short term NSAID is ok with me if that's most helpful (1-2 weeks).

## 2024-08-23 ENCOUNTER — EVALUATION (OUTPATIENT)
Age: 74
End: 2024-08-23
Payer: MEDICARE

## 2024-08-23 DIAGNOSIS — Z96.641 STATUS POST RIGHT HIP REPLACEMENT: ICD-10-CM

## 2024-08-23 DIAGNOSIS — M16.11 PRIMARY OSTEOARTHRITIS OF RIGHT HIP: Primary | ICD-10-CM

## 2024-08-23 PROCEDURE — 97161 PT EVAL LOW COMPLEX 20 MIN: CPT

## 2024-08-23 PROCEDURE — 97140 MANUAL THERAPY 1/> REGIONS: CPT

## 2024-08-23 NOTE — LETTER
2024    Yvette Aguilar  [address] [place] [id number]    Patient: Richard M Goldberg   YOB: 1950   Date of Visit: 2024     Encounter Diagnosis     ICD-10-CM    1. Primary osteoarthritis of right hip  M16.11       2. Status post right hip replacement  Z96.641           Dear Dr. Aguilar:    Thank you for your recent referral of Richard M Goldberg. Please review the attached evaluation summary from Sp's recent visit.     Please verify that you agree with the plan of care by signing the attached order.     If you have any questions or concerns, please do not hesitate to call.     I sincerely appreciate the opportunity to share in the care of one of your patients and hope to have another opportunity to work with you in the near future.       Sincerely,    Jose Tse, PT      Referring Provider:      I certify that I have read the below Plan of Care and certify the need for these services furnished under this plan of treatment while under my care.                    Yvette Aguilar  [address] [place] [id number]  Via Fax: [phone]          PT Evaluation     Today's date: 2024  Patient name: Richard M Goldberg  : 1950  MRN: [id number]  Referring provider: Yvette Aguilar  Dx:   Encounter Diagnosis     ICD-10-CM    1. Primary osteoarthritis of right hip  M16.11       2. Status post right hip replacement  Z96.641                      Assessment  Impairments: abnormal gait, abnormal muscle firing, abnormal or restricted ROM, activity intolerance, impaired balance, impaired physical strength, lacks appropriate home exercise program, pain with function, safety issue, participation limitations, activity limitations and endurance  Symptom irritability: low    Assessment details: Pt is a 73 year old male  that presents to outpatient physical therapy in the Home S/P R JABIER that occurred on . Pt demonstrates increased pain, decreased  ROM, decreased strength, decreased activity tolerance, and decreased functional activity due to pain and limited ROM. Noted decreased, weight-bearing tolerance and Trendelenburg gait pattern with ambulation. Significant bilateral upper extremity support required with stair and home navigation. Education was given on typical physiological response following a JABIER as well as the benefit of completing HEP. Pt expressed understanding with HEP. Hip precautions were also reviewed with patient. Pt would benefit from skilled OP in the Home to address noted impairments, meet patient's goals, navigate home safely, and to return to [facility].   Thank you for this referral.    Understanding of Dx/Px/POC: good     Prognosis: good    Goals  STGs:   1. Pt will be able to demonstrate an increase of strength by at least 1/2 grade within 4 weeks   2. Pt will be able to achieve increased ROM by at least 25% within 4 weeks.   3. Pt will be able to ambulate independently without AD for at least 150 ft without increase of symptoms within 4 weeks   4. Pt will be able to report less than 3 night disturbances due to pain/discomfort within 4 weeks.     LTGs:   1. Pt will be independent with all IADLs without pain or discomfort upon discharge.   2. Pt will be independent with HEP upon discharge   3. Pt will be able to ambulate at least 300 ft independently without AD without pain upon discharge   4. Patient will be able to navigate at least 14 steps with single upper extremity support in a non-reciprocal pattern upon discharge  5. Pt will be able to report no pain or discomfort with all work duties and recreational activities for a full day upon discharge        Plan  Patient would benefit from: PT eval and skilled physical therapy  Planned modality interventions: cryotherapy    Planned therapy interventions: abdominal trunk stabilization, IADL retraining, joint mobilization, manual therapy, massage, Wright taping, muscle pump exercises,  neuromuscular re-education, patient education, strengthening, stretching, therapeutic activities, therapeutic exercise, therapeutic training, home exercise program, functional ROM exercises, flexibility, community reintegration, breathing training, balance/weight bearing training, balance, activity modification, ADL retraining, dressing changes and body mechanics training    Frequency: 1-2x week  Duration in weeks: 5  Treatment plan discussed with: patient    Subjective Evaluation    History of Present Illness  Date of surgery: 2024  Mechanism of injury: surgery  Mechanism of injury: Patient is seen in the home for outpatient physical therapy status post right total hip arthroplasty that was performed on . Sp states he believes that he received an anterior approach. Patient indicates that he's doing well and has been walking without any assistive device for the last three days. However, he reports an increase amount of right hip and low back pain with the increase of activity around the home and community.     Denies fever, chills, shortness of breath, chest pain, increase lower extremity swelling, pain, or redness.     Goals:decrease right hip pain, get back to working out, play golf again  Quality of life: good    Patient Goals  Patient goals for therapy: decreased pain, increased motion, increased strength and return to sport/leisure activities    Pain  Current pain ratin  At best pain ratin  At worst pain ratin  Quality: dull ache, discomfort, tight, pulling and pressure  Relieving factors: rest and medications  Aggravating factors: standing, stair climbing, walking and lifting  Progression: improved    Social Support  Steps to enter house: yes  2  Stairs in house: yes   14  Lives in: multiple-level home  Lives with: alone    Employment status: not working  Hand dominance: right    Treatments  Previous treatment: injection treatment, physical therapy and chiropractic    Objective      Palpation     Right   Tenderness of the adductor brevis, adductor longus, adductor howie, gluteus irais, gluteus medius, iliopsoas, lumbar paraspinals, piriformis, proximal biceps femoris, proximal semimembranosus, proximal semitendinosus and sartorius.   Trigger point to gluteus irais, gluteus medius and piriformis.     Tenderness     Right Hip   Tenderness in the greater trochanter.     Additional Tenderness Details   Reports significant tenderness over incision    Neurological Testing     Sensation     Lumbar   Left   Intact: light touch    Right   Intact: light touch    Passive Range of Motion     Right Hip   Flexion: 80 degrees with pain  Extension: 0 degrees   Abduction: 15 degrees     Strength/Myotome Testing     Additional Strength Details   Will assessment MD protocol permits    Ambulation   Weight-Bearing Status   Weight-Bearing Status (Right): weight-bearing as tolerated    Assistive device used: none    Ambulation: Level Surfaces   Ambulation without assistive device: independent    Ambulation: Stairs   Ascend stairs: independent  Pattern: non-reciprocal  Railings: two rails (Maximum)  Descend stairs: contact guard assist  Pattern: non-reciprocal  Railings: two rails  Curbs: hand held    Observational Gait   Gait: antalgic and asymmetric   Increased left stance time. Decreased walking speed, stride length and right stance time.   Left foot contact pattern: foot flat  Right foot contact pattern: foot flat    Quality of Movement During Gait   Trunk    Trunk (Right): Positive lateral lean over stance limb.     Pelvis  Anterior pelvic tilt.   Pelvis (Right): Positive Trendelenburg.     Hip    Hip (Right): Positive circumducted.     Knee    Knee (Left): Positive increased ER tibial torsion.   Knee (Right): Positive increased ER tibial torsion.     General Comments:      Hip Comments   Incision appears clean, dry and intact (posterior in nature)    Ecchymosis and edema - WNL           Patient was seen  in their Home for this Outpatient in the Home visit.    At Initial Evaluation the following documents were reviewed and given to the patient: [employer] [facility]'s General Consent and Release, [employer] [facility]'s Home Health Service Agreement, Patient Rights and Responsibilities, Home Health Admission Handbook and Outpatient Insurance Verification.    Precautions: R posterior Hip Precautions     Patient Active Problem List   Diagnosis   • Anxiety   • BPH with obstruction/lower urinary tract symptoms   • Elevated glucose   • Erectile dysfunction of non-organic origin   • GERD without esophagitis   • Hyperlipidemia   • Essential hypertension   • Obesity (BMI 30.0-34.9)   • Obstructive sleep apnea   • Osteoarthritis   • Psoriasis   • Bifascicular block   • Calculus of kidney   • Primary insomnia   • GERD (gastroesophageal reflux disease)   • Hypertensive urgency   • Low back pain   • Alcohol use   • Dyslipidemia   • Coronary artery disease involving native coronary artery of native heart without angina pectoris   • Left leg pain   • Sacroiliitis (HCC)   • Hepatic cirrhosis (HCC)   • Obesity (BMI 30-39.9)         Daily Treatment Diary    Date 8/23            FOTO IE -             Re-Eval IE               Manuals    Hip PROM per MD COLBY                                                   Neuro Re-Ed                                                                                                Ther Ex    Glut sets             Quad sets             Hip abduction and abduction isometrics             Bridge iso             Standing hip abduction             Standing heel raise             Mini squat                                                                 Ther Activity                              Gait Training                              Modalities

## 2024-08-25 NOTE — PROGRESS NOTES
PT Evaluation     Today's date: 2024  Patient name: Richard M Goldberg  : 1950  MRN: [id number]  Referring provider: Yvette Aguilar  Dx:   Encounter Diagnosis     ICD-10-CM    1. Primary osteoarthritis of right hip  M16.11       2. Status post right hip replacement  Z96.641                      Assessment  Impairments: abnormal gait, abnormal muscle firing, abnormal or restricted ROM, activity intolerance, impaired balance, impaired physical strength, lacks appropriate home exercise program, pain with function, safety issue, participation limitations, activity limitations and endurance  Symptom irritability: low    Assessment details: Pt is a 73 year old male  that presents to outpatient physical therapy in the Home S/P R JABIER that occurred on . Pt demonstrates increased pain, decreased ROM, decreased strength, decreased activity tolerance, and decreased functional activity due to pain and limited ROM. Noted decreased, weight-bearing tolerance and Trendelenburg gait pattern with ambulation. Significant bilateral upper extremity support required with stair and home navigation. Education was given on typical physiological response following a JABIER as well as the benefit of completing HEP. Pt expressed understanding with HEP. Hip precautions were also reviewed with patient. Pt would benefit from skilled OP in the Home to address noted impairments, meet patient's goals, navigate home safely, and to return to PLOF.   Thank you for this referral.    Understanding of Dx/Px/POC: good     Prognosis: good    Goals  STGs:   1. Pt will be able to demonstrate an increase of strength by at least 1/2 grade within 4 weeks   2. Pt will be able to achieve increased ROM by at least 25% within 4 weeks.   3. Pt will be able to ambulate independently without AD for at least 150 ft without increase of symptoms within 4 weeks   4. Pt will be able to report less than 3 night disturbances due to pain/discomfort within 4  weeks.     LTGs:   1. Pt will be independent with all IADLs without pain or discomfort upon discharge.   2. Pt will be independent with HEP upon discharge   3. Pt will be able to ambulate at least 300 ft independently without AD without pain upon discharge   4. Patient will be able to navigate at least 14 steps with single upper extremity support in a non-reciprocal pattern upon discharge  5. Pt will be able to report no pain or discomfort with all work duties and recreational activities for a full day upon discharge        Plan  Patient would benefit from: PT eval and skilled physical therapy  Planned modality interventions: cryotherapy    Planned therapy interventions: abdominal trunk stabilization, IADL retraining, joint mobilization, manual therapy, massage, Wright taping, muscle pump exercises, neuromuscular re-education, patient education, strengthening, stretching, therapeutic activities, therapeutic exercise, therapeutic training, home exercise program, functional ROM exercises, flexibility, community reintegration, breathing training, balance/weight bearing training, balance, activity modification, ADL retraining, dressing changes and body mechanics training    Frequency: 1-2x week  Duration in weeks: 5  Treatment plan discussed with: patient    Subjective Evaluation    History of Present Illness  Date of surgery: 8/5/2024  Mechanism of injury: surgery  Mechanism of injury: Patient is seen in the home for outpatient physical therapy status post right total hip arthroplasty that was performed on 8/05. Sp states he believes that he received an anterior approach. Patient indicates that he's doing well and has been walking without any assistive device for the last three days. However, he reports an increase amount of right hip and low back pain with the increase of activity around the home and community.     Denies fever, chills, shortness of breath, chest pain, increase lower extremity swelling, pain,  or redness.     Goals:decrease right hip pain, get back to working out, play golf again  Quality of life: good    Patient Goals  Patient goals for therapy: decreased pain, increased motion, increased strength and return to sport/leisure activities    Pain  Current pain ratin  At best pain ratin  At worst pain ratin  Quality: dull ache, discomfort, tight, pulling and pressure  Relieving factors: rest and medications  Aggravating factors: standing, stair climbing, walking and lifting  Progression: improved    Social Support  Steps to enter house: yes  2  Stairs in house: yes   14  Lives in: multiple-level home  Lives with: alone    Employment status: not working  Hand dominance: right    Treatments  Previous treatment: injection treatment, physical therapy and chiropractic    Objective     Palpation     Right   Tenderness of the adductor brevis, adductor longus, adductor howie, gluteus irais, gluteus medius, iliopsoas, lumbar paraspinals, piriformis, proximal biceps femoris, proximal semimembranosus, proximal semitendinosus and sartorius.   Trigger point to gluteus irais, gluteus medius and piriformis.     Tenderness     Right Hip   Tenderness in the greater trochanter.     Additional Tenderness Details   Reports significant tenderness over incision    Neurological Testing     Sensation     Lumbar   Left   Intact: light touch    Right   Intact: light touch    Passive Range of Motion     Right Hip   Flexion: 80 degrees with pain  Extension: 0 degrees   Abduction: 15 degrees     Strength/Myotome Testing     Additional Strength Details   Will assessment MD protocol permits    Ambulation   Weight-Bearing Status   Weight-Bearing Status (Right): weight-bearing as tolerated    Assistive device used: none    Ambulation: Level Surfaces   Ambulation without assistive device: independent    Ambulation: Stairs   Ascend stairs: independent  Pattern: non-reciprocal  Railings: two rails (Maximum)  Descend stairs:  contact guard assist  Pattern: non-reciprocal  Railings: two rails  Curbs: hand held    Observational Gait   Gait: antalgic and asymmetric   Increased left stance time. Decreased walking speed, stride length and right stance time.   Left foot contact pattern: foot flat  Right foot contact pattern: foot flat    Quality of Movement During Gait   Trunk    Trunk (Right): Positive lateral lean over stance limb.     Pelvis  Anterior pelvic tilt.   Pelvis (Right): Positive Trendelenburg.     Hip    Hip (Right): Positive circumducted.     Knee    Knee (Left): Positive increased ER tibial torsion.   Knee (Right): Positive increased ER tibial torsion.     General Comments:      Hip Comments   Incision appears clean, dry and intact (posterior in nature)    Ecchymosis and edema - WNL           Patient was seen in their Home for this Outpatient in the Home visit.    At Initial Evaluation the following documents were reviewed and given to the patient: [employer] [facility] [employer] [facility]'s General Consent and Release, [employer] [facility]'s Home Health Service Agreement, Patient Rights and Responsibilities, Home Health Admission Handbook and Outpatient Insurance Verification.    Precautions: R posterior Hip Precautions     Patient Active Problem List   Diagnosis    Anxiety    BPH with obstruction/lower urinary tract symptoms    Elevated glucose    Erectile dysfunction of non-organic origin    GERD without esophagitis    Hyperlipidemia    Essential hypertension    Obesity (BMI 30.0-34.9)    Obstructive sleep apnea    Osteoarthritis    Psoriasis    Bifascicular block    Calculus of kidney    Primary insomnia    GERD (gastroesophageal reflux disease)    Hypertensive urgency    Low back pain    Alcohol use    Dyslipidemia    Coronary artery disease involving native coronary artery of native heart without angina pectoris    Left leg pain    Sacroiliitis (HCC)    Hepatic cirrhosis (HCC)    Obesity (BMI  30-39.9)         Daily Treatment Diary    Date 8/23            FOTO IE -             Re-Eval IE               Manuals    Hip PROM per MD COLBY                                                   Neuro Re-Ed                                                                                                Ther Ex    Glut sets             Quad sets             Hip abduction and abduction isometrics             Bridge iso             Standing hip abduction             Standing heel raise             Mini squat                                                                 Ther Activity                              Gait Training                              Modalities

## 2024-08-26 ENCOUNTER — TELEPHONE (OUTPATIENT)
Dept: HOME HEALTH SERVICES | Facility: HOME HEALTHCARE | Age: 74
End: 2024-08-26

## 2024-08-27 ENCOUNTER — OFFICE VISIT (OUTPATIENT)
Age: 74
End: 2024-08-27
Payer: MEDICARE

## 2024-08-27 DIAGNOSIS — Z96.641 STATUS POST RIGHT HIP REPLACEMENT: ICD-10-CM

## 2024-08-27 DIAGNOSIS — M16.11 PRIMARY OSTEOARTHRITIS OF RIGHT HIP: Primary | ICD-10-CM

## 2024-08-27 PROCEDURE — 97140 MANUAL THERAPY 1/> REGIONS: CPT

## 2024-08-27 PROCEDURE — 97110 THERAPEUTIC EXERCISES: CPT

## 2024-08-27 NOTE — PROGRESS NOTES
Daily Note     Today's date: 2024  Patient name: Richard M Goldberg  : 1950  MRN: [id number]  Referring provider: Yvette Aguilar  Dx:   Encounter Diagnosis     ICD-10-CM    1. Primary osteoarthritis of right hip  M16.11       2. Status post right hip replacement  Z96.641                      Subjective: patient states that he's doing all right today. He reports that he takes the pain medication in the morning and feels pretty good for most of the day and has been doing more and more each day. He indicates that by the end of the day, however, his hip and back pain increase to the point where he cannot stand anymore, and requires to take more pain medication. Overall fells that his hip is improving each day.      Objective: See treatment diary below    Patient called to confirm JABIER precautions - provider that he is to avoid extreme, PROM hip flexion and no greater than 20° of hip IR        Assessment: Tolerated treatment well. Passive range of motion was performed to increase hip mobility and to decrease glut and quadriceps tightness within intolerance activities were also performed to increase proximal lower extremity strength and are ROM within tolerance. Education was given on precautions, recommended to limit prolonging and walking to decrease pain in the evening. Patient demonstrated fatigue post treatment and would benefit from continued PT  ,    Plan: Continue per plan of care.  Progress treament per protocol.      Patient was seen in their Home for this Outpatient in the Home visit.    At Initial Evaluation the following documents were reviewed and given to the patient: [facility] [employer] [facility]'s General Consent and Release, [facility] [facility] [employer] [facility]'s Home Health Service Agreement, Patient Rights and Responsibilities, Home Health Admission Handbook and Outpatient Insurance Verification.    Precautions: R posterior Hip Precautions     Patient Active Problem  "List   Diagnosis    Anxiety    BPH with obstruction/lower urinary tract symptoms    Elevated glucose    Erectile dysfunction of non-organic origin    GERD without esophagitis    Hyperlipidemia    Essential hypertension    Obesity (BMI 30.0-34.9)    Obstructive sleep apnea    Osteoarthritis    Psoriasis    Bifascicular block    Calculus of kidney    Primary insomnia    GERD (gastroesophageal reflux disease)    Hypertensive urgency    Low back pain    Alcohol use    Dyslipidemia    Coronary artery disease involving native coronary artery of native heart without angina pectoris    Left leg pain    Sacroiliitis (HCC)    Hepatic cirrhosis (HCC)    Obesity (BMI 30-39.9)         Daily Treatment Diary    Date 8/23 8/27           FOTO IE -             Re-Eval IE               Manuals    Hip PROM per MD COLBY JM                                                  Neuro Re-Ed                                                                                                Ther Ex    Glut sets  HEP           Quad sets  HEP           Hip abduction and abduction isometrics  2x10 3\" hold ea           Bridge iso  2x10           Standing hip abduction  2x10    Supne 2x10           Standing heel raise  2x10           Mini squat  nv           Standing hip and knee flexion  2x10           Reclined hip flexion  2x10                                     Ther Activity                              Gait Training                              Modalities                                        "

## 2024-09-03 ENCOUNTER — OFFICE VISIT (OUTPATIENT)
Age: 74
End: 2024-09-03
Payer: MEDICARE

## 2024-09-03 DIAGNOSIS — M16.11 PRIMARY OSTEOARTHRITIS OF RIGHT HIP: Primary | ICD-10-CM

## 2024-09-03 DIAGNOSIS — Z96.641 STATUS POST RIGHT HIP REPLACEMENT: ICD-10-CM

## 2024-09-03 PROCEDURE — 97530 THERAPEUTIC ACTIVITIES: CPT

## 2024-09-03 PROCEDURE — 97140 MANUAL THERAPY 1/> REGIONS: CPT

## 2024-09-03 PROCEDURE — 97110 THERAPEUTIC EXERCISES: CPT

## 2024-09-04 NOTE — PROGRESS NOTES
Daily Note     Today's date: 2024  Patient name: Richard M Goldberg  : 1950  MRN: [id number]  Referring provider: Yvette Aguilar  Dx:   Encounter Diagnosis     ICD-10-CM    1. Primary osteoarthritis of right hip  M16.11       2. Status post right hip replacement  Z96.641                      Subjective: Pt states that he was in a lot of pain on Thursday and Friday due to doing more standing and walking around, but was able to calm it down after laying down for a while. States that he lost his balance over the weekend and twisted on this R leg causing a lot of pain, which also resolved within a few hours of laying down. Still requires the pain medication for symptoms management.       Objective: See treatment diary below      Assessment: Tolerated treatment well. Noted improvement with symptoms and PROM with manual techniques per MD protocol today compared to previous session. Activities were also performed to increase proximal LE strength and functional endurance to assist with home navigation safely and to improve endurance and tolerance. Continues to demonstrate significant trendelenburg gait pattern on the R LE with stance. Patient demonstrated fatigue post treatment and would benefit from continued PT      Plan: Continue per plan of care.      Patient was seen in their Home for this Outpatient in the Home visit.    At Initial Evaluation the following documents were reviewed and given to the patient: [facility] [facility]'s General Consent and Release, [employer] [facility]'s Home Health Service Agreement, Patient Rights and Responsibilities, Home Health Admission Handbook and Outpatient Insurance Verification.    Precautions: R posterior Hip Precautions     Patient Active Problem List   Diagnosis    Anxiety    BPH with obstruction/lower urinary tract symptoms    Elevated glucose    Erectile dysfunction of non-organic origin    GERD without esophagitis     "Hyperlipidemia    Essential hypertension    Obesity (BMI 30.0-34.9)    Obstructive sleep apnea    Osteoarthritis    Psoriasis    Bifascicular block    Calculus of kidney    Primary insomnia    GERD (gastroesophageal reflux disease)    Hypertensive urgency    Low back pain    Alcohol use    Dyslipidemia    Coronary artery disease involving native coronary artery of native heart without angina pectoris    Left leg pain    Sacroiliitis (HCC)    Hepatic cirrhosis (HCC)    Obesity (BMI 30-39.9)         Daily Treatment Diary    Date 8/23 8/27 9/3          FOTO IE -             Re-Eval IE               Manuals    Hip PROM per MD LASHA COLBY JM                                                 Neuro Re-Ed                                                                                                Ther Ex    Glut sets  HEP           Quad sets  HEP           Hip abduction and abduction isometrics  2x10 3\" hold ea 2x10 3\" hold ea          Bridge iso  2x10 2x10          Standing hip abduction  2x10    Supne 2x10 2x10    Supne 2x10    Side stepping with purple TB 2 mins ea direction          Standing heel raise  2x10 2x10          Mini squat  nv 2x10          Standing hip and knee flexion  2x10 2x10          Reclined hip flexion  2x10 Supine 4x5                                    Ther Activity    Stair navigation   3x ea direciton          4\" step up   2x10                                     Gait Training                              Modalities                                          "

## 2024-09-06 ENCOUNTER — OFFICE VISIT (OUTPATIENT)
Age: 74
End: 2024-09-06
Payer: MEDICARE

## 2024-09-06 DIAGNOSIS — M16.11 PRIMARY OSTEOARTHRITIS OF RIGHT HIP: Primary | ICD-10-CM

## 2024-09-06 DIAGNOSIS — Z96.641 STATUS POST RIGHT HIP REPLACEMENT: ICD-10-CM

## 2024-09-06 PROCEDURE — 97140 MANUAL THERAPY 1/> REGIONS: CPT

## 2024-09-06 PROCEDURE — 97110 THERAPEUTIC EXERCISES: CPT

## 2024-09-06 PROCEDURE — 97530 THERAPEUTIC ACTIVITIES: CPT

## 2024-09-06 NOTE — PROGRESS NOTES
Daily Note     Today's date: 2024  Patient name: Richard M Goldberg  : 1950  MRN: [id number]  Referring provider: Yvette Aguilar  Dx:   Encounter Diagnosis     ICD-10-CM    1. Primary osteoarthritis of right hip  M16.11       2. Status post right hip replacement  Z96.641                      Subjective: States that he feels that he is continuing to improve with his symptoms. He reports that he still needs the same amount of pain medication's each day and has to lay down throughout the week due to hip pain.      Objective: See treatment diary below      Assessment: Tolerated treatment well. Manual techniques were performed to continue to improve hip range of motion and mobility with intolerance per MD protocol continues to report improvement with symptoms with passive range of motion. Activities were progressed to improve hip abduction, strength, and functional mobility in the home giving to limit trend trendelenburg gait pattern.Patient demonstrated fatigue post treatment and would benefit from continued PT      Plan: Continue per plan of care.      Patient was seen in their Home for this Outpatient in the Home visit.    At Initial Evaluation the following documents were reviewed and given to the patient: The [facility] [facility] [employer] of [facility]'s General Consent and Release, [employer] [facility]'s Home Health Service Agreement, Patient Rights and Responsibilities, Home Health Admission Handbook and Outpatient Insurance Verification.    Precautions: R posterior Hip Precautions     Patient Active Problem List   Diagnosis    Anxiety    BPH with obstruction/lower urinary tract symptoms    Elevated glucose    Erectile dysfunction of non-organic origin    GERD without esophagitis    Hyperlipidemia    Essential hypertension    Obesity (BMI 30.0-34.9)    Obstructive sleep apnea    Osteoarthritis    Psoriasis    Bifascicular block    Calculus of kidney    Primary insomnia    GERD  "(gastroesophageal reflux disease)    Hypertensive urgency    Low back pain    Alcohol use    Dyslipidemia    Coronary artery disease involving native coronary artery of native heart without angina pectoris    Left leg pain    Sacroiliitis (HCC)    Hepatic cirrhosis (HCC)    Obesity (BMI 30-39.9)         Daily Treatment Diary    Date 8/23 8/27 9/3 9/6         FOTO IE -             Re-Eval IE               Manuals    Hip PROM per MD LASHA COLBY                                                Neuro Re-Ed                                                                                                Ther Ex    Glut sets  HEP           Quad sets  HEP           Hip abduction and abduction isometrics  2x10 3\" hold ea 2x10 3\" hold ea 2X10         Bridge iso  2x10 2x10          Standing hip abduction  2x10    Supne 2x10 2x10    Supne 2x10    Side stepping with purple TB 2 mins ea direction 2x10    Side lying 2x10    Sidestepping with purple TB  4 minutes each direction         Standing heel raise  2x10 2x10 2x10         Mini squat  nv 2x10          Standing hip and knee flexion  2x10 2x10          Reclined hip flexion  2x10 Supine 4x5                                    Ther Activity    Stair navigation   3x ea direciton 3x ea direction         4\" step up   2x10  2x10 lateral         [place] with hand carry    6 lbs on R 5 minutes                      Gait Training                              Modalities                                            "

## 2024-09-10 ENCOUNTER — OFFICE VISIT (OUTPATIENT)
Age: 74
End: 2024-09-10
Payer: MEDICARE

## 2024-09-10 DIAGNOSIS — Z96.641 STATUS POST RIGHT HIP REPLACEMENT: ICD-10-CM

## 2024-09-10 DIAGNOSIS — M16.11 PRIMARY OSTEOARTHRITIS OF RIGHT HIP: Primary | ICD-10-CM

## 2024-09-10 PROCEDURE — 97140 MANUAL THERAPY 1/> REGIONS: CPT

## 2024-09-10 PROCEDURE — 97530 THERAPEUTIC ACTIVITIES: CPT

## 2024-09-10 PROCEDURE — 97110 THERAPEUTIC EXERCISES: CPT

## 2024-09-10 NOTE — PROGRESS NOTES
Daily Note     Today's date: 9/10/2024  Patient name: Richard M Goldberg  : 1950  MRN: [id number]  Referring provider: Yvette Aguilar  Dx:   Encounter Diagnosis     ICD-10-CM    1. Primary osteoarthritis of right hip  M16.11       2. Status post right hip replacement  Z96.641                      Subjective: patient states that he is doing well today and feels that he is continuing to make small improvements with his hip strength mobility and pain each day. Does report that he has been driving around town to the store on occasion.      Objective: See treatment diary below      Assessment: Tolerated treatment well. Manual techniques were performed to increase hip mobility and range of motion within MD protocols. Denied any pain or discomfort with passive techniques today. Noted improvement with stair navigation today compared to previous sessions; able to climb steps in a reciprocal pattern requiring bilateral upper extremity support. Patient demonstrated fatigue post treatment and would benefit from continued PT      Plan: Continue per plan of care.      Patient was seen in their Home for this Outpatient in the Home visit.    At Initial Evaluation the following documents were reviewed and given to the patient: The [employer] [facility]'s General Consent and Release, [employer] [facility] [facility]'s Home Health Service Agreement, Patient Rights and Responsibilities, Home Health Admission Handbook and Outpatient Insurance Verification.    Precautions: R posterior Hip Precautions     Patient Active Problem List   Diagnosis    Anxiety    BPH with obstruction/lower urinary tract symptoms    Elevated glucose    Erectile dysfunction of non-organic origin    GERD without esophagitis    Hyperlipidemia    Essential hypertension    Obesity (BMI 30.0-34.9)    Obstructive sleep apnea    Osteoarthritis    Psoriasis    Bifascicular block    Calculus of kidney    Primary insomnia    GERD  "(gastroesophageal reflux disease)    Hypertensive urgency    Low back pain    Alcohol use    Dyslipidemia    Coronary artery disease involving native coronary artery of native heart without angina pectoris    Left leg pain    Sacroiliitis (HCC)    Hepatic cirrhosis (HCC)    Obesity (BMI 30-39.9)         Daily Treatment Diary    Date 8/23 8/27 9/3 9/6 9/10        FOTO IE -             Re-Eval IE               Manuals    Hip PROM per MD LASHA COLBY                                               Neuro Re-Ed                                                                                                Ther Ex    Glut sets  HEP           Quad sets  HEP           Hip abduction and abduction isometrics  2x10 3\" hold ea 2x10 3\" hold ea 2X10         Bridge iso  2x10 2x10  2x10        Standing hip abduction  2x10    Supne 2x10 2x10    Supne 2x10    Side stepping with purple TB 2 mins ea direction 2x10    Side lying 2x10    Sidestepping with purple TB  4 minutes each direction 2x10    Side lying 2x10    Sidestepping with purple TB  4 minutes each direction        Standing heel raise  2x10 2x10 2x10 2x10        Mini squat  nv 2x10  2x10        Standing hip and knee flexion  2x10 2x10          Reclined hip flexion  2x10 Supine 4x5  Supine 2x10                                  Ther Activity    Stair navigation   3x ea direciton 3x ea direction 5x ea direction        4\" step up   2x10  2x10 lateral 2x10        [place] with hand carry    6 lbs on R 5 minutes 6 pounds on right upper extremity five minutes                     Gait Training                              Modalities                                              "

## 2024-09-13 ENCOUNTER — OFFICE VISIT (OUTPATIENT)
Age: 74
End: 2024-09-13
Payer: MEDICARE

## 2024-09-13 DIAGNOSIS — M16.11 PRIMARY OSTEOARTHRITIS OF RIGHT HIP: Primary | ICD-10-CM

## 2024-09-13 DIAGNOSIS — Z96.641 STATUS POST RIGHT HIP REPLACEMENT: ICD-10-CM

## 2024-09-13 PROCEDURE — 97140 MANUAL THERAPY 1/> REGIONS: CPT

## 2024-09-13 PROCEDURE — 97110 THERAPEUTIC EXERCISES: CPT

## 2024-09-16 NOTE — PROGRESS NOTES
Daily Note     Today's date: 9/15/2024  Patient name: Richard M Goldberg  : 1950  MRN: [id number]  Referring provider: Yvette Aguilar  Dx:   Encounter Diagnosis     ICD-10-CM    1. Primary osteoarthritis of right hip  M16.11       2. Status post right hip replacement  Z96.641                      Subjective: patient states that he continues to feel a little bit better each day. Reports that he can only have today's session last 30 minutes due to an appointment. Indicates that he has been able to on occasion go up the steps in a reciprocal pattern.       Objective: See treatment diary below      Assessment: Tolerated treatment well. Techniques for report to continue to improve hip range of motion within intolerance. Activities were also performed increased proximal hips, strength within tolerance. Still benefiting from outpatient in the home physical therapy due to poor walking tolerance, impaired balance, poor endurance and limited navigation in the home. Skilled physical therapy still required to address balance and strength deficits in a safe manner. Still has functional limitations in decreased standing/walking tolerance, difficulty with stair ambulation, and decreased quality of life associated with pain and weakness. Patient demonstrated fatigue post treatment and would benefit from continued PT. Reminded patient of MD protocols and urge to follow protocol for safety measures.       Plan: Continue per plan of care.      Patient was seen in their Home for this Outpatient in the Home visit.    At Initial Evaluation the following documents were reviewed and given to the patient: [employer] of [facility]'s General Consent and Release, [employer] [facility]'s Home Health Service Agreement, Patient Rights and Responsibilities, Home Health Admission Handbook and Outpatient Insurance Verification.    Precautions: R posterior Hip Precautions     Patient Active Problem  "List   Diagnosis    Anxiety    BPH with obstruction/lower urinary tract symptoms    Elevated glucose    Erectile dysfunction of non-organic origin    GERD without esophagitis    Hyperlipidemia    Essential hypertension    Obesity (BMI 30.0-34.9)    Obstructive sleep apnea    Osteoarthritis    Psoriasis    Bifascicular block    Calculus of kidney    Primary insomnia    GERD (gastroesophageal reflux disease)    Hypertensive urgency    Low back pain    Alcohol use    Dyslipidemia    Coronary artery disease involving native coronary artery of native heart without angina pectoris    Left leg pain    Sacroiliitis (HCC)    Hepatic cirrhosis (HCC)    Obesity (BMI 30-39.9)         Daily Treatment Diary    Date 8/23 8/27 9/3 9/6 9/10 9/13       FOTO IE -             Re-Eval IE               Manuals    Hip PROM per MD LASHA COLBY                                              Neuro Re-Ed                                                                                                Ther Ex    Glut sets  HEP           Quad sets  HEP           Hip abduction and abduction isometrics  2x10 3\" hold ea 2x10 3\" hold ea 2X10         Bridge iso  2x10 2x10  2x10 2x10       Standing hip abduction  2x10    Supne 2x10 2x10    Supne 2x10    Side stepping with purple TB 2 mins ea direction 2x10    Side lying 2x10    Sidestepping with purple TB  4 minutes each direction 2x10    Side lying 2x10    Sidestepping with purple TB  4 minutes each direction 2x10    Side lying 2x10    Side, stepping with purple TB Two minutes       Standing heel raise  2x10 2x10 2x10 2x10        Mini squat  nv 2x10  2x10        Standing hip and knee flexion  2x10 2x10          Reclined hip flexion  2x10 Supine 4x5  Supine 2x10 Supine 2x10                                 Ther Activity    Stair navigation   3x ea direciton 3x ea direction 5x ea direction        4\" step up   2x10  2x10 lateral 2x10        [place] with hand carry    6 lbs on R 5 minutes 6 pounds on right " upper extremity five minutes                     Gait Training                              Modalities

## 2024-09-17 ENCOUNTER — OFFICE VISIT (OUTPATIENT)
Age: 74
End: 2024-09-17
Payer: MEDICARE

## 2024-09-17 DIAGNOSIS — M16.11 PRIMARY OSTEOARTHRITIS OF RIGHT HIP: Primary | ICD-10-CM

## 2024-09-17 DIAGNOSIS — Z96.641 STATUS POST RIGHT HIP REPLACEMENT: ICD-10-CM

## 2024-09-17 PROCEDURE — 97110 THERAPEUTIC EXERCISES: CPT

## 2024-09-17 PROCEDURE — 97140 MANUAL THERAPY 1/> REGIONS: CPT

## 2024-09-17 PROCEDURE — 97530 THERAPEUTIC ACTIVITIES: CPT

## 2024-09-17 NOTE — PROGRESS NOTES
Daily Note     Today's date: 2024  Patient name: Richard M Goldberg  : 1950  MRN: [id number]  Referring provider: Yvette Aguilar  Dx:   Encounter Diagnosis     ICD-10-CM    1. Primary osteoarthritis of right hip  M16.11       2. Status post right hip replacement  Z96.641                      Subjective: patient states that he continues to feel that he is improving each day. Reports that he feels that most of his pain now is coming or is due to his back pain. Has been working on the exercises daily and is getting around his house and Community better. Asked to take a pain medication around 5-6 pm after a long day on his feet.       Objective: See treatment diary below      Assessment: Tolerated treatment well. Manual techniques were performed to continue to address limited passive range of motion per MD protocol. Activities or else performed to increase proximal hip strength, and endurance to address for hip flexion, abduction, and extension strength. Reported moderate discomfort to incision with sideline hip abduction. Patient demonstrates adequate balance and home navigation without assistance. Patient demonstrated fatigue post treatment, exhibited good technique with therapeutic exercises, and would benefit from continued PT in an outpatient setting to continue to improve limited functional endurance and noted weakness.       Plan:    plan is to discharge patient from formal OP physical therapy in the home. Advised patient to continue with home exercise program which I reviewed with them today. Advised patient to contact me with any future questions or concerns regarding exercise. Pt is in agreement with discharge plan.       Patient was seen in their Home for this Outpatient in the Home visit.    At Initial Evaluation the following documents were reviewed and given to the patient: [facility] [facility] [employer] [facility] General Consent and Release, The [employer] of [facility]  [facility] Agreement, Patient Rights and Responsibilities, Home Health Admission Handbook and Outpatient Insurance Verification.    Precautions: R posterior Hip Precautions     Patient Active Problem List   Diagnosis    Anxiety    BPH with obstruction/lower urinary tract symptoms    Elevated glucose    Erectile dysfunction of non-organic origin    GERD without esophagitis    Hyperlipidemia    Essential hypertension    Obesity (BMI 30.0-34.9)    Obstructive sleep apnea    Osteoarthritis    Psoriasis    Bifascicular block    Calculus of kidney    Primary insomnia    GERD (gastroesophageal reflux disease)    Hypertensive urgency    Low back pain    Alcohol use    Dyslipidemia    Coronary artery disease involving native coronary artery of native heart without angina pectoris    Left leg pain    Sacroiliitis (HCC)    Hepatic cirrhosis (HCC)    Obesity (BMI 30-39.9)         Daily Treatment Diary    Date 8/23 8/27 9/3 9/6 9/10 9/13 9/17      FOTO IE -             Re-Eval IE               Manuals    Hip PROM per MD LASHA COLBY                                             Neuro Re-Ed                                                                                                Ther Ex    Glut sets  HEP           Quad sets  HEP           Hip abduction and abduction isometrics  2x10 3\" hold ea 2x10 3\" hold ea 2X10   2X10 purple TB      Bridge iso  2x10 2x10  2x10 2x10 2x10      Standing hip abduction  2x10    Supne 2x10 2x10    Supne 2x10    Side stepping with purple TB 2 mins ea direction 2x10    Side lying 2x10    Sidestepping with purple TB  4 minutes each direction 2x10    Side lying 2x10    Sidestepping with purple TB  4 minutes each direction 2x10    Side lying 2x10    Side, stepping with purple TB Two minutes 2x10    Side lying 2x10    CS 2x20    Side, stepping with purple TB Two minutes      Standing heel raise  2x10 2x10 2x10 2x10  2x15      Mini squat  nv 2x10  2x10  2x10      Standing hip and " "knee flexion  2x10 2x10          Reclined hip flexion  2x10 Supine 4x5  Supine 2x10 Supine 2x10 Full SLR 2x10                                Ther Activity    Stair navigation   3x ea direciton 3x ea direction 5x ea direction  2x ea  Direction      4\" step up   2x10  2x10 lateral 2x10  2x10      [place] with hand carry    6 lbs on R 5 minutes 6 pounds on right upper extremity five minutes                     Gait Training                              Modalities                                                  "

## 2024-09-25 ENCOUNTER — EVALUATION (OUTPATIENT)
Dept: PHYSICAL THERAPY | Facility: MEDICAL CENTER | Age: 74
End: 2024-09-25
Payer: MEDICARE

## 2024-09-25 DIAGNOSIS — M16.11 PRIMARY OSTEOARTHRITIS OF RIGHT HIP: Primary | ICD-10-CM

## 2024-09-25 DIAGNOSIS — Z96.641 STATUS POST RIGHT HIP REPLACEMENT: ICD-10-CM

## 2024-09-25 PROCEDURE — 97161 PT EVAL LOW COMPLEX 20 MIN: CPT | Performed by: PHYSICAL THERAPIST

## 2024-09-25 NOTE — PROGRESS NOTES
PT Evaluation     Today's date: 2024  Patient name: Richard M Goldberg  : 1950  MRN: [id number]  Referring provider: Clay Glover MD  Dx:   Encounter Diagnosis     ICD-10-CM    1. Primary osteoarthritis of right hip  M16.11       2. Status post right hip replacement  Z96.641                      Assessment  Impairments: abnormal muscle firing, abnormal muscle tone, abnormal or restricted ROM, impaired physical strength, lacks appropriate home exercise program and pain with function    Assessment details: Richard M Goldberg is a 73 y.o. male was evaluated on 2024  for Primary osteoarthritis of right hip  (primary encounter diagnosis)  Status post right hip replacement. Richard M Goldberg has the above listed impairments resulting in functional deficits and negative impact to quality of life.  Patient is appropriate for skilled PT intervention to promote maximal return to function and patient specific goals.      Patient agrees with outlined treatment plan and all questions were answered to their satisfaction.      Understanding of Dx/Px/POC: good     Prognosis: good    Goals  Patient will successfully transition to home exercise program.  Patient will be able to manage symptoms independently.    Sp will report reciprocal gait on stairs  Sp will report no limitation in using elliptical       Plan  Patient would benefit from: skilled PT  Referral necessary: No  Planned modality interventions: thermotherapy: hydrocollator packs    Planned therapy interventions: home exercise program, manual therapy, neuromuscular re-education, patient education, functional ROM exercises, strengthening, stretching, joint mobilization, graded activity, graded exercise, therapeutic exercise, body mechanics training, motor coordination training and activity modification    Frequency: 3x week  Duration in weeks: 12  Treatment plan discussed with: patient        Subjective Evaluation    History of Present  Illness  Mechanism of injury: Richard M Goldberg is a 73 y.o. male presenting to therapy s/p R JABIER performed in August.  He reports doing well post operatively, had home PT and transitioned to outpatient now.  He is going to return to gym and restart a fitness program.   Also has chronic low back pain that is always an issue.    Patient Goals  Patient goals for therapy: increased motion, decreased pain, return to sport/leisure activities, independence with ADLs/IADLs and increased strength    Pain  Current pain rating: 3  At best pain ratin  At worst pain ratin  Quality: dull ache, discomfort, pressure and tight          Objective     Observations     Right Hip  Positive for incision. Negative for drainage.     Neurological Testing     Sensation     Hip   Left Hip   Intact: light touch    Right Hip   Intact: light touch    Passive Range of Motion     Right Hip   Flexion: 100 degrees   Extension: 10 degrees   Abduction: 35 degrees   External rotation (90/90): 30 degrees   Internal rotation (90/90): 15 degrees     Strength/Myotome Testing     Left Hip   Planes of Motion   Flexion: 4+  Extension: 4+  Abduction: 4+  Adduction: 4+  External rotation: 4+  Internal rotation: 4+    Right Hip   Planes of Motion   Flexion: 4-  Extension: 3+  Abduction: 3+  Adduction: 3+  External rotation: 3+  Internal rotation: 3+             Precautions: R JABIER  anterior approach         Manuals                                                                 Neuro Re-Ed                                                                                                        Ther Ex                                                                                                                     Ther Activity                                       Gait Training                                       Modalities

## 2024-09-25 NOTE — LETTER
2024    Clay Glover MD  [address] [place] [address]    Patient: Richard M Goldberg   YOB: 1950   Date of Visit: 2024     Encounter Diagnosis     ICD-10-CM    1. Primary osteoarthritis of right hip  M16.11       2. Status post right hip replacement  Z96.641           Dear Dr. Glover:    Thank you for your recent referral of Richard M Goldberg. Please review the attached evaluation summary from Sp's recent visit.     Please verify that you agree with the plan of care by signing the attached order.     If you have any questions or concerns, please do not hesitate to call.     I sincerely appreciate the opportunity to share in the care of one of your patients and hope to have another opportunity to work with you in the near future.       Sincerely,    Leon Pollack, PT      Referring Provider:      I certify that I have read the below Plan of Care and certify the need for these services furnished under this plan of treatment while under my care.                    Clay Glover MD  [address] [place] [address]  Via Fax: [phone]          PT Evaluation     Today's date: 2024  Patient name: Richard M Goldberg  : 1950  MRN: [id number]  Referring provider: Clay Glover MD  Dx:   Encounter Diagnosis     ICD-10-CM    1. Primary osteoarthritis of right hip  M16.11       2. Status post right hip replacement  Z96.641                      Assessment  Impairments: abnormal muscle firing, abnormal muscle tone, abnormal or restricted ROM, impaired physical strength, lacks appropriate home exercise program and pain with function    Assessment details: Richard M Goldberg is a 73 y.o. male was evaluated on 2024  for Primary osteoarthritis of right hip  (primary encounter diagnosis)  Status post right hip replacement. Richard M Goldberg has the above listed impairments resulting in functional deficits and negative impact to quality of life.   Patient is appropriate for skilled PT intervention to promote maximal return to function and patient specific goals.      Patient agrees with outlined treatment plan and all questions were answered to their satisfaction.      Understanding of Dx/Px/POC: good     Prognosis: good    Goals  Patient will successfully transition to home exercise program.  Patient will be able to manage symptoms independently.    Sp will report reciprocal gait on stairs  Sp will report no limitation in using elliptical       Plan  Patient would benefit from: skilled PT  Referral necessary: No  Planned modality interventions: thermotherapy: hydrocollator packs    Planned therapy interventions: home exercise program, manual therapy, neuromuscular re-education, patient education, functional ROM exercises, strengthening, stretching, joint mobilization, graded activity, graded exercise, therapeutic exercise, body mechanics training, motor coordination training and activity modification    Frequency: 3x week  Duration in weeks: 12  Treatment plan discussed with: patient        Subjective Evaluation    History of Present Illness  Mechanism of injury: Richard M Goldberg is a 73 y.o. male presenting to therapy s/p R JABIER performed in August.  He reports doing well post operatively, had home PT and transitioned to outpatient now.  He is going to return to gym and restart a fitness program.   Also has chronic low back pain that is always an issue.    Patient Goals  Patient goals for therapy: increased motion, decreased pain, return to sport/leisure activities, independence with ADLs/IADLs and increased strength    Pain  Current pain rating: 3  At best pain ratin  At worst pain ratin  Quality: dull ache, discomfort, pressure and tight          Objective     Observations     Right Hip  Positive for incision. Negative for drainage.     Neurological Testing     Sensation     Hip   Left Hip   Intact: light touch    Right Hip   Intact:  light touch    Passive Range of Motion     Right Hip   Flexion: 100 degrees   Extension: 10 degrees   Abduction: 35 degrees   External rotation (90/90): 30 degrees   Internal rotation (90/90): 15 degrees     Strength/Myotome Testing     Left Hip   Planes of Motion   Flexion: 4+  Extension: 4+  Abduction: 4+  Adduction: 4+  External rotation: 4+  Internal rotation: 4+    Right Hip   Planes of Motion   Flexion: 4-  Extension: 3+  Abduction: 3+  Adduction: 3+  External rotation: 3+  Internal rotation: 3+             Precautions: R JABIER 8/5 anterior approach         Manuals 9/25                                                                Neuro Re-Ed                                                                                                        Ther Ex                                                                                                                     Ther Activity                                       Gait Training                                       Modalities

## 2024-09-27 ENCOUNTER — OFFICE VISIT (OUTPATIENT)
Dept: PHYSICAL THERAPY | Facility: MEDICAL CENTER | Age: 74
End: 2024-09-27
Payer: MEDICARE

## 2024-09-27 DIAGNOSIS — Z96.641 STATUS POST RIGHT HIP REPLACEMENT: Primary | ICD-10-CM

## 2024-09-27 DIAGNOSIS — M16.11 PRIMARY OSTEOARTHRITIS OF RIGHT HIP: ICD-10-CM

## 2024-09-27 PROCEDURE — 97110 THERAPEUTIC EXERCISES: CPT | Performed by: PHYSICAL THERAPIST

## 2024-09-27 NOTE — PROGRESS NOTES
Daily Note     Today's date: 2024  Patient name: Richard M Goldberg  : 1950  MRN: [id number]  Referring provider: Clay Glover MD  Dx:   Encounter Diagnosis     ICD-10-CM    1. Status post right hip replacement  Z96.641       2. Primary osteoarthritis of right hip  M16.11                      Subjective: Sp reports no new issues      Objective: See treatment diary below      Assessment: Tolerated treatment well. Patient demonstrated fatigue post treatment and exhibited good technique with therapeutic exercises      Plan: Continue per plan of care.      Precautions: R JABIER  anterior approach         Manuals                                                                 Neuro Re-Ed                                                                                                        Ther Ex             Ball adduction 30            TB abduction 30            Bike 10 min            SLR  Flex and abduction  30                                                                Ther Activity                                       Gait Training                                       Modalities

## 2024-09-30 ENCOUNTER — OFFICE VISIT (OUTPATIENT)
Dept: PHYSICAL THERAPY | Facility: MEDICAL CENTER | Age: 74
End: 2024-09-30
Payer: MEDICARE

## 2024-09-30 DIAGNOSIS — M16.11 PRIMARY OSTEOARTHRITIS OF RIGHT HIP: ICD-10-CM

## 2024-09-30 DIAGNOSIS — Z96.641 STATUS POST RIGHT HIP REPLACEMENT: Primary | ICD-10-CM

## 2024-09-30 PROCEDURE — 97110 THERAPEUTIC EXERCISES: CPT | Performed by: PHYSICAL THERAPIST

## 2024-09-30 NOTE — PROGRESS NOTES
Daily Note     Today's date: 2024  Patient name: Richard M Goldberg  : 1950  MRN: [id number]  Referring provider: Clay Glover MD  Dx:   Encounter Diagnosis     ICD-10-CM    1. Status post right hip replacement  Z96.641       2. Primary osteoarthritis of right hip  M16.11                      Subjective: Sp reports no new issues      Objective: See treatment diary below      Assessment: Tolerated treatment well. Patient demonstrated fatigue post treatment and exhibited good technique with therapeutic exercises      Plan: Continue per plan of care.      Precautions: R JABIER  anterior approach         Manuals                                                                 Neuro Re-Ed                                                                                                        Ther Ex             Ball adduction 30            TB abduction 30            Bike 10 min            SLR  Flex and abduction  30                                                                Ther Activity                                       Gait Training                                       Modalities

## 2024-10-02 ENCOUNTER — TELEPHONE (OUTPATIENT)
Age: 74
End: 2024-10-02

## 2024-10-02 DIAGNOSIS — K21.9 GASTROESOPHAGEAL REFLUX DISEASE: ICD-10-CM

## 2024-10-02 NOTE — TELEPHONE ENCOUNTER
Advised pt of Dr. Spring's message. Pt verbally understood.  Pt will need script renewed for omeprazole, will send script to [facility] pharmacy.

## 2024-10-02 NOTE — TELEPHONE ENCOUNTER
Long-term NSAID use is not recommended in patients with coronary artery disease due to increased risks. Also increases the risk of bleeding even being just on aspirin.    However, in the short-term if he is having severe pain I would be okay with using it for the short-term, for example less than 2-week course if he has acute inflammation.    He should take omeprazole if he is going to take the NSAIDs short term.

## 2024-10-02 NOTE — TELEPHONE ENCOUNTER
Pt calls asking if he would be okay to take Ibuprofen with cardiac history and medications.  His pain management doctor wants him to take it for his back pain. Pt had told him he was told not to take it due to history of heart attack and stent.  Pt is off the Plavix now, only taking Aspirin.    Please advise.

## 2024-10-04 ENCOUNTER — APPOINTMENT (OUTPATIENT)
Dept: PHYSICAL THERAPY | Facility: MEDICAL CENTER | Age: 74
End: 2024-10-04
Payer: MEDICARE

## 2024-11-01 ENCOUNTER — OFFICE VISIT (OUTPATIENT)
Dept: PHYSICAL THERAPY | Facility: MEDICAL CENTER | Age: 74
End: 2024-11-01
Payer: MEDICARE

## 2024-11-01 DIAGNOSIS — M54.50 CHRONIC BILATERAL LOW BACK PAIN WITHOUT SCIATICA: Primary | ICD-10-CM

## 2024-11-01 DIAGNOSIS — G89.29 CHRONIC BILATERAL LOW BACK PAIN WITHOUT SCIATICA: Primary | ICD-10-CM

## 2024-11-01 PROCEDURE — 97110 THERAPEUTIC EXERCISES: CPT | Performed by: PHYSICAL THERAPIST

## 2024-11-04 ENCOUNTER — OFFICE VISIT (OUTPATIENT)
Dept: PHYSICAL THERAPY | Facility: MEDICAL CENTER | Age: 74
End: 2024-11-04
Payer: MEDICARE

## 2024-11-04 DIAGNOSIS — G89.29 CHRONIC BILATERAL LOW BACK PAIN WITHOUT SCIATICA: Primary | ICD-10-CM

## 2024-11-04 DIAGNOSIS — M54.50 CHRONIC BILATERAL LOW BACK PAIN WITHOUT SCIATICA: Primary | ICD-10-CM

## 2024-11-04 PROCEDURE — 97110 THERAPEUTIC EXERCISES: CPT | Performed by: PHYSICAL THERAPIST

## 2024-11-04 NOTE — PROGRESS NOTES
Daily Note     Today's date: 2024  Patient name: Richard M Goldberg  : 1950  MRN: [id number]  Referring provider: Clay Glover MD  Dx:   Encounter Diagnosis     ICD-10-CM    1. Chronic bilateral low back pain without sciatica  M54.50     G89.29                      Subjective: Sp reports no new issues      Objective: See treatment diary below      Assessment: Tolerated treatment well. Patient demonstrated fatigue post treatment and exhibited good technique with therapeutic exercises      Plan: Continue per plan of care.      Precautions: R JABIER  anterior approach         Manuals                                                                 Neuro Re-Ed                                                                                                        Ther Ex             Ball adduction 30            TB abduction 30            Bike 10 min            SLR  Flex and abduction  30                                                                Ther Activity                                       Gait Training                                       Modalities                                          Daily Note     Today's date: 2024  Patient name: Richard M Goldberg  : 1950  MRN: [id number]  Referring provider: Clay Glover MD  Dx:   Encounter Diagnosis     ICD-10-CM    1. Chronic bilateral low back pain without sciatica  M54.50     G89.29                      Subjective: Sp reports no new issues      Objective: See treatment diary below      Assessment: Tolerated treatment well. Patient demonstrated fatigue post treatment and exhibited good technique with therapeutic exercises      Plan: Continue per plan of care.      Precautions: R JABIER  anterior approach         Manuals                                                                 Neuro Re-Ed                                                                                                        Ther Ex              Ball adduction 30            TB abduction 30            Bike 10 min            SLR  Flex and abduction  30                                                                Ther Activity                                       Gait Training                                       Modalities

## 2024-11-04 NOTE — PROGRESS NOTES
Daily Note     Today's date: 2024  Patient name: Richard M Goldberg  : 1950  MRN: [id number]  Referring provider: Clay Glover MD  Dx:   Encounter Diagnosis     ICD-10-CM    1. Chronic bilateral low back pain without sciatica  M54.50     G89.29                      Subjective: Sp reports no new issues      Objective: See treatment diary below      Assessment: Tolerated treatment well. Patient demonstrated fatigue post treatment and exhibited good technique with therapeutic exercises      Plan: Continue per plan of care.      Precautions: R JABIER  anterior approach         Manuals                                                                 Neuro Re-Ed                                                                                                        Ther Ex             Ball adduction 30            TB abduction 30            Bike 10 min            SLR  Flex and abduction  30                                                                Ther Activity                                       Gait Training                                       Modalities

## 2024-11-06 ENCOUNTER — OFFICE VISIT (OUTPATIENT)
Dept: PHYSICAL THERAPY | Facility: MEDICAL CENTER | Age: 74
End: 2024-11-06
Payer: MEDICARE

## 2024-11-06 DIAGNOSIS — M54.50 CHRONIC BILATERAL LOW BACK PAIN WITHOUT SCIATICA: Primary | ICD-10-CM

## 2024-11-06 DIAGNOSIS — G89.29 CHRONIC BILATERAL LOW BACK PAIN WITHOUT SCIATICA: Primary | ICD-10-CM

## 2024-11-06 PROCEDURE — 97110 THERAPEUTIC EXERCISES: CPT | Performed by: PHYSICAL THERAPIST

## 2024-11-06 NOTE — PROGRESS NOTES
Daily Note     Today's date: 2024  Patient name: Richard M Goldberg  : 1950  MRN: [id number]  Referring provider: Clay Glover MD  Dx:   Encounter Diagnosis     ICD-10-CM    1. Chronic bilateral low back pain without sciatica  M54.50     G89.29                      Subjective: Sp reports no new issues      Objective: See treatment diary below      Assessment: Tolerated treatment well. Patient demonstrated fatigue post treatment and exhibited good technique with therapeutic exercises      Plan: Continue per plan of care.      Precautions: R JABIER  anterior approach         Manuals                                                                 Neuro Re-Ed                                                                                                        Ther Ex             Ball adduction 30            TB abduction 30            Bike 10 min            SLR  Flex and abduction  30                                                                Ther Activity                                       Gait Training                                       Modalities                                          Daily Note     Today's date: 2024  Patient name: Richard M Goldberg  : 1950  MRN: [id number]  Referring provider: Clay Glover MD  Dx:   Encounter Diagnosis     ICD-10-CM    1. Chronic bilateral low back pain without sciatica  M54.50     G89.29                      Subjective: Sp reports no new issues      Objective: See treatment diary below      Assessment: Tolerated treatment well. Patient demonstrated fatigue post treatment and exhibited good technique with therapeutic exercises      Plan: Continue per plan of care.      Precautions: R JABIER  anterior approach         Manuals                                                                 Neuro Re-Ed                                                                                                        Ther Ex              Ball adduction 30            TB abduction 30            Bike 10 min            SLR  Flex and abduction  30                                                                Ther Activity                                       Gait Training                                       Modalities

## 2024-11-08 ENCOUNTER — OFFICE VISIT (OUTPATIENT)
Dept: PHYSICAL THERAPY | Facility: MEDICAL CENTER | Age: 74
End: 2024-11-08
Payer: MEDICARE

## 2024-11-08 DIAGNOSIS — G89.29 CHRONIC BILATERAL LOW BACK PAIN WITHOUT SCIATICA: Primary | ICD-10-CM

## 2024-11-08 DIAGNOSIS — M54.50 CHRONIC BILATERAL LOW BACK PAIN WITHOUT SCIATICA: Primary | ICD-10-CM

## 2024-11-08 PROCEDURE — 97110 THERAPEUTIC EXERCISES: CPT | Performed by: PHYSICAL THERAPIST

## 2024-11-08 NOTE — PROGRESS NOTES
Daily Note     Today's date: 2024  Patient name: Richard M Goldberg  : 1950  MRN: [id number]  Referring provider: Clay Glover MD  Dx:   Encounter Diagnosis     ICD-10-CM    1. Chronic bilateral low back pain without sciatica  M54.50     G89.29                      Subjective: Sp reports no new issues      Objective: See treatment diary below      Assessment: Tolerated treatment well. Patient demonstrated fatigue post treatment and exhibited good technique with therapeutic exercises      Plan: Continue per plan of care.      Precautions: R JABIER  anterior approach         Manuals                                                                 Neuro Re-Ed                                                                                                        Ther Ex             Ball adduction 30            TB abduction 30            Bike 10 min            SLR  Flex and abduction  30                                                                Ther Activity                                       Gait Training                                       Modalities                                          Daily Note     Today's date: 2024  Patient name: Richard M Goldberg  : 1950  MRN: [id number]  Referring provider: Clay Glover MD  Dx:   Encounter Diagnosis     ICD-10-CM    1. Chronic bilateral low back pain without sciatica  M54.50     G89.29                      Subjective: Sp reports no new issues      Objective: See treatment diary below      Assessment: Tolerated treatment well. Patient demonstrated fatigue post treatment and exhibited good technique with therapeutic exercises      Plan: Continue per plan of care.      Precautions: R JABIER  anterior approach         Manuals                                                                 Neuro Re-Ed                                                                                                        Ther Ex              Ball adduction 30            TB abduction 30            Bike 10 min            SLR  Flex and abduction  30                                                                Ther Activity                                       Gait Training                                       Modalities

## 2024-11-11 ENCOUNTER — OFFICE VISIT (OUTPATIENT)
Dept: PHYSICAL THERAPY | Facility: MEDICAL CENTER | Age: 74
End: 2024-11-11
Payer: MEDICARE

## 2024-11-11 DIAGNOSIS — G89.29 CHRONIC BILATERAL LOW BACK PAIN WITHOUT SCIATICA: Primary | ICD-10-CM

## 2024-11-11 DIAGNOSIS — M54.50 CHRONIC BILATERAL LOW BACK PAIN WITHOUT SCIATICA: Primary | ICD-10-CM

## 2024-11-11 PROCEDURE — 97110 THERAPEUTIC EXERCISES: CPT | Performed by: PHYSICAL THERAPIST

## 2024-11-12 NOTE — PROGRESS NOTES
Daily Note     Today's date: 2024  Patient name: Richard M Goldberg  : 1950  MRN: [id number]  Referring provider: Clay Glover MD  Dx:   Encounter Diagnosis     ICD-10-CM    1. Chronic bilateral low back pain without sciatica  M54.50     G89.29                      Subjective: Sp reports no new issues      Objective: See treatment diary below      Assessment: Tolerated treatment well. Patient demonstrated fatigue post treatment and exhibited good technique with therapeutic exercises      Plan: Continue per plan of care.      Precautions: R JABIER  anterior approach         Manuals                                                                 Neuro Re-Ed                                                                                                        Ther Ex             Ball adduction 30            TB abduction 30            Bike 10 min            SLR  Flex and abduction  30                                                                Ther Activity                                       Gait Training                                       Modalities                                          Daily Note     Today's date: 2024  Patient name: Richard M Goldberg  : 1950  MRN: [id number]  Referring provider: Clay Glover MD  Dx:   Encounter Diagnosis     ICD-10-CM    1. Chronic bilateral low back pain without sciatica  M54.50     G89.29                      Subjective: Sp reports no new issues      Objective: See treatment diary below      Assessment: Tolerated treatment well. Patient demonstrated fatigue post treatment and exhibited good technique with therapeutic exercises      Plan: Continue per plan of care.      Precautions: R JABIER  anterior approach         Manuals                                                                 Neuro Re-Ed                                                                                                        Ther Ex              Ball adduction 30            TB abduction 30            Bike 10 min            SLR  Flex and abduction  30                                                                Ther Activity                                       Gait Training                                       Modalities

## 2024-11-13 ENCOUNTER — OFFICE VISIT (OUTPATIENT)
Dept: PHYSICAL THERAPY | Facility: MEDICAL CENTER | Age: 74
End: 2024-11-13
Payer: MEDICARE

## 2024-11-13 DIAGNOSIS — G89.29 CHRONIC BILATERAL LOW BACK PAIN WITHOUT SCIATICA: Primary | ICD-10-CM

## 2024-11-13 DIAGNOSIS — M54.50 CHRONIC BILATERAL LOW BACK PAIN WITHOUT SCIATICA: Primary | ICD-10-CM

## 2024-11-13 PROCEDURE — 97110 THERAPEUTIC EXERCISES: CPT | Performed by: PHYSICAL THERAPIST

## 2024-11-13 NOTE — PROGRESS NOTES
Daily Note     Today's date: 2024  Patient name: Richard M Goldberg  : 1950  MRN: [id number]  Referring provider: Clay Glover MD  Dx:   Encounter Diagnosis     ICD-10-CM    1. Chronic bilateral low back pain without sciatica  M54.50     G89.29                      Subjective: Sp reports no new issues      Objective: See treatment diary below      Assessment: Tolerated treatment well. Patient demonstrated fatigue post treatment and exhibited good technique with therapeutic exercises      Plan: Continue per plan of care.      Precautions: R JABIER  anterior approach         Manuals                                                                 Neuro Re-Ed                                                                                                        Ther Ex             Ball adduction 30            TB abduction 30            Bike 10 min            SLR  Flex and abduction  30                                                                Ther Activity                                       Gait Training                                       Modalities

## 2024-11-15 ENCOUNTER — APPOINTMENT (OUTPATIENT)
Dept: PHYSICAL THERAPY | Facility: MEDICAL CENTER | Age: 74
End: 2024-11-15
Payer: MEDICARE

## 2024-11-18 ENCOUNTER — OFFICE VISIT (OUTPATIENT)
Dept: PHYSICAL THERAPY | Facility: MEDICAL CENTER | Age: 74
End: 2024-11-18
Payer: MEDICARE

## 2024-11-18 DIAGNOSIS — M54.50 CHRONIC BILATERAL LOW BACK PAIN WITHOUT SCIATICA: Primary | ICD-10-CM

## 2024-11-18 DIAGNOSIS — G89.29 CHRONIC BILATERAL LOW BACK PAIN WITHOUT SCIATICA: Primary | ICD-10-CM

## 2024-11-18 PROCEDURE — 97110 THERAPEUTIC EXERCISES: CPT | Performed by: PHYSICAL THERAPIST

## 2024-11-19 NOTE — PROGRESS NOTES
Daily Note     Today's date: 2024  Patient name: Richard M Goldberg  : 1950  MRN: [id number]  Referring provider: Clay Glover MD  Dx:   Encounter Diagnosis     ICD-10-CM    1. Chronic bilateral low back pain without sciatica  M54.50     G89.29                      Subjective: Sp reports no new issues      Objective: See treatment diary below      Assessment: Tolerated treatment well. Patient demonstrated fatigue post treatment and exhibited good technique with therapeutic exercises      Plan: Continue per plan of care.      Precautions: R JABIER  anterior approach         Manuals                                                                 Neuro Re-Ed                                                                                                        Ther Ex             Ball adduction 30            TB abduction 30            Bike 10 min            SLR  Flex and abduction  30                                                                Ther Activity                                       Gait Training                                       Modalities

## 2024-11-20 ENCOUNTER — OFFICE VISIT (OUTPATIENT)
Dept: PHYSICAL THERAPY | Facility: MEDICAL CENTER | Age: 74
End: 2024-11-20
Payer: MEDICARE

## 2024-11-20 DIAGNOSIS — M54.50 CHRONIC BILATERAL LOW BACK PAIN WITHOUT SCIATICA: Primary | ICD-10-CM

## 2024-11-20 DIAGNOSIS — G89.29 CHRONIC BILATERAL LOW BACK PAIN WITHOUT SCIATICA: Primary | ICD-10-CM

## 2024-11-20 PROCEDURE — 97110 THERAPEUTIC EXERCISES: CPT | Performed by: PHYSICAL THERAPIST

## 2024-11-22 ENCOUNTER — OFFICE VISIT (OUTPATIENT)
Dept: PHYSICAL THERAPY | Facility: MEDICAL CENTER | Age: 74
End: 2024-11-22
Payer: MEDICARE

## 2024-11-22 DIAGNOSIS — M54.50 CHRONIC BILATERAL LOW BACK PAIN WITHOUT SCIATICA: Primary | ICD-10-CM

## 2024-11-22 DIAGNOSIS — G89.29 CHRONIC BILATERAL LOW BACK PAIN WITHOUT SCIATICA: Primary | ICD-10-CM

## 2024-11-22 PROCEDURE — 97110 THERAPEUTIC EXERCISES: CPT | Performed by: PHYSICAL THERAPIST

## 2024-11-22 NOTE — PROGRESS NOTES
Daily Note     Today's date: 2024  Patient name: Richard M Goldberg  : 1950  MRN: [id number]  Referring provider: Clay Glover MD  Dx:   Encounter Diagnosis     ICD-10-CM    1. Chronic bilateral low back pain without sciatica  M54.50     G89.29                      Subjective: Sp reports no new issues      Objective: See treatment diary below      Assessment: Tolerated treatment well. Patient demonstrated fatigue post treatment and exhibited good technique with therapeutic exercises      Plan: Continue per plan of care.      Precautions: R JABIER  anterior approach         Manuals                                                                 Neuro Re-Ed                                                                                                        Ther Ex             Ball adduction 30            TB abduction 30            Bike 10 min            SLR  Flex and abduction  30                                                                Ther Activity                                       Gait Training                                       Modalities

## 2024-11-22 NOTE — PROGRESS NOTES
Daily Note     Today's date: 2024  Patient name: Richard M Goldberg  : 1950  MRN: [id number]  Referring provider: Clay Glover MD  Dx:   Encounter Diagnosis     ICD-10-CM    1. Chronic bilateral low back pain without sciatica  M54.50     G89.29                      Subjective: Sp reports no new issues      Objective: See treatment diary below      Assessment: Tolerated treatment well. Patient demonstrated fatigue post treatment and exhibited good technique with therapeutic exercises      Plan: Continue per plan of care.      Precautions: R JABIER  anterior approach         Manuals                                                                 Neuro Re-Ed                                                                                                        Ther Ex             Ball adduction 30            TB abduction 30            Bike 10 min            SLR  Flex and abduction  30                                                                Ther Activity                                       Gait Training                                       Modalities

## 2024-11-27 ENCOUNTER — OFFICE VISIT (OUTPATIENT)
Dept: PHYSICAL THERAPY | Facility: MEDICAL CENTER | Age: 74
End: 2024-11-27
Payer: MEDICARE

## 2024-11-27 DIAGNOSIS — M54.50 CHRONIC BILATERAL LOW BACK PAIN WITHOUT SCIATICA: Primary | ICD-10-CM

## 2024-11-27 DIAGNOSIS — G89.29 CHRONIC BILATERAL LOW BACK PAIN WITHOUT SCIATICA: Primary | ICD-10-CM

## 2024-11-27 PROCEDURE — 97140 MANUAL THERAPY 1/> REGIONS: CPT | Performed by: PHYSICAL THERAPIST

## 2024-11-27 PROCEDURE — 97110 THERAPEUTIC EXERCISES: CPT | Performed by: PHYSICAL THERAPIST

## 2024-11-27 NOTE — PROGRESS NOTES
Daily Note     Today's date: 2024  Patient name: Richard M Goldberg  : 1950  MRN: [id number]  Referring provider: Clay Glover MD  Dx:   Encounter Diagnosis     ICD-10-CM    1. Chronic bilateral low back pain without sciatica  M54.50     G89.29                      Subjective: Sp reports no new issues      Objective: See treatment diary below      Assessment: Tolerated treatment well. Patient demonstrated fatigue post treatment and exhibited good technique with therapeutic exercises      Plan: Continue per plan of care.      Precautions: R JABIER  anterior approach         Manuals                                                                 Neuro Re-Ed                                                                                                        Ther Ex             Ball adduction 30            TB abduction 30            Bike 10 min            SLR  Flex and abduction  30                                                                Ther Activity                                       Gait Training                                       Modalities

## 2024-11-29 ENCOUNTER — OFFICE VISIT (OUTPATIENT)
Dept: PHYSICAL THERAPY | Facility: MEDICAL CENTER | Age: 74
End: 2024-11-29
Payer: MEDICARE

## 2024-11-29 DIAGNOSIS — G89.29 CHRONIC BILATERAL LOW BACK PAIN WITHOUT SCIATICA: Primary | ICD-10-CM

## 2024-11-29 DIAGNOSIS — M54.50 CHRONIC BILATERAL LOW BACK PAIN WITHOUT SCIATICA: Primary | ICD-10-CM

## 2024-11-29 PROCEDURE — 97110 THERAPEUTIC EXERCISES: CPT | Performed by: PHYSICAL THERAPIST

## 2024-11-29 NOTE — PROGRESS NOTES
Daily Note     Today's date: 2024  Patient name: Richard M Goldberg  : 1950  MRN: [id number]  Referring provider: Clay Glover MD  Dx:   Encounter Diagnosis     ICD-10-CM    1. Chronic bilateral low back pain without sciatica  M54.50     G89.29                      Subjective: Sp reports no new issues      Objective: See treatment diary below      Assessment: Tolerated treatment well. Patient demonstrated fatigue post treatment and exhibited good technique with therapeutic exercises      Plan: Continue per plan of care.      Precautions: R JABIER  anterior approach         Manuals                                                                 Neuro Re-Ed                                                                                                        Ther Ex             Ball adduction 30            TB abduction 30            Bike 10 min            SLR  Flex and abduction  30                                                                Ther Activity                                       Gait Training                                       Modalities

## 2024-12-02 ENCOUNTER — OFFICE VISIT (OUTPATIENT)
Dept: PHYSICAL THERAPY | Facility: MEDICAL CENTER | Age: 74
End: 2024-12-02
Payer: MEDICARE

## 2024-12-02 DIAGNOSIS — G89.29 CHRONIC BILATERAL LOW BACK PAIN WITHOUT SCIATICA: Primary | ICD-10-CM

## 2024-12-02 DIAGNOSIS — M54.50 CHRONIC BILATERAL LOW BACK PAIN WITHOUT SCIATICA: Primary | ICD-10-CM

## 2024-12-02 PROCEDURE — 97110 THERAPEUTIC EXERCISES: CPT | Performed by: PHYSICAL THERAPIST

## 2024-12-03 NOTE — PROGRESS NOTES
Daily Note     Today's date: 12/3/2024  Patient name: Richard M Goldberg  : 1950  MRN: [id number]  Referring provider: Clay Glover MD  Dx:   Encounter Diagnosis     ICD-10-CM    1. Chronic bilateral low back pain without sciatica  M54.50     G89.29                      Subjective: Sp reports no new issues      Objective: See treatment diary below      Assessment: Tolerated treatment well. Patient demonstrated fatigue post treatment and exhibited good technique with therapeutic exercises      Plan: Continue per plan of care.      Precautions: R JABIER  anterior approach         Manuals                                                                 Neuro Re-Ed                                                                                                        Ther Ex             Ball adduction 30            TB abduction 30            Bike 10 min            SLR  Flex and abduction  30                                                                Ther Activity                                       Gait Training                                       Modalities

## 2024-12-04 ENCOUNTER — OFFICE VISIT (OUTPATIENT)
Dept: PHYSICAL THERAPY | Facility: MEDICAL CENTER | Age: 74
End: 2024-12-04
Payer: MEDICARE

## 2024-12-04 DIAGNOSIS — G89.29 CHRONIC BILATERAL LOW BACK PAIN WITHOUT SCIATICA: Primary | ICD-10-CM

## 2024-12-04 DIAGNOSIS — M54.50 CHRONIC BILATERAL LOW BACK PAIN WITHOUT SCIATICA: Primary | ICD-10-CM

## 2024-12-04 PROCEDURE — 97110 THERAPEUTIC EXERCISES: CPT | Performed by: PHYSICAL THERAPIST

## 2024-12-04 NOTE — PROGRESS NOTES
Daily Note     Today's date: 2024  Patient name: Richard M Goldberg  : 1950  MRN: [id number]  Referring provider: Clay Glover MD  Dx:   Encounter Diagnosis     ICD-10-CM    1. Chronic bilateral low back pain without sciatica  M54.50     G89.29                      Subjective: Sp reports no new issues      Objective: See treatment diary below      Assessment: Tolerated treatment well. Patient demonstrated fatigue post treatment and exhibited good technique with therapeutic exercises      Plan: Continue per plan of care.      Precautions: R JABIER  anterior approach         Manuals                                                                 Neuro Re-Ed                                                                                                        Ther Ex             Ball adduction 30            TB abduction 30            Bike 10 min            SLR  Flex and abduction  30                                                                Ther Activity                                       Gait Training                                       Modalities

## 2024-12-06 ENCOUNTER — OFFICE VISIT (OUTPATIENT)
Dept: PHYSICAL THERAPY | Facility: MEDICAL CENTER | Age: 74
End: 2024-12-06
Payer: MEDICARE

## 2024-12-06 DIAGNOSIS — G89.29 CHRONIC BILATERAL LOW BACK PAIN WITHOUT SCIATICA: Primary | ICD-10-CM

## 2024-12-06 DIAGNOSIS — M54.50 CHRONIC BILATERAL LOW BACK PAIN WITHOUT SCIATICA: Primary | ICD-10-CM

## 2024-12-06 PROCEDURE — 97110 THERAPEUTIC EXERCISES: CPT | Performed by: PHYSICAL MEDICINE & REHABILITATION

## 2024-12-06 NOTE — PROGRESS NOTES
"Daily Note     Today's date: 2024  Patient name: Richard M Goldberg  : 1950  MRN: [id number]  Referring provider: Clay Glover MD  Dx:   Encounter Diagnosis     ICD-10-CM    1. Chronic bilateral low back pain without sciatica  M54.50     G89.29                      Subjective: Rich reported \"I am doing alright, stiff.\"      Objective: See treatment diary below      Assessment: Tolerated treatment well. Patient would benefit from continued PT. Following UPAs he reported it felt not as painful.      Plan: Continue per plan of care.      Precautions: R JABIER  anterior approach         Manuals             UPASs JR                                                   Neuro Re-Ed                                                                                                        Ther Ex             Ball adduction 30            TB abduction 30            Bike 10 min            SLR  Flex and abduction  30                                                                Ther Activity                                       Gait Training                                       Modalities                                            "

## 2024-12-11 ENCOUNTER — OFFICE VISIT (OUTPATIENT)
Dept: PHYSICAL THERAPY | Facility: MEDICAL CENTER | Age: 74
End: 2024-12-11
Payer: MEDICARE

## 2024-12-11 DIAGNOSIS — G89.29 CHRONIC BILATERAL LOW BACK PAIN WITHOUT SCIATICA: Primary | ICD-10-CM

## 2024-12-11 DIAGNOSIS — M54.50 CHRONIC BILATERAL LOW BACK PAIN WITHOUT SCIATICA: Primary | ICD-10-CM

## 2024-12-11 PROCEDURE — 97110 THERAPEUTIC EXERCISES: CPT | Performed by: PHYSICAL THERAPIST

## 2024-12-13 ENCOUNTER — OFFICE VISIT (OUTPATIENT)
Dept: PHYSICAL THERAPY | Facility: MEDICAL CENTER | Age: 74
End: 2024-12-13
Payer: MEDICARE

## 2024-12-13 DIAGNOSIS — M54.50 CHRONIC BILATERAL LOW BACK PAIN WITHOUT SCIATICA: Primary | ICD-10-CM

## 2024-12-13 DIAGNOSIS — G89.29 CHRONIC BILATERAL LOW BACK PAIN WITHOUT SCIATICA: Primary | ICD-10-CM

## 2024-12-13 PROCEDURE — 97110 THERAPEUTIC EXERCISES: CPT | Performed by: PHYSICAL THERAPIST

## 2024-12-13 NOTE — PROGRESS NOTES
"Daily Note     Today's date: 2024  Patient name: Richard M Goldberg  : 1950  MRN: [id number]  Referring provider: Clay Glover MD  Dx:   Encounter Diagnosis     ICD-10-CM    1. Chronic bilateral low back pain without sciatica  M54.50     G89.29                      Subjective: Rich reported \"I am doing alright, stiff.\"      Objective: See treatment diary below      Assessment: Tolerated treatment well. Patient would benefit from continued PT. Following UPAs he reported it felt not as painful.      Plan: Continue per plan of care.      Precautions: R JABIER  anterior approach         Manuals             UPASs AF                                                   Neuro Re-Ed                                                                                                        Ther Ex             Ball adduction 30            TB abduction 30            Bike 10 min            SLR  Flex and abduction  30                                                                Ther Activity                                       Gait Training                                       Modalities                                            "

## 2024-12-13 NOTE — PROGRESS NOTES
"Daily Note     Today's date: 2024  Patient name: Richard M Goldberg  : 1950  MRN: [id number]  Referring provider: Clay Glover MD  Dx:   Encounter Diagnosis     ICD-10-CM    1. Chronic bilateral low back pain without sciatica  M54.50     G89.29                      Subjective: Rich reported \"I am doing alright, stiff.\"      Objective: See treatment diary below      Assessment: Tolerated treatment well. Patient would benefit from continued PT. Following UPAs he reported it felt not as painful.      Plan: Continue per plan of care.      Precautions: R JABIER  anterior approach         Manuals             UPASs AF                                                   Neuro Re-Ed                                                                                                        Ther Ex             Ball adduction 30            TB abduction 30            Bike 10 min            SLR  Flex and abduction  30                                                                Ther Activity                                       Gait Training                                       Modalities                                            "

## 2024-12-16 ENCOUNTER — OFFICE VISIT (OUTPATIENT)
Dept: PHYSICAL THERAPY | Facility: MEDICAL CENTER | Age: 74
End: 2024-12-16
Payer: MEDICARE

## 2024-12-16 DIAGNOSIS — G89.29 CHRONIC BILATERAL LOW BACK PAIN WITHOUT SCIATICA: Primary | ICD-10-CM

## 2024-12-16 DIAGNOSIS — M54.50 CHRONIC BILATERAL LOW BACK PAIN WITHOUT SCIATICA: Primary | ICD-10-CM

## 2024-12-16 PROCEDURE — 97110 THERAPEUTIC EXERCISES: CPT | Performed by: PHYSICAL THERAPIST

## 2024-12-16 NOTE — PROGRESS NOTES
"Daily Note     Today's date: 2024  Patient name: Richard M Goldberg  : 1950  MRN: [id number]  Referring provider: Clay Glover MD  Dx:   Encounter Diagnosis     ICD-10-CM    1. Chronic bilateral low back pain without sciatica  M54.50     G89.29                      Subjective: Rich reported \"I am doing alright, stiff.\"      Objective: See treatment diary below      Assessment: Tolerated treatment well. Patient would benefit from continued PT. Following UPAs he reported it felt not as painful.      Plan: Continue per plan of care.      Precautions: R JABIER  anterior approach         Manuals             UPASs AF                                                   Neuro Re-Ed                                                                                                        Ther Ex             Ball adduction 30            TB abduction 30            Bike 10 min            SLR  Flex and abduction  30                                                                Ther Activity                                       Gait Training                                       Modalities                                            "

## 2024-12-18 ENCOUNTER — OFFICE VISIT (OUTPATIENT)
Dept: PHYSICAL THERAPY | Facility: MEDICAL CENTER | Age: 74
End: 2024-12-18
Payer: MEDICARE

## 2024-12-18 DIAGNOSIS — G89.29 CHRONIC BILATERAL LOW BACK PAIN WITHOUT SCIATICA: Primary | ICD-10-CM

## 2024-12-18 DIAGNOSIS — M54.50 CHRONIC BILATERAL LOW BACK PAIN WITHOUT SCIATICA: Primary | ICD-10-CM

## 2024-12-18 PROCEDURE — 97110 THERAPEUTIC EXERCISES: CPT | Performed by: PHYSICAL THERAPIST

## 2024-12-20 ENCOUNTER — OFFICE VISIT (OUTPATIENT)
Dept: PHYSICAL THERAPY | Facility: MEDICAL CENTER | Age: 74
End: 2024-12-20
Payer: MEDICARE

## 2024-12-20 DIAGNOSIS — G89.29 CHRONIC BILATERAL LOW BACK PAIN WITHOUT SCIATICA: Primary | ICD-10-CM

## 2024-12-20 DIAGNOSIS — M54.50 CHRONIC BILATERAL LOW BACK PAIN WITHOUT SCIATICA: Primary | ICD-10-CM

## 2024-12-20 PROCEDURE — 97110 THERAPEUTIC EXERCISES: CPT | Performed by: PHYSICAL THERAPIST

## 2024-12-20 NOTE — PROGRESS NOTES
Daily Note     Today's date: 2024  Patient name: Richard M Goldberg  : 1950  MRN: [id number]  Referring provider: Clay Glover MD  Dx:   Encounter Diagnosis     ICD-10-CM    1. Chronic bilateral low back pain without sciatica  M54.50     G89.29                      Subjective: Rich reported general stiffness, feels better after moving more and finishing elliptical       Objective: See treatment diary below      Assessment: Tolerated treatment well. Patient would benefit from continued PT. Following UPAs he reported it felt not as painful.      Plan: Continue per plan of care.      Precautions: R JABIER  anterior approach         Manuals             UPASs AF                                                   Neuro Re-Ed                                                                                                        Ther Ex             Ball adduction 30            TB abduction 30            Bike 10 min            SLR  Flex and abduction  30                                                                Ther Activity                                       Gait Training                                       Modalities

## 2024-12-23 ENCOUNTER — OFFICE VISIT (OUTPATIENT)
Dept: PHYSICAL THERAPY | Facility: MEDICAL CENTER | Age: 74
End: 2024-12-23
Payer: MEDICARE

## 2024-12-23 DIAGNOSIS — M54.50 CHRONIC BILATERAL LOW BACK PAIN WITHOUT SCIATICA: Primary | ICD-10-CM

## 2024-12-23 DIAGNOSIS — G89.29 CHRONIC BILATERAL LOW BACK PAIN WITHOUT SCIATICA: Primary | ICD-10-CM

## 2024-12-23 PROCEDURE — 97110 THERAPEUTIC EXERCISES: CPT | Performed by: PHYSICAL THERAPIST

## 2024-12-23 NOTE — PROGRESS NOTES
Daily Note     Today's date: 2024  Patient name: Richard M Goldberg  : 1950  MRN: [id number]  Referring provider: Clay Glover MD  Dx:   Encounter Diagnosis     ICD-10-CM    1. Chronic bilateral low back pain without sciatica  M54.50     G89.29                      Subjective: Rich reported general stiffness,  continues to feel better after mobilizations and has been able to be more active in gym as a result       Objective: See treatment diary below      Assessment: Tolerated treatment well. Patient would benefit from continued PT to address chronic lumbar mobility deficits and reduce pain related to arthritic changes to allow for more activity tolerance in gym.      Plan: Continue per plan of care.      Precautions: R JABIER  anterior approach         Manuals             UPASs AF                                                   Neuro Re-Ed                                                                                                        Ther Ex             Ball adduction 30            TB abduction 30            Bike 10 min            SLR  Flex and abduction  30                                                                Ther Activity                                       Gait Training                                       Modalities

## 2024-12-27 ENCOUNTER — OFFICE VISIT (OUTPATIENT)
Dept: PHYSICAL THERAPY | Facility: MEDICAL CENTER | Age: 74
End: 2024-12-27
Payer: MEDICARE

## 2024-12-27 DIAGNOSIS — G89.29 CHRONIC BILATERAL LOW BACK PAIN WITHOUT SCIATICA: Primary | ICD-10-CM

## 2024-12-27 DIAGNOSIS — M54.50 CHRONIC BILATERAL LOW BACK PAIN WITHOUT SCIATICA: Primary | ICD-10-CM

## 2024-12-27 PROCEDURE — 97110 THERAPEUTIC EXERCISES: CPT | Performed by: PHYSICAL THERAPIST

## 2024-12-27 NOTE — PROGRESS NOTES
Daily Note     Today's date: 2024  Patient name: Richard M Goldberg  : 1950  MRN: [id number]  Referring provider: Clay Glover MD  Dx:   Encounter Diagnosis     ICD-10-CM    1. Chronic bilateral low back pain without sciatica  M54.50     G89.29                      Subjective: Rich reported general stiffness,  continues to feel better after mobilizations and has been able to be more active in gym as a result       Objective: See treatment diary below      Assessment: Tolerated treatment well. Patient would benefit from continued PT to address chronic lumbar mobility deficits and reduce pain related to arthritic changes to allow for more activity tolerance in gym.      Plan: Continue per plan of care.      Precautions: R JABIER  anterior approach         Manuals             UPASs AF                                                   Neuro Re-Ed                                                                                                        Ther Ex             Ball adduction 30            TB abduction 30            Bike 10 min            SLR  Flex and abduction  30                                                                Ther Activity                                       Gait Training                                       Modalities

## 2024-12-30 ENCOUNTER — OFFICE VISIT (OUTPATIENT)
Dept: PHYSICAL THERAPY | Facility: MEDICAL CENTER | Age: 74
End: 2024-12-30
Payer: MEDICARE

## 2024-12-30 DIAGNOSIS — M54.50 CHRONIC BILATERAL LOW BACK PAIN WITHOUT SCIATICA: Primary | ICD-10-CM

## 2024-12-30 DIAGNOSIS — G89.29 CHRONIC BILATERAL LOW BACK PAIN WITHOUT SCIATICA: Primary | ICD-10-CM

## 2024-12-30 PROCEDURE — 97110 THERAPEUTIC EXERCISES: CPT | Performed by: PHYSICAL THERAPIST

## 2024-12-30 NOTE — PROGRESS NOTES
Daily Note     Today's date: 2024  Patient name: Richard M Goldberg  : 1950  MRN: [id number]  Referring provider: Clay Glover MD  Dx:   Encounter Diagnosis     ICD-10-CM    1. Chronic bilateral low back pain without sciatica  M54.50     G89.29                      Subjective: Rich reported general stiffness,  continues to feel better after mobilizations and has been able to be more active in gym as a result       Objective: See treatment diary below      Assessment: Tolerated treatment well. Patient would benefit from continued PT to address chronic lumbar mobility deficits and reduce pain related to arthritic changes to allow for more activity tolerance in gym.      Plan: Continue per plan of care.      Precautions: R JABIER  anterior approach         Manuals             UPASs AF                                                   Neuro Re-Ed                                                                                                        Ther Ex             Ball adduction 30            TB abduction 30            Bike 10 min            SLR  Flex and abduction  30                                                                Ther Activity                                       Gait Training                                       Modalities

## 2025-01-03 ENCOUNTER — OFFICE VISIT (OUTPATIENT)
Dept: PHYSICAL THERAPY | Facility: MEDICAL CENTER | Age: 75
End: 2025-01-03
Payer: MEDICARE

## 2025-01-03 DIAGNOSIS — G89.29 CHRONIC BILATERAL LOW BACK PAIN WITHOUT SCIATICA: Primary | ICD-10-CM

## 2025-01-03 DIAGNOSIS — M54.50 CHRONIC BILATERAL LOW BACK PAIN WITHOUT SCIATICA: Primary | ICD-10-CM

## 2025-01-03 PROCEDURE — 97110 THERAPEUTIC EXERCISES: CPT | Performed by: PHYSICAL THERAPIST

## 2025-01-03 NOTE — PROGRESS NOTES
Daily Note     Today's date: 1/3/2025  Patient name: Richard M Goldberg  : 1950  MRN: [id number]  Referring provider: Clay Glover MD  Dx:   Encounter Diagnosis     ICD-10-CM    1. Chronic bilateral low back pain without sciatica  M54.50     G89.29                      Subjective: Rich reported general stiffness,  continues to feel better after mobilizations and has been able to be more active in gym as a result       Objective: See treatment diary below      Assessment: Tolerated treatment well. Patient would benefit from continued PT to address chronic lumbar mobility deficits and reduce pain related to arthritic changes to allow for more activity tolerance in gym.      Plan: Continue per plan of care.      Precautions: R JABIER  anterior approach         Manuals 1/3            UPASs AF                                                   Neuro Re-Ed                                                                                                        Ther Ex             Ball adduction 30            TB abduction 30            Bike 10 min            SLR  Flex and abduction  30                                                                Ther Activity                                       Gait Training                                       Modalities

## 2025-01-06 ENCOUNTER — APPOINTMENT (OUTPATIENT)
Dept: PHYSICAL THERAPY | Facility: MEDICAL CENTER | Age: 75
End: 2025-01-06
Payer: MEDICARE

## 2025-01-08 ENCOUNTER — OFFICE VISIT (OUTPATIENT)
Dept: PHYSICAL THERAPY | Facility: MEDICAL CENTER | Age: 75
End: 2025-01-08
Payer: MEDICARE

## 2025-01-08 DIAGNOSIS — M54.50 CHRONIC BILATERAL LOW BACK PAIN WITHOUT SCIATICA: Primary | ICD-10-CM

## 2025-01-08 DIAGNOSIS — G89.29 CHRONIC BILATERAL LOW BACK PAIN WITHOUT SCIATICA: Primary | ICD-10-CM

## 2025-01-08 PROCEDURE — 97110 THERAPEUTIC EXERCISES: CPT | Performed by: PHYSICAL THERAPIST

## 2025-01-08 NOTE — PROGRESS NOTES
Daily Note     Today's date: 2025  Patient name: Richard M Goldberg  : 1950  MRN: [id number]  Referring provider: Clay Glover MD  Dx:   Encounter Diagnosis     ICD-10-CM    1. Chronic bilateral low back pain without sciatica  M54.50     G89.29                      Subjective: Rich reported general stiffness,  continues to feel better after mobilizations and has been able to be more active in gym as a result       Objective: See treatment diary below      Assessment: Tolerated treatment well. 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      Precautions: R JABIER  anterior approach         Manuals             UPASs AF                                                   Neuro Re-Ed                                                                                                        Ther Ex             Ball adduction 30            TB abduction 30            Bike 10 min            SLR  Flex and abduction  30                                                                Ther Activity                                       Gait Training                                       Modalities

## 2025-01-10 ENCOUNTER — OFFICE VISIT (OUTPATIENT)
Dept: PHYSICAL THERAPY | Facility: MEDICAL CENTER | Age: 75
End: 2025-01-10
Payer: MEDICARE

## 2025-01-10 DIAGNOSIS — M54.50 CHRONIC BILATERAL LOW BACK PAIN WITHOUT SCIATICA: Primary | ICD-10-CM

## 2025-01-10 DIAGNOSIS — G89.29 CHRONIC BILATERAL LOW BACK PAIN WITHOUT SCIATICA: Primary | ICD-10-CM

## 2025-01-10 PROCEDURE — 97110 THERAPEUTIC EXERCISES: CPT | Performed by: PHYSICAL THERAPIST

## 2025-01-13 ENCOUNTER — OFFICE VISIT (OUTPATIENT)
Dept: PHYSICAL THERAPY | Facility: MEDICAL CENTER | Age: 75
End: 2025-01-13
Payer: MEDICARE

## 2025-01-13 DIAGNOSIS — G89.29 CHRONIC BILATERAL LOW BACK PAIN WITHOUT SCIATICA: Primary | ICD-10-CM

## 2025-01-13 DIAGNOSIS — M54.50 CHRONIC BILATERAL LOW BACK PAIN WITHOUT SCIATICA: Primary | ICD-10-CM

## 2025-01-13 PROCEDURE — 97110 THERAPEUTIC EXERCISES: CPT | Performed by: PHYSICAL THERAPIST

## 2025-01-13 NOTE — PROGRESS NOTES
Daily Note     Today's date: 2025  Patient name: Richard M Goldberg  : 1950  MRN: [id number]  Referring provider: Clay Glover MD  Dx:   Encounter Diagnosis     ICD-10-CM    1. Chronic bilateral low back pain without sciatica  M54.50     G89.29                      Subjective: Rich reported general stiffness,  continues to feel better after mobilizations and has been able to be more active in gym as a result       Objective: See treatment diary below      Assessment: Tolerated treatment well. 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      Precautions: R JABIER  anterior approach         Manuals             UPASs AF                                                   Neuro Re-Ed                                                                                                        Ther Ex             Ball adduction 30            TB abduction 30            Bike 10 min            SLR  Flex and abduction  30                                                                Ther Activity                                       Gait Training                                       Modalities

## 2025-01-13 NOTE — PROGRESS NOTES
Daily Note     Today's date: 2025  Patient name: Richard M Goldberg  : 1950  MRN: [id number]  Referring provider: Clay Glover MD  Dx:   Encounter Diagnosis     ICD-10-CM    1. Chronic bilateral low back pain without sciatica  M54.50     G89.29                      Subjective: Rich reported general stiffness,  continues to feel better after mobilizations and has been able to be more active in gym as a result       Objective: See treatment diary below      Assessment: Tolerated treatment well. 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      Precautions: R JABIER  anterior approach         Manuals 1/10            UPASs AF                                                   Neuro Re-Ed                                                                                                        Ther Ex             Ball adduction 30            TB abduction 30            Bike 10 min            SLR  Flex and abduction  30                                                                Ther Activity                                       Gait Training                                       Modalities

## 2025-01-15 ENCOUNTER — OFFICE VISIT (OUTPATIENT)
Dept: PHYSICAL THERAPY | Facility: MEDICAL CENTER | Age: 75
End: 2025-01-15
Payer: MEDICARE

## 2025-01-15 DIAGNOSIS — G89.29 CHRONIC BILATERAL LOW BACK PAIN WITHOUT SCIATICA: Primary | ICD-10-CM

## 2025-01-15 DIAGNOSIS — M54.50 CHRONIC BILATERAL LOW BACK PAIN WITHOUT SCIATICA: Primary | ICD-10-CM

## 2025-01-15 PROCEDURE — 97110 THERAPEUTIC EXERCISES: CPT | Performed by: PHYSICAL THERAPIST

## 2025-01-17 ENCOUNTER — OFFICE VISIT (OUTPATIENT)
Dept: PHYSICAL THERAPY | Facility: MEDICAL CENTER | Age: 75
End: 2025-01-17
Payer: MEDICARE

## 2025-01-17 DIAGNOSIS — M54.50 CHRONIC BILATERAL LOW BACK PAIN WITHOUT SCIATICA: Primary | ICD-10-CM

## 2025-01-17 DIAGNOSIS — G89.29 CHRONIC BILATERAL LOW BACK PAIN WITHOUT SCIATICA: Primary | ICD-10-CM

## 2025-01-17 PROCEDURE — 97110 THERAPEUTIC EXERCISES: CPT | Performed by: PHYSICAL THERAPIST

## 2025-01-17 NOTE — PROGRESS NOTES
Daily Note     Today's date: 2025  Patient name: Richard M Goldberg  : 1950  MRN: [id number]  Referring provider: Clay Glover MD  Dx:   Encounter Diagnosis     ICD-10-CM    1. Chronic bilateral low back pain without sciatica  M54.50     G89.29                      Subjective: Rich reported general stiffness,  continues to feel better after mobilizations and has been able to be more active in gym as a result       Objective: See treatment diary below      Assessment: Tolerated treatment well. 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      Precautions: R JABIER  anterior approach         Manuals 1/15            UPASs AF                                                   Neuro Re-Ed                                                                                                        Ther Ex             Ball adduction 30            TB abduction 30            Bike 10 min            SLR  Flex and abduction  30                                                                Ther Activity                                       Gait Training                                       Modalities

## 2025-01-17 NOTE — PROGRESS NOTES
Daily Note     Today's date: 2025  Patient name: Richard M Goldberg  : 1950  MRN: [id number]  Referring provider: Clay Glover MD  Dx:   Encounter Diagnosis     ICD-10-CM    1. Chronic bilateral low back pain without sciatica  M54.50     G89.29                      Subjective: Rich reported general stiffness,  continues to feel better after mobilizations and has been able to be more active in gym as a result       Objective: See treatment diary below      Assessment: Tolerated treatment well. 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      Precautions: R JABIER  anterior approach         Manuals             UPASs AF                                                   Neuro Re-Ed                                                                                                        Ther Ex             Ball adduction 30            TB abduction 30            Bike 10 min            SLR  Flex and abduction  30                                                                Ther Activity                                       Gait Training                                       Modalities

## 2025-01-20 ENCOUNTER — OFFICE VISIT (OUTPATIENT)
Dept: PHYSICAL THERAPY | Facility: MEDICAL CENTER | Age: 75
End: 2025-01-20
Payer: MEDICARE

## 2025-01-20 DIAGNOSIS — M54.50 CHRONIC BILATERAL LOW BACK PAIN WITHOUT SCIATICA: Primary | ICD-10-CM

## 2025-01-20 DIAGNOSIS — G89.29 CHRONIC BILATERAL LOW BACK PAIN WITHOUT SCIATICA: Primary | ICD-10-CM

## 2025-01-20 PROCEDURE — 97110 THERAPEUTIC EXERCISES: CPT | Performed by: PHYSICAL THERAPIST

## 2025-01-20 NOTE — PROGRESS NOTES
Daily Note     Today's date: 2025  Patient name: Richard M Goldberg  : 1950  MRN: [id number]  Referring provider: Clay Glover MD  Dx:   Encounter Diagnosis     ICD-10-CM    1. Chronic bilateral low back pain without sciatica  M54.50     G89.29                      Subjective: Rich reported general stiffness,  continues to feel better after mobilizations and has been able to be more active in gym as a result       Objective: See treatment diary below      Assessment: Tolerated treatment well. 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      Precautions: R JABIER  anterior approach         Manuals             UPASs AF                                                   Neuro Re-Ed                                                                                                        Ther Ex             Ball adduction 30            TB abduction 30            Bike 10 min            SLR  Flex and abduction  30                                                                Ther Activity                                       Gait Training                                       Modalities

## 2025-01-22 ENCOUNTER — APPOINTMENT (OUTPATIENT)
Dept: PHYSICAL THERAPY | Facility: MEDICAL CENTER | Age: 75
End: 2025-01-22
Payer: MEDICARE

## 2025-01-24 ENCOUNTER — APPOINTMENT (OUTPATIENT)
Dept: PHYSICAL THERAPY | Facility: MEDICAL CENTER | Age: 75
End: 2025-01-24
Payer: MEDICARE

## 2025-01-30 ENCOUNTER — OFFICE VISIT (OUTPATIENT)
Dept: CARDIOLOGY CLINIC | Facility: CLINIC | Age: 75
End: 2025-01-30
Payer: MEDICARE

## 2025-01-30 VITALS
SYSTOLIC BLOOD PRESSURE: 126 MMHG | OXYGEN SATURATION: 98 % | DIASTOLIC BLOOD PRESSURE: 74 MMHG | HEART RATE: 62 BPM | BODY MASS INDEX: 30.72 KG/M2 | WEIGHT: 208 LBS

## 2025-01-30 DIAGNOSIS — I45.2 BIFASCICULAR BLOCK: ICD-10-CM

## 2025-01-30 DIAGNOSIS — I25.10 CORONARY ARTERY DISEASE INVOLVING NATIVE CORONARY ARTERY OF NATIVE HEART WITHOUT ANGINA PECTORIS: Primary | ICD-10-CM

## 2025-01-30 DIAGNOSIS — I10 ESSENTIAL HYPERTENSION: ICD-10-CM

## 2025-01-30 DIAGNOSIS — E11.69 TYPE 2 DIABETES MELLITUS WITH OTHER SPECIFIED COMPLICATION, WITHOUT LONG-TERM CURRENT USE OF INSULIN (HCC): ICD-10-CM

## 2025-01-30 PROCEDURE — 99214 OFFICE O/P EST MOD 30 MIN: CPT | Performed by: INTERNAL MEDICINE

## 2025-01-30 NOTE — PROGRESS NOTES
Cardiology Follow Up    Richard M Goldberg  1950  [id number]  [facility] [place] [id number] [facility] [id number] [phone]    1. Coronary artery disease involving native coronary artery of native heart without angina pectoris        2. Essential hypertension        3. Type 2 diabetes mellitus with other specified complication, without long-term current use of insulin (HCC)        4. Bifascicular block            Discussion/Summary:    CAD: Stent to the second OM in June 2019 in the setting of an NSTEMI. Other nonobstructive disease. Currently without any angina. He is exercising regularly. Explained that he needs to be on aspirin and remain on this. Lipid panel is well-controlled with Crestor. Previously was requiring antihypertensives but with weight loss, he has not required it. Remain off and continue observation.    Bifascicular block: Stable. No suggestion of any higher degree block. Had a Holter monitor done last summer at [facility].    Although mild to moderate much regurgitation previously noted on echo, the most recent 1 in July 2024 showed just mild MR.    Weight loss with Mounjaro and lifestyle changes. Continues to do well with this.    Last echo showed low normal EF. Volume status is stable. No changes.        Previous History:  74-year-old gentleman.  He has coronary disease identified in June of 2019. He had an NSTEMI.  EF was preserved.  He had a stent to the 2nd OM, 60% 1st diagonal, 60% circ, 50% mid RCA.    He was on DAPT, but stopped in 2023.    He has a baseline left anterior fascicular block, RBBB. Sinus bradycardia, but no symptoms.  He has hypertension as well, tends to be worse with anxiety but of late has been better controlled.    Hip pain, for hip replacement.  Elevated glucose, started on Mounjaro and lost a good deal of weight.    Interval History:    Has lost another 40 pounds since last visit  with me. Has significant back pain which limits him and causes significant pain for him, only transiently responsive to some of the interventions he had. Follows with pain management only in [place]. Underwent his hip surgery. No complication but did have pain.    He has not restarted the aspirin as requested. He was instructed to resume this right away.    No acute cardiac concerns. He had questions about some of his other medications interacting from a cardiac perspective. All questions answered in detail.    Problem List       Anxiety    BPH with obstruction/lower urinary tract symptoms    Elevated glucose    Erectile dysfunction of non-organic origin    GERD without esophagitis    Hyperlipidemia    Essential hypertension    Obesity (BMI 30.0-34.9)    Obstructive sleep apnea    Osteoarthritis    Psoriasis    Bifascicular block    Calculus of kidney    Primary insomnia    GERD (gastroesophageal reflux disease)    Low back pain    Alcohol use    Dyslipidemia    Coronary artery disease involving native coronary artery of native heart without angina pectoris          Past Medical History:   Diagnosis Date    CAD (coronary artery disease)     s/p stent x1 on 6/19/19    Closed fracture of distal end of fibula with routine healing     unspecified fracture morphology, unspecified laterality, subsequent encounter; Last Assessed:12/27/16    GERD (gastroesophageal reflux disease)     Heart attack (HCC)     Hypertension     Kidney stones     Nocturia     Psoriasis     Sleep apnea      Social History     Tobacco Use    Smoking status: Never    Smokeless tobacco: Never   Substance Use Topics    Alcohol use: Yes     Comment: 1 drink daily    Drug use: No      Family History   Problem Relation Age of Onset    Arthritis Mother     Hypertension Mother     Irritable bowel syndrome Mother     Osteoporosis Mother     Arthritis Other      Past Surgical History:   Procedure Laterality Date    APPENDECTOMY      CARPAL TUNNEL RELEASE       ROTATOR CUFF REPAIR      SHOULDER ARTHROSCOPY Left     Last ASsessed:10/24/16    TOTAL HIP ARTHROPLASTY      TRIGGER FINGER RELEASE         Current Outpatient Medications:     amoxicillin (AMOXIL) 875 mg tablet, Take 875 mg by mouth in the morning, Disp: , Rfl:     fluticasone (FLONASE) 50 mcg/act nasal spray, 2 sprays into each nostril 2 (two) times a day as needed for rhinitis, Disp: , Rfl:     metFORMIN (GLUCOPHAGE-XR) 750 mg 24 hr tablet, Take 750 mg by mouth daily with breakfast, Disp: , Rfl:     methocarbamol (ROBAXIN) 750 mg tablet, methocarbamol 750 mg tablet  take 1 tablet by mouth three times a day if needed for muscle spasm, Disp: , Rfl:     Mirabegron ER 25 MG TB24, Take 25 mg by mouth in the morning, Disp: , Rfl:     omeprazole (PriLOSEC) 20 mg delayed release capsule, Take 1 capsule (20 mg total) by mouth daily, Disp: 90 capsule, Rfl: 0    oxyCODONE-acetaminophen (PERCOCET) 5-325 mg per tablet, take 1 tablet by mouth every 8 hours if needed for MODERATE PAIN (PAIN SCORE 4-6), Disp: , Rfl:     rosuvastatin (CRESTOR) 20 MG tablet, Take 1 tablet (20 mg total) by mouth daily, Disp: 90 tablet, Rfl: 3    tamsulosin (FLOMAX) 0.4 mg, Take 1 capsule (0.4 mg total) by mouth 2 (two) times a day, Disp: 60 capsule, Rfl: 3    aspirin (ECOTRIN LOW STRENGTH) 81 mg EC tablet, Take 1 tablet (81 mg total) by mouth daily (Patient not taking: Reported on 7/10/2024), Disp: 30 tablet, Rfl: 0    Mounjaro 5 MG/0.5ML, Inject 5 mg under the skin every 7 days, Disp: , Rfl:     Omega-3 Fatty Acids (fish oil) 1,000 mg, Take 1,000 mg by mouth daily (Patient not taking: Reported on 7/24/2024), Disp: , Rfl:   Allergies   Allergen Reactions    Latex Hives    Other     Shellfish-Derived Products - Food Allergy      [facility]     Vitals:    01/30/25 1126   BP: 126/74   BP Location: Right arm   Patient Position: Sitting   Cuff Size: Standard   Pulse: 62   SpO2: 98%   Weight: 94.3 kg (208 lb)     Vitals:    01/30/25 1126   Weight:  "94.3 kg (208 lb)          Body mass index is 30.72 kg/m².    Physical Exam:  GEN: Richard M Goldberg appears well, alert and oriented x 3, pleasant and cooperative   HEENT: pupils equal, round, and reactive to light; extraocular muscles intact  NECK: supple, no carotid bruits   HEART: regular rhythm, normal S1 and S2, no murmurs, clicks, gallops or rubs   LUNGS: clear to auscultation bilaterally; no wheezes, rales, or rhonchi   ABDOMEN: normal bowel sounds, soft, no tenderness, no distention  EXTREMITIES: peripheral pulses normal; no clubbing, cyanosis, or edema  NEURO: no focal findings   SKIN: normal without suspicious lesions on exposed skin    ROS:  Back pain,   Except as noted in HPI, is otherwise reviewed in detail and a 12 point review of systems is negative.  ROS reviewed and is unchanged    Labs:  Lab Results   Component Value Date    K 4.5 12/03/2024     12/03/2024    CREATININE 0.76 12/03/2024    BUN 14 12/03/2024    CO2 28 12/03/2024    ALT 13 12/03/2024    AST 16 12/03/2024    TSH 1.42 07/10/2024    INR 0.9 05/23/2021    GLUF 144 (H) 03/28/2024    HGBA1C 5.1 12/03/2024    WBC 7.43 03/28/2024    HGB 15.4 03/28/2024    HCT 45.7 03/28/2024     03/28/2024     No results found for: \"CHOL\"  Lab Results   Component Value Date    LDLCALC 62 03/28/2024    LDLCALC 53 02/11/2023    LDLCALC 44 04/09/2021     Lab Results   Component Value Date    HDL 51 03/28/2024    HDL 52 02/11/2023    HDL 66 04/09/2021     Lab Results   Component Value Date    TRIG 92 03/28/2024    TRIG 92 02/11/2023    TRIG 50 04/09/2021     Testing:  Echo 7/2024 [facility]  Holter 7/2024 [facility]    Stress 2/24/23  Stress ECG: No ST deviation is noted. The ECG was not diagnostic due to pharmacological (vasodilator) stress.    Stress Function: Left ventricular function post-stress is normal.    Perfusion: There are no perfusion defects.    Stress Combined Conclusion: Left ventricular perfusion is normal.       Stress Test 8/5/20:  SUMMARY:  - "  Stress results: Duration of exercise was 7 min and 30 sec. Maximal work rate was 9.3 METs. Maximal heart rate during stress was 127 bpm ( 84 % of maximal predicted heart rate). Target heart rate was not achieved. There was normal  resting blood pressure with a hypertensive response to stress. The systolic blood pressure increased by 90 mmHg during peak stress. There was no chest pain during stress.  -  ECG conclusions: The stress ECG was negative for ischemia and normal.  -  Perfusion imaging: The left ventricle was mildly dilated. There was a moderate-sized, severe, fixed myocardial perfusion defect of the basal to mid inferior wall.  -  Gated SPECT: The calculated left ventricular ejection fraction was 54 %. Left ventricular ejection fraction appeared to be at the lower limits of normal. There was severely reduced myocardial thickening and motion of the basal inferior  wall of the left ventricle.     IMPRESSIONS: Abnormal study after maximal exercise. There was a small to medium sized infarct in the mid to basal inferior region. Overall left ventricular systolic function was preserved, but there were regional wall motion abnormalities.  Diagnostic sensitivity was limited by submaximal stress. Patient achieved 84% of maximum age predicted heart rate. The patient had a hypertensive response to exercise, increasing the systolic blood pressure by 90 mmHg from baseline.    Echo 7/7/20:  LEFT VENTRICLE:  Systolic function was normal. Ejection fraction was estimated to be 60 %.  There were no regional wall motion abnormalities.  Doppler parameters were consistent with abnormal left ventricular relaxation (grade 1 diastolic dysfunction).     MITRAL VALVE:  There was mild annular calcification.  There was mild to moderate regurgitation.     AORTIC VALVE:  There was mild regurgitation.     TRICUSPID VALVE:  There was trace regurgitation.    Cardiac Cath 6/2019  CORONARY CIRCULATION:  1st diagonal: There was a tubular 60 %  stenosis.  2nd obtuse marginal: There was a 99 % stenosis. There was ROSIE grade 2 flow through the vessel (partial perfusion). This lesion is a likely culprit for the patient's recent myocardial infarction. An intervention was performed.  Left AV groove artery: There was a discrete 60 % stenosis.  Mid RCA: There was a 50 % stenosis.     1ST LESION INTERVENTIONS:  A successful balloon angioplasty with stent procedure was performed on the 99 % lesion in the 2nd obtuse marginal. Following intervention there was an excellent angiographic appearance with a 0 % residual stenosis.  A Xience Mamie Rx 2.75 x 18mm drug-eluting stent was placed across the lesion and deployed at a maximum inflation pressure of 16 pam.

## 2025-02-10 ENCOUNTER — OFFICE VISIT (OUTPATIENT)
Dept: PHYSICAL THERAPY | Facility: MEDICAL CENTER | Age: 75
End: 2025-02-10
Payer: MEDICARE

## 2025-02-10 DIAGNOSIS — M54.50 CHRONIC BILATERAL LOW BACK PAIN WITHOUT SCIATICA: Primary | ICD-10-CM

## 2025-02-10 DIAGNOSIS — G89.29 CHRONIC BILATERAL LOW BACK PAIN WITHOUT SCIATICA: Primary | ICD-10-CM

## 2025-02-10 PROCEDURE — 97110 THERAPEUTIC EXERCISES: CPT | Performed by: PHYSICAL THERAPIST

## 2025-02-10 NOTE — PROGRESS NOTES
Daily Note     Today's date: 2/10/2025  Patient name: Richard M Goldberg  : 1950  MRN: [id number]  Referring provider: Clay Glover MD  Dx:   Encounter Diagnosis     ICD-10-CM    1. Chronic bilateral low back pain without sciatica  M54.50     G89.29                      Subjective: Rich reported general stiffness,  continues to feel better after mobilizations and has been able to be more active in gym as a result       Objective: See treatment diary below      Assessment: Tolerated treatment well. 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      Precautions: R JABIER  anterior approach         Manuals 2/10            UPASs AF                                                   Neuro Re-Ed                                                                                                        Ther Ex             Ball adduction 30            TB abduction 30            Bike 10 min            SLR  Flex and abduction  30                                                                Ther Activity                                       Gait Training                                       Modalities

## 2025-02-12 ENCOUNTER — OFFICE VISIT (OUTPATIENT)
Dept: PHYSICAL THERAPY | Facility: MEDICAL CENTER | Age: 75
End: 2025-02-12
Payer: MEDICARE

## 2025-02-12 DIAGNOSIS — M54.50 CHRONIC BILATERAL LOW BACK PAIN WITHOUT SCIATICA: Primary | ICD-10-CM

## 2025-02-12 DIAGNOSIS — G89.29 CHRONIC BILATERAL LOW BACK PAIN WITHOUT SCIATICA: Primary | ICD-10-CM

## 2025-02-12 PROCEDURE — 97110 THERAPEUTIC EXERCISES: CPT | Performed by: PHYSICAL THERAPIST

## 2025-02-12 NOTE — PROGRESS NOTES
Daily Note     Today's date: 2025  Patient name: Richard M Goldberg  : 1950  MRN: [id number]  Referring provider: Clay Glover MD  Dx:   Encounter Diagnosis     ICD-10-CM    1. Chronic bilateral low back pain without sciatica  M54.50     G89.29                      Subjective: Rich reported general stiffness,  continues to feel better after mobilizations and has been able to be more active in gym as a result       Objective: See treatment diary below      Assessment: Tolerated treatment well. 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      Precautions: R JABIER  anterior approach         Manuals             UPASs AF                                                   Neuro Re-Ed                                                                                                        Ther Ex             Ball adduction 30            TB abduction 30            Bike 10 min            SLR  Flex and abduction  30                                                                Ther Activity                                       Gait Training                                       Modalities

## 2025-02-17 ENCOUNTER — OFFICE VISIT (OUTPATIENT)
Dept: PHYSICAL THERAPY | Facility: MEDICAL CENTER | Age: 75
End: 2025-02-17
Payer: MEDICARE

## 2025-02-17 DIAGNOSIS — M54.50 CHRONIC BILATERAL LOW BACK PAIN WITHOUT SCIATICA: Primary | ICD-10-CM

## 2025-02-17 DIAGNOSIS — G89.29 CHRONIC BILATERAL LOW BACK PAIN WITHOUT SCIATICA: Primary | ICD-10-CM

## 2025-02-17 PROCEDURE — 97110 THERAPEUTIC EXERCISES: CPT | Performed by: PHYSICAL THERAPIST

## 2025-02-17 NOTE — PROGRESS NOTES
Daily Note     Today's date: 2025  Patient name: Richard M Goldberg  : 1950  MRN: [id number]  Referring provider: Clay Glover MD  Dx:   Encounter Diagnosis     ICD-10-CM    1. Chronic bilateral low back pain without sciatica  M54.50     G89.29                      Subjective: Rich reported general stiffness,  continues to feel better after mobilizations and has been able to be more active in gym as a result       Objective: See treatment diary below      Assessment: Tolerated treatment well. 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      Precautions: R JABIER  anterior approach         Manuals             UPASs AF                                                   Neuro Re-Ed                                                                                                        Ther Ex             Ball adduction 30            TB abduction 30            Bike 10 min            SLR  Flex and abduction  30                                                                Ther Activity                                       Gait Training                                       Modalities

## 2025-03-03 ENCOUNTER — OFFICE VISIT (OUTPATIENT)
Dept: PHYSICAL THERAPY | Facility: MEDICAL CENTER | Age: 75
End: 2025-03-03
Payer: MEDICARE

## 2025-03-03 DIAGNOSIS — G89.29 CHRONIC BILATERAL LOW BACK PAIN WITHOUT SCIATICA: Primary | ICD-10-CM

## 2025-03-03 DIAGNOSIS — M54.50 CHRONIC BILATERAL LOW BACK PAIN WITHOUT SCIATICA: Primary | ICD-10-CM

## 2025-03-03 PROCEDURE — 97110 THERAPEUTIC EXERCISES: CPT | Performed by: PHYSICAL THERAPIST

## 2025-03-05 ENCOUNTER — OFFICE VISIT (OUTPATIENT)
Dept: PHYSICAL THERAPY | Facility: MEDICAL CENTER | Age: 75
End: 2025-03-05
Payer: MEDICARE

## 2025-03-05 DIAGNOSIS — G89.29 CHRONIC BILATERAL LOW BACK PAIN WITHOUT SCIATICA: Primary | ICD-10-CM

## 2025-03-05 DIAGNOSIS — M54.50 CHRONIC BILATERAL LOW BACK PAIN WITHOUT SCIATICA: Primary | ICD-10-CM

## 2025-03-05 PROCEDURE — 97110 THERAPEUTIC EXERCISES: CPT | Performed by: PHYSICAL THERAPIST

## 2025-03-05 NOTE — PROGRESS NOTES
Daily Note     Today's date: 3/5/2025  Patient name: Richard M Goldberg  : 1950  MRN: [id number]  Referring provider: Clay Glover MD  Dx:   Encounter Diagnosis     ICD-10-CM    1. Chronic bilateral low back pain without sciatica  M54.50     G89.29                      Subjective: Rich reported general stiffness,  continues to feel better after mobilizations and has been able to be more active in gym as a result       Objective: See treatment diary below      Assessment: Tolerated treatment well. 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      Precautions: R JABIER  anterior approach         Manuals 3/3            UPASs AF                                                   Neuro Re-Ed                                                                                                        Ther Ex             Ball adduction 30            TB abduction 30            Bike 10 min            SLR  Flex and abduction  30                                                                Ther Activity                                       Gait Training                                       Modalities

## 2025-03-07 ENCOUNTER — OFFICE VISIT (OUTPATIENT)
Dept: PHYSICAL THERAPY | Facility: MEDICAL CENTER | Age: 75
End: 2025-03-07
Payer: MEDICARE

## 2025-03-07 DIAGNOSIS — M54.50 CHRONIC BILATERAL LOW BACK PAIN WITHOUT SCIATICA: Primary | ICD-10-CM

## 2025-03-07 DIAGNOSIS — G89.29 CHRONIC BILATERAL LOW BACK PAIN WITHOUT SCIATICA: Primary | ICD-10-CM

## 2025-03-07 PROCEDURE — 97110 THERAPEUTIC EXERCISES: CPT | Performed by: PHYSICAL THERAPIST

## 2025-03-07 NOTE — PROGRESS NOTES
Daily Note     Today's date: 3/7/2025  Patient name: Richard M Goldberg  : 1950  MRN: [id number]  Referring provider: Clay Glover MD  Dx:   Encounter Diagnosis     ICD-10-CM    1. Chronic bilateral low back pain without sciatica  M54.50     G89.29                      Subjective: Rich reported general stiffness,  continues to feel better after mobilizations and has been able to be more active in gym as a result       Objective: See treatment diary below      Assessment: Tolerated treatment well. 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      Precautions: R JABIER / anterior approach         Manuals 3/5            UPASs AF                                                   Neuro Re-Ed                                                                                                        Ther Ex             Ball adduction 30            TB abduction 30            Bike 10 min            SLR  Flex and abduction  30                                                                Ther Activity                                       Gait Training                                       Modalities

## 2025-03-07 NOTE — PROGRESS NOTES
Daily Note     Today's date: 3/7/2025  Patient name: Richard M Goldberg  : 1950  MRN: [id number]  Referring provider: Clay Glover MD  Dx:   Encounter Diagnosis     ICD-10-CM    1. Chronic bilateral low back pain without sciatica  M54.50     G89.29                      Subjective: Rich reported general stiffness,  continues to feel better after mobilizations and has been able to be more active in gym as a result       Objective: See treatment diary below      Assessment: Tolerated treatment well. 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      Precautions: R JABIER  anterior approach         Manuals 3/7            UPASs AF                                                   Neuro Re-Ed                                                                                                        Ther Ex             Ball adduction 30            TB abduction 30            Bike 10 min            SLR  Flex and abduction  30                                                                Ther Activity                                       Gait Training                                       Modalities

## 2025-03-17 ENCOUNTER — APPOINTMENT (OUTPATIENT)
Dept: PHYSICAL THERAPY | Facility: MEDICAL CENTER | Age: 75
End: 2025-03-17
Payer: MEDICARE

## 2025-03-19 ENCOUNTER — OFFICE VISIT (OUTPATIENT)
Dept: PHYSICAL THERAPY | Facility: MEDICAL CENTER | Age: 75
End: 2025-03-19
Payer: MEDICARE

## 2025-03-19 DIAGNOSIS — G89.29 CHRONIC BILATERAL LOW BACK PAIN WITHOUT SCIATICA: Primary | ICD-10-CM

## 2025-03-19 DIAGNOSIS — M54.50 CHRONIC BILATERAL LOW BACK PAIN WITHOUT SCIATICA: Primary | ICD-10-CM

## 2025-03-19 PROCEDURE — 97110 THERAPEUTIC EXERCISES: CPT | Performed by: PHYSICAL THERAPIST

## 2025-03-21 ENCOUNTER — OFFICE VISIT (OUTPATIENT)
Dept: PHYSICAL THERAPY | Facility: MEDICAL CENTER | Age: 75
End: 2025-03-21
Payer: MEDICARE

## 2025-03-21 DIAGNOSIS — G89.29 CHRONIC BILATERAL LOW BACK PAIN WITHOUT SCIATICA: Primary | ICD-10-CM

## 2025-03-21 DIAGNOSIS — M54.50 CHRONIC BILATERAL LOW BACK PAIN WITHOUT SCIATICA: Primary | ICD-10-CM

## 2025-03-21 PROCEDURE — 97110 THERAPEUTIC EXERCISES: CPT | Performed by: PHYSICAL THERAPIST

## 2025-03-21 NOTE — PROGRESS NOTES
Daily Note     Today's date: 3/21/2025  Patient name: Richard M Goldberg  : 1950  MRN: [id number]  Referring provider: Clay Glover MD  Dx:   Encounter Diagnosis     ICD-10-CM    1. Chronic bilateral low back pain without sciatica  M54.50     G89.29                      Subjective: Rich reported general stiffness,  continues to feel better after mobilizations and has been able to be more active in gym as a result       Objective: See treatment diary below      Assessment: Tolerated treatment well. 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      Precautions: R JABIER  anterior approach         Manuals 3/19            UPASs AF                                                   Neuro Re-Ed                                                                                                        Ther Ex             Ball adduction 30            TB abduction 30            Bike 10 min            SLR  Flex and abduction  30                                                                Ther Activity                                       Gait Training                                       Modalities

## 2025-03-24 ENCOUNTER — APPOINTMENT (OUTPATIENT)
Dept: PHYSICAL THERAPY | Facility: MEDICAL CENTER | Age: 75
End: 2025-03-24
Payer: MEDICARE

## 2025-03-24 NOTE — PROGRESS NOTES
Daily Note     Today's date: 3/23/2025  Patient name: Richard M Goldberg  : 1950  MRN: [id number]  Referring provider: Clay Glover MD  Dx:   Encounter Diagnosis     ICD-10-CM    1. Chronic bilateral low back pain without sciatica  M54.50     G89.29                      Subjective: Rich reported general stiffness,  continues to feel better after mobilizations and has been able to be more active in gym as a result       Objective: See treatment diary below      Assessment: Tolerated treatment well. 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      Precautions: R JABIER  anterior approach         Manuals 3/21            UPASs AF                                                   Neuro Re-Ed                                                                                                        Ther Ex             Ball adduction 30            TB abduction 30            Bike 10 min            SLR  Flex and abduction  30                                                                Ther Activity                                       Gait Training                                       Modalities

## 2025-03-26 ENCOUNTER — OFFICE VISIT (OUTPATIENT)
Dept: PHYSICAL THERAPY | Facility: MEDICAL CENTER | Age: 75
End: 2025-03-26
Payer: MEDICARE

## 2025-03-26 DIAGNOSIS — M54.50 CHRONIC BILATERAL LOW BACK PAIN WITHOUT SCIATICA: Primary | ICD-10-CM

## 2025-03-26 DIAGNOSIS — G89.29 CHRONIC BILATERAL LOW BACK PAIN WITHOUT SCIATICA: Primary | ICD-10-CM

## 2025-03-26 PROCEDURE — 97110 THERAPEUTIC EXERCISES: CPT | Performed by: PHYSICAL THERAPIST

## 2025-03-28 NOTE — PROGRESS NOTES
Daily Note     Today's date: 3/28/2025  Patient name: Richard M Goldberg  : 1950  MRN: [id number]  Referring provider: Clay Glover MD  Dx:   Encounter Diagnosis     ICD-10-CM    1. Chronic bilateral low back pain without sciatica  M54.50     G89.29                      Subjective: Rich reported general stiffness,  continues to feel better after mobilizations and has been able to be more active in gym as a result       Objective: See treatment diary below      Assessment: Tolerated treatment well. 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      Precautions: R JABIER  anterior approach         Manuals 3/28            UPASs AF                                                   Neuro Re-Ed                                                                                                        Ther Ex             Ball adduction 30            TB abduction 30            Bike 10 min            SLR  Flex and abduction  30                                                                Ther Activity                                       Gait Training                                       Modalities

## 2025-03-31 ENCOUNTER — OFFICE VISIT (OUTPATIENT)
Dept: PHYSICAL THERAPY | Facility: MEDICAL CENTER | Age: 75
End: 2025-03-31
Payer: MEDICARE

## 2025-03-31 DIAGNOSIS — M54.50 CHRONIC BILATERAL LOW BACK PAIN WITHOUT SCIATICA: Primary | ICD-10-CM

## 2025-03-31 DIAGNOSIS — G89.29 CHRONIC BILATERAL LOW BACK PAIN WITHOUT SCIATICA: Primary | ICD-10-CM

## 2025-03-31 PROCEDURE — 97140 MANUAL THERAPY 1/> REGIONS: CPT | Performed by: PHYSICAL THERAPIST

## 2025-04-01 NOTE — PROGRESS NOTES
Daily Note     Today's date: 2025  Patient name: Richard M Goldberg  : 1950  MRN: [id number]  Referring provider: Clay Glover MD  Dx:   Encounter Diagnosis     ICD-10-CM    1. Chronic bilateral low back pain without sciatica  M54.50     G89.29                      Subjective: Rich reported general stiffness,  continues to feel better after mobilizations and has been able to be more active in gym as a result       Objective: See treatment diary below      Assessment: Tolerated treatment well. 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      Precautions: R JABIER  anterior approach         Manuals 3/31            UPASs AF                                                   Neuro Re-Ed                                                                                                        Ther Ex             Ball adduction 30            TB abduction 30            Bike 10 min            SLR  Flex and abduction  30                                                                Ther Activity                                       Gait Training                                       Modalities

## 2025-04-02 ENCOUNTER — OFFICE VISIT (OUTPATIENT)
Dept: PHYSICAL THERAPY | Facility: MEDICAL CENTER | Age: 75
End: 2025-04-02
Payer: MEDICARE

## 2025-04-02 DIAGNOSIS — G89.29 CHRONIC BILATERAL LOW BACK PAIN WITHOUT SCIATICA: Primary | ICD-10-CM

## 2025-04-02 DIAGNOSIS — M54.50 CHRONIC BILATERAL LOW BACK PAIN WITHOUT SCIATICA: Primary | ICD-10-CM

## 2025-04-02 PROCEDURE — 97140 MANUAL THERAPY 1/> REGIONS: CPT | Performed by: PHYSICAL THERAPIST

## 2025-04-02 NOTE — LETTER
2025    Roland Valle DO  [address]  [address]  [facility] [address]    Patient: Richard M Goldberg   YOB: 1950   Date of Visit: 2025     Encounter Diagnosis     ICD-10-CM    1. Chronic bilateral low back pain without sciatica  M54.50     G89.29           Dear Dr. Roland Valle DO:    Thank you for your recent referral of Richard M Goldberg. Please review the attached evaluation summary from Sp's recent visit.     Please verify that you agree with the plan of care by signing the attached order.     If you have any questions or concerns, please do not hesitate to call.     I sincerely appreciate the opportunity to share in the care of one of your patients and hope to have another opportunity to work with you in the near future.       Sincerely,    Leon Pollack, PT      Referring Provider:      I certify that I have read the below Plan of Care and certify the need for these services furnished under this plan of treatment while under my care.                    Roland Valle DO  [address]  [address]  [facility] [address]  Via Fax: [phone]          PT Evaluation     Today's date: 2025  Patient name: Richard M Goldberg  : 1950  MRN: [id number]  Referring provider: Clay Glover MD  Dx:   Encounter Diagnosis     ICD-10-CM    1. Chronic bilateral low back pain without sciatica  M54.50     G89.29                      Assessment  Impairments: abnormal muscle firing, abnormal muscle tone, abnormal or restricted ROM, impaired physical strength, lacks appropriate home exercise program and pain with function    Assessment details: Richard M Goldberg is a 74 y.o. male presenting to therapy with chronic low back pain.   Sp has exhausted many options through pain management and is attempting to avoid surgery at all costs while continuing to be active in gym and focus on his fitness and weight loss.   Due to the relief he has gotten from outpatient  therapy intervention, it is appropriate to continue therapy on a maintenance program to prevent functional decline and promote activity as another non-surgical means of pain relief.    Understanding of Dx/Px/POC: good     Prognosis: good    Goals  Patient will successfully transition to home exercise program.  Patient will be able to manage symptoms independently.    Sp will be abl to maintain his activity level and continue with workout routine in gym between pain management in interventions     Plan  Patient would benefit from: skilled PT  Referral necessary: No  Planned modality interventions: thermotherapy: hydrocollator packs    Planned therapy interventions: home exercise program, manual therapy, neuromuscular re-education, patient education, functional ROM exercises, strengthening, stretching, joint mobilization, graded activity, graded exercise, therapeutic exercise, body mechanics training, motor coordination training and activity modification    Frequency: 3x week  Duration in weeks: 12  Treatment plan discussed with: patient        Subjective Evaluation    History of Present Illness  Mechanism of injury: Richard M Goldberg is a 74 y.o. male presenting to therapy with complaints of chronic low back pain.  He has gone through extensive pain management with multiple injections however has very short lasting effects and continues to suffer from chronic low back pain and stiffness.   He has used therapy in the past as a method of pain relief that allows him to be more active throughout day and in gym   Patient Goals  Patient goals for therapy: increased motion, independence with ADLs/IADLs, increased strength, return to sport/leisure activities and decreased pain    Pain  Current pain ratin  At best pain rating: 3  At worst pain ratin  Quality: discomfort, pressure and tight          Objective  .Red Flag Screening:  History Cancer (-)  Bowl/Bladder dysfunction (-)  Night pain (-)  Unexplained  weight loss (-)     Dermatomes:  WNL   Myotomes:  WNL    Reflexes:   Patellar R 2+, L 2+  Achilles R 2+, L 2+      Lumbar:  AROM:   Flexion WFL  Extension Severe restriction  Side bending Severe restriction  Rotation Severe restriction          PAIVM: Hypomobility throughout lumbar spine, particularly lower lumbar    Myofascial tenderness and spasm paraspinal musculature        Special Tests:   Crossed SLR (-), SLR (-) Prone instability (-)   Neural Dynamic Testing: Tibial Bias (-), Peroneal Bias (-)   Slump Testing (-) with and without axial compression       Hip   WFL, bilateral JABIER present   Special Tests: OMARI (-), FADIR (-)                         Precautions: None      Manuals                                                                 Neuro Re-Ed                                                                                                        Ther Ex                                                                                                                     Ther Activity                                       Gait Training                                       Modalities

## 2025-04-04 ENCOUNTER — OFFICE VISIT (OUTPATIENT)
Dept: PHYSICAL THERAPY | Facility: MEDICAL CENTER | Age: 75
End: 2025-04-04
Payer: MEDICARE

## 2025-04-04 DIAGNOSIS — G89.29 CHRONIC BILATERAL LOW BACK PAIN WITHOUT SCIATICA: Primary | ICD-10-CM

## 2025-04-04 DIAGNOSIS — M54.50 CHRONIC BILATERAL LOW BACK PAIN WITHOUT SCIATICA: Primary | ICD-10-CM

## 2025-04-04 PROCEDURE — 97140 MANUAL THERAPY 1/> REGIONS: CPT | Performed by: PHYSICAL THERAPIST

## 2025-04-04 NOTE — PROGRESS NOTES
PT Evaluation     Today's date: 2025  Patient name: Richard M Goldberg  : 1950  MRN: [id number]  Referring provider: Clay Glover MD  Dx:   Encounter Diagnosis     ICD-10-CM    1. Chronic bilateral low back pain without sciatica  M54.50     G89.29                      Assessment  Impairments: abnormal muscle firing, abnormal muscle tone, abnormal or restricted ROM, impaired physical strength, lacks appropriate home exercise program and pain with function    Assessment details: Richard M Goldberg is a 74 y.o. male presenting to therapy with chronic low back pain.   Sp has exhausted many options through pain management and is attempting to avoid surgery at all costs while continuing to be active in gym and focus on his fitness and weight loss.   Due to the relief he has gotten from outpatient therapy intervention, it is appropriate to continue therapy on a maintenance program to prevent functional decline and promote activity as another non-surgical means of pain relief.    Understanding of Dx/Px/POC: good     Prognosis: good    Goals  Patient will successfully transition to home exercise program.  Patient will be able to manage symptoms independently.    Sp will be abl to maintain his activity level and continue with workout routine in gym between pain management in interventions     Plan  Patient would benefit from: skilled PT  Referral necessary: No  Planned modality interventions: thermotherapy: hydrocollator packs    Planned therapy interventions: home exercise program, manual therapy, neuromuscular re-education, patient education, functional ROM exercises, strengthening, stretching, joint mobilization, graded activity, graded exercise, therapeutic exercise, body mechanics training, motor coordination training and activity modification    Frequency: 3x week  Duration in weeks: 12  Treatment plan discussed with: patient        Subjective Evaluation    History of Present Illness  Mechanism  of injury: Richard M Goldberg is a 74 y.o. male presenting to therapy with complaints of chronic low back pain.  He has gone through extensive pain management with multiple injections however has very short lasting effects and continues to suffer from chronic low back pain and stiffness.   He has used therapy in the past as a method of pain relief that allows him to be more active throughout day and in gym   Patient Goals  Patient goals for therapy: increased motion, independence with ADLs/IADLs, increased strength, return to sport/leisure activities and decreased pain    Pain  Current pain ratin  At best pain rating: 3  At worst pain ratin  Quality: discomfort, pressure and tight          Objective  .Red Flag Screening:  History Cancer (-)  Bowl/Bladder dysfunction (-)  Night pain (-)  Unexplained weight loss (-)     Dermatomes:  WNL   Myotomes:  WNL    Reflexes:   Patellar R 2+, L 2+  Achilles R 2+, L 2+      Lumbar:  AROM:   Flexion WFL  Extension Severe restriction  Side bending Severe restriction  Rotation Severe restriction          PAIVM: Hypomobility throughout lumbar spine, particularly lower lumbar    Myofascial tenderness and spasm paraspinal musculature        Special Tests:   Crossed SLR (-), SLR (-) Prone instability (-)   Neural Dynamic Testing: Tibial Bias (-), Peroneal Bias (-)   Slump Testing (-) with and without axial compression       Hip   WFL, bilateral JABIER present   Special Tests: OMARI (-), FADIR (-)                         Precautions: None      Manuals                                                                 Neuro Re-Ed                                                                                                        Ther Ex                                                                                                                     Ther Activity                                       Gait Training                                       Modalities

## 2025-04-07 ENCOUNTER — OFFICE VISIT (OUTPATIENT)
Dept: PHYSICAL THERAPY | Facility: MEDICAL CENTER | Age: 75
End: 2025-04-07
Payer: MEDICARE

## 2025-04-07 ENCOUNTER — OFFICE VISIT (OUTPATIENT)
Dept: OBGYN CLINIC | Facility: MEDICAL CENTER | Age: 75
End: 2025-04-07
Payer: MEDICARE

## 2025-04-07 ENCOUNTER — APPOINTMENT (OUTPATIENT)
Dept: RADIOLOGY | Facility: MEDICAL CENTER | Age: 75
End: 2025-04-07
Payer: MEDICARE

## 2025-04-07 VITALS — HEIGHT: 69 IN | BODY MASS INDEX: 30.72 KG/M2

## 2025-04-07 DIAGNOSIS — M25.512 LEFT SHOULDER PAIN, UNSPECIFIED CHRONICITY: ICD-10-CM

## 2025-04-07 DIAGNOSIS — G89.29 CHRONIC BILATERAL LOW BACK PAIN WITHOUT SCIATICA: Primary | ICD-10-CM

## 2025-04-07 DIAGNOSIS — M54.50 CHRONIC BILATERAL LOW BACK PAIN WITHOUT SCIATICA: Primary | ICD-10-CM

## 2025-04-07 DIAGNOSIS — M19.019 OSTEOARTHRITIS OF AC (ACROMIOCLAVICULAR) JOINT: Primary | ICD-10-CM

## 2025-04-07 PROCEDURE — 99213 OFFICE O/P EST LOW 20 MIN: CPT | Performed by: ORTHOPAEDIC SURGERY

## 2025-04-07 PROCEDURE — 97140 MANUAL THERAPY 1/> REGIONS: CPT | Performed by: PHYSICAL THERAPIST

## 2025-04-07 PROCEDURE — 73030 X-RAY EXAM OF SHOULDER: CPT

## 2025-04-07 PROCEDURE — 99203 OFFICE O/P NEW LOW 30 MIN: CPT | Performed by: ORTHOPAEDIC SURGERY

## 2025-04-07 RX ORDER — OXYCODONE HCL 10 MG/1
TABLET, FILM COATED, EXTENDED RELEASE ORAL
COMMUNITY

## 2025-04-07 RX ORDER — CLOTRIMAZOLE AND BETAMETHASONE DIPROPIONATE 10; .64 MG/G; MG/G
CREAM TOPICAL
COMMUNITY

## 2025-04-07 RX ORDER — FEXOFENADINE HCL AND PSEUDOEPHEDRINE HCL 180; 240 MG/1; MG/1
TABLET, EXTENDED RELEASE ORAL
COMMUNITY

## 2025-04-07 RX ORDER — HYDROCHLOROTHIAZIDE 12.5 MG/1
1 TABLET ORAL 2 TIMES DAILY
COMMUNITY

## 2025-04-07 RX ORDER — ATORVASTATIN CALCIUM 10 MG/1
80 TABLET, FILM COATED ORAL
COMMUNITY

## 2025-04-07 RX ORDER — BUPRENORPHINE 5 UG/H
1 PATCH TRANSDERMAL WEEKLY
COMMUNITY

## 2025-04-07 RX ORDER — ZOLPIDEM TARTRATE 5 MG/1
TABLET ORAL
COMMUNITY
Start: 2025-03-12

## 2025-04-07 RX ORDER — PANTOPRAZOLE SODIUM 40 MG/1
1 TABLET, DELAYED RELEASE ORAL DAILY
COMMUNITY
Start: 2025-02-10

## 2025-04-07 RX ORDER — CYCLOBENZAPRINE HCL 10 MG
TABLET ORAL
COMMUNITY

## 2025-04-07 RX ORDER — TIRZEPATIDE 15 MG/.5ML
INJECTION, SOLUTION SUBCUTANEOUS
COMMUNITY
Start: 2025-04-02

## 2025-04-07 RX ORDER — HYDROCODONE BITARTRATE AND HOMATROPINE METHYLBROMIDE ORAL SOLUTION 5; 1.5 MG/5ML; MG/5ML
LIQUID ORAL
COMMUNITY

## 2025-04-07 RX ORDER — TADALAFIL 10 MG/1
10 TABLET ORAL
COMMUNITY

## 2025-04-07 NOTE — PROGRESS NOTES
"Orthopaedic Surgery - Office Note  Richard M Goldberg (74 y.o. male)   : 1950   MRN: [id number]  Encounter Date: 2025    Assessment / Plan  Left shoulder cyst over AC joint    He can continue with activity as tolerated. If at some point this does bother him, we can consider an aspiration and/or CSI  A distal clavicle resection can be consider if it become painful  Encouraged him to keep up with scapular strengthening   Ice, heat and anti-inflammatories prn   Ordered and reviewed XR during the visit   Follow-up:  Return if symptoms worsen or fail to improve.      Chief Complaint / Date of Onset  Left shoulder bump over this AC joint, noticed in early   Injury Mechanism / Date  None  Surgery / Date  None    History of Present Illness   Richard M Goldberg is a 74 y.o. male who presents for evaluation of left shoulder bump. He states he first noticed this bump a few months ago. He states this is not painful but did grow in size. He does not have any pain associated with it or his shoulder. He does have OA in his shoulder. He has not lost any ROM. He denies any radiating symptoms.     Treatment Summary  Medications / Modalities  None  Bracing / Immobilization  None  Physical Therapy  None recently   Injections  None  Prior Surgeries  2 rotator cuff repairs on his Left shoulder, done in the   Other Treatments  None    Employment / Current Status  N/A    Sport / Organization / Current Status  Active, weight lifting       Review of Systems  Pertinent items are noted in HPI.  All other systems were reviewed and are negative.      Physical Exam  Ht 5' 9\" (1.753 m)   BMI 30.72 kg/m²   Cons: Appears well.  No apparent distress.  Psych: Alert. Oriented x3.  Mood and affect normal.  Eyes: PERRLA, EOMI  Resp: Normal effort.  No audible wheezing or stridor.  CV: Palpable pulse.  No discernable arrhythmia.  No LE edema.  Lymph:  No palpable cervical, axillary, or inguinal lymphadenopathy.  Skin: Warm.  No " palpable masses.  No visible lesions.  Neuro: Normal muscle tone.  Normal and symmetric DTR's.     Left Shoulder Exam  Alignment / Posture:  Normal shoulder posture.  Inspection:  No swelling. No ecchymosis. Visible bump over the AC joint   Palpation:   moderate AC joint tenderness. No effusion.  ROM:  Normal shoulder ROM.  Strength:  Supraspinatus 5/5. Infraspinatus 5/5. Subscapularis 5/5.  Stability:  No objective shoulder instability.  Tests: No pertinent positive or negative tests.  Neurovascular:  Sensation intact in Ax/R/M/U nerve distributions. 2+ radial pulse.       Studies Reviewed  I have personally reviewed pertinent films in PACS.  XR of left shoulder - images from 04/07/2025 showing moderate arthritic changes and hardware from previous cuff repair seen, moderate AC joint OA      Procedures  No procedures today.    Medical, Surgical, Family, and Social History  The patient's medical history, family history, and social history, were reviewed and updated as appropriate.    Past Medical History:   Diagnosis Date    CAD (coronary artery disease)     s/p stent x1 on 6/19/19    Closed fracture of distal end of fibula with routine healing     unspecified fracture morphology, unspecified laterality, subsequent encounter; Last Assessed:12/27/16    GERD (gastroesophageal reflux disease)     Heart attack (HCC)     Hypertension     Kidney stones     Nocturia     Psoriasis     Sleep apnea        Past Surgical History:   Procedure Laterality Date    APPENDECTOMY      CARPAL TUNNEL RELEASE      ROTATOR CUFF REPAIR      SHOULDER ARTHROSCOPY Left     Last ASsessed:10/24/16    TOTAL HIP ARTHROPLASTY      TRIGGER FINGER RELEASE         Family History   Problem Relation Age of Onset    Arthritis Mother     Hypertension Mother     Irritable bowel syndrome Mother     Osteoporosis Mother     Arthritis Other        Social History     Occupational History    Not on file   Tobacco Use    Smoking status: Never    Smokeless  tobacco: Never   Substance and Sexual Activity    Alcohol use: Yes     Comment: 1 drink daily    Drug use: No    Sexual activity: Not on file       Allergies   Allergen Reactions    Latex Hives    Other     Shellfish-Derived Products - Food Allergy      [place] [facility]         Current Outpatient Medications:     amoxicillin (AMOXIL) 875 mg tablet, Take 875 mg by mouth in the morning, Disp: , Rfl:     atorvastatin (LIPITOR) 10 mg tablet, Take 80 mg by mouth daily with dinner, Disp: , Rfl:     clotrimazole-betamethasone (LOTRISONE) 1-0.05 % cream, , Disp: , Rfl:     Coenzyme Q10 (Co Q 10) 100 MG CAPS, , Disp: , Rfl:     cyclobenzaprine (FLEXERIL) 10 mg tablet, , Disp: , Rfl:     fexofenadine-pseudoephedrine (ALLEGRA-D 24) 180-240 MG per 24 hr tablet, , Disp: , Rfl:     fluticasone (FLONASE) 50 mcg/act nasal spray, 2 sprays into each nostril 2 (two) times a day as needed for rhinitis, Disp: , Rfl:     hydroCHLOROthiazide 12.5 mg tablet, Take 1 capsule by mouth 2 (two) times a day, Disp: , Rfl:     HYDROcodone Bit-Homatrop MBr (Hydromet) 5-1.5 mg/5 mL syrup, , Disp: , Rfl:     mebendazole (VERMOX) 100 MG chewable tablet, , Disp: , Rfl:     metFORMIN (GLUCOPHAGE-XR) 750 mg 24 hr tablet, Take 750 mg by mouth daily with breakfast, Disp: , Rfl:     methocarbamol (ROBAXIN) 750 mg tablet, methocarbamol 750 mg tablet  take 1 tablet by mouth three times a day if needed for muscle spasm, Disp: , Rfl:     METHOCARBAMOL PO, Take 1 tablet by mouth 3 (three) times a day as needed, Disp: , Rfl:     Mirabegron ER 25 MG TB24, Take 25 mg by mouth in the morning, Disp: , Rfl:     Mounjaro 15 MG/0.5ML SOAJ, INJECT 15MG SUBCUTANEOUSLY (UNDER THE SKIN) ONCE WEEKLY, Disp: , Rfl:     NABUMETONE PO, , Disp: , Rfl:     omeprazole (PriLOSEC) 20 mg delayed release capsule, Take 1 capsule (20 mg total) by mouth daily, Disp: 90 capsule, Rfl: 0    oxyCODONE (OxyCONTIN) 10 mg 12 hr tablet, , Disp: , Rfl:     oxyCODONE-acetaminophen (PERCOCET)  5-325 mg per tablet, take 1 tablet by mouth every 8 hours if needed for MODERATE PAIN (PAIN SCORE 4-6), Disp: , Rfl:     pantoprazole (PROTONIX) 40 mg tablet, Take 1 tablet by mouth in the morning, Disp: , Rfl:     rosuvastatin (CRESTOR) 20 MG tablet, Take 1 tablet (20 mg total) by mouth daily, Disp: 90 tablet, Rfl: 3    tadalafil (Cialis) 10 MG tablet, 10 mg, Disp: , Rfl:     tamsulosin (FLOMAX) 0.4 mg, Take 1 capsule (0.4 mg total) by mouth 2 (two) times a day, Disp: 60 capsule, Rfl: 3    transdermal buprenorphine (BUTRANS) 5 mcg/hr TD patch, Place 1 patch on the skin once a week, Disp: , Rfl:     zolpidem (AMBIEN) 5 mg tablet, take 1 tablet by mouth nightly as needed for sleep, Disp: , Rfl:     aspirin (ECOTRIN LOW STRENGTH) 81 mg EC tablet, Take 1 tablet (81 mg total) by mouth daily (Patient not taking: Reported on 7/10/2024), Disp: 30 tablet, Rfl: 0    Mounjaro 5 MG/0.5ML, Inject 5 mg under the skin every 7 days, Disp: , Rfl:     Omega-3 Fatty Acids (fish oil) 1,000 mg, Take 1,000 mg by mouth daily (Patient not taking: Reported on 7/24/2024), Disp: , Rfl:       Michelle Salcido    Scribe Attestation      I,:  Michelle Salcido am acting as a scribe while in the presence of the attending physician.:       I,:  Nahum Heart MD personally performed the services described in this documentation    as scribed in my presence.:

## 2025-04-07 NOTE — PROGRESS NOTES
Daily Note     Today's date: 2025  Patient name: Richard M Goldberg  : 1950  MRN: [id number]  Referring provider: Clay Glover MD  Dx:   Encounter Diagnosis     ICD-10-CM    1. Chronic bilateral low back pain without sciatica  M54.50     G89.29                      Subjective: Sp reports general stiffness and aching in low back      Objective: See treatment diary below      Assessment: Tolerated treatment well. Patient  with notable  improvement in motion and pain post mobs, continue on maintenance program       Plan: Continue per plan of care.      Precautions: None      Manuals /            Lumbar mob's AF                                                    Neuro Re-Ed                                                                                                        Ther Ex                                                                                                                     Ther Activity                                       Gait Training                                       Modalities

## 2025-04-09 ENCOUNTER — APPOINTMENT (OUTPATIENT)
Dept: PHYSICAL THERAPY | Facility: MEDICAL CENTER | Age: 75
End: 2025-04-09
Payer: MEDICARE

## 2025-04-09 NOTE — PROGRESS NOTES
Daily Note     Today's date: 2025  Patient name: Richard M Goldberg  : 1950  MRN: [id number]  Referring provider: Clay Glover MD  Dx:   Encounter Diagnosis     ICD-10-CM    1. Chronic bilateral low back pain without sciatica  M54.50     G89.29                      Subjective: Sp reports general stiffness and aching in low back      Objective: See treatment diary below      Assessment: Tolerated treatment well. Patient  with notable  improvement in motion and pain post mobs, continue on maintenance program       Plan: Continue per plan of care.      Precautions: None      Manuals             Lumbar mob's AF                                                    Neuro Re-Ed                                                                                                        Ther Ex                                                                                                                     Ther Activity                                       Gait Training                                       Modalities

## 2025-04-11 ENCOUNTER — OFFICE VISIT (OUTPATIENT)
Dept: PHYSICAL THERAPY | Facility: MEDICAL CENTER | Age: 75
End: 2025-04-11
Payer: MEDICARE

## 2025-04-11 DIAGNOSIS — G89.29 CHRONIC BILATERAL LOW BACK PAIN WITHOUT SCIATICA: Primary | ICD-10-CM

## 2025-04-11 DIAGNOSIS — M54.50 CHRONIC BILATERAL LOW BACK PAIN WITHOUT SCIATICA: Primary | ICD-10-CM

## 2025-04-11 PROCEDURE — 97140 MANUAL THERAPY 1/> REGIONS: CPT | Performed by: PHYSICAL THERAPIST

## 2025-04-13 NOTE — PROGRESS NOTES
Daily Note     Today's date: 2025  Patient name: Richard M Goldberg  : 1950  MRN: [id number]  Referring provider: Clay Glover MD  Dx:   Encounter Diagnosis     ICD-10-CM    1. Chronic bilateral low back pain without sciatica  M54.50     G89.29                      Subjective: Sp reports general stiffness and aching in low back      Objective: See treatment diary below      Assessment: Tolerated treatment well. Patient  with notable  improvement in motion and pain post mobs, continue on maintenance program       Plan: Continue per plan of care.      Precautions: None      Manuals             Lumbar mob's AF                                                    Neuro Re-Ed                                                                                                        Ther Ex                                                                                                                     Ther Activity                                       Gait Training                                       Modalities

## 2025-05-14 ENCOUNTER — OFFICE VISIT (OUTPATIENT)
Dept: PHYSICAL THERAPY | Facility: MEDICAL CENTER | Age: 75
End: 2025-05-14
Payer: MEDICARE

## 2025-05-14 DIAGNOSIS — M54.50 CHRONIC BILATERAL LOW BACK PAIN WITHOUT SCIATICA: Primary | ICD-10-CM

## 2025-05-14 DIAGNOSIS — G89.29 CHRONIC BILATERAL LOW BACK PAIN WITHOUT SCIATICA: Primary | ICD-10-CM

## 2025-05-14 PROCEDURE — 97140 MANUAL THERAPY 1/> REGIONS: CPT | Performed by: PHYSICAL THERAPIST

## 2025-05-16 ENCOUNTER — OFFICE VISIT (OUTPATIENT)
Dept: PHYSICAL THERAPY | Facility: MEDICAL CENTER | Age: 75
End: 2025-05-16
Payer: MEDICARE

## 2025-05-16 DIAGNOSIS — M54.50 CHRONIC BILATERAL LOW BACK PAIN WITHOUT SCIATICA: Primary | ICD-10-CM

## 2025-05-16 DIAGNOSIS — G89.29 CHRONIC BILATERAL LOW BACK PAIN WITHOUT SCIATICA: Primary | ICD-10-CM

## 2025-05-16 PROCEDURE — 97140 MANUAL THERAPY 1/> REGIONS: CPT | Performed by: PHYSICAL THERAPIST

## 2025-05-16 NOTE — PROGRESS NOTES
Daily Note     Today's date: 2025  Patient name: Richard M Goldberg  : 1950  MRN: [id number]  Referring provider: Clay Glover MD  Dx:   Encounter Diagnosis     ICD-10-CM    1. Chronic bilateral low back pain without sciatica  M54.50     G89.29                      Subjective: Sp reports general stiffness and aching in low back      Objective: See treatment diary below      Assessment: Tolerated treatment well. Patient with notable improvement in motion and pain post mobs, continue on maintenance program       Plan: Continue per plan of care.      Precautions: None      Manuals             Lumbar mob's AF                                                    Neuro Re-Ed                                                                                                        Ther Ex                                                                                                                     Ther Activity                                       Gait Training                                       Modalities

## 2025-05-16 NOTE — PROGRESS NOTES
Daily Note     Today's date: 2025  Patient name: Richard M Goldberg  : 1950  MRN: [id number]  Referring provider: Clay Glover MD  Dx:   Encounter Diagnosis     ICD-10-CM    1. Chronic bilateral low back pain without sciatica  M54.50     G89.29                      Subjective: Sp reports general stiffness and aching in low back      Objective: See treatment diary below      Assessment: Tolerated treatment well. Patient with notable improvement in motion and pain post mobs, continue on maintenance program       Plan: Continue per plan of care.      Precautions: None      Manuals             Lumbar mob's AF                                                    Neuro Re-Ed                                                                                                        Ther Ex                                                                                                                     Ther Activity                                       Gait Training                                       Modalities

## 2025-05-19 ENCOUNTER — OFFICE VISIT (OUTPATIENT)
Dept: PHYSICAL THERAPY | Facility: MEDICAL CENTER | Age: 75
End: 2025-05-19
Payer: MEDICARE

## 2025-05-19 DIAGNOSIS — M54.50 CHRONIC BILATERAL LOW BACK PAIN WITHOUT SCIATICA: Primary | ICD-10-CM

## 2025-05-19 DIAGNOSIS — G89.29 CHRONIC BILATERAL LOW BACK PAIN WITHOUT SCIATICA: Primary | ICD-10-CM

## 2025-05-19 PROCEDURE — 97140 MANUAL THERAPY 1/> REGIONS: CPT | Performed by: PHYSICAL THERAPIST

## 2025-05-19 NOTE — PROGRESS NOTES
Daily Note     Today's date: 2025  Patient name: Richard M Goldberg  : 1950  MRN: [id number]  Referring provider: Clay Glover MD  Dx:   Encounter Diagnosis     ICD-10-CM    1. Chronic bilateral low back pain without sciatica  M54.50     G89.29                      Subjective: Sp reports general stiffness and aching in low back      Objective: See treatment diary below      Assessment: Tolerated treatment well. Patient with notable improvement in motion and pain post mobs, continue on maintenance program       Plan: Continue per plan of care.      Precautions: None      Manuals             Lumbar mob's AF                                                    Neuro Re-Ed                                                                                                        Ther Ex                                                                                                                     Ther Activity                                       Gait Training                                       Modalities

## 2025-05-21 ENCOUNTER — OFFICE VISIT (OUTPATIENT)
Dept: PHYSICAL THERAPY | Facility: MEDICAL CENTER | Age: 75
End: 2025-05-21
Payer: MEDICARE

## 2025-05-21 DIAGNOSIS — M54.50 CHRONIC BILATERAL LOW BACK PAIN WITHOUT SCIATICA: Primary | ICD-10-CM

## 2025-05-21 DIAGNOSIS — G89.29 CHRONIC BILATERAL LOW BACK PAIN WITHOUT SCIATICA: Primary | ICD-10-CM

## 2025-05-21 PROCEDURE — 97140 MANUAL THERAPY 1/> REGIONS: CPT | Performed by: PHYSICAL THERAPIST

## 2025-05-21 NOTE — PROGRESS NOTES
Daily Note     Today's date: 2025  Patient name: Richard M Goldberg  : 1950  MRN: [id number]  Referring provider: Clay Glover MD  Dx:   Encounter Diagnosis     ICD-10-CM    1. Chronic bilateral low back pain without sciatica  M54.50     G89.29                      Subjective: Sp reports general stiffness and aching in low back      Objective: See treatment diary below      Assessment: Tolerated treatment well. Patient with notable improvement in motion and pain post mobs, continue on maintenance program       Plan: Continue per plan of care.      Precautions: None      Manuals             Lumbar mob's AF                                                    Neuro Re-Ed                                                                                                        Ther Ex                                                                                                                     Ther Activity                                       Gait Training                                       Modalities

## 2025-05-23 ENCOUNTER — OFFICE VISIT (OUTPATIENT)
Dept: PHYSICAL THERAPY | Facility: MEDICAL CENTER | Age: 75
End: 2025-05-23
Payer: MEDICARE

## 2025-05-23 DIAGNOSIS — M54.50 CHRONIC BILATERAL LOW BACK PAIN WITHOUT SCIATICA: Primary | ICD-10-CM

## 2025-05-23 DIAGNOSIS — G89.29 CHRONIC BILATERAL LOW BACK PAIN WITHOUT SCIATICA: Primary | ICD-10-CM

## 2025-05-23 PROCEDURE — 97140 MANUAL THERAPY 1/> REGIONS: CPT | Performed by: PHYSICAL THERAPIST

## 2025-05-25 NOTE — PROGRESS NOTES
Daily Note     Today's date: 2025  Patient name: Richard M Goldberg  : 1950  MRN: [id number]  Referring provider: Clay Glover MD  Dx:   Encounter Diagnosis     ICD-10-CM    1. Chronic bilateral low back pain without sciatica  M54.50     G89.29                      Subjective: Sp reports general stiffness and aching in low back      Objective: See treatment diary below      Assessment: Tolerated treatment well. Patient with notable improvement in motion and pain post mobs, continue on maintenance program       Plan: Continue per plan of care.      Precautions: None      Manuals             Lumbar mob's AF                                                    Neuro Re-Ed                                                                                                        Ther Ex                                                                                                                     Ther Activity                                       Gait Training                                       Modalities

## 2025-05-28 ENCOUNTER — OFFICE VISIT (OUTPATIENT)
Dept: PHYSICAL THERAPY | Facility: MEDICAL CENTER | Age: 75
End: 2025-05-28
Payer: MEDICARE

## 2025-05-28 DIAGNOSIS — G89.29 CHRONIC BILATERAL LOW BACK PAIN WITHOUT SCIATICA: Primary | ICD-10-CM

## 2025-05-28 DIAGNOSIS — M54.50 CHRONIC BILATERAL LOW BACK PAIN WITHOUT SCIATICA: Primary | ICD-10-CM

## 2025-05-28 PROCEDURE — 97140 MANUAL THERAPY 1/> REGIONS: CPT | Performed by: PHYSICAL THERAPIST

## 2025-05-30 ENCOUNTER — OFFICE VISIT (OUTPATIENT)
Dept: PHYSICAL THERAPY | Facility: MEDICAL CENTER | Age: 75
End: 2025-05-30
Payer: MEDICARE

## 2025-05-30 DIAGNOSIS — G89.29 CHRONIC BILATERAL LOW BACK PAIN WITHOUT SCIATICA: Primary | ICD-10-CM

## 2025-05-30 DIAGNOSIS — M54.50 CHRONIC BILATERAL LOW BACK PAIN WITHOUT SCIATICA: Primary | ICD-10-CM

## 2025-05-30 PROCEDURE — 97140 MANUAL THERAPY 1/> REGIONS: CPT | Performed by: PHYSICAL THERAPIST

## 2025-05-30 NOTE — PROGRESS NOTES
Daily Note     Today's date: 2025  Patient name: Richard M Goldberg  : 1950  MRN: [id number]  Referring provider: Clay Glover MD  Dx:   Encounter Diagnosis     ICD-10-CM    1. Chronic bilateral low back pain without sciatica  M54.50     G89.29             Start Time: 1430  Stop Time: 1445  Total time in clinic (min): 15 minutes    Subjective: Sp reports general stiffness and aching in low back      Objective: See treatment diary below      Assessment: Tolerated treatment well. Patient with notable improvement in motion and pain post mobs, continue on maintenance program       Plan: Continue per plan of care.      Precautions: None      Manuals             Lumbar mob's AF                                                    Neuro Re-Ed                                                                                                        Ther Ex                                                                                                                     Ther Activity                                       Gait Training                                       Modalities

## 2025-05-31 NOTE — PROGRESS NOTES
Daily Note     Today's date: 2025  Patient name: Richard M Goldberg  : 1950  MRN: [id number]  Referring provider: Clay Glover MD  Dx:   Encounter Diagnosis     ICD-10-CM    1. Chronic bilateral low back pain without sciatica  M54.50     G89.29                        Subjective: Sp reports general stiffness and aching in low back      Objective: See treatment diary below      Assessment: Tolerated treatment well. Patient with notable improvement in motion and pain post mobs, continue on maintenance program       Plan: Continue per plan of care.      Precautions: None      Manuals             Lumbar mob's AF                                                    Neuro Re-Ed                                                                                                        Ther Ex                                                                                                                     Ther Activity                                       Gait Training                                       Modalities

## 2025-06-02 ENCOUNTER — OFFICE VISIT (OUTPATIENT)
Dept: PHYSICAL THERAPY | Facility: MEDICAL CENTER | Age: 75
End: 2025-06-02
Payer: MEDICARE

## 2025-06-02 DIAGNOSIS — M54.50 CHRONIC BILATERAL LOW BACK PAIN WITHOUT SCIATICA: Primary | ICD-10-CM

## 2025-06-02 DIAGNOSIS — G89.29 CHRONIC BILATERAL LOW BACK PAIN WITHOUT SCIATICA: Primary | ICD-10-CM

## 2025-06-02 PROCEDURE — 97140 MANUAL THERAPY 1/> REGIONS: CPT | Performed by: PHYSICAL THERAPIST

## 2025-06-04 ENCOUNTER — APPOINTMENT (OUTPATIENT)
Dept: PHYSICAL THERAPY | Facility: MEDICAL CENTER | Age: 75
End: 2025-06-04
Payer: MEDICARE

## 2025-06-04 NOTE — PROGRESS NOTES
Daily Note     Today's date: 2025  Patient name: Richard M Goldberg  : 1950  MRN: [id number]  Referring provider: Clay Glover MD  Dx:   Encounter Diagnosis     ICD-10-CM    1. Chronic bilateral low back pain without sciatica  M54.50     G89.29                        Subjective: Sp reports general stiffness and aching in low back      Objective: See treatment diary below      Assessment: Tolerated treatment well. Patient with notable improvement in motion and pain post mobs, continue on maintenance program       Plan: Continue per plan of care.      Precautions: None      Manuals /            Lumbar mob's AF                                                    Neuro Re-Ed                                                                                                        Ther Ex                                                                                                                     Ther Activity                                       Gait Training                                       Modalities